# Patient Record
Sex: FEMALE | Race: WHITE | Employment: OTHER | URBAN - NONMETROPOLITAN AREA
[De-identification: names, ages, dates, MRNs, and addresses within clinical notes are randomized per-mention and may not be internally consistent; named-entity substitution may affect disease eponyms.]

---

## 2017-01-05 ENCOUNTER — OFFICE VISIT (OUTPATIENT)
Dept: GASTROENTEROLOGY | Age: 72
End: 2017-01-05
Payer: MEDICARE

## 2017-01-05 VITALS
SYSTOLIC BLOOD PRESSURE: 130 MMHG | DIASTOLIC BLOOD PRESSURE: 76 MMHG | HEART RATE: 77 BPM | RESPIRATION RATE: 16 BRPM | BODY MASS INDEX: 27.49 KG/M2 | HEIGHT: 64 IN | OXYGEN SATURATION: 96 % | WEIGHT: 161 LBS

## 2017-01-05 DIAGNOSIS — Z86.010 HX OF ADENOMATOUS COLONIC POLYPS: ICD-10-CM

## 2017-01-05 DIAGNOSIS — Z86.010 PERSONAL HISTORY OF COLONIC POLYPS: Primary | ICD-10-CM

## 2017-01-05 PROCEDURE — 99214 OFFICE O/P EST MOD 30 MIN: CPT | Performed by: NURSE PRACTITIONER

## 2017-01-05 ASSESSMENT — ENCOUNTER SYMPTOMS
ALLERGIC/IMMUNOLOGIC NEGATIVE: 1
ABDOMINAL PAIN: 0
CONSTIPATION: 0
SHORTNESS OF BREATH: 0
COUGH: 0
VOICE CHANGE: 0
DIARRHEA: 0
RECTAL PAIN: 0
VOMITING: 0
NAUSEA: 0
BACK PAIN: 0
SORE THROAT: 0
CHEST TIGHTNESS: 0
BLOOD IN STOOL: 0
EYES NEGATIVE: 1

## 2017-01-09 ENCOUNTER — ANESTHESIA EVENT (OUTPATIENT)
Dept: ENDOSCOPY | Age: 72
End: 2017-01-09
Payer: MEDICARE

## 2017-01-11 ENCOUNTER — ANESTHESIA (OUTPATIENT)
Dept: ENDOSCOPY | Age: 72
End: 2017-01-11
Payer: MEDICARE

## 2017-01-11 ENCOUNTER — APPOINTMENT (OUTPATIENT)
Dept: GENERAL RADIOLOGY | Age: 72
End: 2017-01-11
Attending: INTERNAL MEDICINE
Payer: MEDICARE

## 2017-01-11 ENCOUNTER — SURGERY (OUTPATIENT)
Age: 72
End: 2017-01-11

## 2017-01-11 VITALS
OXYGEN SATURATION: 96 % | SYSTOLIC BLOOD PRESSURE: 149 MMHG | DIASTOLIC BLOOD PRESSURE: 86 MMHG | RESPIRATION RATE: 5 BRPM

## 2017-01-11 PROCEDURE — 6360000002 HC RX W HCPCS: Performed by: NURSE ANESTHETIST, CERTIFIED REGISTERED

## 2017-01-11 PROCEDURE — 2500000003 HC RX 250 WO HCPCS: Performed by: NURSE ANESTHETIST, CERTIFIED REGISTERED

## 2017-01-11 PROCEDURE — 74328 X-RAY BILE DUCT ENDOSCOPY: CPT

## 2017-01-11 RX ORDER — SUCCINYLCHOLINE CHLORIDE 20 MG/ML
INJECTION INTRAMUSCULAR; INTRAVENOUS PRN
Status: DISCONTINUED | OUTPATIENT
Start: 2017-01-11 | End: 2017-01-11 | Stop reason: SDUPTHER

## 2017-01-11 RX ORDER — MIDAZOLAM HYDROCHLORIDE 1 MG/ML
INJECTION INTRAMUSCULAR; INTRAVENOUS PRN
Status: DISCONTINUED | OUTPATIENT
Start: 2017-01-11 | End: 2017-01-11 | Stop reason: SDUPTHER

## 2017-01-11 RX ORDER — ROCURONIUM BROMIDE 10 MG/ML
INJECTION, SOLUTION INTRAVENOUS PRN
Status: DISCONTINUED | OUTPATIENT
Start: 2017-01-11 | End: 2017-01-11 | Stop reason: SDUPTHER

## 2017-01-11 RX ORDER — PROPOFOL 10 MG/ML
INJECTION, EMULSION INTRAVENOUS PRN
Status: DISCONTINUED | OUTPATIENT
Start: 2017-01-11 | End: 2017-01-11 | Stop reason: SDUPTHER

## 2017-01-11 RX ORDER — LIDOCAINE HYDROCHLORIDE 10 MG/ML
INJECTION, SOLUTION INFILTRATION; PERINEURAL PRN
Status: DISCONTINUED | OUTPATIENT
Start: 2017-01-11 | End: 2017-01-11 | Stop reason: SDUPTHER

## 2017-01-11 RX ADMIN — SUGAMMADEX 145 MG: 100 INJECTION, SOLUTION INTRAVENOUS at 08:32

## 2017-01-11 RX ADMIN — SUCCINYLCHOLINE CHLORIDE 100 MG: 20 INJECTION, SOLUTION INTRAMUSCULAR; INTRAVENOUS; PARENTERAL at 08:07

## 2017-01-11 RX ADMIN — PROPOFOL 150 MG: 10 INJECTION, EMULSION INTRAVENOUS at 08:04

## 2017-01-11 RX ADMIN — LIDOCAINE HYDROCHLORIDE 40 MG: 10 INJECTION, SOLUTION INFILTRATION; PERINEURAL at 08:04

## 2017-01-11 RX ADMIN — PROPOFOL 50 MG: 10 INJECTION, EMULSION INTRAVENOUS at 08:07

## 2017-01-11 RX ADMIN — ROCURONIUM BROMIDE 15 MG: 10 INJECTION INTRAVENOUS at 08:10

## 2017-01-11 RX ADMIN — ROCURONIUM BROMIDE 5 MG: 10 INJECTION INTRAVENOUS at 08:04

## 2017-01-11 RX ADMIN — INDOMETHACIN 100 MG: 50 SUPPOSITORY RECTAL at 08:19

## 2017-01-11 RX ADMIN — MIDAZOLAM HYDROCHLORIDE 2 MG: 1 INJECTION, SOLUTION INTRAMUSCULAR; INTRAVENOUS at 08:03

## 2017-01-20 DIAGNOSIS — R97.8 ELEVATED CA 19-9 LEVEL: ICD-10-CM

## 2017-01-20 DIAGNOSIS — K83.1 AMPULLARY STENOSIS: Primary | ICD-10-CM

## 2017-01-20 DIAGNOSIS — K83.1 BILIARY STRICTURE: ICD-10-CM

## 2017-01-27 ENCOUNTER — HOSPITAL ENCOUNTER (OUTPATIENT)
Dept: CT IMAGING | Age: 72
Discharge: HOME | End: 2017-01-27
Payer: MEDICARE

## 2017-01-27 DIAGNOSIS — R97.8 ELEVATED CA 19-9 LEVEL: ICD-10-CM

## 2017-01-27 DIAGNOSIS — K83.1 BILIARY STRICTURE: ICD-10-CM

## 2017-01-27 DIAGNOSIS — K83.1 AMPULLARY STENOSIS: ICD-10-CM

## 2017-01-27 LAB
GFR NON-AFRICAN AMERICAN: 37
PERFORMED ON: ABNORMAL
POC CREATININE: 1.4 MG/DL (ref 0.3–1.3)
POC SAMPLE TYPE: ABNORMAL

## 2017-01-27 PROCEDURE — 82565 ASSAY OF CREATININE: CPT

## 2017-01-27 PROCEDURE — 74177 CT ABD & PELVIS W/CONTRAST: CPT

## 2017-01-27 PROCEDURE — 6360000004 HC RX CONTRAST MEDICATION

## 2017-02-01 ENCOUNTER — HOSPITAL ENCOUNTER (INPATIENT)
Age: 72
LOS: 2 days | Discharge: NURSING, REHAB OR LONG-TERM CARE | DRG: 552 | End: 2017-02-03
Attending: EMERGENCY MEDICINE | Admitting: FAMILY MEDICINE
Payer: MEDICARE

## 2017-02-01 ENCOUNTER — APPOINTMENT (OUTPATIENT)
Dept: CT IMAGING | Age: 72
DRG: 552 | End: 2017-02-01
Payer: MEDICARE

## 2017-02-01 DIAGNOSIS — R79.89 ELEVATED SERUM CREATININE: ICD-10-CM

## 2017-02-01 DIAGNOSIS — R73.9 HYPERGLYCEMIA: ICD-10-CM

## 2017-02-01 DIAGNOSIS — S32.029A CLOSED FRACTURE OF SECOND LUMBAR VERTEBRA, UNSPECIFIED FRACTURE MORPHOLOGY, INITIAL ENCOUNTER (HCC): ICD-10-CM

## 2017-02-01 DIAGNOSIS — R93.89 ABNORMAL CT SCAN: ICD-10-CM

## 2017-02-01 DIAGNOSIS — S32.019A CLOSED FRACTURE OF FIRST LUMBAR VERTEBRA, UNSPECIFIED FRACTURE MORPHOLOGY, INITIAL ENCOUNTER (HCC): ICD-10-CM

## 2017-02-01 DIAGNOSIS — R74.8 LIVER ENZYME ELEVATION: ICD-10-CM
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
## 2017-02-01 LAB
ALBUMIN SERPL-MCNC: 3.5 G/DL (ref 3.5–5.2)
ALP BLD-CCNC: 104 U/L (ref 35–104)
ALT SERPL-CCNC: 45 U/L (ref 5–33)
ANION GAP SERPL CALCULATED.3IONS-SCNC: 19 MMOL/L (ref 7–19)
AST SERPL-CCNC: 63 U/L (ref 5–32)
BILIRUB SERPL-MCNC: 0.6 MG/DL (ref 0.2–1.2)
BUN BLDV-MCNC: 20 MG/DL (ref 8–23)
CALCIUM SERPL-MCNC: 9.6 MG/DL (ref 8.8–10.2)
CHLORIDE BLD-SCNC: 97 MMOL/L (ref 98–111)
CO2: 25 MMOL/L (ref 22–29)
CREAT SERPL-MCNC: 1.1 MG/DL (ref 0.5–0.9)
GFR NON-AFRICAN AMERICAN: 49
GLOBULIN: 4.3 G/DL
GLUCOSE BLD-MCNC: 337 MG/DL (ref 74–109)
HCT VFR BLD CALC: 39.7 % (ref 37–47)
HEMOGLOBIN: 13.6 G/DL (ref 12–16)
LACTIC ACID: 1 MG/DL (ref 0.5–1.9)
LIPASE: 60 U/L (ref 13–60)
MCH RBC QN AUTO: 31.6 PG (ref 27–31)
MCHC RBC AUTO-ENTMCNC: 34.3 G/DL (ref 33–37)
MCV RBC AUTO: 92.3 FL (ref 81–99)
PDW BLD-RTO: 12.7 % (ref 11.5–14.5)
PERFORMED ON: NORMAL
PLATELET # BLD: 154 K/UL (ref 130–400)
PMV BLD AUTO: 11.4 FL (ref 7.4–10.4)
POC TROPONIN I: 0 NG/ML (ref 0–0.08)
POTASSIUM SERPL-SCNC: 3.9 MMOL/L (ref 3.5–5)
RBC # BLD: 4.3 M/UL (ref 4.2–5.4)
SODIUM BLD-SCNC: 141 MMOL/L (ref 136–145)
TOTAL PROTEIN: 7.8 G/DL (ref 6.6–8.7)
WBC # BLD: 10.1 K/UL (ref 4.8–10.8)

## 2017-02-01 PROCEDURE — 72131 CT LUMBAR SPINE W/O DYE: CPT

## 2017-02-01 PROCEDURE — 36415 COLL VENOUS BLD VENIPUNCTURE: CPT

## 2017-02-01 PROCEDURE — 96374 THER/PROPH/DIAG INJ IV PUSH: CPT

## 2017-02-01 PROCEDURE — 96375 TX/PRO/DX INJ NEW DRUG ADDON: CPT

## 2017-02-01 PROCEDURE — 74177 CT ABD & PELVIS W/CONTRAST: CPT

## 2017-02-01 PROCEDURE — 2580000003 HC RX 258: Performed by: EMERGENCY MEDICINE

## 2017-02-01 PROCEDURE — 80053 COMPREHEN METABOLIC PANEL: CPT

## 2017-02-01 PROCEDURE — 83605 ASSAY OF LACTIC ACID: CPT

## 2017-02-01 PROCEDURE — 85027 COMPLETE CBC AUTOMATED: CPT

## 2017-02-01 PROCEDURE — 6360000004 HC RX CONTRAST MEDICATION: Performed by: EMERGENCY MEDICINE

## 2017-02-01 PROCEDURE — 93005 ELECTROCARDIOGRAM TRACING: CPT

## 2017-02-01 PROCEDURE — 6370000000 HC RX 637 (ALT 250 FOR IP)

## 2017-02-01 PROCEDURE — 83690 ASSAY OF LIPASE: CPT

## 2017-02-01 PROCEDURE — 84484 ASSAY OF TROPONIN QUANT: CPT

## 2017-02-01 PROCEDURE — 1210000000 HC MED SURG R&B

## 2017-02-01 PROCEDURE — 99284 EMERGENCY DEPT VISIT MOD MDM: CPT | Performed by: EMERGENCY MEDICINE

## 2017-02-01 PROCEDURE — 6360000002 HC RX W HCPCS: Performed by: EMERGENCY MEDICINE

## 2017-02-01 PROCEDURE — 99285 EMERGENCY DEPT VISIT HI MDM: CPT

## 2017-02-01 PROCEDURE — 6370000000 HC RX 637 (ALT 250 FOR IP): Performed by: EMERGENCY MEDICINE

## 2017-02-01 RX ORDER — ONDANSETRON 2 MG/ML
4 INJECTION INTRAMUSCULAR; INTRAVENOUS ONCE
Status: COMPLETED | OUTPATIENT
Start: 2017-02-01 | End: 2017-02-01

## 2017-02-01 RX ORDER — MORPHINE SULFATE 4 MG/ML
4 INJECTION, SOLUTION INTRAMUSCULAR; INTRAVENOUS ONCE
Status: COMPLETED | OUTPATIENT
Start: 2017-02-01 | End: 2017-02-01

## 2017-02-01 RX ORDER — SODIUM CHLORIDE 0.9 % (FLUSH) 0.9 %
10 SYRINGE (ML) INJECTION PRN
Status: DISCONTINUED | OUTPATIENT
Start: 2017-02-01 | End: 2017-02-03 | Stop reason: HOSPADM

## 2017-02-01 RX ORDER — ONDANSETRON 2 MG/ML
4 INJECTION INTRAMUSCULAR; INTRAVENOUS EVERY 8 HOURS PRN
Status: DISCONTINUED | OUTPATIENT
Start: 2017-02-01 | End: 2017-02-03 | Stop reason: HOSPADM

## 2017-02-01 RX ORDER — CLONIDINE HYDROCHLORIDE 0.1 MG/1
TABLET ORAL
Status: COMPLETED
Start: 2017-02-01 | End: 2017-02-01

## 2017-02-01 RX ORDER — SODIUM CHLORIDE 0.9 % (FLUSH) 0.9 %
10 SYRINGE (ML) INJECTION EVERY 12 HOURS SCHEDULED
Status: DISCONTINUED | OUTPATIENT
Start: 2017-02-01 | End: 2017-02-03 | Stop reason: HOSPADM

## 2017-02-01 RX ORDER — GLIMEPIRIDE 4 MG/1
4 TABLET ORAL ONCE
Status: COMPLETED | OUTPATIENT
Start: 2017-02-01 | End: 2017-02-01

## 2017-02-01 RX ORDER — SODIUM CHLORIDE 9 MG/ML
INJECTION, SOLUTION INTRAVENOUS CONTINUOUS
Status: DISCONTINUED | OUTPATIENT
Start: 2017-02-01 | End: 2017-02-01

## 2017-02-01 RX ORDER — CLONIDINE HYDROCHLORIDE 0.1 MG/1
0.1 TABLET ORAL ONCE
Status: COMPLETED | OUTPATIENT
Start: 2017-02-01 | End: 2017-02-01

## 2017-02-01 RX ORDER — MORPHINE SULFATE 4 MG/ML
2 INJECTION, SOLUTION INTRAMUSCULAR; INTRAVENOUS
Status: DISCONTINUED | OUTPATIENT
Start: 2017-02-01 | End: 2017-02-03 | Stop reason: HOSPADM

## 2017-02-01 RX ORDER — SODIUM CHLORIDE 9 MG/ML
INJECTION, SOLUTION INTRAVENOUS CONTINUOUS
Status: DISCONTINUED | OUTPATIENT
Start: 2017-02-01 | End: 2017-02-03 | Stop reason: HOSPADM

## 2017-02-01 RX ADMIN — IOVERSOL 90 ML: 741 INJECTION INTRA-ARTERIAL; INTRAVENOUS at 20:56

## 2017-02-01 RX ADMIN — GLIMEPIRIDE 4 MG: 4 TABLET ORAL at 22:30

## 2017-02-01 RX ADMIN — CLONIDINE HYDROCHLORIDE 0.1 MG: 0.1 TABLET ORAL at 22:31

## 2017-02-01 RX ADMIN — ONDANSETRON 4 MG: 2 INJECTION INTRAMUSCULAR; INTRAVENOUS at 21:12

## 2017-02-01 RX ADMIN — MORPHINE SULFATE 4 MG: 4 INJECTION, SOLUTION INTRAMUSCULAR; INTRAVENOUS at 21:12

## 2017-02-01 RX ADMIN — SODIUM CHLORIDE: 9 INJECTION, SOLUTION INTRAVENOUS at 21:12

## 2017-02-01 ASSESSMENT — PAIN SCALES - GENERAL
PAINLEVEL_OUTOF10: 10
PAINLEVEL_OUTOF10: 10

## 2017-02-01 ASSESSMENT — ENCOUNTER SYMPTOMS
DIARRHEA: 0
EYE PAIN: 0
SHORTNESS OF BREATH: 0
ABDOMINAL PAIN: 0
NAUSEA: 1
BACK PAIN: 1
VOMITING: 1

## 2017-02-01 ASSESSMENT — PAIN DESCRIPTION - PAIN TYPE: TYPE: ACUTE PAIN

## 2017-02-01 ASSESSMENT — PAIN DESCRIPTION - LOCATION: LOCATION: BACK

## 2017-02-02 ENCOUNTER — APPOINTMENT (OUTPATIENT)
Dept: MRI IMAGING | Age: 72
DRG: 552 | End: 2017-02-02
Payer: MEDICARE

## 2017-02-02 PROBLEM — S32.010A CLOSED COMPRESSION FRACTURE OF FIRST LUMBAR VERTEBRA (HCC): Status: ACTIVE | Noted: 2017-02-02

## 2017-02-02 PROBLEM — S32.020A CLOSED COMPRESSION FRACTURE OF SECOND LUMBAR VERTEBRA (HCC): Status: ACTIVE | Noted: 2017-02-02

## 2017-02-02 LAB
BILIRUBIN URINE: NEGATIVE
BLOOD, URINE: ABNORMAL
CLARITY: CLEAR
COLOR: YELLOW
GLUCOSE BLD-MCNC: 432 MG/DL (ref 70–99)
GLUCOSE BLD-MCNC: 447 MG/DL (ref 70–99)
GLUCOSE BLD-MCNC: 516 MG/DL (ref 70–99)
GLUCOSE BLD-MCNC: 522 MG/DL (ref 70–99)
GLUCOSE BLD-MCNC: 532 MG/DL (ref 70–99)
GLUCOSE BLD-MCNC: 562 MG/DL (ref 74–109)
GLUCOSE URINE: >=1000 MG/DL
KETONES, URINE: 40 MG/DL
LEUKOCYTE ESTERASE, URINE: NEGATIVE
NITRITE, URINE: NEGATIVE
PERFORMED ON: ABNORMAL
PH UA: 6
PROTEIN UA: >=1000 MG/DL
SPECIFIC GRAVITY UA: 1.04
UROBILINOGEN, URINE: 0.2 E.U./DL

## 2017-02-02 PROCEDURE — 81003 URINALYSIS AUTO W/O SCOPE: CPT

## 2017-02-02 PROCEDURE — 82948 REAGENT STRIP/BLOOD GLUCOSE: CPT

## 2017-02-02 PROCEDURE — 1210000000 HC MED SURG R&B

## 2017-02-02 PROCEDURE — 6360000002 HC RX W HCPCS: Performed by: FAMILY MEDICINE

## 2017-02-02 PROCEDURE — 6370000000 HC RX 637 (ALT 250 FOR IP): Performed by: FAMILY MEDICINE

## 2017-02-02 PROCEDURE — 82947 ASSAY GLUCOSE BLOOD QUANT: CPT

## 2017-02-02 PROCEDURE — 72148 MRI LUMBAR SPINE W/O DYE: CPT

## 2017-02-02 PROCEDURE — 36415 COLL VENOUS BLD VENIPUNCTURE: CPT

## 2017-02-02 PROCEDURE — 2580000003 HC RX 258: Performed by: FAMILY MEDICINE

## 2017-02-02 RX ORDER — NEBIVOLOL 5 MG/1
10 TABLET ORAL DAILY
Status: DISCONTINUED | OUTPATIENT
Start: 2017-02-02 | End: 2017-02-03 | Stop reason: HOSPADM

## 2017-02-02 RX ORDER — LOSARTAN POTASSIUM 100 MG/1
100 TABLET ORAL DAILY
Status: DISCONTINUED | OUTPATIENT
Start: 2017-02-02 | End: 2017-02-03 | Stop reason: HOSPADM

## 2017-02-02 RX ORDER — OLMESARTAN MEDOXOMIL, AMLODIPINE AND HYDROCHLOROTHIAZIDE TABLET 40/10/25 MG 40; 10; 25 MG/1; MG/1; MG/1
1 TABLET ORAL DAILY
Status: DISCONTINUED | OUTPATIENT
Start: 2017-02-02 | End: 2017-02-02 | Stop reason: CLARIF

## 2017-02-02 RX ORDER — NICOTINE POLACRILEX 4 MG
15 LOZENGE BUCCAL PRN
Status: DISCONTINUED | OUTPATIENT
Start: 2017-02-02 | End: 2017-02-03 | Stop reason: HOSPADM

## 2017-02-02 RX ORDER — DEXTROSE MONOHYDRATE 50 MG/ML
100 INJECTION, SOLUTION INTRAVENOUS PRN
Status: DISCONTINUED | OUTPATIENT
Start: 2017-02-02 | End: 2017-02-03 | Stop reason: HOSPADM

## 2017-02-02 RX ORDER — DEXTROSE MONOHYDRATE 25 G/50ML
12.5 INJECTION, SOLUTION INTRAVENOUS PRN
Status: DISCONTINUED | OUTPATIENT
Start: 2017-02-02 | End: 2017-02-03 | Stop reason: HOSPADM

## 2017-02-02 RX ORDER — GLIMEPIRIDE 4 MG/1
4 TABLET ORAL 2 TIMES DAILY
Status: DISCONTINUED | OUTPATIENT
Start: 2017-02-02 | End: 2017-02-03

## 2017-02-02 RX ORDER — AMLODIPINE BESYLATE 10 MG/1
10 TABLET ORAL DAILY
Status: DISCONTINUED | OUTPATIENT
Start: 2017-02-02 | End: 2017-02-03 | Stop reason: HOSPADM

## 2017-02-02 RX ORDER — INSULIN GLARGINE 100 [IU]/ML
10 INJECTION, SOLUTION SUBCUTANEOUS ONCE
Status: COMPLETED | OUTPATIENT
Start: 2017-02-02 | End: 2017-02-02

## 2017-02-02 RX ORDER — HYDROCHLOROTHIAZIDE 25 MG/1
25 TABLET ORAL DAILY
Status: DISCONTINUED | OUTPATIENT
Start: 2017-02-02 | End: 2017-02-03 | Stop reason: HOSPADM

## 2017-02-02 RX ORDER — POTASSIUM CHLORIDE 20 MEQ/1
20 TABLET, EXTENDED RELEASE ORAL 2 TIMES DAILY
Status: DISCONTINUED | OUTPATIENT
Start: 2017-02-02 | End: 2017-02-03 | Stop reason: HOSPADM

## 2017-02-02 RX ADMIN — LOSARTAN POTASSIUM 100 MG: 100 TABLET ORAL at 09:17

## 2017-02-02 RX ADMIN — INSULIN GLARGINE 10 UNITS: 100 INJECTION, SOLUTION SUBCUTANEOUS at 16:01

## 2017-02-02 RX ADMIN — ONDANSETRON 4 MG: 2 INJECTION INTRAMUSCULAR; INTRAVENOUS at 09:28

## 2017-02-02 RX ADMIN — MORPHINE SULFATE 2 MG: 4 INJECTION, SOLUTION INTRAMUSCULAR; INTRAVENOUS at 04:47

## 2017-02-02 RX ADMIN — INSULIN LISPRO 10 UNITS: 100 INJECTION, SOLUTION INTRAVENOUS; SUBCUTANEOUS at 21:32

## 2017-02-02 RX ADMIN — MORPHINE SULFATE 2 MG: 4 INJECTION, SOLUTION INTRAMUSCULAR; INTRAVENOUS at 15:58

## 2017-02-02 RX ADMIN — MORPHINE SULFATE 2 MG: 4 INJECTION, SOLUTION INTRAMUSCULAR; INTRAVENOUS at 21:38

## 2017-02-02 RX ADMIN — ENOXAPARIN SODIUM 40 MG: 40 INJECTION SUBCUTANEOUS at 09:17

## 2017-02-02 RX ADMIN — POTASSIUM CHLORIDE 20 MEQ: 20 TABLET, EXTENDED RELEASE ORAL at 09:17

## 2017-02-02 RX ADMIN — INSULIN LISPRO 18 UNITS: 100 INJECTION, SOLUTION INTRAVENOUS; SUBCUTANEOUS at 12:27

## 2017-02-02 RX ADMIN — MORPHINE SULFATE 2 MG: 4 INJECTION, SOLUTION INTRAMUSCULAR; INTRAVENOUS at 01:19

## 2017-02-02 RX ADMIN — AMLODIPINE BESYLATE 10 MG: 10 TABLET ORAL at 09:17

## 2017-02-02 RX ADMIN — MORPHINE SULFATE 2 MG: 4 INJECTION, SOLUTION INTRAMUSCULAR; INTRAVENOUS at 09:08

## 2017-02-02 RX ADMIN — POTASSIUM CHLORIDE 20 MEQ: 20 TABLET, EXTENDED RELEASE ORAL at 20:37

## 2017-02-02 RX ADMIN — LINAGLIPTIN 5 MG: 5 TABLET, FILM COATED ORAL at 09:17

## 2017-02-02 RX ADMIN — HYDROCHLOROTHIAZIDE 25 MG: 25 TABLET ORAL at 09:17

## 2017-02-02 RX ADMIN — MORPHINE SULFATE 2 MG: 4 INJECTION, SOLUTION INTRAMUSCULAR; INTRAVENOUS at 23:40

## 2017-02-02 RX ADMIN — GLIMEPIRIDE 4 MG: 4 TABLET ORAL at 20:37

## 2017-02-02 RX ADMIN — INSULIN LISPRO 10 UNITS: 100 INJECTION, SOLUTION INTRAVENOUS; SUBCUTANEOUS at 16:01

## 2017-02-02 RX ADMIN — MORPHINE SULFATE 2 MG: 4 INJECTION, SOLUTION INTRAMUSCULAR; INTRAVENOUS at 19:33

## 2017-02-02 RX ADMIN — NEBIVOLOL HYDROCHLORIDE 10 MG: 5 TABLET ORAL at 09:17

## 2017-02-02 RX ADMIN — ONDANSETRON 4 MG: 2 INJECTION INTRAMUSCULAR; INTRAVENOUS at 01:19

## 2017-02-02 RX ADMIN — INSULIN LISPRO 9 UNITS: 100 INJECTION, SOLUTION INTRAVENOUS; SUBCUTANEOUS at 19:38

## 2017-02-02 RX ADMIN — GLIMEPIRIDE 4 MG: 4 TABLET ORAL at 09:17

## 2017-02-02 RX ADMIN — SODIUM CHLORIDE: 9 INJECTION, SOLUTION INTRAVENOUS at 09:28

## 2017-02-02 ASSESSMENT — PAIN SCALES - GENERAL
PAINLEVEL_OUTOF10: 6
PAINLEVEL_OUTOF10: 10
PAINLEVEL_OUTOF10: 4
PAINLEVEL_OUTOF10: 10
PAINLEVEL_OUTOF10: 5
PAINLEVEL_OUTOF10: 10
PAINLEVEL_OUTOF10: 10

## 2017-02-03 ENCOUNTER — HOSPITAL ENCOUNTER (INPATIENT)
Age: 72
LOS: 11 days | Discharge: HOME HEALTH | DRG: 948 | End: 2017-02-14
Attending: PSYCHIATRY & NEUROLOGY | Admitting: PSYCHIATRY & NEUROLOGY
Payer: MEDICARE

## 2017-02-03 VITALS
WEIGHT: 150 LBS | RESPIRATION RATE: 19 BRPM | DIASTOLIC BLOOD PRESSURE: 81 MMHG | OXYGEN SATURATION: 99 % | HEART RATE: 64 BPM | BODY MASS INDEX: 26.58 KG/M2 | HEIGHT: 63 IN | SYSTOLIC BLOOD PRESSURE: 152 MMHG | TEMPERATURE: 98.8 F

## 2017-02-03 DIAGNOSIS — I10 ESSENTIAL HYPERTENSION: Primary | ICD-10-CM

## 2017-02-03 DIAGNOSIS — E11.9 TYPE 2 DIABETES MELLITUS WITHOUT COMPLICATION, WITHOUT LONG-TERM CURRENT USE OF INSULIN (HCC): ICD-10-CM

## 2017-02-03 DIAGNOSIS — S32.020A CLOSED COMPRESSION FRACTURE OF SECOND LUMBAR VERTEBRA (HCC): ICD-10-CM

## 2017-02-03 LAB
ALBUMIN SERPL-MCNC: 2.8 G/DL (ref 3.5–5.2)
ALP BLD-CCNC: 86 U/L (ref 35–104)
ALT SERPL-CCNC: 22 U/L (ref 5–33)
ANION GAP SERPL CALCULATED.3IONS-SCNC: 12 MMOL/L (ref 7–19)
ANION GAP SERPL CALCULATED.3IONS-SCNC: 16 MMOL/L (ref 7–19)
AST SERPL-CCNC: 17 U/L (ref 5–32)
BASOPHILS ABSOLUTE: 0 K/UL (ref 0–0.2)
BASOPHILS ABSOLUTE: 0 K/UL (ref 0–0.2)
BASOPHILS RELATIVE PERCENT: 0.1 % (ref 0–1)
BASOPHILS RELATIVE PERCENT: 0.1 % (ref 0–1)
BILIRUB SERPL-MCNC: 0.4 MG/DL (ref 0.2–1.2)
BUN BLDV-MCNC: 36 MG/DL (ref 8–23)
BUN BLDV-MCNC: 38 MG/DL (ref 8–23)
CALCIUM SERPL-MCNC: 8.8 MG/DL (ref 8.8–10.2)
CALCIUM SERPL-MCNC: 9.1 MG/DL (ref 8.8–10.2)
CHLORIDE BLD-SCNC: 101 MMOL/L (ref 98–111)
CHLORIDE BLD-SCNC: 105 MMOL/L (ref 98–111)
CO2: 24 MMOL/L (ref 22–29)
CO2: 24 MMOL/L (ref 22–29)
CREAT SERPL-MCNC: 1.5 MG/DL (ref 0.5–0.9)
CREAT SERPL-MCNC: 1.6 MG/DL (ref 0.5–0.9)
EOSINOPHILS ABSOLUTE: 0 K/UL (ref 0–0.6)
EOSINOPHILS ABSOLUTE: 0 K/UL (ref 0–0.6)
EOSINOPHILS RELATIVE PERCENT: 0 % (ref 0–5)
EOSINOPHILS RELATIVE PERCENT: 0.1 % (ref 0–5)
GFR NON-AFRICAN AMERICAN: 32
GFR NON-AFRICAN AMERICAN: 34
GLOBULIN: 3.5 G/DL
GLUCOSE BLD-MCNC: 152 MG/DL (ref 70–99)
GLUCOSE BLD-MCNC: 181 MG/DL (ref 74–109)
GLUCOSE BLD-MCNC: 187 MG/DL (ref 70–99)
GLUCOSE BLD-MCNC: 199 MG/DL (ref 70–99)
GLUCOSE BLD-MCNC: 246 MG/DL (ref 70–99)
GLUCOSE BLD-MCNC: 247 MG/DL (ref 74–109)
HCT VFR BLD CALC: 36.4 % (ref 37–47)
HCT VFR BLD CALC: 37.8 % (ref 37–47)
HEMOGLOBIN: 11.8 G/DL (ref 12–16)
HEMOGLOBIN: 12.7 G/DL (ref 12–16)
LYMPHOCYTES ABSOLUTE: 0.9 K/UL (ref 1.1–4.5)
LYMPHOCYTES ABSOLUTE: 1 K/UL (ref 1.1–4.5)
LYMPHOCYTES RELATIVE PERCENT: 6.5 % (ref 20–40)
LYMPHOCYTES RELATIVE PERCENT: 7.2 % (ref 20–40)
MCH RBC QN AUTO: 30.4 PG (ref 27–31)
MCH RBC QN AUTO: 31.5 PG (ref 27–31)
MCHC RBC AUTO-ENTMCNC: 32.4 G/DL (ref 33–37)
MCHC RBC AUTO-ENTMCNC: 33.6 G/DL (ref 33–37)
MCV RBC AUTO: 93.8 FL (ref 81–99)
MCV RBC AUTO: 93.8 FL (ref 81–99)
MONOCYTES ABSOLUTE: 0.5 K/UL (ref 0–0.9)
MONOCYTES ABSOLUTE: 0.7 K/UL (ref 0–0.9)
MONOCYTES RELATIVE PERCENT: 4 % (ref 0–10)
MONOCYTES RELATIVE PERCENT: 5.2 % (ref 0–10)
NEUTROPHILS ABSOLUTE: 12.1 K/UL (ref 1.5–7.5)
NEUTROPHILS ABSOLUTE: 12.3 K/UL (ref 1.5–7.5)
NEUTROPHILS RELATIVE PERCENT: 87.1 % (ref 50–65)
NEUTROPHILS RELATIVE PERCENT: 89.3 % (ref 50–65)
PDW BLD-RTO: 13.2 % (ref 11.5–14.5)
PDW BLD-RTO: 13.5 % (ref 11.5–14.5)
PERFORMED ON: ABNORMAL
PLATELET # BLD: 154 K/UL (ref 130–400)
PLATELET # BLD: 187 K/UL (ref 130–400)
PMV BLD AUTO: 11.1 FL (ref 7.4–10.4)
PMV BLD AUTO: 11.5 FL (ref 7.4–10.4)
POTASSIUM SERPL-SCNC: 3.7 MMOL/L (ref 3.5–5)
POTASSIUM SERPL-SCNC: 3.8 MMOL/L (ref 3.5–5)
PREALBUMIN: 20 MG/DL (ref 20–40)
RBC # BLD: 3.88 M/UL (ref 4.2–5.4)
RBC # BLD: 4.03 M/UL (ref 4.2–5.4)
SODIUM BLD-SCNC: 141 MMOL/L (ref 136–145)
SODIUM BLD-SCNC: 141 MMOL/L (ref 136–145)
TOTAL PROTEIN: 6.3 G/DL (ref 6.6–8.7)
WBC # BLD: 13.5 K/UL (ref 4.8–10.8)
WBC # BLD: 14.1 K/UL (ref 4.8–10.8)

## 2017-02-03 PROCEDURE — 6370000000 HC RX 637 (ALT 250 FOR IP): Performed by: FAMILY MEDICINE

## 2017-02-03 PROCEDURE — 2580000003 HC RX 258: Performed by: FAMILY MEDICINE

## 2017-02-03 PROCEDURE — 85025 COMPLETE CBC W/AUTO DIFF WBC: CPT

## 2017-02-03 PROCEDURE — 97163 PT EVAL HIGH COMPLEX 45 MIN: CPT

## 2017-02-03 PROCEDURE — 84134 ASSAY OF PREALBUMIN: CPT

## 2017-02-03 PROCEDURE — 99222 1ST HOSP IP/OBS MODERATE 55: CPT | Performed by: INTERNAL MEDICINE

## 2017-02-03 PROCEDURE — 1180000000 HC REHAB R&B

## 2017-02-03 PROCEDURE — G8988 SELF CARE GOAL STATUS: HCPCS

## 2017-02-03 PROCEDURE — G8978 MOBILITY CURRENT STATUS: HCPCS

## 2017-02-03 PROCEDURE — 80048 BASIC METABOLIC PNL TOTAL CA: CPT

## 2017-02-03 PROCEDURE — 94664 DEMO&/EVAL PT USE INHALER: CPT

## 2017-02-03 PROCEDURE — 80053 COMPREHEN METABOLIC PANEL: CPT

## 2017-02-03 PROCEDURE — 6370000000 HC RX 637 (ALT 250 FOR IP): Performed by: PSYCHIATRY & NEUROLOGY

## 2017-02-03 PROCEDURE — G8979 MOBILITY GOAL STATUS: HCPCS

## 2017-02-03 PROCEDURE — G8987 SELF CARE CURRENT STATUS: HCPCS

## 2017-02-03 PROCEDURE — 82948 REAGENT STRIP/BLOOD GLUCOSE: CPT

## 2017-02-03 PROCEDURE — 2700000000 HC OXYGEN THERAPY PER DAY

## 2017-02-03 PROCEDURE — 36415 COLL VENOUS BLD VENIPUNCTURE: CPT

## 2017-02-03 PROCEDURE — 6360000002 HC RX W HCPCS: Performed by: FAMILY MEDICINE

## 2017-02-03 PROCEDURE — 97167 OT EVAL HIGH COMPLEX 60 MIN: CPT

## 2017-02-03 RX ORDER — 0.9 % SODIUM CHLORIDE 0.9 %
1000 INTRAVENOUS SOLUTION INTRAVENOUS ONCE
Status: COMPLETED | OUTPATIENT
Start: 2017-02-03 | End: 2017-02-03

## 2017-02-03 RX ORDER — DEXTROSE MONOHYDRATE 50 MG/ML
100 INJECTION, SOLUTION INTRAVENOUS PRN
Status: CANCELLED | OUTPATIENT
Start: 2017-02-03

## 2017-02-03 RX ORDER — NICOTINE POLACRILEX 4 MG
15 LOZENGE BUCCAL PRN
Status: CANCELLED | OUTPATIENT
Start: 2017-02-03

## 2017-02-03 RX ORDER — SODIUM CHLORIDE 9 MG/ML
INJECTION, SOLUTION INTRAVENOUS CONTINUOUS
Status: DISCONTINUED | OUTPATIENT
Start: 2017-02-03 | End: 2017-02-07

## 2017-02-03 RX ORDER — ONDANSETRON 2 MG/ML
4 INJECTION INTRAMUSCULAR; INTRAVENOUS EVERY 8 HOURS PRN
Status: DISCONTINUED | OUTPATIENT
Start: 2017-02-03 | End: 2017-02-14 | Stop reason: HOSPADM

## 2017-02-03 RX ORDER — SODIUM PHOSPHATE, DIBASIC AND SODIUM PHOSPHATE, MONOBASIC 7; 19 G/133ML; G/133ML
1 ENEMA RECTAL
Status: ACTIVE | OUTPATIENT
Start: 2017-02-03 | End: 2017-02-03

## 2017-02-03 RX ORDER — MORPHINE SULFATE 4 MG/ML
2 INJECTION, SOLUTION INTRAMUSCULAR; INTRAVENOUS
Status: CANCELLED | OUTPATIENT
Start: 2017-02-03

## 2017-02-03 RX ORDER — DEXTROSE MONOHYDRATE 50 MG/ML
100 INJECTION, SOLUTION INTRAVENOUS PRN
Status: DISCONTINUED | OUTPATIENT
Start: 2017-02-03 | End: 2017-02-14 | Stop reason: HOSPADM

## 2017-02-03 RX ORDER — HYDROCODONE BITARTRATE AND ACETAMINOPHEN 5; 325 MG/1; MG/1
1 TABLET ORAL EVERY 6 HOURS PRN
Status: DISCONTINUED | OUTPATIENT
Start: 2017-02-03 | End: 2017-02-03 | Stop reason: HOSPADM

## 2017-02-03 RX ORDER — POTASSIUM CHLORIDE 20 MEQ/1
20 TABLET, EXTENDED RELEASE ORAL 2 TIMES DAILY
Status: CANCELLED | OUTPATIENT
Start: 2017-02-03

## 2017-02-03 RX ORDER — NEBIVOLOL 5 MG/1
10 TABLET ORAL DAILY
Status: CANCELLED | OUTPATIENT
Start: 2017-02-04

## 2017-02-03 RX ORDER — POLYETHYLENE GLYCOL 3350 17 G/17G
17 POWDER, FOR SOLUTION ORAL DAILY
Status: DISCONTINUED | OUTPATIENT
Start: 2017-02-03 | End: 2017-02-14 | Stop reason: HOSPADM

## 2017-02-03 RX ORDER — ONDANSETRON 2 MG/ML
4 INJECTION INTRAMUSCULAR; INTRAVENOUS EVERY 8 HOURS PRN
Status: CANCELLED | OUTPATIENT
Start: 2017-02-03

## 2017-02-03 RX ORDER — HYDROCODONE BITARTRATE AND ACETAMINOPHEN 5; 325 MG/1; MG/1
2 TABLET ORAL EVERY 6 HOURS PRN
Status: DISCONTINUED | OUTPATIENT
Start: 2017-02-03 | End: 2017-02-03 | Stop reason: HOSPADM

## 2017-02-03 RX ORDER — SODIUM CHLORIDE 9 MG/ML
INJECTION, SOLUTION INTRAVENOUS CONTINUOUS
Status: CANCELLED | OUTPATIENT
Start: 2017-02-03

## 2017-02-03 RX ORDER — INSULIN GLARGINE 100 [IU]/ML
20 INJECTION, SOLUTION SUBCUTANEOUS DAILY
Status: DISCONTINUED | OUTPATIENT
Start: 2017-02-03 | End: 2017-02-03 | Stop reason: HOSPADM

## 2017-02-03 RX ORDER — POTASSIUM CHLORIDE 20 MEQ/1
20 TABLET, EXTENDED RELEASE ORAL 2 TIMES DAILY
Status: DISCONTINUED | OUTPATIENT
Start: 2017-02-03 | End: 2017-02-14 | Stop reason: HOSPADM

## 2017-02-03 RX ORDER — HYDROCODONE BITARTRATE AND ACETAMINOPHEN 5; 325 MG/1; MG/1
1 TABLET ORAL EVERY 6 HOURS PRN
Status: DISCONTINUED | OUTPATIENT
Start: 2017-02-03 | End: 2017-02-04 | Stop reason: ALTCHOICE

## 2017-02-03 RX ORDER — SODIUM CHLORIDE 0.9 % (FLUSH) 0.9 %
10 SYRINGE (ML) INJECTION PRN
Status: CANCELLED | OUTPATIENT
Start: 2017-02-03

## 2017-02-03 RX ORDER — HYDROCHLOROTHIAZIDE 25 MG/1
25 TABLET ORAL DAILY
Status: CANCELLED | OUTPATIENT
Start: 2017-02-04

## 2017-02-03 RX ORDER — HYDROCHLOROTHIAZIDE 25 MG/1
25 TABLET ORAL DAILY
Status: DISCONTINUED | OUTPATIENT
Start: 2017-02-04 | End: 2017-02-14 | Stop reason: HOSPADM

## 2017-02-03 RX ORDER — HYDROCODONE BITARTRATE AND ACETAMINOPHEN 5; 325 MG/1; MG/1
2 TABLET ORAL EVERY 6 HOURS PRN
Status: DISCONTINUED | OUTPATIENT
Start: 2017-02-03 | End: 2017-02-04 | Stop reason: ALTCHOICE

## 2017-02-03 RX ORDER — INSULIN GLARGINE 100 [IU]/ML
20 INJECTION, SOLUTION SUBCUTANEOUS DAILY
Status: DISCONTINUED | OUTPATIENT
Start: 2017-02-04 | End: 2017-02-11

## 2017-02-03 RX ORDER — NICOTINE POLACRILEX 4 MG
15 LOZENGE BUCCAL PRN
Status: DISCONTINUED | OUTPATIENT
Start: 2017-02-03 | End: 2017-02-14 | Stop reason: HOSPADM

## 2017-02-03 RX ORDER — HYDROCODONE BITARTRATE AND ACETAMINOPHEN 5; 325 MG/1; MG/1
1 TABLET ORAL EVERY 6 HOURS PRN
Status: CANCELLED | OUTPATIENT
Start: 2017-02-03

## 2017-02-03 RX ORDER — DEXTROSE MONOHYDRATE 25 G/50ML
12.5 INJECTION, SOLUTION INTRAVENOUS PRN
Status: DISCONTINUED | OUTPATIENT
Start: 2017-02-03 | End: 2017-02-14 | Stop reason: HOSPADM

## 2017-02-03 RX ORDER — AMLODIPINE BESYLATE 10 MG/1
10 TABLET ORAL DAILY
Status: CANCELLED | OUTPATIENT
Start: 2017-02-04

## 2017-02-03 RX ORDER — AMLODIPINE BESYLATE 10 MG/1
10 TABLET ORAL DAILY
Status: DISCONTINUED | OUTPATIENT
Start: 2017-02-04 | End: 2017-02-14 | Stop reason: HOSPADM

## 2017-02-03 RX ORDER — ONDANSETRON 4 MG/1
4 TABLET, FILM COATED ORAL EVERY 6 HOURS PRN
Status: DISCONTINUED | OUTPATIENT
Start: 2017-02-03 | End: 2017-02-14 | Stop reason: HOSPADM

## 2017-02-03 RX ORDER — MORPHINE SULFATE 4 MG/ML
2 INJECTION, SOLUTION INTRAMUSCULAR; INTRAVENOUS
Status: DISCONTINUED | OUTPATIENT
Start: 2017-02-03 | End: 2017-02-14 | Stop reason: HOSPADM

## 2017-02-03 RX ORDER — SODIUM CHLORIDE 0.9 % (FLUSH) 0.9 %
10 SYRINGE (ML) INJECTION EVERY 12 HOURS SCHEDULED
Status: CANCELLED | OUTPATIENT
Start: 2017-02-03

## 2017-02-03 RX ORDER — HYDROCODONE BITARTRATE AND ACETAMINOPHEN 5; 325 MG/1; MG/1
2 TABLET ORAL EVERY 6 HOURS PRN
Status: CANCELLED | OUTPATIENT
Start: 2017-02-03

## 2017-02-03 RX ORDER — NEBIVOLOL 5 MG/1
10 TABLET ORAL DAILY
Status: DISCONTINUED | OUTPATIENT
Start: 2017-02-04 | End: 2017-02-14 | Stop reason: HOSPADM

## 2017-02-03 RX ORDER — LOSARTAN POTASSIUM 100 MG/1
100 TABLET ORAL DAILY
Status: CANCELLED | OUTPATIENT
Start: 2017-02-04

## 2017-02-03 RX ORDER — DOCUSATE SODIUM 100 MG/1
100 CAPSULE, LIQUID FILLED ORAL 2 TIMES DAILY
Status: DISCONTINUED | OUTPATIENT
Start: 2017-02-03 | End: 2017-02-14 | Stop reason: HOSPADM

## 2017-02-03 RX ORDER — LOSARTAN POTASSIUM 50 MG/1
100 TABLET ORAL DAILY
Status: DISCONTINUED | OUTPATIENT
Start: 2017-02-04 | End: 2017-02-14 | Stop reason: HOSPADM

## 2017-02-03 RX ORDER — DEXTROSE MONOHYDRATE 25 G/50ML
12.5 INJECTION, SOLUTION INTRAVENOUS PRN
Status: CANCELLED | OUTPATIENT
Start: 2017-02-03

## 2017-02-03 RX ORDER — SODIUM CHLORIDE 0.9 % (FLUSH) 0.9 %
10 SYRINGE (ML) INJECTION PRN
Status: DISCONTINUED | OUTPATIENT
Start: 2017-02-03 | End: 2017-02-14 | Stop reason: HOSPADM

## 2017-02-03 RX ORDER — BISACODYL 10 MG
10 SUPPOSITORY, RECTAL RECTAL DAILY PRN
Status: DISCONTINUED | OUTPATIENT
Start: 2017-02-03 | End: 2017-02-14 | Stop reason: HOSPADM

## 2017-02-03 RX ORDER — SODIUM CHLORIDE 0.9 % (FLUSH) 0.9 %
10 SYRINGE (ML) INJECTION EVERY 12 HOURS SCHEDULED
Status: DISCONTINUED | OUTPATIENT
Start: 2017-02-03 | End: 2017-02-09

## 2017-02-03 RX ORDER — INSULIN GLARGINE 100 [IU]/ML
20 INJECTION, SOLUTION SUBCUTANEOUS DAILY
Status: CANCELLED | OUTPATIENT
Start: 2017-02-04

## 2017-02-03 RX ORDER — ACETAMINOPHEN 325 MG/1
650 TABLET ORAL EVERY 4 HOURS PRN
Status: DISCONTINUED | OUTPATIENT
Start: 2017-02-03 | End: 2017-02-14 | Stop reason: HOSPADM

## 2017-02-03 RX ADMIN — DOCUSATE SODIUM 100 MG: 100 CAPSULE, LIQUID FILLED ORAL at 22:30

## 2017-02-03 RX ADMIN — AMLODIPINE BESYLATE 10 MG: 10 TABLET ORAL at 08:33

## 2017-02-03 RX ADMIN — SODIUM CHLORIDE 1000 ML: 9 INJECTION, SOLUTION INTRAVENOUS at 08:32

## 2017-02-03 RX ADMIN — POTASSIUM CHLORIDE 20 MEQ: 20 TABLET, EXTENDED RELEASE ORAL at 22:30

## 2017-02-03 RX ADMIN — MORPHINE SULFATE 2 MG: 4 INJECTION, SOLUTION INTRAMUSCULAR; INTRAVENOUS at 05:49

## 2017-02-03 RX ADMIN — HYDROCODONE BITARTRATE AND ACETAMINOPHEN 2 TABLET: 5; 325 TABLET ORAL at 16:50

## 2017-02-03 RX ADMIN — SODIUM CHLORIDE: 9 INJECTION, SOLUTION INTRAVENOUS at 16:51

## 2017-02-03 RX ADMIN — HYDROCHLOROTHIAZIDE 25 MG: 25 TABLET ORAL at 08:33

## 2017-02-03 RX ADMIN — MORPHINE SULFATE 2 MG: 4 INJECTION, SOLUTION INTRAMUSCULAR; INTRAVENOUS at 01:48

## 2017-02-03 RX ADMIN — LINAGLIPTIN 5 MG: 5 TABLET, FILM COATED ORAL at 08:33

## 2017-02-03 RX ADMIN — MORPHINE SULFATE 2 MG: 4 INJECTION, SOLUTION INTRAMUSCULAR; INTRAVENOUS at 07:07

## 2017-02-03 RX ADMIN — LOSARTAN POTASSIUM 100 MG: 100 TABLET ORAL at 08:41

## 2017-02-03 RX ADMIN — HYDROCODONE BITARTRATE AND ACETAMINOPHEN 2 TABLET: 5; 325 TABLET ORAL at 08:54

## 2017-02-03 RX ADMIN — ONDANSETRON 4 MG: 2 INJECTION INTRAMUSCULAR; INTRAVENOUS at 01:10

## 2017-02-03 RX ADMIN — INSULIN LISPRO 6 UNITS: 100 INJECTION, SOLUTION INTRAVENOUS; SUBCUTANEOUS at 08:34

## 2017-02-03 RX ADMIN — NEBIVOLOL HYDROCHLORIDE 10 MG: 5 TABLET ORAL at 08:32

## 2017-02-03 RX ADMIN — ONDANSETRON 4 MG: 2 INJECTION INTRAMUSCULAR; INTRAVENOUS at 12:34

## 2017-02-03 RX ADMIN — ENOXAPARIN SODIUM 40 MG: 40 INJECTION SUBCUTANEOUS at 08:32

## 2017-02-03 RX ADMIN — INSULIN GLARGINE 20 UNITS: 100 INJECTION, SOLUTION SUBCUTANEOUS at 08:35

## 2017-02-03 RX ADMIN — POTASSIUM CHLORIDE 20 MEQ: 20 TABLET, EXTENDED RELEASE ORAL at 08:33

## 2017-02-03 ASSESSMENT — PAIN DESCRIPTION - DESCRIPTORS
DESCRIPTORS: ACHING
DESCRIPTORS: ACHING

## 2017-02-03 ASSESSMENT — PAIN SCALES - GENERAL
PAINLEVEL_OUTOF10: 10
PAINLEVEL_OUTOF10: 8
PAINLEVEL_OUTOF10: 9
PAINLEVEL_OUTOF10: 9
PAINLEVEL_OUTOF10: 2
PAINLEVEL_OUTOF10: 4

## 2017-02-03 ASSESSMENT — PAIN DESCRIPTION - ORIENTATION
ORIENTATION: LEFT
ORIENTATION: LOWER
ORIENTATION: LOWER

## 2017-02-03 ASSESSMENT — PAIN DESCRIPTION - LOCATION
LOCATION: HIP
LOCATION: BACK
LOCATION: BACK

## 2017-02-03 ASSESSMENT — PAIN DESCRIPTION - PAIN TYPE: TYPE: ACUTE PAIN

## 2017-02-04 LAB
ANION GAP SERPL CALCULATED.3IONS-SCNC: 11 MMOL/L (ref 7–19)
BASOPHILS ABSOLUTE: 0 K/UL (ref 0–0.2)
BASOPHILS RELATIVE PERCENT: 0.1 % (ref 0–1)
BUN BLDV-MCNC: 30 MG/DL (ref 8–23)
CALCIUM SERPL-MCNC: 8.7 MG/DL (ref 8.8–10.2)
CHLORIDE BLD-SCNC: 109 MMOL/L (ref 98–111)
CO2: 24 MMOL/L (ref 22–29)
CREAT SERPL-MCNC: 1.3 MG/DL (ref 0.5–0.9)
EOSINOPHILS ABSOLUTE: 0 K/UL (ref 0–0.6)
EOSINOPHILS RELATIVE PERCENT: 0.3 % (ref 0–5)
GFR NON-AFRICAN AMERICAN: 41
GLUCOSE BLD-MCNC: 100 MG/DL (ref 70–99)
GLUCOSE BLD-MCNC: 112 MG/DL (ref 70–99)
GLUCOSE BLD-MCNC: 119 MG/DL (ref 74–109)
GLUCOSE BLD-MCNC: 148 MG/DL (ref 70–99)
GLUCOSE BLD-MCNC: 66 MG/DL (ref 70–99)
GLUCOSE BLD-MCNC: 71 MG/DL (ref 70–99)
HCT VFR BLD CALC: 36.4 % (ref 37–47)
HEMOGLOBIN: 12.3 G/DL (ref 12–16)
LYMPHOCYTES ABSOLUTE: 1 K/UL (ref 1.1–4.5)
LYMPHOCYTES RELATIVE PERCENT: 9 % (ref 20–40)
MCH RBC QN AUTO: 31.6 PG (ref 27–31)
MCHC RBC AUTO-ENTMCNC: 33.8 G/DL (ref 33–37)
MCV RBC AUTO: 93.6 FL (ref 81–99)
MONOCYTES ABSOLUTE: 0.6 K/UL (ref 0–0.9)
MONOCYTES RELATIVE PERCENT: 5.4 % (ref 0–10)
NEUTROPHILS ABSOLUTE: 9.1 K/UL (ref 1.5–7.5)
NEUTROPHILS RELATIVE PERCENT: 85.2 % (ref 50–65)
PDW BLD-RTO: 13 % (ref 11.5–14.5)
PERFORMED ON: ABNORMAL
PERFORMED ON: NORMAL
PLATELET # BLD: 146 K/UL (ref 130–400)
PMV BLD AUTO: 10.8 FL (ref 7.4–10.4)
POTASSIUM SERPL-SCNC: 3.4 MMOL/L (ref 3.5–5)
RBC # BLD: 3.89 M/UL (ref 4.2–5.4)
SODIUM BLD-SCNC: 144 MMOL/L (ref 136–145)
WBC # BLD: 10.7 K/UL (ref 4.8–10.8)

## 2017-02-04 PROCEDURE — 97163 PT EVAL HIGH COMPLEX 45 MIN: CPT

## 2017-02-04 PROCEDURE — 85025 COMPLETE CBC W/AUTO DIFF WBC: CPT

## 2017-02-04 PROCEDURE — 97535 SELF CARE MNGMENT TRAINING: CPT

## 2017-02-04 PROCEDURE — 2700000000 HC OXYGEN THERAPY PER DAY

## 2017-02-04 PROCEDURE — 97110 THERAPEUTIC EXERCISES: CPT

## 2017-02-04 PROCEDURE — 36415 COLL VENOUS BLD VENIPUNCTURE: CPT

## 2017-02-04 PROCEDURE — 97167 OT EVAL HIGH COMPLEX 60 MIN: CPT

## 2017-02-04 PROCEDURE — 82948 REAGENT STRIP/BLOOD GLUCOSE: CPT

## 2017-02-04 PROCEDURE — 6360000002 HC RX W HCPCS: Performed by: PSYCHIATRY & NEUROLOGY

## 2017-02-04 PROCEDURE — 97116 GAIT TRAINING THERAPY: CPT

## 2017-02-04 PROCEDURE — G8987 SELF CARE CURRENT STATUS: HCPCS

## 2017-02-04 PROCEDURE — 2580000003 HC RX 258: Performed by: FAMILY MEDICINE

## 2017-02-04 PROCEDURE — 1180000000 HC REHAB R&B

## 2017-02-04 PROCEDURE — 80048 BASIC METABOLIC PNL TOTAL CA: CPT

## 2017-02-04 PROCEDURE — 6370000000 HC RX 637 (ALT 250 FOR IP): Performed by: FAMILY MEDICINE

## 2017-02-04 PROCEDURE — 6370000000 HC RX 637 (ALT 250 FOR IP): Performed by: PSYCHIATRY & NEUROLOGY

## 2017-02-04 PROCEDURE — G8988 SELF CARE GOAL STATUS: HCPCS

## 2017-02-04 PROCEDURE — 99223 1ST HOSP IP/OBS HIGH 75: CPT | Performed by: PSYCHIATRY & NEUROLOGY

## 2017-02-04 PROCEDURE — 6360000002 HC RX W HCPCS: Performed by: FAMILY MEDICINE

## 2017-02-04 RX ORDER — OXYCODONE AND ACETAMINOPHEN 10; 325 MG/1; MG/1
1 TABLET ORAL EVERY 6 HOURS PRN
Status: DISCONTINUED | OUTPATIENT
Start: 2017-02-04 | End: 2017-02-06

## 2017-02-04 RX ORDER — ONDANSETRON 4 MG/1
TABLET, ORALLY DISINTEGRATING ORAL
Status: DISPENSED
Start: 2017-02-04 | End: 2017-02-04

## 2017-02-04 RX ORDER — ONDANSETRON 4 MG/1
TABLET, ORALLY DISINTEGRATING ORAL
Status: COMPLETED
Start: 2017-02-04 | End: 2017-02-05

## 2017-02-04 RX ADMIN — HYDROCODONE BITARTRATE AND ACETAMINOPHEN 1 TABLET: 5; 325 TABLET ORAL at 00:27

## 2017-02-04 RX ADMIN — ONDANSETRON 4 MG: 2 INJECTION INTRAMUSCULAR; INTRAVENOUS at 10:49

## 2017-02-04 RX ADMIN — POTASSIUM CHLORIDE 20 MEQ: 20 TABLET, EXTENDED RELEASE ORAL at 08:21

## 2017-02-04 RX ADMIN — ONDANSETRON HYDROCHLORIDE 4 MG: 4 TABLET, FILM COATED ORAL at 04:55

## 2017-02-04 RX ADMIN — HYDROCODONE BITARTRATE AND ACETAMINOPHEN 2 TABLET: 5; 325 TABLET ORAL at 09:00

## 2017-02-04 RX ADMIN — SODIUM CHLORIDE: 9 INJECTION, SOLUTION INTRAVENOUS at 02:58

## 2017-02-04 RX ADMIN — LINAGLIPTIN 5 MG: 5 TABLET, FILM COATED ORAL at 08:21

## 2017-02-04 RX ADMIN — ONDANSETRON HYDROCHLORIDE 4 MG: 4 TABLET, FILM COATED ORAL at 22:25

## 2017-02-04 RX ADMIN — HYDROCODONE BITARTRATE AND ACETAMINOPHEN 2 TABLET: 5; 325 TABLET ORAL at 16:02

## 2017-02-04 RX ADMIN — NEBIVOLOL HYDROCHLORIDE 10 MG: 5 TABLET ORAL at 08:21

## 2017-02-04 RX ADMIN — DOCUSATE SODIUM 100 MG: 100 CAPSULE, LIQUID FILLED ORAL at 22:18

## 2017-02-04 RX ADMIN — ONDANSETRON 4 MG: 2 INJECTION INTRAMUSCULAR; INTRAVENOUS at 22:27

## 2017-02-04 RX ADMIN — SODIUM CHLORIDE: 9 INJECTION, SOLUTION INTRAVENOUS at 22:25

## 2017-02-04 RX ADMIN — SODIUM CHLORIDE: 9 INJECTION, SOLUTION INTRAVENOUS at 12:26

## 2017-02-04 RX ADMIN — ENOXAPARIN SODIUM 40 MG: 40 INJECTION SUBCUTANEOUS at 08:21

## 2017-02-04 RX ADMIN — DOCUSATE SODIUM 100 MG: 100 CAPSULE, LIQUID FILLED ORAL at 08:21

## 2017-02-04 RX ADMIN — HYDROCHLOROTHIAZIDE 25 MG: 25 TABLET ORAL at 08:21

## 2017-02-04 RX ADMIN — AMLODIPINE BESYLATE 10 MG: 10 TABLET ORAL at 08:21

## 2017-02-04 RX ADMIN — POTASSIUM CHLORIDE 20 MEQ: 20 TABLET, EXTENDED RELEASE ORAL at 22:17

## 2017-02-04 RX ADMIN — OXYCODONE HYDROCHLORIDE AND ACETAMINOPHEN 1 TABLET: 10; 325 TABLET ORAL at 22:19

## 2017-02-04 RX ADMIN — INSULIN LISPRO 3 UNITS: 100 INJECTION, SOLUTION INTRAVENOUS; SUBCUTANEOUS at 12:25

## 2017-02-04 RX ADMIN — INSULIN GLARGINE 20 UNITS: 100 INJECTION, SOLUTION SUBCUTANEOUS at 08:21

## 2017-02-04 RX ADMIN — LOSARTAN POTASSIUM 100 MG: 50 TABLET, FILM COATED ORAL at 08:59

## 2017-02-04 ASSESSMENT — PAIN SCALES - GENERAL
PAINLEVEL_OUTOF10: 8
PAINLEVEL_OUTOF10: 8
PAINLEVEL_OUTOF10: 0
PAINLEVEL_OUTOF10: 8
PAINLEVEL_OUTOF10: 10
PAINLEVEL_OUTOF10: 0
PAINLEVEL_OUTOF10: 8
PAINLEVEL_OUTOF10: 10
PAINLEVEL_OUTOF10: 0
PAINLEVEL_OUTOF10: 7
PAINLEVEL_OUTOF10: 7

## 2017-02-04 ASSESSMENT — PAIN DESCRIPTION - ORIENTATION: ORIENTATION: LOWER

## 2017-02-04 ASSESSMENT — PAIN DESCRIPTION - DESCRIPTORS: DESCRIPTORS: ACHING

## 2017-02-04 ASSESSMENT — PAIN DESCRIPTION - LOCATION: LOCATION: BACK

## 2017-02-04 ASSESSMENT — PAIN DESCRIPTION - PAIN TYPE: TYPE: ACUTE PAIN

## 2017-02-04 ASSESSMENT — PAIN DESCRIPTION - FREQUENCY: FREQUENCY: CONTINUOUS

## 2017-02-05 LAB
GLUCOSE BLD-MCNC: 82 MG/DL (ref 70–99)
GLUCOSE BLD-MCNC: 83 MG/DL (ref 70–99)
GLUCOSE BLD-MCNC: 87 MG/DL (ref 70–99)
GLUCOSE BLD-MCNC: 87 MG/DL (ref 70–99)
PERFORMED ON: NORMAL

## 2017-02-05 PROCEDURE — 2580000003 HC RX 258: Performed by: FAMILY MEDICINE

## 2017-02-05 PROCEDURE — 6370000000 HC RX 637 (ALT 250 FOR IP): Performed by: FAMILY MEDICINE

## 2017-02-05 PROCEDURE — 2700000000 HC OXYGEN THERAPY PER DAY

## 2017-02-05 PROCEDURE — 82948 REAGENT STRIP/BLOOD GLUCOSE: CPT

## 2017-02-05 PROCEDURE — 6370000000 HC RX 637 (ALT 250 FOR IP): Performed by: PSYCHIATRY & NEUROLOGY

## 2017-02-05 PROCEDURE — 6360000002 HC RX W HCPCS: Performed by: FAMILY MEDICINE

## 2017-02-05 PROCEDURE — 1180000000 HC REHAB R&B

## 2017-02-05 PROCEDURE — 6360000002 HC RX W HCPCS

## 2017-02-05 PROCEDURE — 6360000002 HC RX W HCPCS: Performed by: PSYCHIATRY & NEUROLOGY

## 2017-02-05 RX ADMIN — DOCUSATE SODIUM 100 MG: 100 CAPSULE, LIQUID FILLED ORAL at 07:44

## 2017-02-05 RX ADMIN — LOSARTAN POTASSIUM 100 MG: 50 TABLET, FILM COATED ORAL at 07:44

## 2017-02-05 RX ADMIN — NEBIVOLOL HYDROCHLORIDE 10 MG: 5 TABLET ORAL at 07:44

## 2017-02-05 RX ADMIN — LINAGLIPTIN 5 MG: 5 TABLET, FILM COATED ORAL at 07:44

## 2017-02-05 RX ADMIN — ENOXAPARIN SODIUM 40 MG: 40 INJECTION SUBCUTANEOUS at 07:44

## 2017-02-05 RX ADMIN — HYDROCHLOROTHIAZIDE 25 MG: 25 TABLET ORAL at 07:45

## 2017-02-05 RX ADMIN — AMLODIPINE BESYLATE 10 MG: 10 TABLET ORAL at 07:44

## 2017-02-05 RX ADMIN — OXYCODONE HYDROCHLORIDE AND ACETAMINOPHEN 1 TABLET: 10; 325 TABLET ORAL at 05:34

## 2017-02-05 RX ADMIN — ONDANSETRON: 4 TABLET, ORALLY DISINTEGRATING ORAL at 02:36

## 2017-02-05 RX ADMIN — Medication 10 ML: at 20:40

## 2017-02-05 RX ADMIN — SODIUM CHLORIDE: 9 INJECTION, SOLUTION INTRAVENOUS at 09:40

## 2017-02-05 RX ADMIN — DOCUSATE SODIUM 100 MG: 100 CAPSULE, LIQUID FILLED ORAL at 20:35

## 2017-02-05 RX ADMIN — ACETAMINOPHEN 650 MG: 325 TABLET, FILM COATED ORAL at 14:39

## 2017-02-05 RX ADMIN — POTASSIUM CHLORIDE 20 MEQ: 20 TABLET, EXTENDED RELEASE ORAL at 07:45

## 2017-02-05 RX ADMIN — MAGNESIUM HYDROXIDE 30 ML: 400 SUSPENSION ORAL at 02:35

## 2017-02-05 RX ADMIN — INSULIN GLARGINE 20 UNITS: 100 INJECTION, SOLUTION SUBCUTANEOUS at 07:47

## 2017-02-05 RX ADMIN — OXYCODONE HYDROCHLORIDE AND ACETAMINOPHEN 1 TABLET: 10; 325 TABLET ORAL at 12:18

## 2017-02-05 RX ADMIN — ONDANSETRON HYDROCHLORIDE 4 MG: 4 TABLET, FILM COATED ORAL at 05:41

## 2017-02-05 RX ADMIN — OXYCODONE HYDROCHLORIDE AND ACETAMINOPHEN 1 TABLET: 10; 325 TABLET ORAL at 20:35

## 2017-02-05 RX ADMIN — POTASSIUM CHLORIDE 20 MEQ: 20 TABLET, EXTENDED RELEASE ORAL at 20:35

## 2017-02-05 RX ADMIN — ACETAMINOPHEN 650 MG: 325 TABLET, FILM COATED ORAL at 22:37

## 2017-02-05 ASSESSMENT — PAIN SCALES - GENERAL
PAINLEVEL_OUTOF10: 4
PAINLEVEL_OUTOF10: 9
PAINLEVEL_OUTOF10: 6
PAINLEVEL_OUTOF10: 8
PAINLEVEL_OUTOF10: 4
PAINLEVEL_OUTOF10: 8
PAINLEVEL_OUTOF10: 0
PAINLEVEL_OUTOF10: 7

## 2017-02-05 ASSESSMENT — PAIN DESCRIPTION - LOCATION
LOCATION: BACK
LOCATION: HEAD

## 2017-02-05 ASSESSMENT — PAIN DESCRIPTION - FREQUENCY: FREQUENCY: CONTINUOUS

## 2017-02-05 ASSESSMENT — PAIN DESCRIPTION - DESCRIPTORS: DESCRIPTORS: ACHING

## 2017-02-05 ASSESSMENT — PAIN DESCRIPTION - PAIN TYPE
TYPE: ACUTE PAIN
TYPE: CHRONIC PAIN

## 2017-02-06 LAB
ANION GAP SERPL CALCULATED.3IONS-SCNC: 13 MMOL/L (ref 7–19)
BASOPHILS ABSOLUTE: 0 K/UL (ref 0–0.2)
BASOPHILS RELATIVE PERCENT: 0.2 % (ref 0–1)
BUN BLDV-MCNC: 14 MG/DL (ref 8–23)
CALCIUM SERPL-MCNC: 8.3 MG/DL (ref 8.8–10.2)
CHLORIDE BLD-SCNC: 108 MMOL/L (ref 98–111)
CO2: 24 MMOL/L (ref 22–29)
CREAT SERPL-MCNC: 1.2 MG/DL (ref 0.5–0.9)
EOSINOPHILS ABSOLUTE: 0.1 K/UL (ref 0–0.6)
EOSINOPHILS RELATIVE PERCENT: 1.9 % (ref 0–5)
GFR NON-AFRICAN AMERICAN: 44
GLUCOSE BLD-MCNC: 124 MG/DL (ref 70–99)
GLUCOSE BLD-MCNC: 61 MG/DL (ref 70–99)
GLUCOSE BLD-MCNC: 65 MG/DL (ref 70–99)
GLUCOSE BLD-MCNC: 67 MG/DL (ref 70–99)
GLUCOSE BLD-MCNC: 71 MG/DL (ref 74–109)
GLUCOSE BLD-MCNC: 83 MG/DL (ref 70–99)
GLUCOSE BLD-MCNC: 92 MG/DL (ref 70–99)
HCT VFR BLD CALC: 38.2 % (ref 37–47)
HEMOGLOBIN: 12.8 G/DL (ref 12–16)
LYMPHOCYTES ABSOLUTE: 1 K/UL (ref 1.1–4.5)
LYMPHOCYTES RELATIVE PERCENT: 15.8 % (ref 20–40)
MCH RBC QN AUTO: 31 PG (ref 27–31)
MCHC RBC AUTO-ENTMCNC: 33.5 G/DL (ref 33–37)
MCV RBC AUTO: 92.5 FL (ref 81–99)
MONOCYTES ABSOLUTE: 0.5 K/UL (ref 0–0.9)
MONOCYTES RELATIVE PERCENT: 8.2 % (ref 0–10)
NEUTROPHILS ABSOLUTE: 4.8 K/UL (ref 1.5–7.5)
NEUTROPHILS RELATIVE PERCENT: 73.9 % (ref 50–65)
PDW BLD-RTO: 12.7 % (ref 11.5–14.5)
PERFORMED ON: ABNORMAL
PERFORMED ON: NORMAL
PERFORMED ON: NORMAL
PLATELET # BLD: 134 K/UL (ref 130–400)
PMV BLD AUTO: 10.9 FL (ref 7.4–10.4)
POTASSIUM SERPL-SCNC: 3.5 MMOL/L (ref 3.5–5)
RBC # BLD: 4.13 M/UL (ref 4.2–5.4)
SODIUM BLD-SCNC: 145 MMOL/L (ref 136–145)
WBC # BLD: 6.5 K/UL (ref 4.8–10.8)

## 2017-02-06 PROCEDURE — 80048 BASIC METABOLIC PNL TOTAL CA: CPT

## 2017-02-06 PROCEDURE — 6360000002 HC RX W HCPCS: Performed by: FAMILY MEDICINE

## 2017-02-06 PROCEDURE — 6370000000 HC RX 637 (ALT 250 FOR IP): Performed by: FAMILY MEDICINE

## 2017-02-06 PROCEDURE — 1180000000 HC REHAB R&B

## 2017-02-06 PROCEDURE — 2700000000 HC OXYGEN THERAPY PER DAY

## 2017-02-06 PROCEDURE — 97530 THERAPEUTIC ACTIVITIES: CPT

## 2017-02-06 PROCEDURE — 6370000000 HC RX 637 (ALT 250 FOR IP): Performed by: PSYCHIATRY & NEUROLOGY

## 2017-02-06 PROCEDURE — 36415 COLL VENOUS BLD VENIPUNCTURE: CPT

## 2017-02-06 PROCEDURE — 97110 THERAPEUTIC EXERCISES: CPT

## 2017-02-06 PROCEDURE — 99232 SBSQ HOSP IP/OBS MODERATE 35: CPT | Performed by: PSYCHIATRY & NEUROLOGY

## 2017-02-06 PROCEDURE — 82948 REAGENT STRIP/BLOOD GLUCOSE: CPT

## 2017-02-06 PROCEDURE — 2580000003 HC RX 258: Performed by: FAMILY MEDICINE

## 2017-02-06 PROCEDURE — 85025 COMPLETE CBC W/AUTO DIFF WBC: CPT

## 2017-02-06 PROCEDURE — 97116 GAIT TRAINING THERAPY: CPT

## 2017-02-06 RX ORDER — DOXAZOSIN 2 MG/1
2 TABLET ORAL EVERY 12 HOURS SCHEDULED
Status: DISCONTINUED | OUTPATIENT
Start: 2017-02-06 | End: 2017-02-10

## 2017-02-06 RX ORDER — BACITRACIN, NEOMYCIN, POLYMYXIN B 400; 3.5; 5 [USP'U]/G; MG/G; [USP'U]/G
OINTMENT TOPICAL 2 TIMES DAILY
Status: DISCONTINUED | OUTPATIENT
Start: 2017-02-06 | End: 2017-02-14 | Stop reason: HOSPADM

## 2017-02-06 RX ORDER — BUTALBITAL, ACETAMINOPHEN AND CAFFEINE 50; 325; 40 MG/1; MG/1; MG/1
1 TABLET ORAL EVERY 4 HOURS PRN
Status: DISCONTINUED | OUTPATIENT
Start: 2017-02-06 | End: 2017-02-14 | Stop reason: HOSPADM

## 2017-02-06 RX ORDER — HYDROCODONE BITARTRATE AND ACETAMINOPHEN 10; 325 MG/1; MG/1
1 TABLET ORAL EVERY 4 HOURS PRN
Status: DISCONTINUED | OUTPATIENT
Start: 2017-02-06 | End: 2017-02-14 | Stop reason: HOSPADM

## 2017-02-06 RX ADMIN — DOXAZOSIN 2 MG: 2 TABLET ORAL at 08:22

## 2017-02-06 RX ADMIN — DOXAZOSIN 2 MG: 2 TABLET ORAL at 20:27

## 2017-02-06 RX ADMIN — LOSARTAN POTASSIUM 100 MG: 50 TABLET, FILM COATED ORAL at 08:21

## 2017-02-06 RX ADMIN — Medication 10 ML: at 20:30

## 2017-02-06 RX ADMIN — POLYETHYLENE GLYCOL 3350 17 G: 17 POWDER, FOR SOLUTION ORAL at 08:20

## 2017-02-06 RX ADMIN — LINAGLIPTIN 5 MG: 5 TABLET, FILM COATED ORAL at 08:20

## 2017-02-06 RX ADMIN — ENOXAPARIN SODIUM 40 MG: 40 INJECTION SUBCUTANEOUS at 08:20

## 2017-02-06 RX ADMIN — HYDROCODONE BITARTRATE AND ACETAMINOPHEN 1 TABLET: 10; 325 TABLET ORAL at 15:42

## 2017-02-06 RX ADMIN — SODIUM CHLORIDE: 9 INJECTION, SOLUTION INTRAVENOUS at 08:13

## 2017-02-06 RX ADMIN — ONDANSETRON 4 MG: 2 INJECTION INTRAMUSCULAR; INTRAVENOUS at 06:00

## 2017-02-06 RX ADMIN — AMLODIPINE BESYLATE 10 MG: 10 TABLET ORAL at 08:21

## 2017-02-06 RX ADMIN — POLYMYXIN B SULFATE, BACITRACIN ZINC, NEOMYCIN SULFATE: 5000; 3.5; 4 OINTMENT TOPICAL at 22:34

## 2017-02-06 RX ADMIN — HYDROCHLOROTHIAZIDE 25 MG: 25 TABLET ORAL at 08:21

## 2017-02-06 RX ADMIN — DOCUSATE SODIUM 100 MG: 100 CAPSULE, LIQUID FILLED ORAL at 08:22

## 2017-02-06 RX ADMIN — BUTALBITAL, ACETAMINOPHEN, AND CAFFEINE 1 TABLET: 50; 325; 40 TABLET ORAL at 20:26

## 2017-02-06 RX ADMIN — POTASSIUM CHLORIDE 20 MEQ: 20 TABLET, EXTENDED RELEASE ORAL at 08:21

## 2017-02-06 RX ADMIN — INSULIN GLARGINE 20 UNITS: 100 INJECTION, SOLUTION SUBCUTANEOUS at 08:26

## 2017-02-06 RX ADMIN — POTASSIUM CHLORIDE 20 MEQ: 20 TABLET, EXTENDED RELEASE ORAL at 20:27

## 2017-02-06 RX ADMIN — HYDROCODONE BITARTRATE AND ACETAMINOPHEN 1 TABLET: 10; 325 TABLET ORAL at 08:21

## 2017-02-06 RX ADMIN — ACETAMINOPHEN 650 MG: 325 TABLET, FILM COATED ORAL at 05:55

## 2017-02-06 RX ADMIN — DOCUSATE SODIUM 100 MG: 100 CAPSULE, LIQUID FILLED ORAL at 20:27

## 2017-02-06 RX ADMIN — NEBIVOLOL HYDROCHLORIDE 10 MG: 5 TABLET ORAL at 08:21

## 2017-02-06 RX ADMIN — OXYCODONE HYDROCHLORIDE AND ACETAMINOPHEN 1 TABLET: 10; 325 TABLET ORAL at 02:40

## 2017-02-06 ASSESSMENT — PAIN DESCRIPTION - PAIN TYPE
TYPE: ACUTE PAIN

## 2017-02-06 ASSESSMENT — PAIN DESCRIPTION - FREQUENCY
FREQUENCY: CONTINUOUS

## 2017-02-06 ASSESSMENT — PAIN DESCRIPTION - LOCATION
LOCATION: BACK
LOCATION: HEAD
LOCATION: BACK
LOCATION: ABDOMEN;BACK

## 2017-02-06 ASSESSMENT — PAIN DESCRIPTION - DESCRIPTORS
DESCRIPTORS: ACHING
DESCRIPTORS: CRAMPING;ACHING
DESCRIPTORS: ACHING
DESCRIPTORS: ACHING

## 2017-02-06 ASSESSMENT — PAIN DESCRIPTION - ORIENTATION
ORIENTATION: LOWER

## 2017-02-06 ASSESSMENT — PAIN DESCRIPTION - ONSET
ONSET: ON-GOING
ONSET: ON-GOING

## 2017-02-06 ASSESSMENT — PAIN SCALES - GENERAL
PAINLEVEL_OUTOF10: 0
PAINLEVEL_OUTOF10: 0
PAINLEVEL_OUTOF10: 8
PAINLEVEL_OUTOF10: 6
PAINLEVEL_OUTOF10: 5
PAINLEVEL_OUTOF10: 5
PAINLEVEL_OUTOF10: 10
PAINLEVEL_OUTOF10: 0
PAINLEVEL_OUTOF10: 0
PAINLEVEL_OUTOF10: 10
PAINLEVEL_OUTOF10: 9
PAINLEVEL_OUTOF10: 0

## 2017-02-06 ASSESSMENT — PAIN DESCRIPTION - PROGRESSION
CLINICAL_PROGRESSION: NOT CHANGED
CLINICAL_PROGRESSION: NOT CHANGED

## 2017-02-07 LAB
GLUCOSE BLD-MCNC: 140 MG/DL (ref 70–99)
GLUCOSE BLD-MCNC: 166 MG/DL (ref 70–99)
GLUCOSE BLD-MCNC: 88 MG/DL (ref 70–99)
GLUCOSE BLD-MCNC: 96 MG/DL (ref 70–99)
PERFORMED ON: ABNORMAL
PERFORMED ON: ABNORMAL
PERFORMED ON: NORMAL
PERFORMED ON: NORMAL

## 2017-02-07 PROCEDURE — 2580000003 HC RX 258: Performed by: FAMILY MEDICINE

## 2017-02-07 PROCEDURE — 6370000000 HC RX 637 (ALT 250 FOR IP): Performed by: PSYCHIATRY & NEUROLOGY

## 2017-02-07 PROCEDURE — 6360000002 HC RX W HCPCS: Performed by: FAMILY MEDICINE

## 2017-02-07 PROCEDURE — 2700000000 HC OXYGEN THERAPY PER DAY

## 2017-02-07 PROCEDURE — 99232 SBSQ HOSP IP/OBS MODERATE 35: CPT | Performed by: PSYCHIATRY & NEUROLOGY

## 2017-02-07 PROCEDURE — 1180000000 HC REHAB R&B

## 2017-02-07 PROCEDURE — 6370000000 HC RX 637 (ALT 250 FOR IP): Performed by: FAMILY MEDICINE

## 2017-02-07 PROCEDURE — 82948 REAGENT STRIP/BLOOD GLUCOSE: CPT

## 2017-02-07 RX ORDER — PANTOPRAZOLE SODIUM 40 MG/1
40 TABLET, DELAYED RELEASE ORAL
Status: DISCONTINUED | OUTPATIENT
Start: 2017-02-07 | End: 2017-02-14 | Stop reason: HOSPADM

## 2017-02-07 RX ORDER — DICYCLOMINE HYDROCHLORIDE 10 MG/1
10 CAPSULE ORAL
Status: DISCONTINUED | OUTPATIENT
Start: 2017-02-07 | End: 2017-02-14 | Stop reason: HOSPADM

## 2017-02-07 RX ORDER — SUCRALFATE ORAL 1 G/10ML
1 SUSPENSION ORAL EVERY 6 HOURS SCHEDULED
Status: DISCONTINUED | OUTPATIENT
Start: 2017-02-07 | End: 2017-02-10

## 2017-02-07 RX ADMIN — SODIUM CHLORIDE: 9 INJECTION, SOLUTION INTRAVENOUS at 06:37

## 2017-02-07 RX ADMIN — ONDANSETRON 4 MG: 2 INJECTION INTRAMUSCULAR; INTRAVENOUS at 05:13

## 2017-02-07 RX ADMIN — HYDROCODONE BITARTRATE AND ACETAMINOPHEN 1 TABLET: 10; 325 TABLET ORAL at 20:39

## 2017-02-07 RX ADMIN — SUCRALFATE 1 G: 1 SUSPENSION ORAL at 23:41

## 2017-02-07 RX ADMIN — NEBIVOLOL HYDROCHLORIDE 10 MG: 5 TABLET ORAL at 07:43

## 2017-02-07 RX ADMIN — INSULIN GLARGINE 20 UNITS: 100 INJECTION, SOLUTION SUBCUTANEOUS at 07:53

## 2017-02-07 RX ADMIN — ENOXAPARIN SODIUM 40 MG: 40 INJECTION SUBCUTANEOUS at 07:43

## 2017-02-07 RX ADMIN — SUCRALFATE 1 G: 1 SUSPENSION ORAL at 11:58

## 2017-02-07 RX ADMIN — POTASSIUM CHLORIDE 20 MEQ: 20 TABLET, EXTENDED RELEASE ORAL at 07:43

## 2017-02-07 RX ADMIN — Medication 10 ML: at 07:53

## 2017-02-07 RX ADMIN — DOCUSATE SODIUM 100 MG: 100 CAPSULE, LIQUID FILLED ORAL at 07:44

## 2017-02-07 RX ADMIN — HYDROCHLOROTHIAZIDE 25 MG: 25 TABLET ORAL at 07:47

## 2017-02-07 RX ADMIN — SUCRALFATE 1 G: 1 SUSPENSION ORAL at 09:43

## 2017-02-07 RX ADMIN — Medication 10 ML: at 20:19

## 2017-02-07 RX ADMIN — AMLODIPINE BESYLATE 10 MG: 10 TABLET ORAL at 07:44

## 2017-02-07 RX ADMIN — POLYMYXIN B SULFATE, BACITRACIN ZINC, NEOMYCIN SULFATE: 5000; 3.5; 4 OINTMENT TOPICAL at 07:50

## 2017-02-07 RX ADMIN — POTASSIUM CHLORIDE 20 MEQ: 20 TABLET, EXTENDED RELEASE ORAL at 20:19

## 2017-02-07 RX ADMIN — POLYMYXIN B SULFATE, BACITRACIN ZINC, NEOMYCIN SULFATE: 5000; 3.5; 4 OINTMENT TOPICAL at 20:18

## 2017-02-07 RX ADMIN — HYDROCODONE BITARTRATE AND ACETAMINOPHEN 1 TABLET: 10; 325 TABLET ORAL at 07:51

## 2017-02-07 RX ADMIN — LOSARTAN POTASSIUM 100 MG: 50 TABLET, FILM COATED ORAL at 07:43

## 2017-02-07 RX ADMIN — DICYCLOMINE HYDROCHLORIDE 10 MG: 10 CAPSULE ORAL at 09:43

## 2017-02-07 RX ADMIN — DOXAZOSIN 2 MG: 2 TABLET ORAL at 20:19

## 2017-02-07 RX ADMIN — HYDROCODONE BITARTRATE AND ACETAMINOPHEN 1 TABLET: 10; 325 TABLET ORAL at 03:46

## 2017-02-07 RX ADMIN — PANTOPRAZOLE SODIUM 40 MG: 40 TABLET, DELAYED RELEASE ORAL at 16:38

## 2017-02-07 RX ADMIN — DOXAZOSIN 2 MG: 2 TABLET ORAL at 07:44

## 2017-02-07 RX ADMIN — DOCUSATE SODIUM 100 MG: 100 CAPSULE, LIQUID FILLED ORAL at 20:19

## 2017-02-07 RX ADMIN — DICYCLOMINE HYDROCHLORIDE 10 MG: 10 CAPSULE ORAL at 16:38

## 2017-02-07 RX ADMIN — PANTOPRAZOLE SODIUM 40 MG: 40 TABLET, DELAYED RELEASE ORAL at 09:43

## 2017-02-07 RX ADMIN — LINAGLIPTIN 5 MG: 5 TABLET, FILM COATED ORAL at 07:44

## 2017-02-07 RX ADMIN — POLYETHYLENE GLYCOL 3350 17 G: 17 POWDER, FOR SOLUTION ORAL at 07:43

## 2017-02-07 ASSESSMENT — PAIN SCALES - GENERAL
PAINLEVEL_OUTOF10: 0
PAINLEVEL_OUTOF10: 0
PAINLEVEL_OUTOF10: 6
PAINLEVEL_OUTOF10: 10
PAINLEVEL_OUTOF10: 0
PAINLEVEL_OUTOF10: 8

## 2017-02-07 ASSESSMENT — PAIN DESCRIPTION - LOCATION
LOCATION: BACK
LOCATION: BACK

## 2017-02-07 ASSESSMENT — PAIN DESCRIPTION - ONSET
ONSET: ON-GOING
ONSET: ON-GOING

## 2017-02-07 ASSESSMENT — PAIN DESCRIPTION - PAIN TYPE
TYPE: ACUTE PAIN
TYPE: ACUTE PAIN

## 2017-02-07 ASSESSMENT — PAIN DESCRIPTION - PROGRESSION: CLINICAL_PROGRESSION: NOT CHANGED

## 2017-02-07 ASSESSMENT — PAIN DESCRIPTION - ORIENTATION
ORIENTATION: LOWER
ORIENTATION: LOWER

## 2017-02-07 ASSESSMENT — PAIN DESCRIPTION - FREQUENCY
FREQUENCY: CONTINUOUS
FREQUENCY: CONTINUOUS

## 2017-02-07 ASSESSMENT — PAIN DESCRIPTION - DESCRIPTORS
DESCRIPTORS: ACHING
DESCRIPTORS: ACHING

## 2017-02-08 LAB
GLUCOSE BLD-MCNC: 133 MG/DL (ref 70–99)
GLUCOSE BLD-MCNC: 149 MG/DL (ref 70–99)
GLUCOSE BLD-MCNC: 194 MG/DL (ref 70–99)
GLUCOSE BLD-MCNC: 270 MG/DL (ref 70–99)
GLUCOSE BLD-MCNC: 78 MG/DL (ref 70–99)
PERFORMED ON: ABNORMAL
PERFORMED ON: NORMAL

## 2017-02-08 PROCEDURE — 82948 REAGENT STRIP/BLOOD GLUCOSE: CPT

## 2017-02-08 PROCEDURE — 97530 THERAPEUTIC ACTIVITIES: CPT

## 2017-02-08 PROCEDURE — 97150 GROUP THERAPEUTIC PROCEDURES: CPT

## 2017-02-08 PROCEDURE — 97116 GAIT TRAINING THERAPY: CPT

## 2017-02-08 PROCEDURE — 6370000000 HC RX 637 (ALT 250 FOR IP): Performed by: PSYCHIATRY & NEUROLOGY

## 2017-02-08 PROCEDURE — 99233 SBSQ HOSP IP/OBS HIGH 50: CPT | Performed by: PSYCHIATRY & NEUROLOGY

## 2017-02-08 PROCEDURE — 97110 THERAPEUTIC EXERCISES: CPT

## 2017-02-08 PROCEDURE — 2700000000 HC OXYGEN THERAPY PER DAY

## 2017-02-08 PROCEDURE — 6360000002 HC RX W HCPCS: Performed by: FAMILY MEDICINE

## 2017-02-08 PROCEDURE — 1180000000 HC REHAB R&B

## 2017-02-08 PROCEDURE — 6370000000 HC RX 637 (ALT 250 FOR IP): Performed by: FAMILY MEDICINE

## 2017-02-08 RX ADMIN — SUCRALFATE 1 G: 1 SUSPENSION ORAL at 12:20

## 2017-02-08 RX ADMIN — INSULIN LISPRO 9 UNITS: 100 INJECTION, SOLUTION INTRAVENOUS; SUBCUTANEOUS at 12:47

## 2017-02-08 RX ADMIN — DOCUSATE SODIUM 100 MG: 100 CAPSULE, LIQUID FILLED ORAL at 07:43

## 2017-02-08 RX ADMIN — LOSARTAN POTASSIUM 100 MG: 50 TABLET, FILM COATED ORAL at 07:43

## 2017-02-08 RX ADMIN — DICYCLOMINE HYDROCHLORIDE 10 MG: 10 CAPSULE ORAL at 05:58

## 2017-02-08 RX ADMIN — HYDROCODONE BITARTRATE AND ACETAMINOPHEN 1 TABLET: 10; 325 TABLET ORAL at 15:07

## 2017-02-08 RX ADMIN — POTASSIUM CHLORIDE 20 MEQ: 20 TABLET, EXTENDED RELEASE ORAL at 19:30

## 2017-02-08 RX ADMIN — LINAGLIPTIN 5 MG: 5 TABLET, FILM COATED ORAL at 07:43

## 2017-02-08 RX ADMIN — DICYCLOMINE HYDROCHLORIDE 10 MG: 10 CAPSULE ORAL at 15:06

## 2017-02-08 RX ADMIN — DOXAZOSIN 2 MG: 2 TABLET ORAL at 19:30

## 2017-02-08 RX ADMIN — PANTOPRAZOLE SODIUM 40 MG: 40 TABLET, DELAYED RELEASE ORAL at 15:07

## 2017-02-08 RX ADMIN — AMLODIPINE BESYLATE 10 MG: 10 TABLET ORAL at 07:43

## 2017-02-08 RX ADMIN — PANTOPRAZOLE SODIUM 40 MG: 40 TABLET, DELAYED RELEASE ORAL at 05:58

## 2017-02-08 RX ADMIN — HYDROCODONE BITARTRATE AND ACETAMINOPHEN 1 TABLET: 10; 325 TABLET ORAL at 19:30

## 2017-02-08 RX ADMIN — POTASSIUM CHLORIDE 20 MEQ: 20 TABLET, EXTENDED RELEASE ORAL at 07:43

## 2017-02-08 RX ADMIN — HYDROCODONE BITARTRATE AND ACETAMINOPHEN 1 TABLET: 10; 325 TABLET ORAL at 03:00

## 2017-02-08 RX ADMIN — NEBIVOLOL HYDROCHLORIDE 10 MG: 5 TABLET ORAL at 07:43

## 2017-02-08 RX ADMIN — HYDROCODONE BITARTRATE AND ACETAMINOPHEN 1 TABLET: 10; 325 TABLET ORAL at 07:50

## 2017-02-08 RX ADMIN — DOXAZOSIN 2 MG: 2 TABLET ORAL at 07:43

## 2017-02-08 RX ADMIN — ENOXAPARIN SODIUM 40 MG: 40 INJECTION SUBCUTANEOUS at 07:42

## 2017-02-08 RX ADMIN — DOCUSATE SODIUM 100 MG: 100 CAPSULE, LIQUID FILLED ORAL at 19:31

## 2017-02-08 RX ADMIN — DICYCLOMINE HYDROCHLORIDE 10 MG: 10 CAPSULE ORAL at 12:20

## 2017-02-08 RX ADMIN — SUCRALFATE 1 G: 1 SUSPENSION ORAL at 05:58

## 2017-02-08 RX ADMIN — HYDROCHLOROTHIAZIDE 25 MG: 25 TABLET ORAL at 07:43

## 2017-02-08 RX ADMIN — SUCRALFATE 1 G: 1 SUSPENSION ORAL at 17:40

## 2017-02-08 ASSESSMENT — PAIN SCALES - GENERAL
PAINLEVEL_OUTOF10: 0
PAINLEVEL_OUTOF10: 7
PAINLEVEL_OUTOF10: 6
PAINLEVEL_OUTOF10: 4
PAINLEVEL_OUTOF10: 4
PAINLEVEL_OUTOF10: 8
PAINLEVEL_OUTOF10: 8
PAINLEVEL_OUTOF10: 7
PAINLEVEL_OUTOF10: 5
PAINLEVEL_OUTOF10: 7

## 2017-02-08 ASSESSMENT — PAIN DESCRIPTION - PAIN TYPE
TYPE: CHRONIC PAIN
TYPE: ACUTE PAIN
TYPE: CHRONIC PAIN
TYPE: ACUTE PAIN
TYPE: CHRONIC PAIN

## 2017-02-08 ASSESSMENT — PAIN DESCRIPTION - ORIENTATION
ORIENTATION: LOWER

## 2017-02-08 ASSESSMENT — PAIN DESCRIPTION - LOCATION
LOCATION: BACK

## 2017-02-08 ASSESSMENT — PAIN DESCRIPTION - FREQUENCY
FREQUENCY: CONTINUOUS

## 2017-02-08 ASSESSMENT — PAIN DESCRIPTION - DESCRIPTORS
DESCRIPTORS: ACHING

## 2017-02-08 ASSESSMENT — PAIN DESCRIPTION - PROGRESSION
CLINICAL_PROGRESSION: NOT CHANGED
CLINICAL_PROGRESSION: NOT CHANGED

## 2017-02-08 ASSESSMENT — PAIN DESCRIPTION - ONSET: ONSET: ON-GOING

## 2017-02-09 LAB
ANION GAP SERPL CALCULATED.3IONS-SCNC: 11 MMOL/L (ref 7–19)
BASOPHILS ABSOLUTE: 0 K/UL (ref 0–0.2)
BASOPHILS RELATIVE PERCENT: 0 % (ref 0–1)
BUN BLDV-MCNC: 19 MG/DL (ref 8–23)
CALCIUM SERPL-MCNC: 8.2 MG/DL (ref 8.8–10.2)
CHLORIDE BLD-SCNC: 102 MMOL/L (ref 98–111)
CO2: 26 MMOL/L (ref 22–29)
CREAT SERPL-MCNC: 1.6 MG/DL (ref 0.5–0.9)
EOSINOPHILS ABSOLUTE: 0.1 K/UL (ref 0–0.6)
EOSINOPHILS RELATIVE PERCENT: 2.6 % (ref 0–5)
GFR NON-AFRICAN AMERICAN: 32
GLUCOSE BLD-MCNC: 189 MG/DL (ref 70–99)
GLUCOSE BLD-MCNC: 204 MG/DL (ref 70–99)
GLUCOSE BLD-MCNC: 215 MG/DL (ref 70–99)
GLUCOSE BLD-MCNC: 235 MG/DL (ref 74–109)
GLUCOSE BLD-MCNC: 288 MG/DL (ref 70–99)
HCT VFR BLD CALC: 32.3 % (ref 37–47)
HEMOGLOBIN: 10.2 G/DL (ref 12–16)
LYMPHOCYTES ABSOLUTE: 1.2 K/UL (ref 1.1–4.5)
LYMPHOCYTES RELATIVE PERCENT: 32 % (ref 20–40)
MCH RBC QN AUTO: 30.4 PG (ref 27–31)
MCHC RBC AUTO-ENTMCNC: 31.6 G/DL (ref 33–37)
MCV RBC AUTO: 96.4 FL (ref 81–99)
MONOCYTES ABSOLUTE: 0.3 K/UL (ref 0–0.9)
MONOCYTES RELATIVE PERCENT: 9 % (ref 0–10)
NEUTROPHILS ABSOLUTE: 2.1 K/UL (ref 1.5–7.5)
NEUTROPHILS RELATIVE PERCENT: 56.1 % (ref 50–65)
PDW BLD-RTO: 13.4 % (ref 11.5–14.5)
PERFORMED ON: ABNORMAL
PLATELET # BLD: 103 K/UL (ref 130–400)
PMV BLD AUTO: 11.3 FL (ref 7.4–10.4)
POTASSIUM SERPL-SCNC: 4.3 MMOL/L (ref 3.5–5)
RBC # BLD: 3.35 M/UL (ref 4.2–5.4)
SODIUM BLD-SCNC: 139 MMOL/L (ref 136–145)
WBC # BLD: 3.8 K/UL (ref 4.8–10.8)

## 2017-02-09 PROCEDURE — 6370000000 HC RX 637 (ALT 250 FOR IP): Performed by: FAMILY MEDICINE

## 2017-02-09 PROCEDURE — 2700000000 HC OXYGEN THERAPY PER DAY

## 2017-02-09 PROCEDURE — 6370000000 HC RX 637 (ALT 250 FOR IP): Performed by: PSYCHIATRY & NEUROLOGY

## 2017-02-09 PROCEDURE — 97530 THERAPEUTIC ACTIVITIES: CPT

## 2017-02-09 PROCEDURE — 97535 SELF CARE MNGMENT TRAINING: CPT

## 2017-02-09 PROCEDURE — 82948 REAGENT STRIP/BLOOD GLUCOSE: CPT

## 2017-02-09 PROCEDURE — 6360000002 HC RX W HCPCS: Performed by: FAMILY MEDICINE

## 2017-02-09 PROCEDURE — 1180000000 HC REHAB R&B

## 2017-02-09 PROCEDURE — 97110 THERAPEUTIC EXERCISES: CPT

## 2017-02-09 PROCEDURE — 36415 COLL VENOUS BLD VENIPUNCTURE: CPT

## 2017-02-09 PROCEDURE — 85025 COMPLETE CBC W/AUTO DIFF WBC: CPT

## 2017-02-09 PROCEDURE — 99232 SBSQ HOSP IP/OBS MODERATE 35: CPT | Performed by: PSYCHIATRY & NEUROLOGY

## 2017-02-09 PROCEDURE — 97116 GAIT TRAINING THERAPY: CPT

## 2017-02-09 PROCEDURE — 80048 BASIC METABOLIC PNL TOTAL CA: CPT

## 2017-02-09 RX ADMIN — AMLODIPINE BESYLATE 10 MG: 10 TABLET ORAL at 08:03

## 2017-02-09 RX ADMIN — PANTOPRAZOLE SODIUM 40 MG: 40 TABLET, DELAYED RELEASE ORAL at 17:16

## 2017-02-09 RX ADMIN — SUCRALFATE 1 G: 1 SUSPENSION ORAL at 21:15

## 2017-02-09 RX ADMIN — HYDROCODONE BITARTRATE AND ACETAMINOPHEN 1 TABLET: 10; 325 TABLET ORAL at 20:23

## 2017-02-09 RX ADMIN — INSULIN LISPRO 6 UNITS: 100 INJECTION, SOLUTION INTRAVENOUS; SUBCUTANEOUS at 08:09

## 2017-02-09 RX ADMIN — PANTOPRAZOLE SODIUM 40 MG: 40 TABLET, DELAYED RELEASE ORAL at 05:16

## 2017-02-09 RX ADMIN — POLYMYXIN B SULFATE, BACITRACIN ZINC, NEOMYCIN SULFATE: 5000; 3.5; 4 OINTMENT TOPICAL at 08:08

## 2017-02-09 RX ADMIN — SUCRALFATE 1 G: 1 SUSPENSION ORAL at 17:16

## 2017-02-09 RX ADMIN — DOXAZOSIN 2 MG: 2 TABLET ORAL at 08:02

## 2017-02-09 RX ADMIN — LINAGLIPTIN 5 MG: 5 TABLET, FILM COATED ORAL at 08:02

## 2017-02-09 RX ADMIN — POTASSIUM CHLORIDE 20 MEQ: 20 TABLET, EXTENDED RELEASE ORAL at 08:02

## 2017-02-09 RX ADMIN — POLYMYXIN B SULFATE, BACITRACIN ZINC, NEOMYCIN SULFATE: 5000; 3.5; 4 OINTMENT TOPICAL at 20:23

## 2017-02-09 RX ADMIN — DOCUSATE SODIUM 100 MG: 100 CAPSULE, LIQUID FILLED ORAL at 08:02

## 2017-02-09 RX ADMIN — DICYCLOMINE HYDROCHLORIDE 10 MG: 10 CAPSULE ORAL at 17:16

## 2017-02-09 RX ADMIN — POTASSIUM CHLORIDE 20 MEQ: 20 TABLET, EXTENDED RELEASE ORAL at 20:23

## 2017-02-09 RX ADMIN — INSULIN LISPRO 3 UNITS: 100 INJECTION, SOLUTION INTRAVENOUS; SUBCUTANEOUS at 20:24

## 2017-02-09 RX ADMIN — SUCRALFATE 1 G: 1 SUSPENSION ORAL at 05:16

## 2017-02-09 RX ADMIN — ENOXAPARIN SODIUM 40 MG: 40 INJECTION SUBCUTANEOUS at 08:01

## 2017-02-09 RX ADMIN — INSULIN LISPRO 9 UNITS: 100 INJECTION, SOLUTION INTRAVENOUS; SUBCUTANEOUS at 17:32

## 2017-02-09 RX ADMIN — SUCRALFATE 1 G: 1 SUSPENSION ORAL at 11:04

## 2017-02-09 RX ADMIN — LOSARTAN POTASSIUM 100 MG: 50 TABLET, FILM COATED ORAL at 08:02

## 2017-02-09 RX ADMIN — DOXAZOSIN 2 MG: 2 TABLET ORAL at 20:23

## 2017-02-09 RX ADMIN — INSULIN GLARGINE 20 UNITS: 100 INJECTION, SOLUTION SUBCUTANEOUS at 08:02

## 2017-02-09 RX ADMIN — DICYCLOMINE HYDROCHLORIDE 10 MG: 10 CAPSULE ORAL at 05:15

## 2017-02-09 RX ADMIN — NEBIVOLOL HYDROCHLORIDE 10 MG: 5 TABLET ORAL at 08:02

## 2017-02-09 RX ADMIN — HYDROCHLOROTHIAZIDE 25 MG: 25 TABLET ORAL at 08:03

## 2017-02-09 RX ADMIN — DOCUSATE SODIUM 100 MG: 100 CAPSULE, LIQUID FILLED ORAL at 20:23

## 2017-02-09 RX ADMIN — HYDROCODONE BITARTRATE AND ACETAMINOPHEN 1 TABLET: 10; 325 TABLET ORAL at 13:22

## 2017-02-09 RX ADMIN — HYDROCODONE BITARTRATE AND ACETAMINOPHEN 1 TABLET: 10; 325 TABLET ORAL at 05:56

## 2017-02-09 RX ADMIN — DICYCLOMINE HYDROCHLORIDE 10 MG: 10 CAPSULE ORAL at 11:04

## 2017-02-09 ASSESSMENT — PAIN SCALES - GENERAL
PAINLEVEL_OUTOF10: 8
PAINLEVEL_OUTOF10: 8
PAINLEVEL_OUTOF10: 10
PAINLEVEL_OUTOF10: 7
PAINLEVEL_OUTOF10: 3
PAINLEVEL_OUTOF10: 7
PAINLEVEL_OUTOF10: 9
PAINLEVEL_OUTOF10: 10

## 2017-02-09 ASSESSMENT — PAIN DESCRIPTION - PAIN TYPE
TYPE: ACUTE PAIN

## 2017-02-09 ASSESSMENT — PAIN DESCRIPTION - ORIENTATION
ORIENTATION: LOWER

## 2017-02-09 ASSESSMENT — PAIN DESCRIPTION - DESCRIPTORS
DESCRIPTORS: ACHING

## 2017-02-09 ASSESSMENT — PAIN DESCRIPTION - ONSET
ONSET: ON-GOING
ONSET: ON-GOING

## 2017-02-09 ASSESSMENT — PAIN DESCRIPTION - FREQUENCY
FREQUENCY: CONTINUOUS
FREQUENCY: CONTINUOUS

## 2017-02-09 ASSESSMENT — PAIN DESCRIPTION - LOCATION
LOCATION: BACK

## 2017-02-09 ASSESSMENT — PAIN DESCRIPTION - PROGRESSION
CLINICAL_PROGRESSION: GRADUALLY IMPROVING
CLINICAL_PROGRESSION: NOT CHANGED

## 2017-02-10 LAB
GLUCOSE BLD-MCNC: 177 MG/DL (ref 70–99)
GLUCOSE BLD-MCNC: 197 MG/DL (ref 70–99)
GLUCOSE BLD-MCNC: 239 MG/DL (ref 70–99)
GLUCOSE BLD-MCNC: 254 MG/DL (ref 70–99)
PERFORMED ON: ABNORMAL

## 2017-02-10 PROCEDURE — 1180000000 HC REHAB R&B

## 2017-02-10 PROCEDURE — 99232 SBSQ HOSP IP/OBS MODERATE 35: CPT | Performed by: PSYCHIATRY & NEUROLOGY

## 2017-02-10 PROCEDURE — 6360000002 HC RX W HCPCS: Performed by: FAMILY MEDICINE

## 2017-02-10 PROCEDURE — 97535 SELF CARE MNGMENT TRAINING: CPT

## 2017-02-10 PROCEDURE — 99232 SBSQ HOSP IP/OBS MODERATE 35: CPT | Performed by: INTERNAL MEDICINE

## 2017-02-10 PROCEDURE — 97116 GAIT TRAINING THERAPY: CPT

## 2017-02-10 PROCEDURE — 6370000000 HC RX 637 (ALT 250 FOR IP): Performed by: PSYCHIATRY & NEUROLOGY

## 2017-02-10 PROCEDURE — 97110 THERAPEUTIC EXERCISES: CPT

## 2017-02-10 PROCEDURE — 6370000000 HC RX 637 (ALT 250 FOR IP): Performed by: FAMILY MEDICINE

## 2017-02-10 PROCEDURE — 2700000000 HC OXYGEN THERAPY PER DAY

## 2017-02-10 PROCEDURE — 97530 THERAPEUTIC ACTIVITIES: CPT

## 2017-02-10 PROCEDURE — 82948 REAGENT STRIP/BLOOD GLUCOSE: CPT

## 2017-02-10 RX ORDER — DOXAZOSIN 2 MG/1
4 TABLET ORAL EVERY 12 HOURS SCHEDULED
Status: DISCONTINUED | OUTPATIENT
Start: 2017-02-10 | End: 2017-02-13

## 2017-02-10 RX ADMIN — HYDROCHLOROTHIAZIDE 25 MG: 25 TABLET ORAL at 07:14

## 2017-02-10 RX ADMIN — DICYCLOMINE HYDROCHLORIDE 10 MG: 10 CAPSULE ORAL at 11:02

## 2017-02-10 RX ADMIN — DOCUSATE SODIUM 100 MG: 100 CAPSULE, LIQUID FILLED ORAL at 21:53

## 2017-02-10 RX ADMIN — LINAGLIPTIN 5 MG: 5 TABLET, FILM COATED ORAL at 07:14

## 2017-02-10 RX ADMIN — INSULIN LISPRO 9 UNITS: 100 INJECTION, SOLUTION INTRAVENOUS; SUBCUTANEOUS at 17:42

## 2017-02-10 RX ADMIN — SUCRALFATE 1 G: 1 SUSPENSION ORAL at 05:40

## 2017-02-10 RX ADMIN — LOSARTAN POTASSIUM 100 MG: 50 TABLET, FILM COATED ORAL at 07:14

## 2017-02-10 RX ADMIN — NEBIVOLOL HYDROCHLORIDE 10 MG: 5 TABLET ORAL at 07:14

## 2017-02-10 RX ADMIN — INSULIN LISPRO 6 UNITS: 100 INJECTION, SOLUTION INTRAVENOUS; SUBCUTANEOUS at 07:15

## 2017-02-10 RX ADMIN — DOCUSATE SODIUM 100 MG: 100 CAPSULE, LIQUID FILLED ORAL at 07:13

## 2017-02-10 RX ADMIN — PANTOPRAZOLE SODIUM 40 MG: 40 TABLET, DELAYED RELEASE ORAL at 05:40

## 2017-02-10 RX ADMIN — DOXAZOSIN 4 MG: 2 TABLET ORAL at 21:53

## 2017-02-10 RX ADMIN — POTASSIUM CHLORIDE 20 MEQ: 20 TABLET, EXTENDED RELEASE ORAL at 21:53

## 2017-02-10 RX ADMIN — POLYMYXIN B SULFATE, BACITRACIN ZINC, NEOMYCIN SULFATE: 5000; 3.5; 4 OINTMENT TOPICAL at 21:53

## 2017-02-10 RX ADMIN — AMLODIPINE BESYLATE 10 MG: 10 TABLET ORAL at 07:14

## 2017-02-10 RX ADMIN — INSULIN LISPRO 2 UNITS: 100 INJECTION, SOLUTION INTRAVENOUS; SUBCUTANEOUS at 21:54

## 2017-02-10 RX ADMIN — DOXAZOSIN 2 MG: 2 TABLET ORAL at 07:14

## 2017-02-10 RX ADMIN — INSULIN LISPRO 3 UNITS: 100 INJECTION, SOLUTION INTRAVENOUS; SUBCUTANEOUS at 12:07

## 2017-02-10 RX ADMIN — HYDROCODONE BITARTRATE AND ACETAMINOPHEN 1 TABLET: 10; 325 TABLET ORAL at 21:53

## 2017-02-10 RX ADMIN — POTASSIUM CHLORIDE 20 MEQ: 20 TABLET, EXTENDED RELEASE ORAL at 07:14

## 2017-02-10 RX ADMIN — INSULIN GLARGINE 20 UNITS: 100 INJECTION, SOLUTION SUBCUTANEOUS at 09:08

## 2017-02-10 RX ADMIN — DICYCLOMINE HYDROCHLORIDE 10 MG: 10 CAPSULE ORAL at 16:30

## 2017-02-10 RX ADMIN — DICYCLOMINE HYDROCHLORIDE 10 MG: 10 CAPSULE ORAL at 05:40

## 2017-02-10 RX ADMIN — PANTOPRAZOLE SODIUM 40 MG: 40 TABLET, DELAYED RELEASE ORAL at 16:30

## 2017-02-10 RX ADMIN — ENOXAPARIN SODIUM 40 MG: 40 INJECTION SUBCUTANEOUS at 07:13

## 2017-02-10 RX ADMIN — HYDROCODONE BITARTRATE AND ACETAMINOPHEN 1 TABLET: 10; 325 TABLET ORAL at 18:06

## 2017-02-10 ASSESSMENT — PAIN SCALES - GENERAL
PAINLEVEL_OUTOF10: 10
PAINLEVEL_OUTOF10: 4
PAINLEVEL_OUTOF10: 0
PAINLEVEL_OUTOF10: 9
PAINLEVEL_OUTOF10: 8
PAINLEVEL_OUTOF10: 10

## 2017-02-10 ASSESSMENT — PAIN DESCRIPTION - ONSET: ONSET: ON-GOING

## 2017-02-10 ASSESSMENT — PAIN DESCRIPTION - FREQUENCY: FREQUENCY: CONTINUOUS

## 2017-02-10 ASSESSMENT — PAIN DESCRIPTION - PAIN TYPE
TYPE: ACUTE PAIN
TYPE: ACUTE PAIN

## 2017-02-10 ASSESSMENT — PAIN DESCRIPTION - LOCATION
LOCATION: BACK
LOCATION: BACK

## 2017-02-10 ASSESSMENT — PAIN DESCRIPTION - DESCRIPTORS: DESCRIPTORS: ACHING;NAGGING

## 2017-02-10 ASSESSMENT — PAIN DESCRIPTION - ORIENTATION: ORIENTATION: LOWER

## 2017-02-11 LAB
ANION GAP SERPL CALCULATED.3IONS-SCNC: 13 MMOL/L (ref 7–19)
BASOPHILS ABSOLUTE: 0 K/UL (ref 0–0.2)
BASOPHILS RELATIVE PERCENT: 0 % (ref 0–1)
BUN BLDV-MCNC: 21 MG/DL (ref 8–23)
CALCIUM SERPL-MCNC: 8.3 MG/DL (ref 8.8–10.2)
CHLORIDE BLD-SCNC: 101 MMOL/L (ref 98–111)
CO2: 25 MMOL/L (ref 22–29)
CREAT SERPL-MCNC: 1.3 MG/DL (ref 0.5–0.9)
EOSINOPHILS ABSOLUTE: 0.1 K/UL (ref 0–0.6)
EOSINOPHILS RELATIVE PERCENT: 2 % (ref 0–5)
GFR NON-AFRICAN AMERICAN: 41
GLUCOSE BLD-MCNC: 211 MG/DL (ref 70–99)
GLUCOSE BLD-MCNC: 224 MG/DL (ref 70–99)
GLUCOSE BLD-MCNC: 225 MG/DL (ref 70–99)
GLUCOSE BLD-MCNC: 253 MG/DL (ref 70–99)
GLUCOSE BLD-MCNC: 275 MG/DL (ref 74–109)
HCT VFR BLD CALC: 32.5 % (ref 37–47)
HEMOGLOBIN: 10.2 G/DL (ref 12–16)
LYMPHOCYTES ABSOLUTE: 1.1 K/UL (ref 1.1–4.5)
LYMPHOCYTES RELATIVE PERCENT: 29.8 % (ref 20–40)
MCH RBC QN AUTO: 30.1 PG (ref 27–31)
MCHC RBC AUTO-ENTMCNC: 31.4 G/DL (ref 33–37)
MCV RBC AUTO: 95.9 FL (ref 81–99)
MONOCYTES ABSOLUTE: 0.3 K/UL (ref 0–0.9)
MONOCYTES RELATIVE PERCENT: 7 % (ref 0–10)
NEUTROPHILS ABSOLUTE: 2.2 K/UL (ref 1.5–7.5)
NEUTROPHILS RELATIVE PERCENT: 60.6 % (ref 50–65)
PDW BLD-RTO: 13.6 % (ref 11.5–14.5)
PERFORMED ON: ABNORMAL
PLATELET # BLD: 117 K/UL (ref 130–400)
PMV BLD AUTO: 11.5 FL (ref 7.4–10.4)
POTASSIUM SERPL-SCNC: 4.1 MMOL/L (ref 3.5–5)
RBC # BLD: 3.39 M/UL (ref 4.2–5.4)
SODIUM BLD-SCNC: 139 MMOL/L (ref 136–145)
WBC # BLD: 3.6 K/UL (ref 4.8–10.8)

## 2017-02-11 PROCEDURE — 1180000000 HC REHAB R&B

## 2017-02-11 PROCEDURE — 82948 REAGENT STRIP/BLOOD GLUCOSE: CPT

## 2017-02-11 PROCEDURE — 6370000000 HC RX 637 (ALT 250 FOR IP): Performed by: PSYCHIATRY & NEUROLOGY

## 2017-02-11 PROCEDURE — 80048 BASIC METABOLIC PNL TOTAL CA: CPT

## 2017-02-11 PROCEDURE — 6370000000 HC RX 637 (ALT 250 FOR IP): Performed by: FAMILY MEDICINE

## 2017-02-11 PROCEDURE — 99232 SBSQ HOSP IP/OBS MODERATE 35: CPT | Performed by: PSYCHIATRY & NEUROLOGY

## 2017-02-11 PROCEDURE — 6360000002 HC RX W HCPCS: Performed by: FAMILY MEDICINE

## 2017-02-11 PROCEDURE — 36415 COLL VENOUS BLD VENIPUNCTURE: CPT

## 2017-02-11 PROCEDURE — 2700000000 HC OXYGEN THERAPY PER DAY

## 2017-02-11 PROCEDURE — 85025 COMPLETE CBC W/AUTO DIFF WBC: CPT

## 2017-02-11 RX ORDER — INSULIN GLARGINE 100 [IU]/ML
30 INJECTION, SOLUTION SUBCUTANEOUS DAILY
Status: DISCONTINUED | OUTPATIENT
Start: 2017-02-12 | End: 2017-02-14 | Stop reason: HOSPADM

## 2017-02-11 RX ADMIN — INSULIN GLARGINE 20 UNITS: 100 INJECTION, SOLUTION SUBCUTANEOUS at 08:08

## 2017-02-11 RX ADMIN — INSULIN LISPRO 3 UNITS: 100 INJECTION, SOLUTION INTRAVENOUS; SUBCUTANEOUS at 21:39

## 2017-02-11 RX ADMIN — DOCUSATE SODIUM 100 MG: 100 CAPSULE, LIQUID FILLED ORAL at 08:07

## 2017-02-11 RX ADMIN — PANTOPRAZOLE SODIUM 40 MG: 40 TABLET, DELAYED RELEASE ORAL at 15:54

## 2017-02-11 RX ADMIN — DICYCLOMINE HYDROCHLORIDE 10 MG: 10 CAPSULE ORAL at 15:54

## 2017-02-11 RX ADMIN — HYDROCHLOROTHIAZIDE 25 MG: 25 TABLET ORAL at 08:07

## 2017-02-11 RX ADMIN — ENOXAPARIN SODIUM 40 MG: 40 INJECTION SUBCUTANEOUS at 08:06

## 2017-02-11 RX ADMIN — INSULIN LISPRO 6 UNITS: 100 INJECTION, SOLUTION INTRAVENOUS; SUBCUTANEOUS at 12:36

## 2017-02-11 RX ADMIN — NEBIVOLOL HYDROCHLORIDE 10 MG: 5 TABLET ORAL at 08:07

## 2017-02-11 RX ADMIN — INSULIN LISPRO 6 UNITS: 100 INJECTION, SOLUTION INTRAVENOUS; SUBCUTANEOUS at 16:27

## 2017-02-11 RX ADMIN — AMLODIPINE BESYLATE 10 MG: 10 TABLET ORAL at 08:07

## 2017-02-11 RX ADMIN — INSULIN LISPRO 9 UNITS: 100 INJECTION, SOLUTION INTRAVENOUS; SUBCUTANEOUS at 08:07

## 2017-02-11 RX ADMIN — HYDROCODONE BITARTRATE AND ACETAMINOPHEN 1 TABLET: 10; 325 TABLET ORAL at 13:00

## 2017-02-11 RX ADMIN — DOCUSATE SODIUM 100 MG: 100 CAPSULE, LIQUID FILLED ORAL at 21:40

## 2017-02-11 RX ADMIN — DICYCLOMINE HYDROCHLORIDE 10 MG: 10 CAPSULE ORAL at 06:07

## 2017-02-11 RX ADMIN — LINAGLIPTIN 5 MG: 5 TABLET, FILM COATED ORAL at 08:07

## 2017-02-11 RX ADMIN — HYDROCODONE BITARTRATE AND ACETAMINOPHEN 1 TABLET: 10; 325 TABLET ORAL at 19:32

## 2017-02-11 RX ADMIN — DOXAZOSIN 4 MG: 2 TABLET ORAL at 21:40

## 2017-02-11 RX ADMIN — DOXAZOSIN 4 MG: 2 TABLET ORAL at 08:07

## 2017-02-11 RX ADMIN — POTASSIUM CHLORIDE 20 MEQ: 20 TABLET, EXTENDED RELEASE ORAL at 08:07

## 2017-02-11 RX ADMIN — POLYMYXIN B SULFATE, BACITRACIN ZINC, NEOMYCIN SULFATE: 5000; 3.5; 4 OINTMENT TOPICAL at 08:08

## 2017-02-11 RX ADMIN — POTASSIUM CHLORIDE 20 MEQ: 20 TABLET, EXTENDED RELEASE ORAL at 21:40

## 2017-02-11 RX ADMIN — PANTOPRAZOLE SODIUM 40 MG: 40 TABLET, DELAYED RELEASE ORAL at 06:07

## 2017-02-11 RX ADMIN — POLYMYXIN B SULFATE, BACITRACIN ZINC, NEOMYCIN SULFATE: 5000; 3.5; 4 OINTMENT TOPICAL at 21:39

## 2017-02-11 RX ADMIN — LOSARTAN POTASSIUM 100 MG: 50 TABLET, FILM COATED ORAL at 08:08

## 2017-02-11 ASSESSMENT — PAIN SCALES - GENERAL
PAINLEVEL_OUTOF10: 2
PAINLEVEL_OUTOF10: 4
PAINLEVEL_OUTOF10: 6
PAINLEVEL_OUTOF10: 8

## 2017-02-11 ASSESSMENT — PAIN DESCRIPTION - PAIN TYPE: TYPE: ACUTE PAIN

## 2017-02-11 ASSESSMENT — PAIN DESCRIPTION - LOCATION: LOCATION: BACK

## 2017-02-12 LAB
GLUCOSE BLD-MCNC: 203 MG/DL (ref 70–99)
GLUCOSE BLD-MCNC: 204 MG/DL (ref 70–99)
GLUCOSE BLD-MCNC: 216 MG/DL (ref 70–99)
GLUCOSE BLD-MCNC: 224 MG/DL (ref 70–99)
PERFORMED ON: ABNORMAL

## 2017-02-12 PROCEDURE — 6360000002 HC RX W HCPCS: Performed by: FAMILY MEDICINE

## 2017-02-12 PROCEDURE — 6370000000 HC RX 637 (ALT 250 FOR IP): Performed by: PSYCHIATRY & NEUROLOGY

## 2017-02-12 PROCEDURE — 6370000000 HC RX 637 (ALT 250 FOR IP): Performed by: FAMILY MEDICINE

## 2017-02-12 PROCEDURE — 82948 REAGENT STRIP/BLOOD GLUCOSE: CPT

## 2017-02-12 PROCEDURE — 1180000000 HC REHAB R&B

## 2017-02-12 PROCEDURE — 2700000000 HC OXYGEN THERAPY PER DAY

## 2017-02-12 RX ADMIN — PANTOPRAZOLE SODIUM 40 MG: 40 TABLET, DELAYED RELEASE ORAL at 16:07

## 2017-02-12 RX ADMIN — INSULIN LISPRO 6 UNITS: 100 INJECTION, SOLUTION INTRAVENOUS; SUBCUTANEOUS at 17:39

## 2017-02-12 RX ADMIN — DOXAZOSIN 4 MG: 2 TABLET ORAL at 08:01

## 2017-02-12 RX ADMIN — HYDROCODONE BITARTRATE AND ACETAMINOPHEN 1 TABLET: 10; 325 TABLET ORAL at 13:01

## 2017-02-12 RX ADMIN — NEBIVOLOL HYDROCHLORIDE 10 MG: 5 TABLET ORAL at 08:01

## 2017-02-12 RX ADMIN — HYDROCODONE BITARTRATE AND ACETAMINOPHEN 1 TABLET: 10; 325 TABLET ORAL at 19:13

## 2017-02-12 RX ADMIN — HYDROCODONE BITARTRATE AND ACETAMINOPHEN 1 TABLET: 10; 325 TABLET ORAL at 23:49

## 2017-02-12 RX ADMIN — INSULIN LISPRO 6 UNITS: 100 INJECTION, SOLUTION INTRAVENOUS; SUBCUTANEOUS at 10:42

## 2017-02-12 RX ADMIN — INSULIN GLARGINE 30 UNITS: 100 INJECTION, SOLUTION SUBCUTANEOUS at 08:02

## 2017-02-12 RX ADMIN — POTASSIUM CHLORIDE 20 MEQ: 20 TABLET, EXTENDED RELEASE ORAL at 08:01

## 2017-02-12 RX ADMIN — POLYMYXIN B SULFATE, BACITRACIN ZINC, NEOMYCIN SULFATE: 5000; 3.5; 4 OINTMENT TOPICAL at 19:14

## 2017-02-12 RX ADMIN — LOSARTAN POTASSIUM 100 MG: 50 TABLET, FILM COATED ORAL at 08:01

## 2017-02-12 RX ADMIN — DOCUSATE SODIUM 100 MG: 100 CAPSULE, LIQUID FILLED ORAL at 19:14

## 2017-02-12 RX ADMIN — DOCUSATE SODIUM 100 MG: 100 CAPSULE, LIQUID FILLED ORAL at 08:01

## 2017-02-12 RX ADMIN — INSULIN LISPRO 3 UNITS: 100 INJECTION, SOLUTION INTRAVENOUS; SUBCUTANEOUS at 21:14

## 2017-02-12 RX ADMIN — LINAGLIPTIN 5 MG: 5 TABLET, FILM COATED ORAL at 08:01

## 2017-02-12 RX ADMIN — POTASSIUM CHLORIDE 20 MEQ: 20 TABLET, EXTENDED RELEASE ORAL at 19:13

## 2017-02-12 RX ADMIN — DOXAZOSIN 4 MG: 2 TABLET ORAL at 19:13

## 2017-02-12 RX ADMIN — DICYCLOMINE HYDROCHLORIDE 10 MG: 10 CAPSULE ORAL at 10:43

## 2017-02-12 RX ADMIN — PANTOPRAZOLE SODIUM 40 MG: 40 TABLET, DELAYED RELEASE ORAL at 06:17

## 2017-02-12 RX ADMIN — POLYETHYLENE GLYCOL 3350 17 G: 17 POWDER, FOR SOLUTION ORAL at 08:02

## 2017-02-12 RX ADMIN — HYDROCHLOROTHIAZIDE 25 MG: 25 TABLET ORAL at 08:01

## 2017-02-12 RX ADMIN — DICYCLOMINE HYDROCHLORIDE 10 MG: 10 CAPSULE ORAL at 06:17

## 2017-02-12 RX ADMIN — DICYCLOMINE HYDROCHLORIDE 10 MG: 10 CAPSULE ORAL at 16:07

## 2017-02-12 RX ADMIN — POLYMYXIN B SULFATE, BACITRACIN ZINC, NEOMYCIN SULFATE: 5000; 3.5; 4 OINTMENT TOPICAL at 08:03

## 2017-02-12 RX ADMIN — AMLODIPINE BESYLATE 10 MG: 10 TABLET ORAL at 08:02

## 2017-02-12 RX ADMIN — ENOXAPARIN SODIUM 40 MG: 40 INJECTION SUBCUTANEOUS at 08:01

## 2017-02-12 RX ADMIN — HYDROCODONE BITARTRATE AND ACETAMINOPHEN 1 TABLET: 10; 325 TABLET ORAL at 01:50

## 2017-02-12 RX ADMIN — INSULIN LISPRO 6 UNITS: 100 INJECTION, SOLUTION INTRAVENOUS; SUBCUTANEOUS at 08:02

## 2017-02-12 ASSESSMENT — PAIN SCALES - GENERAL
PAINLEVEL_OUTOF10: 8
PAINLEVEL_OUTOF10: 3
PAINLEVEL_OUTOF10: 8
PAINLEVEL_OUTOF10: 2
PAINLEVEL_OUTOF10: 8
PAINLEVEL_OUTOF10: 3
PAINLEVEL_OUTOF10: 8

## 2017-02-13 LAB
ANION GAP SERPL CALCULATED.3IONS-SCNC: 11 MMOL/L (ref 7–19)
BASOPHILS ABSOLUTE: 0 K/UL (ref 0–0.2)
BASOPHILS RELATIVE PERCENT: 0.2 % (ref 0–1)
BUN BLDV-MCNC: 19 MG/DL (ref 8–23)
CALCIUM SERPL-MCNC: 8.6 MG/DL (ref 8.8–10.2)
CHLORIDE BLD-SCNC: 105 MMOL/L (ref 98–111)
CO2: 28 MMOL/L (ref 22–29)
CREAT SERPL-MCNC: 1.3 MG/DL (ref 0.5–0.9)
EOSINOPHILS ABSOLUTE: 0.1 K/UL (ref 0–0.6)
EOSINOPHILS RELATIVE PERCENT: 2.7 % (ref 0–5)
GFR NON-AFRICAN AMERICAN: 41
GLUCOSE BLD-MCNC: 121 MG/DL (ref 70–99)
GLUCOSE BLD-MCNC: 141 MG/DL (ref 74–109)
GLUCOSE BLD-MCNC: 205 MG/DL (ref 70–99)
GLUCOSE BLD-MCNC: 239 MG/DL (ref 70–99)
GLUCOSE BLD-MCNC: 297 MG/DL (ref 70–99)
HCT VFR BLD CALC: 31.7 % (ref 37–47)
HEMOGLOBIN: 9.9 G/DL (ref 12–16)
LYMPHOCYTES ABSOLUTE: 1.3 K/UL (ref 1.1–4.5)
LYMPHOCYTES RELATIVE PERCENT: 28.9 % (ref 20–40)
MCH RBC QN AUTO: 30.2 PG (ref 27–31)
MCHC RBC AUTO-ENTMCNC: 31.2 G/DL (ref 33–37)
MCV RBC AUTO: 96.6 FL (ref 81–99)
MONOCYTES ABSOLUTE: 0.4 K/UL (ref 0–0.9)
MONOCYTES RELATIVE PERCENT: 8.9 % (ref 0–10)
NEUTROPHILS ABSOLUTE: 2.6 K/UL (ref 1.5–7.5)
NEUTROPHILS RELATIVE PERCENT: 58.8 % (ref 50–65)
PDW BLD-RTO: 13.5 % (ref 11.5–14.5)
PERFORMED ON: ABNORMAL
PLATELET # BLD: 124 K/UL (ref 130–400)
PMV BLD AUTO: 11.4 FL (ref 7.4–10.4)
POTASSIUM SERPL-SCNC: 4.2 MMOL/L (ref 3.5–5)
RBC # BLD: 3.28 M/UL (ref 4.2–5.4)
SODIUM BLD-SCNC: 144 MMOL/L (ref 136–145)
WBC # BLD: 4.4 K/UL (ref 4.8–10.8)

## 2017-02-13 PROCEDURE — 80048 BASIC METABOLIC PNL TOTAL CA: CPT

## 2017-02-13 PROCEDURE — 99232 SBSQ HOSP IP/OBS MODERATE 35: CPT | Performed by: PSYCHIATRY & NEUROLOGY

## 2017-02-13 PROCEDURE — 1180000000 HC REHAB R&B

## 2017-02-13 PROCEDURE — 97530 THERAPEUTIC ACTIVITIES: CPT

## 2017-02-13 PROCEDURE — 6370000000 HC RX 637 (ALT 250 FOR IP): Performed by: PSYCHIATRY & NEUROLOGY

## 2017-02-13 PROCEDURE — 97535 SELF CARE MNGMENT TRAINING: CPT

## 2017-02-13 PROCEDURE — 6370000000 HC RX 637 (ALT 250 FOR IP): Performed by: FAMILY MEDICINE

## 2017-02-13 PROCEDURE — 36415 COLL VENOUS BLD VENIPUNCTURE: CPT

## 2017-02-13 PROCEDURE — 2700000000 HC OXYGEN THERAPY PER DAY

## 2017-02-13 PROCEDURE — 85025 COMPLETE CBC W/AUTO DIFF WBC: CPT

## 2017-02-13 PROCEDURE — 6360000002 HC RX W HCPCS: Performed by: FAMILY MEDICINE

## 2017-02-13 PROCEDURE — 97110 THERAPEUTIC EXERCISES: CPT

## 2017-02-13 PROCEDURE — 97116 GAIT TRAINING THERAPY: CPT

## 2017-02-13 PROCEDURE — 82948 REAGENT STRIP/BLOOD GLUCOSE: CPT

## 2017-02-13 RX ORDER — DOXAZOSIN 2 MG/1
6 TABLET ORAL EVERY 12 HOURS SCHEDULED
Status: DISCONTINUED | OUTPATIENT
Start: 2017-02-13 | End: 2017-02-14 | Stop reason: HOSPADM

## 2017-02-13 RX ADMIN — DICYCLOMINE HYDROCHLORIDE 10 MG: 10 CAPSULE ORAL at 16:14

## 2017-02-13 RX ADMIN — LOSARTAN POTASSIUM 100 MG: 50 TABLET, FILM COATED ORAL at 07:42

## 2017-02-13 RX ADMIN — PANTOPRAZOLE SODIUM 40 MG: 40 TABLET, DELAYED RELEASE ORAL at 16:14

## 2017-02-13 RX ADMIN — HYDROCODONE BITARTRATE AND ACETAMINOPHEN 1 TABLET: 10; 325 TABLET ORAL at 21:33

## 2017-02-13 RX ADMIN — POTASSIUM CHLORIDE 20 MEQ: 20 TABLET, EXTENDED RELEASE ORAL at 07:43

## 2017-02-13 RX ADMIN — INSULIN LISPRO 9 UNITS: 100 INJECTION, SOLUTION INTRAVENOUS; SUBCUTANEOUS at 17:30

## 2017-02-13 RX ADMIN — DOCUSATE SODIUM 100 MG: 100 CAPSULE, LIQUID FILLED ORAL at 19:48

## 2017-02-13 RX ADMIN — DICYCLOMINE HYDROCHLORIDE 10 MG: 10 CAPSULE ORAL at 05:35

## 2017-02-13 RX ADMIN — HYDROCODONE BITARTRATE AND ACETAMINOPHEN 1 TABLET: 10; 325 TABLET ORAL at 11:03

## 2017-02-13 RX ADMIN — NEBIVOLOL HYDROCHLORIDE 10 MG: 5 TABLET ORAL at 07:43

## 2017-02-13 RX ADMIN — POTASSIUM CHLORIDE 20 MEQ: 20 TABLET, EXTENDED RELEASE ORAL at 19:47

## 2017-02-13 RX ADMIN — DOXAZOSIN 6 MG: 2 TABLET ORAL at 19:47

## 2017-02-13 RX ADMIN — HYDROCODONE BITARTRATE AND ACETAMINOPHEN 1 TABLET: 10; 325 TABLET ORAL at 18:15

## 2017-02-13 RX ADMIN — HYDROCHLOROTHIAZIDE 25 MG: 25 TABLET ORAL at 07:43

## 2017-02-13 RX ADMIN — AMLODIPINE BESYLATE 10 MG: 10 TABLET ORAL at 07:43

## 2017-02-13 RX ADMIN — INSULIN LISPRO 3 UNITS: 100 INJECTION, SOLUTION INTRAVENOUS; SUBCUTANEOUS at 21:34

## 2017-02-13 RX ADMIN — INSULIN GLARGINE 30 UNITS: 100 INJECTION, SOLUTION SUBCUTANEOUS at 07:42

## 2017-02-13 RX ADMIN — INSULIN LISPRO 6 UNITS: 100 INJECTION, SOLUTION INTRAVENOUS; SUBCUTANEOUS at 12:20

## 2017-02-13 RX ADMIN — PANTOPRAZOLE SODIUM 40 MG: 40 TABLET, DELAYED RELEASE ORAL at 05:35

## 2017-02-13 RX ADMIN — DOXAZOSIN 6 MG: 2 TABLET ORAL at 07:42

## 2017-02-13 RX ADMIN — LINAGLIPTIN 5 MG: 5 TABLET, FILM COATED ORAL at 07:42

## 2017-02-13 RX ADMIN — ENOXAPARIN SODIUM 40 MG: 40 INJECTION SUBCUTANEOUS at 07:42

## 2017-02-13 RX ADMIN — HYDROCODONE BITARTRATE AND ACETAMINOPHEN 1 TABLET: 10; 325 TABLET ORAL at 05:35

## 2017-02-13 RX ADMIN — DICYCLOMINE HYDROCHLORIDE 10 MG: 10 CAPSULE ORAL at 11:03

## 2017-02-13 RX ADMIN — DOCUSATE SODIUM 100 MG: 100 CAPSULE, LIQUID FILLED ORAL at 07:42

## 2017-02-13 RX ADMIN — POLYMYXIN B SULFATE, BACITRACIN ZINC, NEOMYCIN SULFATE: 5000; 3.5; 4 OINTMENT TOPICAL at 19:47

## 2017-02-13 ASSESSMENT — PAIN SCALES - GENERAL
PAINLEVEL_OUTOF10: 8
PAINLEVEL_OUTOF10: 2
PAINLEVEL_OUTOF10: 8
PAINLEVEL_OUTOF10: 2
PAINLEVEL_OUTOF10: 0
PAINLEVEL_OUTOF10: 8
PAINLEVEL_OUTOF10: 7
PAINLEVEL_OUTOF10: 7
PAINLEVEL_OUTOF10: 2

## 2017-02-13 ASSESSMENT — PAIN DESCRIPTION - LOCATION: LOCATION: BACK

## 2017-02-14 VITALS
DIASTOLIC BLOOD PRESSURE: 84 MMHG | RESPIRATION RATE: 18 BRPM | HEIGHT: 63 IN | OXYGEN SATURATION: 94 % | WEIGHT: 162.8 LBS | BODY MASS INDEX: 28.84 KG/M2 | SYSTOLIC BLOOD PRESSURE: 162 MMHG | TEMPERATURE: 97.9 F | HEART RATE: 79 BPM

## 2017-02-14 LAB
GLUCOSE BLD-MCNC: 166 MG/DL (ref 70–99)
GLUCOSE BLD-MCNC: 221 MG/DL (ref 70–99)
PERFORMED ON: ABNORMAL
PERFORMED ON: ABNORMAL

## 2017-02-14 PROCEDURE — 6360000002 HC RX W HCPCS: Performed by: FAMILY MEDICINE

## 2017-02-14 PROCEDURE — 6370000000 HC RX 637 (ALT 250 FOR IP): Performed by: FAMILY MEDICINE

## 2017-02-14 PROCEDURE — 6370000000 HC RX 637 (ALT 250 FOR IP): Performed by: PSYCHIATRY & NEUROLOGY

## 2017-02-14 PROCEDURE — 99239 HOSP IP/OBS DSCHRG MGMT >30: CPT | Performed by: PSYCHIATRY & NEUROLOGY

## 2017-02-14 PROCEDURE — 2700000000 HC OXYGEN THERAPY PER DAY

## 2017-02-14 PROCEDURE — 82948 REAGENT STRIP/BLOOD GLUCOSE: CPT

## 2017-02-14 RX ORDER — AMLODIPINE BESYLATE 10 MG/1
10 TABLET ORAL DAILY
Qty: 30 TABLET | Refills: 3 | Status: ON HOLD | OUTPATIENT
Start: 2017-02-14 | End: 2018-08-10 | Stop reason: CLARIF

## 2017-02-14 RX ORDER — POTASSIUM CHLORIDE 20 MEQ/1
20 TABLET, EXTENDED RELEASE ORAL 2 TIMES DAILY
Qty: 60 TABLET | Refills: 3 | Status: ON HOLD | OUTPATIENT
Start: 2017-02-14 | End: 2018-08-10

## 2017-02-14 RX ORDER — LOSARTAN POTASSIUM 100 MG/1
100 TABLET ORAL DAILY
Qty: 30 TABLET | Refills: 3 | Status: SHIPPED | OUTPATIENT
Start: 2017-02-14 | End: 2017-03-25

## 2017-02-14 RX ORDER — BACITRACIN, NEOMYCIN, POLYMYXIN B 400; 3.5; 5 [USP'U]/G; MG/G; [USP'U]/G
OINTMENT TOPICAL
Qty: 1 TUBE | Refills: 0 | Status: SHIPPED | OUTPATIENT
Start: 2017-02-14 | End: 2017-02-24

## 2017-02-14 RX ORDER — INSULIN GLARGINE 100 [IU]/ML
30 INJECTION, SOLUTION SUBCUTANEOUS DAILY
Qty: 1 VIAL | Refills: 3 | Status: ON HOLD | OUTPATIENT
Start: 2017-02-14 | End: 2018-08-10 | Stop reason: CLARIF

## 2017-02-14 RX ORDER — NICOTINE POLACRILEX 4 MG
15 LOZENGE BUCCAL PRN
Qty: 45 G | Refills: 1 | Status: ON HOLD | OUTPATIENT
Start: 2017-02-14 | End: 2017-03-30 | Stop reason: HOSPADM

## 2017-02-14 RX ORDER — PANTOPRAZOLE SODIUM 40 MG/1
40 TABLET, DELAYED RELEASE ORAL
Qty: 30 TABLET | Refills: 3 | Status: ON HOLD | OUTPATIENT
Start: 2017-02-14 | End: 2017-03-24 | Stop reason: ALTCHOICE

## 2017-02-14 RX ORDER — HYDROCHLOROTHIAZIDE 25 MG/1
25 TABLET ORAL DAILY
Qty: 30 TABLET | Refills: 3 | Status: ON HOLD | OUTPATIENT
Start: 2017-02-14 | End: 2018-08-10

## 2017-02-14 RX ORDER — PSEUDOEPHEDRINE HCL 30 MG
100 TABLET ORAL 2 TIMES DAILY
Qty: 60 CAPSULE | Refills: 0 | Status: ON HOLD | OUTPATIENT
Start: 2017-02-14 | End: 2018-08-10

## 2017-02-14 RX ORDER — NEBIVOLOL 10 MG/1
10 TABLET ORAL DAILY
Qty: 30 TABLET | Refills: 3 | Status: ON HOLD | OUTPATIENT
Start: 2017-02-14 | End: 2018-08-15

## 2017-02-14 RX ORDER — DOXAZOSIN 2 MG/1
6 TABLET ORAL EVERY 12 HOURS SCHEDULED
Qty: 30 TABLET | Refills: 3 | Status: ON HOLD | OUTPATIENT
Start: 2017-02-14 | End: 2018-08-10 | Stop reason: CLARIF

## 2017-02-14 RX ORDER — HYDROCODONE BITARTRATE AND ACETAMINOPHEN 10; 325 MG/1; MG/1
1 TABLET ORAL EVERY 4 HOURS PRN
Qty: 120 TABLET | Refills: 0 | Status: SHIPPED | OUTPATIENT
Start: 2017-02-14 | End: 2017-02-21

## 2017-02-14 RX ADMIN — DOXAZOSIN 6 MG: 2 TABLET ORAL at 08:28

## 2017-02-14 RX ADMIN — PANTOPRAZOLE SODIUM 40 MG: 40 TABLET, DELAYED RELEASE ORAL at 05:14

## 2017-02-14 RX ADMIN — NEBIVOLOL HYDROCHLORIDE 10 MG: 5 TABLET ORAL at 08:29

## 2017-02-14 RX ADMIN — AMLODIPINE BESYLATE 10 MG: 10 TABLET ORAL at 08:28

## 2017-02-14 RX ADMIN — LINAGLIPTIN 5 MG: 5 TABLET, FILM COATED ORAL at 08:28

## 2017-02-14 RX ADMIN — POTASSIUM CHLORIDE 20 MEQ: 20 TABLET, EXTENDED RELEASE ORAL at 08:28

## 2017-02-14 RX ADMIN — INSULIN LISPRO 3 UNITS: 100 INJECTION, SOLUTION INTRAVENOUS; SUBCUTANEOUS at 12:21

## 2017-02-14 RX ADMIN — HYDROCODONE BITARTRATE AND ACETAMINOPHEN 1 TABLET: 10; 325 TABLET ORAL at 01:57

## 2017-02-14 RX ADMIN — ENOXAPARIN SODIUM 40 MG: 40 INJECTION SUBCUTANEOUS at 08:27

## 2017-02-14 RX ADMIN — DOCUSATE SODIUM 100 MG: 100 CAPSULE, LIQUID FILLED ORAL at 08:28

## 2017-02-14 RX ADMIN — HYDROCODONE BITARTRATE AND ACETAMINOPHEN 1 TABLET: 10; 325 TABLET ORAL at 05:13

## 2017-02-14 RX ADMIN — DICYCLOMINE HYDROCHLORIDE 10 MG: 10 CAPSULE ORAL at 11:34

## 2017-02-14 RX ADMIN — INSULIN GLARGINE 30 UNITS: 100 INJECTION, SOLUTION SUBCUTANEOUS at 08:31

## 2017-02-14 RX ADMIN — LOSARTAN POTASSIUM 100 MG: 50 TABLET, FILM COATED ORAL at 08:28

## 2017-02-14 RX ADMIN — DICYCLOMINE HYDROCHLORIDE 10 MG: 10 CAPSULE ORAL at 05:13

## 2017-02-14 RX ADMIN — HYDROCHLOROTHIAZIDE 25 MG: 25 TABLET ORAL at 08:28

## 2017-02-14 RX ADMIN — INSULIN LISPRO 6 UNITS: 100 INJECTION, SOLUTION INTRAVENOUS; SUBCUTANEOUS at 08:50

## 2017-02-14 ASSESSMENT — PAIN SCALES - GENERAL
PAINLEVEL_OUTOF10: 1
PAINLEVEL_OUTOF10: 8
PAINLEVEL_OUTOF10: 2
PAINLEVEL_OUTOF10: 8

## 2017-02-15 ENCOUNTER — TELEPHONE (OUTPATIENT)
Dept: NEUROLOGY | Age: 72
End: 2017-02-15

## 2017-02-15 LAB
EKG P AXIS: 38 DEGREES
EKG P-R INTERVAL: 164 MS
EKG Q-T INTERVAL: 444 MS
EKG QRS DURATION: 102 MS
EKG QTC CALCULATION (BAZETT): 465 MS
EKG T AXIS: -32 DEGREES

## 2017-03-24 ENCOUNTER — ANESTHESIA (OUTPATIENT)
Dept: ENDOSCOPY | Age: 72
End: 2017-03-24
Payer: MEDICARE

## 2017-03-24 ENCOUNTER — HOSPITAL ENCOUNTER (OUTPATIENT)
Age: 72
Setting detail: OUTPATIENT SURGERY
Discharge: HOME | End: 2017-03-24
Attending: INTERNAL MEDICINE | Admitting: INTERNAL MEDICINE
Payer: MEDICARE

## 2017-03-24 ENCOUNTER — ANESTHESIA EVENT (OUTPATIENT)
Dept: ENDOSCOPY | Age: 72
End: 2017-03-24
Payer: MEDICARE

## 2017-03-24 VITALS
HEIGHT: 64 IN | DIASTOLIC BLOOD PRESSURE: 75 MMHG | RESPIRATION RATE: 14 BRPM | TEMPERATURE: 97.1 F | WEIGHT: 150 LBS | HEART RATE: 67 BPM | OXYGEN SATURATION: 98 % | SYSTOLIC BLOOD PRESSURE: 153 MMHG | BODY MASS INDEX: 25.61 KG/M2

## 2017-03-24 VITALS
RESPIRATION RATE: 15 BRPM | DIASTOLIC BLOOD PRESSURE: 69 MMHG | SYSTOLIC BLOOD PRESSURE: 137 MMHG | OXYGEN SATURATION: 97 %

## 2017-03-24 LAB
GLUCOSE BLD-MCNC: 70 MG/DL (ref 70–99)
PERFORMED ON: NORMAL

## 2017-03-24 PROCEDURE — 7100000011 HC PHASE II RECOVERY - ADDTL 15 MIN: Performed by: INTERNAL MEDICINE

## 2017-03-24 PROCEDURE — 45385 COLONOSCOPY W/LESION REMOVAL: CPT | Performed by: INTERNAL MEDICINE

## 2017-03-24 PROCEDURE — 2500000003 HC RX 250 WO HCPCS: Performed by: INTERNAL MEDICINE

## 2017-03-24 PROCEDURE — 3700000001 HC ADD 15 MINUTES (ANESTHESIA): Performed by: INTERNAL MEDICINE

## 2017-03-24 PROCEDURE — 2500000003 HC RX 250 WO HCPCS: Performed by: NURSE ANESTHETIST, CERTIFIED REGISTERED

## 2017-03-24 PROCEDURE — 6360000002 HC RX W HCPCS: Performed by: NURSE ANESTHETIST, CERTIFIED REGISTERED

## 2017-03-24 PROCEDURE — 3609010300 HC COLONOSCOPY W/BIOPSY SINGLE/MULTIPLE: Performed by: INTERNAL MEDICINE

## 2017-03-24 PROCEDURE — 7100000010 HC PHASE II RECOVERY - FIRST 15 MIN: Performed by: INTERNAL MEDICINE

## 2017-03-24 PROCEDURE — 45380 COLONOSCOPY AND BIOPSY: CPT | Performed by: INTERNAL MEDICINE

## 2017-03-24 PROCEDURE — 3700000000 HC ANESTHESIA ATTENDED CARE: Performed by: INTERNAL MEDICINE

## 2017-03-24 PROCEDURE — 2720000010 HC SURG SUPPLY STERILE: Performed by: INTERNAL MEDICINE

## 2017-03-24 PROCEDURE — 2580000003 HC RX 258: Performed by: INTERNAL MEDICINE

## 2017-03-24 PROCEDURE — 82948 REAGENT STRIP/BLOOD GLUCOSE: CPT

## 2017-03-24 RX ORDER — DIPHENHYDRAMINE HYDROCHLORIDE 50 MG/ML
12.5 INJECTION INTRAMUSCULAR; INTRAVENOUS
Status: DISCONTINUED | OUTPATIENT
Start: 2017-03-24 | End: 2017-03-24 | Stop reason: HOSPADM

## 2017-03-24 RX ORDER — PROMETHAZINE HYDROCHLORIDE 25 MG/ML
6.25 INJECTION, SOLUTION INTRAMUSCULAR; INTRAVENOUS
Status: DISCONTINUED | OUTPATIENT
Start: 2017-03-24 | End: 2017-03-24 | Stop reason: HOSPADM

## 2017-03-24 RX ORDER — LIDOCAINE HYDROCHLORIDE 20 MG/ML
INJECTION, SOLUTION INFILTRATION; PERINEURAL PRN
Status: DISCONTINUED | OUTPATIENT
Start: 2017-03-24 | End: 2017-03-24 | Stop reason: SDUPTHER

## 2017-03-24 RX ORDER — ONDANSETRON 2 MG/ML
4 INJECTION INTRAMUSCULAR; INTRAVENOUS
Status: DISCONTINUED | OUTPATIENT
Start: 2017-03-24 | End: 2017-03-24 | Stop reason: HOSPADM

## 2017-03-24 RX ORDER — LIDOCAINE HYDROCHLORIDE 10 MG/ML
1 INJECTION, SOLUTION EPIDURAL; INFILTRATION; INTRACAUDAL; PERINEURAL ONCE
Status: COMPLETED | OUTPATIENT
Start: 2017-03-24 | End: 2017-03-24

## 2017-03-24 RX ORDER — SODIUM CHLORIDE, SODIUM LACTATE, POTASSIUM CHLORIDE, CALCIUM CHLORIDE 600; 310; 30; 20 MG/100ML; MG/100ML; MG/100ML; MG/100ML
INJECTION, SOLUTION INTRAVENOUS CONTINUOUS
Status: DISCONTINUED | OUTPATIENT
Start: 2017-03-24 | End: 2017-03-24 | Stop reason: HOSPADM

## 2017-03-24 RX ORDER — PROPOFOL 10 MG/ML
INJECTION, EMULSION INTRAVENOUS PRN
Status: DISCONTINUED | OUTPATIENT
Start: 2017-03-24 | End: 2017-03-24 | Stop reason: SDUPTHER

## 2017-03-24 RX ADMIN — LIDOCAINE HYDROCHLORIDE 40 MG: 20 INJECTION, SOLUTION INFILTRATION; PERINEURAL at 13:57

## 2017-03-24 RX ADMIN — LIDOCAINE HYDROCHLORIDE 1 ML: 10 INJECTION, SOLUTION EPIDURAL; INFILTRATION; INTRACAUDAL; PERINEURAL at 12:09

## 2017-03-24 RX ADMIN — PROPOFOL 450 MG: 10 INJECTION, EMULSION INTRAVENOUS at 13:57

## 2017-03-24 RX ADMIN — SODIUM CHLORIDE, POTASSIUM CHLORIDE, SODIUM LACTATE AND CALCIUM CHLORIDE: 600; 310; 30; 20 INJECTION, SOLUTION INTRAVENOUS at 12:09

## 2017-03-24 ASSESSMENT — PAIN - FUNCTIONAL ASSESSMENT: PAIN_FUNCTIONAL_ASSESSMENT: 0-10

## 2017-03-25 ENCOUNTER — HOSPITAL ENCOUNTER (INPATIENT)
Age: 72
LOS: 2 days | Discharge: HOME | DRG: 392 | End: 2017-03-30
Attending: EMERGENCY MEDICINE | Admitting: FAMILY MEDICINE
Payer: MEDICARE

## 2017-03-25 ENCOUNTER — APPOINTMENT (OUTPATIENT)
Dept: CT IMAGING | Age: 72
DRG: 392 | End: 2017-03-25
Payer: MEDICARE

## 2017-03-25 DIAGNOSIS — R11.11 NON-INTRACTABLE VOMITING WITHOUT NAUSEA, UNSPECIFIED VOMITING TYPE: ICD-10-CM

## 2017-03-25 DIAGNOSIS — R10.9 ABDOMINAL PAIN, UNSPECIFIED LOCATION: Primary | ICD-10-CM

## 2017-03-25 LAB
ALBUMIN SERPL-MCNC: 4.2 G/DL (ref 3.5–5.2)
ALP BLD-CCNC: 70 U/L (ref 35–104)
ALT SERPL-CCNC: 14 U/L (ref 5–33)
ANION GAP SERPL CALCULATED.3IONS-SCNC: 15 MMOL/L (ref 7–19)
AST SERPL-CCNC: 21 U/L (ref 5–32)
BASOPHILS ABSOLUTE: 0 K/UL (ref 0–0.2)
BASOPHILS RELATIVE PERCENT: 0 % (ref 0–1)
BILIRUB SERPL-MCNC: 0.4 MG/DL (ref 0.2–1.2)
BUN BLDV-MCNC: 22 MG/DL (ref 8–23)
CALCIUM SERPL-MCNC: 9.8 MG/DL (ref 8.8–10.2)
CHLORIDE BLD-SCNC: 101 MMOL/L (ref 98–111)
CO2: 28 MMOL/L (ref 22–29)
CREAT SERPL-MCNC: 1.1 MG/DL (ref 0.5–0.9)
EOSINOPHILS ABSOLUTE: 0 K/UL (ref 0–0.6)
EOSINOPHILS RELATIVE PERCENT: 0 % (ref 0–5)
GFR NON-AFRICAN AMERICAN: 49
GLOBULIN: 3.5 G/DL
GLUCOSE BLD-MCNC: 189 MG/DL (ref 74–109)
GLUCOSE BLD-MCNC: 256 MG/DL (ref 70–99)
GLUCOSE BLD-MCNC: 290 MG/DL (ref 70–99)
GLUCOSE BLD-MCNC: 291 MG/DL (ref 70–99)
HCT VFR BLD CALC: 33.9 % (ref 37–47)
HEMOGLOBIN: 11 G/DL (ref 12–16)
LIPASE: 78 U/L (ref 13–60)
LYMPHOCYTES ABSOLUTE: 0.7 K/UL (ref 1.1–4.5)
LYMPHOCYTES RELATIVE PERCENT: 10.2 % (ref 20–40)
MCH RBC QN AUTO: 30.5 PG (ref 27–31)
MCHC RBC AUTO-ENTMCNC: 32.4 G/DL (ref 33–37)
MCV RBC AUTO: 93.9 FL (ref 81–99)
MONOCYTES ABSOLUTE: 0.2 K/UL (ref 0–0.9)
MONOCYTES RELATIVE PERCENT: 3.5 % (ref 0–10)
NEUTROPHILS ABSOLUTE: 6 K/UL (ref 1.5–7.5)
NEUTROPHILS RELATIVE PERCENT: 86.2 % (ref 50–65)
PDW BLD-RTO: 13.5 % (ref 11.5–14.5)
PERFORMED ON: ABNORMAL
PLATELET # BLD: 129 K/UL (ref 130–400)
PMV BLD AUTO: 10.6 FL (ref 7.4–10.4)
POTASSIUM SERPL-SCNC: 3.3 MMOL/L (ref 3.5–5)
RBC # BLD: 3.61 M/UL (ref 4.2–5.4)
SODIUM BLD-SCNC: 144 MMOL/L (ref 136–145)
TOTAL PROTEIN: 7.7 G/DL (ref 6.6–8.7)
WBC # BLD: 6.9 K/UL (ref 4.8–10.8)

## 2017-03-25 PROCEDURE — 6360000002 HC RX W HCPCS: Performed by: EMERGENCY MEDICINE

## 2017-03-25 PROCEDURE — 82948 REAGENT STRIP/BLOOD GLUCOSE: CPT

## 2017-03-25 PROCEDURE — 6360000004 HC RX CONTRAST MEDICATION: Performed by: EMERGENCY MEDICINE

## 2017-03-25 PROCEDURE — 6370000000 HC RX 637 (ALT 250 FOR IP): Performed by: FAMILY MEDICINE

## 2017-03-25 PROCEDURE — 6360000002 HC RX W HCPCS: Performed by: INTERNAL MEDICINE

## 2017-03-25 PROCEDURE — 96372 THER/PROPH/DIAG INJ SC/IM: CPT

## 2017-03-25 PROCEDURE — 96361 HYDRATE IV INFUSION ADD-ON: CPT

## 2017-03-25 PROCEDURE — 96376 TX/PRO/DX INJ SAME DRUG ADON: CPT

## 2017-03-25 PROCEDURE — 36415 COLL VENOUS BLD VENIPUNCTURE: CPT

## 2017-03-25 PROCEDURE — 85025 COMPLETE CBC W/AUTO DIFF WBC: CPT

## 2017-03-25 PROCEDURE — 96375 TX/PRO/DX INJ NEW DRUG ADDON: CPT

## 2017-03-25 PROCEDURE — 83690 ASSAY OF LIPASE: CPT

## 2017-03-25 PROCEDURE — 99285 EMERGENCY DEPT VISIT HI MDM: CPT

## 2017-03-25 PROCEDURE — 99284 EMERGENCY DEPT VISIT MOD MDM: CPT | Performed by: EMERGENCY MEDICINE

## 2017-03-25 PROCEDURE — G0378 HOSPITAL OBSERVATION PER HR: HCPCS

## 2017-03-25 PROCEDURE — 6360000002 HC RX W HCPCS: Performed by: FAMILY MEDICINE

## 2017-03-25 PROCEDURE — 80053 COMPREHEN METABOLIC PANEL: CPT

## 2017-03-25 PROCEDURE — 2580000003 HC RX 258: Performed by: EMERGENCY MEDICINE

## 2017-03-25 PROCEDURE — 6370000000 HC RX 637 (ALT 250 FOR IP): Performed by: EMERGENCY MEDICINE

## 2017-03-25 PROCEDURE — 99215 OFFICE O/P EST HI 40 MIN: CPT | Performed by: INTERNAL MEDICINE

## 2017-03-25 PROCEDURE — 2580000003 HC RX 258: Performed by: FAMILY MEDICINE

## 2017-03-25 PROCEDURE — 88305 TISSUE EXAM BY PATHOLOGIST: CPT

## 2017-03-25 PROCEDURE — 74177 CT ABD & PELVIS W/CONTRAST: CPT

## 2017-03-25 PROCEDURE — 96365 THER/PROPH/DIAG IV INF INIT: CPT

## 2017-03-25 RX ORDER — ACETAMINOPHEN 325 MG/1
650 TABLET ORAL EVERY 4 HOURS PRN
Status: DISCONTINUED | OUTPATIENT
Start: 2017-03-25 | End: 2017-03-30 | Stop reason: HOSPADM

## 2017-03-25 RX ORDER — NEBIVOLOL 5 MG/1
10 TABLET ORAL DAILY
Status: DISCONTINUED | OUTPATIENT
Start: 2017-03-25 | End: 2017-03-26

## 2017-03-25 RX ORDER — AMLODIPINE BESYLATE 10 MG/1
10 TABLET ORAL DAILY
Status: DISCONTINUED | OUTPATIENT
Start: 2017-03-25 | End: 2017-03-30 | Stop reason: HOSPADM

## 2017-03-25 RX ORDER — MORPHINE SULFATE 4 MG/ML
4 INJECTION, SOLUTION INTRAMUSCULAR; INTRAVENOUS ONCE
Status: COMPLETED | OUTPATIENT
Start: 2017-03-25 | End: 2017-03-25

## 2017-03-25 RX ORDER — INSULIN GLARGINE 100 [IU]/ML
30 INJECTION, SOLUTION SUBCUTANEOUS DAILY
Status: DISCONTINUED | OUTPATIENT
Start: 2017-03-25 | End: 2017-03-30 | Stop reason: HOSPADM

## 2017-03-25 RX ORDER — NICOTINE POLACRILEX 4 MG
15 LOZENGE BUCCAL PRN
Status: DISCONTINUED | OUTPATIENT
Start: 2017-03-25 | End: 2017-03-30 | Stop reason: HOSPADM

## 2017-03-25 RX ORDER — ONDANSETRON 2 MG/ML
4 INJECTION INTRAMUSCULAR; INTRAVENOUS ONCE
Status: COMPLETED | OUTPATIENT
Start: 2017-03-25 | End: 2017-03-25

## 2017-03-25 RX ORDER — DOCUSATE SODIUM 100 MG/1
100 CAPSULE, LIQUID FILLED ORAL 2 TIMES DAILY
Status: DISCONTINUED | OUTPATIENT
Start: 2017-03-25 | End: 2017-03-30 | Stop reason: HOSPADM

## 2017-03-25 RX ORDER — SODIUM CHLORIDE 0.9 % (FLUSH) 0.9 %
10 SYRINGE (ML) INJECTION PRN
Status: DISCONTINUED | OUTPATIENT
Start: 2017-03-25 | End: 2017-03-30 | Stop reason: HOSPADM

## 2017-03-25 RX ORDER — DEXTROSE MONOHYDRATE 25 G/50ML
12.5 INJECTION, SOLUTION INTRAVENOUS PRN
Status: DISCONTINUED | OUTPATIENT
Start: 2017-03-25 | End: 2017-03-30 | Stop reason: HOSPADM

## 2017-03-25 RX ORDER — MORPHINE SULFATE 4 MG/ML
4 INJECTION, SOLUTION INTRAMUSCULAR; INTRAVENOUS EVERY 4 HOURS PRN
Status: DISCONTINUED | OUTPATIENT
Start: 2017-03-25 | End: 2017-03-27

## 2017-03-25 RX ORDER — ONDANSETRON 2 MG/ML
8 INJECTION INTRAMUSCULAR; INTRAVENOUS EVERY 6 HOURS PRN
Status: DISCONTINUED | OUTPATIENT
Start: 2017-03-25 | End: 2017-03-30 | Stop reason: HOSPADM

## 2017-03-25 RX ORDER — DOXAZOSIN MESYLATE 4 MG/1
6 TABLET ORAL EVERY 12 HOURS SCHEDULED
Status: DISCONTINUED | OUTPATIENT
Start: 2017-03-25 | End: 2017-03-30 | Stop reason: HOSPADM

## 2017-03-25 RX ORDER — NICOTINE POLACRILEX 4 MG
15 LOZENGE BUCCAL PRN
Status: DISCONTINUED | OUTPATIENT
Start: 2017-03-25 | End: 2017-03-25 | Stop reason: SDUPTHER

## 2017-03-25 RX ORDER — DEXTROSE MONOHYDRATE 50 MG/ML
100 INJECTION, SOLUTION INTRAVENOUS PRN
Status: DISCONTINUED | OUTPATIENT
Start: 2017-03-25 | End: 2017-03-30 | Stop reason: HOSPADM

## 2017-03-25 RX ORDER — POTASSIUM CHLORIDE 20 MEQ/1
40 TABLET, EXTENDED RELEASE ORAL ONCE
Status: DISCONTINUED | OUTPATIENT
Start: 2017-03-25 | End: 2017-03-28

## 2017-03-25 RX ORDER — 0.9 % SODIUM CHLORIDE 0.9 %
500 INTRAVENOUS SOLUTION INTRAVENOUS ONCE
Status: COMPLETED | OUTPATIENT
Start: 2017-03-25 | End: 2017-03-25

## 2017-03-25 RX ORDER — ONDANSETRON 4 MG/1
4 TABLET, ORALLY DISINTEGRATING ORAL EVERY 8 HOURS PRN
Qty: 30 TABLET | Refills: 0 | Status: ON HOLD | OUTPATIENT
Start: 2017-03-25 | End: 2018-08-10

## 2017-03-25 RX ORDER — PROMETHAZINE HYDROCHLORIDE 25 MG/1
25 TABLET ORAL EVERY 6 HOURS PRN
Qty: 20 TABLET | Refills: 0 | Status: ON HOLD | OUTPATIENT
Start: 2017-03-25 | End: 2018-08-10

## 2017-03-25 RX ORDER — PROMETHAZINE HYDROCHLORIDE 25 MG/ML
12.5 INJECTION, SOLUTION INTRAMUSCULAR; INTRAVENOUS ONCE
Status: COMPLETED | OUTPATIENT
Start: 2017-03-25 | End: 2017-03-25

## 2017-03-25 RX ORDER — POTASSIUM CHLORIDE 20 MEQ/1
20 TABLET, EXTENDED RELEASE ORAL 2 TIMES DAILY
Status: DISCONTINUED | OUTPATIENT
Start: 2017-03-25 | End: 2017-03-30 | Stop reason: HOSPADM

## 2017-03-25 RX ORDER — PROMETHAZINE HYDROCHLORIDE 25 MG/ML
12.5 INJECTION, SOLUTION INTRAMUSCULAR; INTRAVENOUS EVERY 6 HOURS PRN
Status: DISCONTINUED | OUTPATIENT
Start: 2017-03-25 | End: 2017-03-26

## 2017-03-25 RX ORDER — CIPROFLOXACIN 2 MG/ML
400 INJECTION, SOLUTION INTRAVENOUS EVERY 12 HOURS
Status: DISCONTINUED | OUTPATIENT
Start: 2017-03-25 | End: 2017-03-30 | Stop reason: HOSPADM

## 2017-03-25 RX ORDER — SODIUM CHLORIDE 0.9 % (FLUSH) 0.9 %
10 SYRINGE (ML) INJECTION EVERY 12 HOURS SCHEDULED
Status: DISCONTINUED | OUTPATIENT
Start: 2017-03-25 | End: 2017-03-30 | Stop reason: HOSPADM

## 2017-03-25 RX ORDER — ONDANSETRON 2 MG/ML
4 INJECTION INTRAMUSCULAR; INTRAVENOUS EVERY 6 HOURS PRN
Status: DISCONTINUED | OUTPATIENT
Start: 2017-03-25 | End: 2017-03-25

## 2017-03-25 RX ADMIN — POTASSIUM CHLORIDE: 2 INJECTION, SOLUTION, CONCENTRATE INTRAVENOUS at 13:15

## 2017-03-25 RX ADMIN — Medication 10 ML: at 22:15

## 2017-03-25 RX ADMIN — IOPAMIDOL 90 ML: 755 INJECTION, SOLUTION INTRAVENOUS at 04:27

## 2017-03-25 RX ADMIN — PROMETHAZINE HYDROCHLORIDE 12.5 MG: 25 INJECTION, SOLUTION INTRAMUSCULAR; INTRAVENOUS at 07:01

## 2017-03-25 RX ADMIN — MORPHINE SULFATE 4 MG: 4 INJECTION, SOLUTION INTRAMUSCULAR; INTRAVENOUS at 18:24

## 2017-03-25 RX ADMIN — MORPHINE SULFATE 4 MG: 4 INJECTION, SOLUTION INTRAMUSCULAR; INTRAVENOUS at 03:52

## 2017-03-25 RX ADMIN — AMLODIPINE BESYLATE 10 MG: 10 TABLET ORAL at 13:02

## 2017-03-25 RX ADMIN — CIPROFLOXACIN 400 MG: 2 INJECTION, SOLUTION INTRAVENOUS at 22:17

## 2017-03-25 RX ADMIN — PROMETHAZINE HYDROCHLORIDE 12.5 MG: 25 INJECTION, SOLUTION INTRAMUSCULAR; INTRAVENOUS at 04:52

## 2017-03-25 RX ADMIN — DOCUSATE SODIUM 100 MG: 100 CAPSULE, LIQUID FILLED ORAL at 13:02

## 2017-03-25 RX ADMIN — ENOXAPARIN SODIUM 40 MG: 40 INJECTION SUBCUTANEOUS at 13:04

## 2017-03-25 RX ADMIN — INSULIN LISPRO 6 UNITS: 100 INJECTION, SOLUTION INTRAVENOUS; SUBCUTANEOUS at 18:06

## 2017-03-25 RX ADMIN — ONDANSETRON 4 MG: 2 INJECTION INTRAMUSCULAR; INTRAVENOUS at 03:53

## 2017-03-25 RX ADMIN — NEBIVOLOL HYDROCHLORIDE 10 MG: 5 TABLET ORAL at 13:01

## 2017-03-25 RX ADMIN — MORPHINE SULFATE 4 MG: 4 INJECTION, SOLUTION INTRAMUSCULAR; INTRAVENOUS at 07:01

## 2017-03-25 RX ADMIN — DOXAZOSIN 6 MG: 4 TABLET ORAL at 22:15

## 2017-03-25 RX ADMIN — DOXAZOSIN 6 MG: 4 TABLET ORAL at 13:02

## 2017-03-25 RX ADMIN — ONDANSETRON 8 MG: 2 INJECTION INTRAMUSCULAR; INTRAVENOUS at 18:23

## 2017-03-25 RX ADMIN — POTASSIUM CHLORIDE 20 MEQ: 20 TABLET, EXTENDED RELEASE ORAL at 22:16

## 2017-03-25 RX ADMIN — POTASSIUM CHLORIDE: 2 INJECTION, SOLUTION, CONCENTRATE INTRAVENOUS at 23:33

## 2017-03-25 RX ADMIN — INSULIN LISPRO 3 UNITS: 100 INJECTION, SOLUTION INTRAVENOUS; SUBCUTANEOUS at 22:16

## 2017-03-25 RX ADMIN — MORPHINE SULFATE 4 MG: 4 INJECTION, SOLUTION INTRAMUSCULAR; INTRAVENOUS at 22:15

## 2017-03-25 RX ADMIN — PROMETHAZINE HYDROCHLORIDE 12.5 MG: 25 INJECTION, SOLUTION INTRAMUSCULAR; INTRAVENOUS at 13:03

## 2017-03-25 RX ADMIN — SODIUM CHLORIDE 500 ML: 9 INJECTION, SOLUTION INTRAVENOUS at 03:49

## 2017-03-25 RX ADMIN — DOCUSATE SODIUM 100 MG: 100 CAPSULE, LIQUID FILLED ORAL at 22:15

## 2017-03-25 RX ADMIN — POTASSIUM CHLORIDE 20 MEQ: 20 TABLET, EXTENDED RELEASE ORAL at 13:01

## 2017-03-25 RX ADMIN — MORPHINE SULFATE 4 MG: 4 INJECTION, SOLUTION INTRAMUSCULAR; INTRAVENOUS at 13:02

## 2017-03-25 ASSESSMENT — PAIN SCALES - GENERAL
PAINLEVEL_OUTOF10: 7
PAINLEVEL_OUTOF10: 10
PAINLEVEL_OUTOF10: 10
PAINLEVEL_OUTOF10: 8
PAINLEVEL_OUTOF10: 10
PAINLEVEL_OUTOF10: 9
PAINLEVEL_OUTOF10: 8
PAINLEVEL_OUTOF10: 8

## 2017-03-25 ASSESSMENT — PAIN DESCRIPTION - PAIN TYPE: TYPE: ACUTE PAIN

## 2017-03-25 ASSESSMENT — PAIN DESCRIPTION - DESCRIPTORS: DESCRIPTORS: CONSTANT;ACHING

## 2017-03-25 ASSESSMENT — PAIN DESCRIPTION - ORIENTATION: ORIENTATION: OTHER (COMMENT)

## 2017-03-25 ASSESSMENT — PAIN DESCRIPTION - FREQUENCY: FREQUENCY: CONTINUOUS

## 2017-03-25 ASSESSMENT — PAIN DESCRIPTION - LOCATION
LOCATION: ABDOMEN
LOCATION: GENERALIZED
LOCATION: SHOULDER

## 2017-03-25 ASSESSMENT — ENCOUNTER SYMPTOMS
ABDOMINAL PAIN: 1
COUGH: 0
VOMITING: 1
SHORTNESS OF BREATH: 0

## 2017-03-26 ENCOUNTER — APPOINTMENT (OUTPATIENT)
Dept: GENERAL RADIOLOGY | Age: 72
DRG: 392 | End: 2017-03-26
Payer: MEDICARE

## 2017-03-26 LAB
AMYLASE: 88 U/L (ref 28–100)
ANION GAP SERPL CALCULATED.3IONS-SCNC: 14 MMOL/L (ref 7–19)
BASOPHILS ABSOLUTE: 0 K/UL (ref 0–0.2)
BASOPHILS RELATIVE PERCENT: 0.2 % (ref 0–1)
BUN BLDV-MCNC: 24 MG/DL (ref 8–23)
CALCIUM SERPL-MCNC: 8.8 MG/DL (ref 8.8–10.2)
CHLORIDE BLD-SCNC: 104 MMOL/L (ref 98–111)
CO2: 26 MMOL/L (ref 22–29)
CREAT SERPL-MCNC: 1.5 MG/DL (ref 0.5–0.9)
EOSINOPHILS ABSOLUTE: 0 K/UL (ref 0–0.6)
EOSINOPHILS RELATIVE PERCENT: 0 % (ref 0–5)
GFR NON-AFRICAN AMERICAN: 34
GLUCOSE BLD-MCNC: 269 MG/DL (ref 74–109)
GLUCOSE BLD-MCNC: 275 MG/DL (ref 70–99)
GLUCOSE BLD-MCNC: 306 MG/DL (ref 70–99)
GLUCOSE BLD-MCNC: 54 MG/DL (ref 70–99)
GLUCOSE BLD-MCNC: 70 MG/DL (ref 70–99)
GLUCOSE BLD-MCNC: 85 MG/DL (ref 70–99)
HCT VFR BLD CALC: 31.8 % (ref 37–47)
HEMOGLOBIN: 10 G/DL (ref 12–16)
LIPASE: 41 U/L (ref 13–60)
LYMPHOCYTES ABSOLUTE: 0.6 K/UL (ref 1.1–4.5)
LYMPHOCYTES RELATIVE PERCENT: 10.2 % (ref 20–40)
MCH RBC QN AUTO: 30.6 PG (ref 27–31)
MCHC RBC AUTO-ENTMCNC: 31.4 G/DL (ref 33–37)
MCV RBC AUTO: 97.2 FL (ref 81–99)
MONOCYTES ABSOLUTE: 0.4 K/UL (ref 0–0.9)
MONOCYTES RELATIVE PERCENT: 6.8 % (ref 0–10)
NEUTROPHILS ABSOLUTE: 4.6 K/UL (ref 1.5–7.5)
NEUTROPHILS RELATIVE PERCENT: 82.6 % (ref 50–65)
PDW BLD-RTO: 13.9 % (ref 11.5–14.5)
PERFORMED ON: ABNORMAL
PERFORMED ON: NORMAL
PERFORMED ON: NORMAL
PLATELET # BLD: 121 K/UL (ref 130–400)
PMV BLD AUTO: 11.1 FL (ref 7.4–10.4)
POTASSIUM SERPL-SCNC: 3.8 MMOL/L (ref 3.5–5)
RBC # BLD: 3.27 M/UL (ref 4.2–5.4)
SEDIMENTATION RATE, ERYTHROCYTE: 65 MM/HR (ref 0–25)
SODIUM BLD-SCNC: 144 MMOL/L (ref 136–145)
WBC # BLD: 5.6 K/UL (ref 4.8–10.8)

## 2017-03-26 PROCEDURE — 6370000000 HC RX 637 (ALT 250 FOR IP): Performed by: FAMILY MEDICINE

## 2017-03-26 PROCEDURE — 82150 ASSAY OF AMYLASE: CPT

## 2017-03-26 PROCEDURE — 82948 REAGENT STRIP/BLOOD GLUCOSE: CPT

## 2017-03-26 PROCEDURE — 85652 RBC SED RATE AUTOMATED: CPT

## 2017-03-26 PROCEDURE — 80048 BASIC METABOLIC PNL TOTAL CA: CPT

## 2017-03-26 PROCEDURE — 74000 XR ABDOMEN LIMITED (KUB): CPT

## 2017-03-26 PROCEDURE — 96375 TX/PRO/DX INJ NEW DRUG ADDON: CPT

## 2017-03-26 PROCEDURE — 36415 COLL VENOUS BLD VENIPUNCTURE: CPT

## 2017-03-26 PROCEDURE — 96376 TX/PRO/DX INJ SAME DRUG ADON: CPT

## 2017-03-26 PROCEDURE — 99213 OFFICE O/P EST LOW 20 MIN: CPT | Performed by: INTERNAL MEDICINE

## 2017-03-26 PROCEDURE — 96366 THER/PROPH/DIAG IV INF ADDON: CPT

## 2017-03-26 PROCEDURE — 2580000003 HC RX 258: Performed by: FAMILY MEDICINE

## 2017-03-26 PROCEDURE — 83690 ASSAY OF LIPASE: CPT

## 2017-03-26 PROCEDURE — 6360000002 HC RX W HCPCS: Performed by: INTERNAL MEDICINE

## 2017-03-26 PROCEDURE — 6360000002 HC RX W HCPCS: Performed by: FAMILY MEDICINE

## 2017-03-26 PROCEDURE — G0378 HOSPITAL OBSERVATION PER HR: HCPCS

## 2017-03-26 PROCEDURE — 96361 HYDRATE IV INFUSION ADD-ON: CPT

## 2017-03-26 PROCEDURE — 85025 COMPLETE CBC W/AUTO DIFF WBC: CPT

## 2017-03-26 RX ORDER — NEBIVOLOL 5 MG/1
10 TABLET ORAL 2 TIMES DAILY
Status: DISCONTINUED | OUTPATIENT
Start: 2017-03-26 | End: 2017-03-30 | Stop reason: HOSPADM

## 2017-03-26 RX ORDER — METOCLOPRAMIDE HYDROCHLORIDE 5 MG/ML
10 INJECTION INTRAMUSCULAR; INTRAVENOUS EVERY 6 HOURS
Status: DISCONTINUED | OUTPATIENT
Start: 2017-03-26 | End: 2017-03-27

## 2017-03-26 RX ORDER — 0.9 % SODIUM CHLORIDE 0.9 %
500 INTRAVENOUS SOLUTION INTRAVENOUS ONCE
Status: COMPLETED | OUTPATIENT
Start: 2017-03-26 | End: 2017-03-26

## 2017-03-26 RX ADMIN — AMLODIPINE BESYLATE 10 MG: 10 TABLET ORAL at 09:53

## 2017-03-26 RX ADMIN — Medication 10 ML: at 09:54

## 2017-03-26 RX ADMIN — CIPROFLOXACIN 400 MG: 2 INJECTION, SOLUTION INTRAVENOUS at 09:53

## 2017-03-26 RX ADMIN — NEBIVOLOL HYDROCHLORIDE 10 MG: 5 TABLET ORAL at 09:54

## 2017-03-26 RX ADMIN — MORPHINE SULFATE 4 MG: 4 INJECTION, SOLUTION INTRAMUSCULAR; INTRAVENOUS at 08:47

## 2017-03-26 RX ADMIN — INSULIN LISPRO 6 UNITS: 100 INJECTION, SOLUTION INTRAVENOUS; SUBCUTANEOUS at 09:56

## 2017-03-26 RX ADMIN — POTASSIUM CHLORIDE: 2 INJECTION, SOLUTION, CONCENTRATE INTRAVENOUS at 15:51

## 2017-03-26 RX ADMIN — METOCLOPRAMIDE 10 MG: 5 INJECTION, SOLUTION INTRAMUSCULAR; INTRAVENOUS at 22:29

## 2017-03-26 RX ADMIN — MORPHINE SULFATE 4 MG: 4 INJECTION, SOLUTION INTRAMUSCULAR; INTRAVENOUS at 04:21

## 2017-03-26 RX ADMIN — INSULIN GLARGINE 30 UNITS: 100 INJECTION, SOLUTION SUBCUTANEOUS at 09:56

## 2017-03-26 RX ADMIN — DOCUSATE SODIUM 100 MG: 100 CAPSULE, LIQUID FILLED ORAL at 09:54

## 2017-03-26 RX ADMIN — ONDANSETRON 8 MG: 2 INJECTION INTRAMUSCULAR; INTRAVENOUS at 12:10

## 2017-03-26 RX ADMIN — CIPROFLOXACIN 400 MG: 2 INJECTION, SOLUTION INTRAVENOUS at 22:22

## 2017-03-26 RX ADMIN — MORPHINE SULFATE 4 MG: 4 INJECTION, SOLUTION INTRAMUSCULAR; INTRAVENOUS at 15:51

## 2017-03-26 RX ADMIN — MORPHINE SULFATE 4 MG: 4 INJECTION, SOLUTION INTRAMUSCULAR; INTRAVENOUS at 19:23

## 2017-03-26 RX ADMIN — INSULIN LISPRO 8 UNITS: 100 INJECTION, SOLUTION INTRAVENOUS; SUBCUTANEOUS at 12:55

## 2017-03-26 RX ADMIN — PROMETHAZINE HYDROCHLORIDE 12.5 MG: 25 INJECTION, SOLUTION INTRAMUSCULAR; INTRAVENOUS at 19:23

## 2017-03-26 RX ADMIN — MORPHINE SULFATE 4 MG: 4 INJECTION, SOLUTION INTRAMUSCULAR; INTRAVENOUS at 23:33

## 2017-03-26 RX ADMIN — LINAGLIPTIN 5 MG: 5 TABLET, FILM COATED ORAL at 09:53

## 2017-03-26 RX ADMIN — DOXAZOSIN 6 MG: 4 TABLET ORAL at 09:54

## 2017-03-26 RX ADMIN — POTASSIUM CHLORIDE 20 MEQ: 20 TABLET, EXTENDED RELEASE ORAL at 09:53

## 2017-03-26 RX ADMIN — ONDANSETRON 8 MG: 2 INJECTION INTRAMUSCULAR; INTRAVENOUS at 23:33

## 2017-03-26 RX ADMIN — SODIUM CHLORIDE 500 ML: 9 INJECTION, SOLUTION INTRAVENOUS at 09:55

## 2017-03-26 ASSESSMENT — PAIN SCALES - GENERAL
PAINLEVEL_OUTOF10: 10
PAINLEVEL_OUTOF10: 9
PAINLEVEL_OUTOF10: 10

## 2017-03-27 LAB
ANION GAP SERPL CALCULATED.3IONS-SCNC: 12 MMOL/L (ref 7–19)
BASOPHILS ABSOLUTE: 0 K/UL (ref 0–0.2)
BASOPHILS RELATIVE PERCENT: 0.2 % (ref 0–1)
BUN BLDV-MCNC: 24 MG/DL (ref 8–23)
CALCIUM SERPL-MCNC: 8.5 MG/DL (ref 8.8–10.2)
CHLORIDE BLD-SCNC: 110 MMOL/L (ref 98–111)
CO2: 24 MMOL/L (ref 22–29)
CREAT SERPL-MCNC: 1.5 MG/DL (ref 0.5–0.9)
EOSINOPHILS ABSOLUTE: 0 K/UL (ref 0–0.6)
EOSINOPHILS RELATIVE PERCENT: 0.2 % (ref 0–5)
GFR NON-AFRICAN AMERICAN: 34
GLUCOSE BLD-MCNC: 103 MG/DL (ref 74–109)
GLUCOSE BLD-MCNC: 107 MG/DL (ref 70–99)
GLUCOSE BLD-MCNC: 114 MG/DL (ref 70–99)
GLUCOSE BLD-MCNC: 143 MG/DL (ref 70–99)
GLUCOSE BLD-MCNC: 171 MG/DL (ref 70–99)
HCT VFR BLD CALC: 29.3 % (ref 37–47)
HEMOGLOBIN: 9.2 G/DL (ref 12–16)
LYMPHOCYTES ABSOLUTE: 0.9 K/UL (ref 1.1–4.5)
LYMPHOCYTES RELATIVE PERCENT: 15.4 % (ref 20–40)
MCH RBC QN AUTO: 30.8 PG (ref 27–31)
MCHC RBC AUTO-ENTMCNC: 31.4 G/DL (ref 33–37)
MCV RBC AUTO: 98 FL (ref 81–99)
MONOCYTES ABSOLUTE: 0.3 K/UL (ref 0–0.9)
MONOCYTES RELATIVE PERCENT: 5.7 % (ref 0–10)
NEUTROPHILS ABSOLUTE: 4.4 K/UL (ref 1.5–7.5)
NEUTROPHILS RELATIVE PERCENT: 78.1 % (ref 50–65)
PDW BLD-RTO: 13.7 % (ref 11.5–14.5)
PERFORMED ON: ABNORMAL
PLATELET # BLD: 115 K/UL (ref 130–400)
PMV BLD AUTO: 10.6 FL (ref 7.4–10.4)
POTASSIUM SERPL-SCNC: 3.7 MMOL/L (ref 3.5–5)
RBC # BLD: 2.99 M/UL (ref 4.2–5.4)
SODIUM BLD-SCNC: 146 MMOL/L (ref 136–145)
WBC # BLD: 5.6 K/UL (ref 4.8–10.8)

## 2017-03-27 PROCEDURE — 96376 TX/PRO/DX INJ SAME DRUG ADON: CPT

## 2017-03-27 PROCEDURE — 99213 OFFICE O/P EST LOW 20 MIN: CPT | Performed by: INTERNAL MEDICINE

## 2017-03-27 PROCEDURE — 36415 COLL VENOUS BLD VENIPUNCTURE: CPT

## 2017-03-27 PROCEDURE — 6360000002 HC RX W HCPCS: Performed by: INTERNAL MEDICINE

## 2017-03-27 PROCEDURE — 96372 THER/PROPH/DIAG INJ SC/IM: CPT

## 2017-03-27 PROCEDURE — 82948 REAGENT STRIP/BLOOD GLUCOSE: CPT

## 2017-03-27 PROCEDURE — 96366 THER/PROPH/DIAG IV INF ADDON: CPT

## 2017-03-27 PROCEDURE — 2700000000 HC OXYGEN THERAPY PER DAY

## 2017-03-27 PROCEDURE — 85025 COMPLETE CBC W/AUTO DIFF WBC: CPT

## 2017-03-27 PROCEDURE — 80048 BASIC METABOLIC PNL TOTAL CA: CPT

## 2017-03-27 PROCEDURE — 6360000002 HC RX W HCPCS: Performed by: FAMILY MEDICINE

## 2017-03-27 PROCEDURE — 2580000003 HC RX 258: Performed by: FAMILY MEDICINE

## 2017-03-27 PROCEDURE — G0378 HOSPITAL OBSERVATION PER HR: HCPCS

## 2017-03-27 PROCEDURE — 6370000000 HC RX 637 (ALT 250 FOR IP): Performed by: FAMILY MEDICINE

## 2017-03-27 RX ORDER — METOCLOPRAMIDE HYDROCHLORIDE 5 MG/ML
5 INJECTION INTRAMUSCULAR; INTRAVENOUS EVERY 6 HOURS
Status: DISCONTINUED | OUTPATIENT
Start: 2017-03-27 | End: 2017-03-29

## 2017-03-27 RX ORDER — MORPHINE SULFATE 4 MG/ML
4 INJECTION, SOLUTION INTRAMUSCULAR; INTRAVENOUS
Status: DISCONTINUED | OUTPATIENT
Start: 2017-03-27 | End: 2017-03-28

## 2017-03-27 RX ORDER — PROMETHAZINE HYDROCHLORIDE 25 MG/ML
12.5 INJECTION, SOLUTION INTRAMUSCULAR; INTRAVENOUS EVERY 6 HOURS PRN
Status: DISCONTINUED | OUTPATIENT
Start: 2017-03-27 | End: 2017-03-30 | Stop reason: HOSPADM

## 2017-03-27 RX ADMIN — MORPHINE SULFATE 4 MG: 4 INJECTION, SOLUTION INTRAMUSCULAR; INTRAVENOUS at 11:52

## 2017-03-27 RX ADMIN — POTASSIUM CHLORIDE 20 MEQ: 20 TABLET, EXTENDED RELEASE ORAL at 20:51

## 2017-03-27 RX ADMIN — Medication 10 ML: at 08:46

## 2017-03-27 RX ADMIN — ONDANSETRON 8 MG: 2 INJECTION INTRAMUSCULAR; INTRAVENOUS at 16:02

## 2017-03-27 RX ADMIN — POTASSIUM CHLORIDE: 2 INJECTION, SOLUTION, CONCENTRATE INTRAVENOUS at 10:57

## 2017-03-27 RX ADMIN — POTASSIUM CHLORIDE: 2 INJECTION, SOLUTION, CONCENTRATE INTRAVENOUS at 22:27

## 2017-03-27 RX ADMIN — NEBIVOLOL HYDROCHLORIDE 10 MG: 5 TABLET ORAL at 08:31

## 2017-03-27 RX ADMIN — DOXAZOSIN 6 MG: 4 TABLET ORAL at 08:31

## 2017-03-27 RX ADMIN — MORPHINE SULFATE 4 MG: 4 INJECTION, SOLUTION INTRAMUSCULAR; INTRAVENOUS at 16:02

## 2017-03-27 RX ADMIN — MORPHINE SULFATE 4 MG: 4 INJECTION, SOLUTION INTRAMUSCULAR; INTRAVENOUS at 08:30

## 2017-03-27 RX ADMIN — MORPHINE SULFATE 4 MG: 4 INJECTION, SOLUTION INTRAMUSCULAR; INTRAVENOUS at 20:33

## 2017-03-27 RX ADMIN — METOCLOPRAMIDE 5 MG: 5 INJECTION, SOLUTION INTRAMUSCULAR; INTRAVENOUS at 20:34

## 2017-03-27 RX ADMIN — DOCUSATE SODIUM 100 MG: 100 CAPSULE, LIQUID FILLED ORAL at 20:50

## 2017-03-27 RX ADMIN — METOCLOPRAMIDE 10 MG: 5 INJECTION, SOLUTION INTRAMUSCULAR; INTRAVENOUS at 03:48

## 2017-03-27 RX ADMIN — Medication 10 ML: at 20:51

## 2017-03-27 RX ADMIN — MORPHINE SULFATE 4 MG: 4 INJECTION, SOLUTION INTRAMUSCULAR; INTRAVENOUS at 23:37

## 2017-03-27 RX ADMIN — CIPROFLOXACIN 400 MG: 2 INJECTION, SOLUTION INTRAVENOUS at 20:30

## 2017-03-27 RX ADMIN — MORPHINE SULFATE 4 MG: 4 INJECTION, SOLUTION INTRAMUSCULAR; INTRAVENOUS at 03:48

## 2017-03-27 RX ADMIN — LINAGLIPTIN 5 MG: 5 TABLET, FILM COATED ORAL at 08:31

## 2017-03-27 RX ADMIN — DOXAZOSIN 6 MG: 4 TABLET ORAL at 20:50

## 2017-03-27 RX ADMIN — AMLODIPINE BESYLATE 10 MG: 10 TABLET ORAL at 08:31

## 2017-03-27 RX ADMIN — PROMETHAZINE HYDROCHLORIDE 12.5 MG: 25 INJECTION, SOLUTION INTRAMUSCULAR; INTRAVENOUS at 23:37

## 2017-03-27 RX ADMIN — POTASSIUM CHLORIDE 20 MEQ: 20 TABLET, EXTENDED RELEASE ORAL at 08:31

## 2017-03-27 RX ADMIN — METOCLOPRAMIDE 10 MG: 5 INJECTION, SOLUTION INTRAMUSCULAR; INTRAVENOUS at 08:31

## 2017-03-27 RX ADMIN — INSULIN LISPRO 2 UNITS: 100 INJECTION, SOLUTION INTRAVENOUS; SUBCUTANEOUS at 11:55

## 2017-03-27 RX ADMIN — CIPROFLOXACIN 400 MG: 2 INJECTION, SOLUTION INTRAVENOUS at 08:39

## 2017-03-27 RX ADMIN — INSULIN GLARGINE 30 UNITS: 100 INJECTION, SOLUTION SUBCUTANEOUS at 08:30

## 2017-03-27 RX ADMIN — NEBIVOLOL HYDROCHLORIDE 10 MG: 5 TABLET ORAL at 20:51

## 2017-03-27 RX ADMIN — PROMETHAZINE HYDROCHLORIDE 12.5 MG: 25 INJECTION, SOLUTION INTRAMUSCULAR; INTRAVENOUS at 11:52

## 2017-03-27 RX ADMIN — DOCUSATE SODIUM 100 MG: 100 CAPSULE, LIQUID FILLED ORAL at 08:32

## 2017-03-27 RX ADMIN — ONDANSETRON 8 MG: 2 INJECTION INTRAMUSCULAR; INTRAVENOUS at 08:29

## 2017-03-27 RX ADMIN — METOCLOPRAMIDE 5 MG: 5 INJECTION, SOLUTION INTRAMUSCULAR; INTRAVENOUS at 16:02

## 2017-03-27 ASSESSMENT — PAIN SCALES - GENERAL
PAINLEVEL_OUTOF10: 5
PAINLEVEL_OUTOF10: 10
PAINLEVEL_OUTOF10: 6
PAINLEVEL_OUTOF10: 10
PAINLEVEL_OUTOF10: 9
PAINLEVEL_OUTOF10: 5
PAINLEVEL_OUTOF10: 9
PAINLEVEL_OUTOF10: 10
PAINLEVEL_OUTOF10: 6
PAINLEVEL_OUTOF10: 7

## 2017-03-28 LAB
GLUCOSE BLD-MCNC: 105 MG/DL (ref 70–99)
GLUCOSE BLD-MCNC: 118 MG/DL (ref 70–99)
GLUCOSE BLD-MCNC: 76 MG/DL (ref 70–99)
GLUCOSE BLD-MCNC: 77 MG/DL (ref 70–99)
GLUCOSE BLD-MCNC: 96 MG/DL (ref 70–99)
PERFORMED ON: ABNORMAL
PERFORMED ON: ABNORMAL
PERFORMED ON: NORMAL

## 2017-03-28 PROCEDURE — 99232 SBSQ HOSP IP/OBS MODERATE 35: CPT | Performed by: INTERNAL MEDICINE

## 2017-03-28 PROCEDURE — 6370000000 HC RX 637 (ALT 250 FOR IP): Performed by: FAMILY MEDICINE

## 2017-03-28 PROCEDURE — 6360000002 HC RX W HCPCS: Performed by: INTERNAL MEDICINE

## 2017-03-28 PROCEDURE — 96376 TX/PRO/DX INJ SAME DRUG ADON: CPT

## 2017-03-28 PROCEDURE — 6360000002 HC RX W HCPCS: Performed by: FAMILY MEDICINE

## 2017-03-28 PROCEDURE — 1210000000 HC MED SURG R&B

## 2017-03-28 PROCEDURE — 82948 REAGENT STRIP/BLOOD GLUCOSE: CPT

## 2017-03-28 PROCEDURE — 2700000000 HC OXYGEN THERAPY PER DAY

## 2017-03-28 PROCEDURE — 2580000003 HC RX 258: Performed by: FAMILY MEDICINE

## 2017-03-28 RX ORDER — OXYCODONE HYDROCHLORIDE AND ACETAMINOPHEN 5; 325 MG/1; MG/1
1 TABLET ORAL EVERY 4 HOURS PRN
Status: DISCONTINUED | OUTPATIENT
Start: 2017-03-28 | End: 2017-03-30 | Stop reason: HOSPADM

## 2017-03-28 RX ORDER — MORPHINE SULFATE 4 MG/ML
2 INJECTION, SOLUTION INTRAMUSCULAR; INTRAVENOUS
Status: DISCONTINUED | OUTPATIENT
Start: 2017-03-28 | End: 2017-03-30 | Stop reason: HOSPADM

## 2017-03-28 RX ADMIN — DOCUSATE SODIUM 100 MG: 100 CAPSULE, LIQUID FILLED ORAL at 07:52

## 2017-03-28 RX ADMIN — MORPHINE SULFATE 2 MG: 4 INJECTION, SOLUTION INTRAMUSCULAR; INTRAVENOUS at 14:28

## 2017-03-28 RX ADMIN — PROMETHAZINE HYDROCHLORIDE 12.5 MG: 25 INJECTION, SOLUTION INTRAMUSCULAR; INTRAVENOUS at 18:13

## 2017-03-28 RX ADMIN — NEBIVOLOL HYDROCHLORIDE 10 MG: 5 TABLET ORAL at 20:50

## 2017-03-28 RX ADMIN — NEBIVOLOL HYDROCHLORIDE 10 MG: 5 TABLET ORAL at 07:51

## 2017-03-28 RX ADMIN — ONDANSETRON 8 MG: 2 INJECTION INTRAMUSCULAR; INTRAVENOUS at 04:07

## 2017-03-28 RX ADMIN — MORPHINE SULFATE 4 MG: 4 INJECTION, SOLUTION INTRAMUSCULAR; INTRAVENOUS at 04:06

## 2017-03-28 RX ADMIN — MORPHINE SULFATE 2 MG: 4 INJECTION, SOLUTION INTRAMUSCULAR; INTRAVENOUS at 21:13

## 2017-03-28 RX ADMIN — LINAGLIPTIN 5 MG: 5 TABLET, FILM COATED ORAL at 07:52

## 2017-03-28 RX ADMIN — POTASSIUM CHLORIDE: 2 INJECTION, SOLUTION, CONCENTRATE INTRAVENOUS at 10:47

## 2017-03-28 RX ADMIN — CIPROFLOXACIN 400 MG: 2 INJECTION, SOLUTION INTRAVENOUS at 20:54

## 2017-03-28 RX ADMIN — CIPROFLOXACIN 400 MG: 2 INJECTION, SOLUTION INTRAVENOUS at 10:07

## 2017-03-28 RX ADMIN — MORPHINE SULFATE 4 MG: 4 INJECTION, SOLUTION INTRAMUSCULAR; INTRAVENOUS at 07:49

## 2017-03-28 RX ADMIN — AMLODIPINE BESYLATE 10 MG: 10 TABLET ORAL at 07:52

## 2017-03-28 RX ADMIN — ONDANSETRON 4 MG: 2 INJECTION INTRAMUSCULAR; INTRAVENOUS at 14:28

## 2017-03-28 RX ADMIN — DOXAZOSIN 6 MG: 4 TABLET ORAL at 07:52

## 2017-03-28 RX ADMIN — POTASSIUM CHLORIDE 20 MEQ: 20 TABLET, EXTENDED RELEASE ORAL at 07:51

## 2017-03-28 RX ADMIN — DOXAZOSIN 6 MG: 4 TABLET ORAL at 20:50

## 2017-03-28 RX ADMIN — INSULIN GLARGINE 30 UNITS: 100 INJECTION, SOLUTION SUBCUTANEOUS at 07:49

## 2017-03-28 RX ADMIN — Medication 10 ML: at 20:51

## 2017-03-28 RX ADMIN — DOCUSATE SODIUM 100 MG: 100 CAPSULE, LIQUID FILLED ORAL at 20:50

## 2017-03-28 RX ADMIN — METOCLOPRAMIDE 5 MG: 5 INJECTION, SOLUTION INTRAMUSCULAR; INTRAVENOUS at 03:20

## 2017-03-28 RX ADMIN — MORPHINE SULFATE 2 MG: 4 INJECTION, SOLUTION INTRAMUSCULAR; INTRAVENOUS at 18:12

## 2017-03-28 RX ADMIN — METOCLOPRAMIDE 5 MG: 5 INJECTION, SOLUTION INTRAMUSCULAR; INTRAVENOUS at 07:51

## 2017-03-28 RX ADMIN — METOCLOPRAMIDE 5 MG: 5 INJECTION, SOLUTION INTRAMUSCULAR; INTRAVENOUS at 14:27

## 2017-03-28 RX ADMIN — OXYCODONE HYDROCHLORIDE AND ACETAMINOPHEN 1 TABLET: 5; 325 TABLET ORAL at 19:24

## 2017-03-28 RX ADMIN — POTASSIUM CHLORIDE 20 MEQ: 20 TABLET, EXTENDED RELEASE ORAL at 20:50

## 2017-03-28 RX ADMIN — METOCLOPRAMIDE 5 MG: 5 INJECTION, SOLUTION INTRAMUSCULAR; INTRAVENOUS at 20:50

## 2017-03-28 ASSESSMENT — PAIN SCALES - GENERAL
PAINLEVEL_OUTOF10: 7
PAINLEVEL_OUTOF10: 0
PAINLEVEL_OUTOF10: 9
PAINLEVEL_OUTOF10: 0
PAINLEVEL_OUTOF10: 8
PAINLEVEL_OUTOF10: 10
PAINLEVEL_OUTOF10: 9
PAINLEVEL_OUTOF10: 3

## 2017-03-29 LAB
ALBUMIN SERPL-MCNC: 2.8 G/DL (ref 3.5–5.2)
ALP BLD-CCNC: 51 U/L (ref 35–104)
ALT SERPL-CCNC: 9 U/L (ref 5–33)
ANION GAP SERPL CALCULATED.3IONS-SCNC: 13 MMOL/L (ref 7–19)
AST SERPL-CCNC: 15 U/L (ref 5–32)
BASOPHILS ABSOLUTE: 0 K/UL (ref 0–0.2)
BASOPHILS RELATIVE PERCENT: 0.2 % (ref 0–1)
BILIRUB SERPL-MCNC: 0.4 MG/DL (ref 0.2–1.2)
BUN BLDV-MCNC: 17 MG/DL (ref 8–23)
CALCIUM SERPL-MCNC: 8.4 MG/DL (ref 8.8–10.2)
CHLORIDE BLD-SCNC: 108 MMOL/L (ref 98–111)
CO2: 24 MMOL/L (ref 22–29)
CREAT SERPL-MCNC: 1.2 MG/DL (ref 0.5–0.9)
EOSINOPHILS ABSOLUTE: 0.1 K/UL (ref 0–0.6)
EOSINOPHILS RELATIVE PERCENT: 2.2 % (ref 0–5)
GFR NON-AFRICAN AMERICAN: 44
GLOBULIN: 2.5 G/DL
GLUCOSE BLD-MCNC: 104 MG/DL (ref 70–99)
GLUCOSE BLD-MCNC: 109 MG/DL (ref 70–99)
GLUCOSE BLD-MCNC: 137 MG/DL (ref 70–99)
GLUCOSE BLD-MCNC: 53 MG/DL (ref 74–109)
GLUCOSE BLD-MCNC: 56 MG/DL (ref 70–99)
GLUCOSE BLD-MCNC: 73 MG/DL (ref 70–99)
GLUCOSE BLD-MCNC: 77 MG/DL (ref 70–99)
HCT VFR BLD CALC: 27.2 % (ref 37–47)
HEMOGLOBIN: 8.9 G/DL (ref 12–16)
LYMPHOCYTES ABSOLUTE: 1.1 K/UL (ref 1.1–4.5)
LYMPHOCYTES RELATIVE PERCENT: 22.9 % (ref 20–40)
MCH RBC QN AUTO: 31 PG (ref 27–31)
MCHC RBC AUTO-ENTMCNC: 32.7 G/DL (ref 33–37)
MCV RBC AUTO: 94.8 FL (ref 81–99)
MONOCYTES ABSOLUTE: 0.4 K/UL (ref 0–0.9)
MONOCYTES RELATIVE PERCENT: 7.7 % (ref 0–10)
NEUTROPHILS ABSOLUTE: 3.3 K/UL (ref 1.5–7.5)
NEUTROPHILS RELATIVE PERCENT: 66.8 % (ref 50–65)
PDW BLD-RTO: 13.3 % (ref 11.5–14.5)
PERFORMED ON: ABNORMAL
PERFORMED ON: NORMAL
PERFORMED ON: NORMAL
PLATELET # BLD: 115 K/UL (ref 130–400)
PMV BLD AUTO: 10.9 FL (ref 7.4–10.4)
POTASSIUM SERPL-SCNC: 3.6 MMOL/L (ref 3.5–5)
RBC # BLD: 2.87 M/UL (ref 4.2–5.4)
SEDIMENTATION RATE, ERYTHROCYTE: 48 MM/HR (ref 0–25)
SODIUM BLD-SCNC: 145 MMOL/L (ref 136–145)
TOTAL PROTEIN: 5.3 G/DL (ref 6.6–8.7)
WBC # BLD: 4.9 K/UL (ref 4.8–10.8)

## 2017-03-29 PROCEDURE — 85025 COMPLETE CBC W/AUTO DIFF WBC: CPT

## 2017-03-29 PROCEDURE — 1210000000 HC MED SURG R&B

## 2017-03-29 PROCEDURE — 6360000002 HC RX W HCPCS: Performed by: INTERNAL MEDICINE

## 2017-03-29 PROCEDURE — 6370000000 HC RX 637 (ALT 250 FOR IP): Performed by: FAMILY MEDICINE

## 2017-03-29 PROCEDURE — 2700000000 HC OXYGEN THERAPY PER DAY

## 2017-03-29 PROCEDURE — 2580000003 HC RX 258: Performed by: FAMILY MEDICINE

## 2017-03-29 PROCEDURE — 82948 REAGENT STRIP/BLOOD GLUCOSE: CPT

## 2017-03-29 PROCEDURE — 80053 COMPREHEN METABOLIC PANEL: CPT

## 2017-03-29 PROCEDURE — 6360000002 HC RX W HCPCS: Performed by: FAMILY MEDICINE

## 2017-03-29 PROCEDURE — 85652 RBC SED RATE AUTOMATED: CPT

## 2017-03-29 PROCEDURE — 36415 COLL VENOUS BLD VENIPUNCTURE: CPT

## 2017-03-29 PROCEDURE — 99232 SBSQ HOSP IP/OBS MODERATE 35: CPT | Performed by: INTERNAL MEDICINE

## 2017-03-29 RX ORDER — METOCLOPRAMIDE HYDROCHLORIDE 5 MG/ML
10 INJECTION INTRAMUSCULAR; INTRAVENOUS EVERY 6 HOURS
Status: DISCONTINUED | OUTPATIENT
Start: 2017-03-29 | End: 2017-03-30 | Stop reason: HOSPADM

## 2017-03-29 RX ADMIN — METOCLOPRAMIDE 5 MG: 5 INJECTION, SOLUTION INTRAMUSCULAR; INTRAVENOUS at 09:46

## 2017-03-29 RX ADMIN — AMLODIPINE BESYLATE 10 MG: 10 TABLET ORAL at 09:46

## 2017-03-29 RX ADMIN — PROMETHAZINE HYDROCHLORIDE 12.5 MG: 25 INJECTION, SOLUTION INTRAMUSCULAR; INTRAVENOUS at 20:20

## 2017-03-29 RX ADMIN — DOXAZOSIN 6 MG: 4 TABLET ORAL at 09:43

## 2017-03-29 RX ADMIN — ONDANSETRON 8 MG: 2 INJECTION INTRAMUSCULAR; INTRAVENOUS at 04:40

## 2017-03-29 RX ADMIN — LINAGLIPTIN 5 MG: 5 TABLET, FILM COATED ORAL at 09:43

## 2017-03-29 RX ADMIN — MORPHINE SULFATE 2 MG: 4 INJECTION, SOLUTION INTRAMUSCULAR; INTRAVENOUS at 01:06

## 2017-03-29 RX ADMIN — CIPROFLOXACIN 400 MG: 2 INJECTION, SOLUTION INTRAVENOUS at 20:06

## 2017-03-29 RX ADMIN — POTASSIUM CHLORIDE 20 MEQ: 20 TABLET, EXTENDED RELEASE ORAL at 09:45

## 2017-03-29 RX ADMIN — Medication 10 ML: at 10:07

## 2017-03-29 RX ADMIN — DOCUSATE SODIUM 100 MG: 100 CAPSULE, LIQUID FILLED ORAL at 20:06

## 2017-03-29 RX ADMIN — MORPHINE SULFATE 2 MG: 4 INJECTION, SOLUTION INTRAMUSCULAR; INTRAVENOUS at 10:40

## 2017-03-29 RX ADMIN — PROMETHAZINE HYDROCHLORIDE 12.5 MG: 25 INJECTION, SOLUTION INTRAMUSCULAR; INTRAVENOUS at 01:06

## 2017-03-29 RX ADMIN — ONDANSETRON 8 MG: 2 INJECTION INTRAMUSCULAR; INTRAVENOUS at 19:23

## 2017-03-29 RX ADMIN — METOCLOPRAMIDE 10 MG: 5 INJECTION, SOLUTION INTRAMUSCULAR; INTRAVENOUS at 20:47

## 2017-03-29 RX ADMIN — NEBIVOLOL HYDROCHLORIDE 10 MG: 5 TABLET ORAL at 09:43

## 2017-03-29 RX ADMIN — PROMETHAZINE HYDROCHLORIDE 12.5 MG: 25 INJECTION, SOLUTION INTRAMUSCULAR; INTRAVENOUS at 10:00

## 2017-03-29 RX ADMIN — POTASSIUM CHLORIDE: 2 INJECTION, SOLUTION, CONCENTRATE INTRAVENOUS at 02:21

## 2017-03-29 RX ADMIN — POTASSIUM CHLORIDE 20 MEQ: 20 TABLET, EXTENDED RELEASE ORAL at 20:06

## 2017-03-29 RX ADMIN — POTASSIUM CHLORIDE: 2 INJECTION, SOLUTION, CONCENTRATE INTRAVENOUS at 20:08

## 2017-03-29 RX ADMIN — NEBIVOLOL HYDROCHLORIDE 10 MG: 5 TABLET ORAL at 20:06

## 2017-03-29 RX ADMIN — CIPROFLOXACIN 400 MG: 2 INJECTION, SOLUTION INTRAVENOUS at 09:47

## 2017-03-29 RX ADMIN — ONDANSETRON 8 MG: 2 INJECTION INTRAMUSCULAR; INTRAVENOUS at 13:24

## 2017-03-29 RX ADMIN — DOXAZOSIN 6 MG: 4 TABLET ORAL at 20:06

## 2017-03-29 RX ADMIN — MORPHINE SULFATE 2 MG: 4 INJECTION, SOLUTION INTRAMUSCULAR; INTRAVENOUS at 22:30

## 2017-03-29 RX ADMIN — DOCUSATE SODIUM 100 MG: 100 CAPSULE, LIQUID FILLED ORAL at 09:45

## 2017-03-29 RX ADMIN — METOCLOPRAMIDE 10 MG: 5 INJECTION, SOLUTION INTRAMUSCULAR; INTRAVENOUS at 16:00

## 2017-03-29 RX ADMIN — METOCLOPRAMIDE 5 MG: 5 INJECTION, SOLUTION INTRAMUSCULAR; INTRAVENOUS at 04:01

## 2017-03-29 RX ADMIN — PROMETHAZINE HYDROCHLORIDE 12.5 MG: 25 INJECTION, SOLUTION INTRAMUSCULAR; INTRAVENOUS at 12:05

## 2017-03-29 RX ADMIN — MORPHINE SULFATE 2 MG: 4 INJECTION, SOLUTION INTRAMUSCULAR; INTRAVENOUS at 16:00

## 2017-03-29 RX ADMIN — OXYCODONE HYDROCHLORIDE AND ACETAMINOPHEN 1 TABLET: 5; 325 TABLET ORAL at 20:05

## 2017-03-29 RX ADMIN — MORPHINE SULFATE 2 MG: 4 INJECTION, SOLUTION INTRAMUSCULAR; INTRAVENOUS at 18:57

## 2017-03-29 RX ADMIN — MORPHINE SULFATE 2 MG: 4 INJECTION, SOLUTION INTRAMUSCULAR; INTRAVENOUS at 04:40

## 2017-03-29 ASSESSMENT — PAIN SCALES - GENERAL
PAINLEVEL_OUTOF10: 10
PAINLEVEL_OUTOF10: 10
PAINLEVEL_OUTOF10: 8
PAINLEVEL_OUTOF10: 10
PAINLEVEL_OUTOF10: 10
PAINLEVEL_OUTOF10: 4
PAINLEVEL_OUTOF10: 9
PAINLEVEL_OUTOF10: 8
PAINLEVEL_OUTOF10: 10
PAINLEVEL_OUTOF10: 10

## 2017-03-30 VITALS
HEIGHT: 64 IN | WEIGHT: 152.25 LBS | DIASTOLIC BLOOD PRESSURE: 79 MMHG | BODY MASS INDEX: 25.99 KG/M2 | OXYGEN SATURATION: 93 % | TEMPERATURE: 98.2 F | HEART RATE: 68 BPM | RESPIRATION RATE: 20 BRPM | SYSTOLIC BLOOD PRESSURE: 173 MMHG

## 2017-03-30 LAB
ANION GAP SERPL CALCULATED.3IONS-SCNC: 13 MMOL/L (ref 7–19)
BASOPHILS ABSOLUTE: 0 K/UL (ref 0–0.2)
BASOPHILS RELATIVE PERCENT: 0.2 % (ref 0–1)
BUN BLDV-MCNC: 15 MG/DL (ref 8–23)
CALCIUM SERPL-MCNC: 8.3 MG/DL (ref 8.8–10.2)
CHLORIDE BLD-SCNC: 106 MMOL/L (ref 98–111)
CO2: 25 MMOL/L (ref 22–29)
CREAT SERPL-MCNC: 1.3 MG/DL (ref 0.5–0.9)
EOSINOPHILS ABSOLUTE: 0.1 K/UL (ref 0–0.6)
EOSINOPHILS RELATIVE PERCENT: 2.2 % (ref 0–5)
GFR NON-AFRICAN AMERICAN: 41
GLUCOSE BLD-MCNC: 127 MG/DL (ref 70–99)
GLUCOSE BLD-MCNC: 87 MG/DL (ref 70–99)
GLUCOSE BLD-MCNC: 88 MG/DL (ref 74–109)
HCT VFR BLD CALC: 28.2 % (ref 37–47)
HEMOGLOBIN: 9.3 G/DL (ref 12–16)
LYMPHOCYTES ABSOLUTE: 1 K/UL (ref 1.1–4.5)
LYMPHOCYTES RELATIVE PERCENT: 21 % (ref 20–40)
MCH RBC QN AUTO: 30.9 PG (ref 27–31)
MCHC RBC AUTO-ENTMCNC: 33 G/DL (ref 33–37)
MCV RBC AUTO: 93.7 FL (ref 81–99)
MONOCYTES ABSOLUTE: 0.3 K/UL (ref 0–0.9)
MONOCYTES RELATIVE PERCENT: 7.2 % (ref 0–10)
NEUTROPHILS ABSOLUTE: 3.2 K/UL (ref 1.5–7.5)
NEUTROPHILS RELATIVE PERCENT: 69.2 % (ref 50–65)
PDW BLD-RTO: 13.4 % (ref 11.5–14.5)
PERFORMED ON: ABNORMAL
PERFORMED ON: NORMAL
PLATELET # BLD: 113 K/UL (ref 130–400)
PMV BLD AUTO: 10.3 FL (ref 7.4–10.4)
POTASSIUM SERPL-SCNC: 3.8 MMOL/L (ref 3.5–5)
RBC # BLD: 3.01 M/UL (ref 4.2–5.4)
SODIUM BLD-SCNC: 144 MMOL/L (ref 136–145)
WBC # BLD: 4.6 K/UL (ref 4.8–10.8)

## 2017-03-30 PROCEDURE — 85025 COMPLETE CBC W/AUTO DIFF WBC: CPT

## 2017-03-30 PROCEDURE — 82948 REAGENT STRIP/BLOOD GLUCOSE: CPT

## 2017-03-30 PROCEDURE — 6360000002 HC RX W HCPCS: Performed by: FAMILY MEDICINE

## 2017-03-30 PROCEDURE — 36415 COLL VENOUS BLD VENIPUNCTURE: CPT

## 2017-03-30 PROCEDURE — 6360000002 HC RX W HCPCS: Performed by: INTERNAL MEDICINE

## 2017-03-30 PROCEDURE — 6370000000 HC RX 637 (ALT 250 FOR IP): Performed by: FAMILY MEDICINE

## 2017-03-30 PROCEDURE — 80048 BASIC METABOLIC PNL TOTAL CA: CPT

## 2017-03-30 RX ORDER — OXYCODONE HYDROCHLORIDE AND ACETAMINOPHEN 5; 325 MG/1; MG/1
1 TABLET ORAL EVERY 6 HOURS PRN
Qty: 30 TABLET | Refills: 0 | Status: SHIPPED | OUTPATIENT
Start: 2017-03-30 | End: 2017-04-06

## 2017-03-30 RX ORDER — CIPROFLOXACIN 500 MG/1
500 TABLET, FILM COATED ORAL 2 TIMES DAILY
Qty: 10 TABLET | Refills: 0 | Status: SHIPPED | OUTPATIENT
Start: 2017-03-30 | End: 2017-04-04

## 2017-03-30 RX ADMIN — MORPHINE SULFATE 2 MG: 4 INJECTION, SOLUTION INTRAMUSCULAR; INTRAVENOUS at 04:56

## 2017-03-30 RX ADMIN — METOCLOPRAMIDE 10 MG: 5 INJECTION, SOLUTION INTRAMUSCULAR; INTRAVENOUS at 09:49

## 2017-03-30 RX ADMIN — DOCUSATE SODIUM 100 MG: 100 CAPSULE, LIQUID FILLED ORAL at 09:50

## 2017-03-30 RX ADMIN — MORPHINE SULFATE 2 MG: 4 INJECTION, SOLUTION INTRAMUSCULAR; INTRAVENOUS at 01:31

## 2017-03-30 RX ADMIN — DOXAZOSIN 6 MG: 4 TABLET ORAL at 09:49

## 2017-03-30 RX ADMIN — ONDANSETRON 8 MG: 2 INJECTION INTRAMUSCULAR; INTRAVENOUS at 10:01

## 2017-03-30 RX ADMIN — AMLODIPINE BESYLATE 10 MG: 10 TABLET ORAL at 09:49

## 2017-03-30 RX ADMIN — LINAGLIPTIN 5 MG: 5 TABLET, FILM COATED ORAL at 10:41

## 2017-03-30 RX ADMIN — NEBIVOLOL HYDROCHLORIDE 10 MG: 5 TABLET ORAL at 10:41

## 2017-03-30 RX ADMIN — POTASSIUM CHLORIDE 20 MEQ: 20 TABLET, EXTENDED RELEASE ORAL at 09:49

## 2017-03-30 RX ADMIN — CIPROFLOXACIN 400 MG: 2 INJECTION, SOLUTION INTRAVENOUS at 09:47

## 2017-03-30 RX ADMIN — METOCLOPRAMIDE 10 MG: 5 INJECTION, SOLUTION INTRAMUSCULAR; INTRAVENOUS at 03:10

## 2017-03-30 RX ADMIN — ONDANSETRON 8 MG: 2 INJECTION INTRAMUSCULAR; INTRAVENOUS at 01:31

## 2017-03-30 ASSESSMENT — PAIN SCALES - GENERAL
PAINLEVEL_OUTOF10: 6
PAINLEVEL_OUTOF10: 7

## 2017-04-11 ENCOUNTER — OFFICE VISIT (OUTPATIENT)
Dept: GASTROENTEROLOGY | Age: 72
End: 2017-04-11
Payer: MEDICARE

## 2017-04-11 VITALS
OXYGEN SATURATION: 98 % | WEIGHT: 151 LBS | HEART RATE: 76 BPM | SYSTOLIC BLOOD PRESSURE: 130 MMHG | BODY MASS INDEX: 25.78 KG/M2 | DIASTOLIC BLOOD PRESSURE: 80 MMHG | HEIGHT: 64 IN

## 2017-04-11 DIAGNOSIS — Z98.890 S/P COLONOSCOPIC POLYPECTOMY: Primary | ICD-10-CM

## 2017-04-11 DIAGNOSIS — A08.39 OTHER VIRAL ENTERITIS: ICD-10-CM

## 2017-04-11 PROCEDURE — 1111F DSCHRG MED/CURRENT MED MERGE: CPT | Performed by: NURSE PRACTITIONER

## 2017-04-11 PROCEDURE — G8420 CALC BMI NORM PARAMETERS: HCPCS | Performed by: NURSE PRACTITIONER

## 2017-04-11 PROCEDURE — 1090F PRES/ABSN URINE INCON ASSESS: CPT | Performed by: NURSE PRACTITIONER

## 2017-04-11 PROCEDURE — 3017F COLORECTAL CA SCREEN DOC REV: CPT | Performed by: NURSE PRACTITIONER

## 2017-04-11 PROCEDURE — G8400 PT W/DXA NO RESULTS DOC: HCPCS | Performed by: NURSE PRACTITIONER

## 2017-04-11 PROCEDURE — 3014F SCREEN MAMMO DOC REV: CPT | Performed by: NURSE PRACTITIONER

## 2017-04-11 PROCEDURE — 1123F ACP DISCUSS/DSCN MKR DOCD: CPT | Performed by: NURSE PRACTITIONER

## 2017-04-11 PROCEDURE — 99213 OFFICE O/P EST LOW 20 MIN: CPT | Performed by: NURSE PRACTITIONER

## 2017-04-11 PROCEDURE — G8427 DOCREV CUR MEDS BY ELIG CLIN: HCPCS | Performed by: NURSE PRACTITIONER

## 2017-04-11 PROCEDURE — 4040F PNEUMOC VAC/ADMIN/RCVD: CPT | Performed by: NURSE PRACTITIONER

## 2017-04-11 PROCEDURE — 1036F TOBACCO NON-USER: CPT | Performed by: NURSE PRACTITIONER

## 2017-04-12 ASSESSMENT — ENCOUNTER SYMPTOMS
CHOKING: 0
ABDOMINAL PAIN: 0
BLOOD IN STOOL: 0
COUGH: 0
ABDOMINAL DISTENTION: 0
DIARRHEA: 0
RECTAL PAIN: 0
VOMITING: 0
VOICE CHANGE: 0
NAUSEA: 0
SHORTNESS OF BREATH: 0
TROUBLE SWALLOWING: 0
CONSTIPATION: 0

## 2017-04-17 ENCOUNTER — TELEPHONE (OUTPATIENT)
Dept: GASTROENTEROLOGY | Age: 72
End: 2017-04-17

## 2018-01-01 ENCOUNTER — OFFICE VISIT (OUTPATIENT)
Dept: CARDIOLOGY | Age: 73
End: 2018-01-01
Payer: MEDICARE

## 2018-01-01 ENCOUNTER — OFFICE VISIT (OUTPATIENT)
Dept: URGENT CARE | Age: 73
End: 2018-01-01
Payer: MEDICARE

## 2018-01-01 VITALS
HEIGHT: 64 IN | SYSTOLIC BLOOD PRESSURE: 158 MMHG | BODY MASS INDEX: 30.39 KG/M2 | HEART RATE: 75 BPM | WEIGHT: 178 LBS | DIASTOLIC BLOOD PRESSURE: 80 MMHG

## 2018-01-01 VITALS
RESPIRATION RATE: 18 BRPM | OXYGEN SATURATION: 96 % | DIASTOLIC BLOOD PRESSURE: 70 MMHG | TEMPERATURE: 97.8 F | HEART RATE: 85 BPM | WEIGHT: 178 LBS | SYSTOLIC BLOOD PRESSURE: 120 MMHG | BODY MASS INDEX: 30.55 KG/M2

## 2018-01-01 DIAGNOSIS — I50.32 CHRONIC DIASTOLIC HEART FAILURE (HCC): ICD-10-CM

## 2018-01-01 DIAGNOSIS — I10 ESSENTIAL HYPERTENSION: Primary | ICD-10-CM

## 2018-01-01 DIAGNOSIS — R94.31 ABNORMAL ELECTROCARDIOGRAM: ICD-10-CM

## 2018-01-01 DIAGNOSIS — H66.92 LEFT OTITIS MEDIA, UNSPECIFIED OTITIS MEDIA TYPE: Primary | ICD-10-CM

## 2018-01-01 PROCEDURE — 4040F PNEUMOC VAC/ADMIN/RCVD: CPT | Performed by: SPECIALIST

## 2018-01-01 PROCEDURE — G8484 FLU IMMUNIZE NO ADMIN: HCPCS | Performed by: INTERNAL MEDICINE

## 2018-01-01 PROCEDURE — 93000 ELECTROCARDIOGRAM COMPLETE: CPT | Performed by: INTERNAL MEDICINE

## 2018-01-01 PROCEDURE — G8484 FLU IMMUNIZE NO ADMIN: HCPCS | Performed by: SPECIALIST

## 2018-01-01 PROCEDURE — G8417 CALC BMI ABV UP PARAM F/U: HCPCS | Performed by: INTERNAL MEDICINE

## 2018-01-01 PROCEDURE — 1036F TOBACCO NON-USER: CPT | Performed by: INTERNAL MEDICINE

## 2018-01-01 PROCEDURE — 99212 OFFICE O/P EST SF 10 MIN: CPT | Performed by: SPECIALIST

## 2018-01-01 PROCEDURE — 1101F PT FALLS ASSESS-DOCD LE1/YR: CPT | Performed by: INTERNAL MEDICINE

## 2018-01-01 PROCEDURE — 1090F PRES/ABSN URINE INCON ASSESS: CPT | Performed by: INTERNAL MEDICINE

## 2018-01-01 PROCEDURE — G8427 DOCREV CUR MEDS BY ELIG CLIN: HCPCS | Performed by: SPECIALIST

## 2018-01-01 PROCEDURE — 1090F PRES/ABSN URINE INCON ASSESS: CPT | Performed by: SPECIALIST

## 2018-01-01 PROCEDURE — 1036F TOBACCO NON-USER: CPT | Performed by: SPECIALIST

## 2018-01-01 PROCEDURE — 4040F PNEUMOC VAC/ADMIN/RCVD: CPT | Performed by: INTERNAL MEDICINE

## 2018-01-01 PROCEDURE — G8417 CALC BMI ABV UP PARAM F/U: HCPCS | Performed by: SPECIALIST

## 2018-01-01 PROCEDURE — G8427 DOCREV CUR MEDS BY ELIG CLIN: HCPCS | Performed by: INTERNAL MEDICINE

## 2018-01-01 PROCEDURE — 3017F COLORECTAL CA SCREEN DOC REV: CPT | Performed by: SPECIALIST

## 2018-01-01 PROCEDURE — G8400 PT W/DXA NO RESULTS DOC: HCPCS | Performed by: SPECIALIST

## 2018-01-01 PROCEDURE — 3017F COLORECTAL CA SCREEN DOC REV: CPT | Performed by: INTERNAL MEDICINE

## 2018-01-01 PROCEDURE — 1101F PT FALLS ASSESS-DOCD LE1/YR: CPT | Performed by: SPECIALIST

## 2018-01-01 PROCEDURE — 1123F ACP DISCUSS/DSCN MKR DOCD: CPT | Performed by: INTERNAL MEDICINE

## 2018-01-01 PROCEDURE — 99214 OFFICE O/P EST MOD 30 MIN: CPT | Performed by: INTERNAL MEDICINE

## 2018-01-01 PROCEDURE — 1123F ACP DISCUSS/DSCN MKR DOCD: CPT | Performed by: SPECIALIST

## 2018-01-01 PROCEDURE — G8400 PT W/DXA NO RESULTS DOC: HCPCS | Performed by: INTERNAL MEDICINE

## 2018-01-01 RX ORDER — CEFUROXIME AXETIL 500 MG/1
500 TABLET ORAL 2 TIMES DAILY
Qty: 20 TABLET | Refills: 0 | Status: SHIPPED | OUTPATIENT
Start: 2018-01-01 | End: 2019-01-01

## 2018-01-01 ASSESSMENT — ENCOUNTER SYMPTOMS
GASTROINTESTINAL NEGATIVE: 1
DIARRHEA: 0
SHORTNESS OF BREATH: 0
NAUSEA: 0
EYES NEGATIVE: 1
RESPIRATORY NEGATIVE: 1
VOMITING: 0

## 2018-08-10 ENCOUNTER — APPOINTMENT (OUTPATIENT)
Dept: GENERAL RADIOLOGY | Age: 73
DRG: 291 | End: 2018-08-10
Payer: MEDICARE

## 2018-08-10 ENCOUNTER — HOSPITAL ENCOUNTER (INPATIENT)
Age: 73
LOS: 5 days | Discharge: HOME | DRG: 291 | End: 2018-08-15
Attending: EMERGENCY MEDICINE | Admitting: FAMILY MEDICINE
Payer: MEDICARE

## 2018-08-10 DIAGNOSIS — N17.9 ACUTE KIDNEY INJURY (HCC): ICD-10-CM

## 2018-08-10 DIAGNOSIS — R06.00 ACUTE DYSPNEA: ICD-10-CM

## 2018-08-10 DIAGNOSIS — I50.9 CONGESTIVE HEART FAILURE, UNSPECIFIED HF CHRONICITY, UNSPECIFIED HEART FAILURE TYPE (HCC): Primary | ICD-10-CM

## 2018-08-10 LAB
ALBUMIN SERPL-MCNC: 3.2 G/DL (ref 3.5–5.2)
ALP BLD-CCNC: 53 U/L (ref 35–104)
ALT SERPL-CCNC: 13 U/L (ref 5–33)
ANION GAP SERPL CALCULATED.3IONS-SCNC: 14 MMOL/L (ref 7–19)
AST SERPL-CCNC: 15 U/L (ref 5–32)
BASE EXCESS ARTERIAL: -6.5 MMOL/L (ref -2–2)
BASOPHILS ABSOLUTE: 0 K/UL (ref 0–0.2)
BASOPHILS RELATIVE PERCENT: 0.4 % (ref 0–1)
BILIRUB SERPL-MCNC: <0.2 MG/DL (ref 0.2–1.2)
BUN BLDV-MCNC: 42 MG/DL (ref 8–23)
CALCIUM SERPL-MCNC: 8.7 MG/DL (ref 8.8–10.2)
CARBOXYHEMOGLOBIN ARTERIAL: 2.4 % (ref 0–5)
CHLORIDE BLD-SCNC: 111 MMOL/L (ref 98–111)
CO2: 17 MMOL/L (ref 22–29)
CREAT SERPL-MCNC: 2.7 MG/DL (ref 0.5–0.9)
EOSINOPHILS ABSOLUTE: 0.1 K/UL (ref 0–0.6)
EOSINOPHILS RELATIVE PERCENT: 1.7 % (ref 0–5)
GFR NON-AFRICAN AMERICAN: 17
GLUCOSE BLD-MCNC: 110 MG/DL (ref 70–99)
GLUCOSE BLD-MCNC: 128 MG/DL (ref 70–99)
GLUCOSE BLD-MCNC: 79 MG/DL (ref 74–109)
HCO3 ARTERIAL: 17.7 MMOL/L (ref 22–26)
HCT VFR BLD CALC: 28.7 % (ref 37–47)
HEMOGLOBIN, ART, EXTENDED: 9.7 G/DL (ref 12–16)
HEMOGLOBIN: 8.7 G/DL (ref 12–16)
LYMPHOCYTES ABSOLUTE: 0.6 K/UL (ref 1.1–4.5)
LYMPHOCYTES RELATIVE PERCENT: 11.9 % (ref 20–40)
MCH RBC QN AUTO: 29.7 PG (ref 27–31)
MCHC RBC AUTO-ENTMCNC: 30.3 G/DL (ref 33–37)
MCV RBC AUTO: 98 FL (ref 81–99)
METHEMOGLOBIN ARTERIAL: 1 %
MONOCYTES ABSOLUTE: 0.4 K/UL (ref 0–0.9)
MONOCYTES RELATIVE PERCENT: 6.7 % (ref 0–10)
NEUTROPHILS ABSOLUTE: 4.2 K/UL (ref 1.5–7.5)
NEUTROPHILS RELATIVE PERCENT: 78.9 % (ref 50–65)
O2 CONTENT ARTERIAL: 12.8 ML/DL
O2 SAT, ARTERIAL: 93.2 %
O2 THERAPY: ABNORMAL
PCO2 ARTERIAL: 30 MMHG (ref 35–45)
PDW BLD-RTO: 13.9 % (ref 11.5–14.5)
PERFORMED ON: ABNORMAL
PERFORMED ON: ABNORMAL
PH ARTERIAL: 7.38 (ref 7.35–7.45)
PLATELET # BLD: 102 K/UL (ref 130–400)
PMV BLD AUTO: 10.7 FL (ref 9.4–12.3)
PO2 ARTERIAL: 65 MMHG (ref 80–100)
POTASSIUM SERPL-SCNC: 4.3 MMOL/L (ref 3.5–5)
POTASSIUM, WHOLE BLOOD: 4.1
PRO-BNP: 7088 PG/ML (ref 0–900)
RBC # BLD: 2.93 M/UL (ref 4.2–5.4)
SODIUM BLD-SCNC: 142 MMOL/L (ref 136–145)
TOTAL PROTEIN: 6.3 G/DL (ref 6.6–8.7)
TROPONIN: <0.01 NG/ML (ref 0–0.03)
WBC # BLD: 5.4 K/UL (ref 4.8–10.8)

## 2018-08-10 PROCEDURE — 82803 BLOOD GASES ANY COMBINATION: CPT

## 2018-08-10 PROCEDURE — 99285 EMERGENCY DEPT VISIT HI MDM: CPT

## 2018-08-10 PROCEDURE — 6360000002 HC RX W HCPCS: Performed by: EMERGENCY MEDICINE

## 2018-08-10 PROCEDURE — 96374 THER/PROPH/DIAG INJ IV PUSH: CPT

## 2018-08-10 PROCEDURE — 2580000003 HC RX 258: Performed by: FAMILY MEDICINE

## 2018-08-10 PROCEDURE — 99285 EMERGENCY DEPT VISIT HI MDM: CPT | Performed by: EMERGENCY MEDICINE

## 2018-08-10 PROCEDURE — 85025 COMPLETE CBC W/AUTO DIFF WBC: CPT

## 2018-08-10 PROCEDURE — 82948 REAGENT STRIP/BLOOD GLUCOSE: CPT

## 2018-08-10 PROCEDURE — 84484 ASSAY OF TROPONIN QUANT: CPT

## 2018-08-10 PROCEDURE — 36415 COLL VENOUS BLD VENIPUNCTURE: CPT

## 2018-08-10 PROCEDURE — 1210000000 HC MED SURG R&B

## 2018-08-10 PROCEDURE — 83880 ASSAY OF NATRIURETIC PEPTIDE: CPT

## 2018-08-10 PROCEDURE — 93005 ELECTROCARDIOGRAM TRACING: CPT

## 2018-08-10 PROCEDURE — 71045 X-RAY EXAM CHEST 1 VIEW: CPT

## 2018-08-10 PROCEDURE — 84132 ASSAY OF SERUM POTASSIUM: CPT

## 2018-08-10 PROCEDURE — 6360000002 HC RX W HCPCS: Performed by: FAMILY MEDICINE

## 2018-08-10 PROCEDURE — 36600 WITHDRAWAL OF ARTERIAL BLOOD: CPT

## 2018-08-10 PROCEDURE — 80053 COMPREHEN METABOLIC PANEL: CPT

## 2018-08-10 PROCEDURE — 6370000000 HC RX 637 (ALT 250 FOR IP): Performed by: FAMILY MEDICINE

## 2018-08-10 RX ORDER — SODIUM CHLORIDE 0.9 % (FLUSH) 0.9 %
10 SYRINGE (ML) INJECTION PRN
Status: DISCONTINUED | OUTPATIENT
Start: 2018-08-10 | End: 2018-08-15 | Stop reason: HOSPADM

## 2018-08-10 RX ORDER — DOXAZOSIN MESYLATE 4 MG/1
4 TABLET ORAL 2 TIMES DAILY
Status: DISCONTINUED | OUTPATIENT
Start: 2018-08-10 | End: 2018-08-15 | Stop reason: HOSPADM

## 2018-08-10 RX ORDER — FUROSEMIDE 10 MG/ML
40 INJECTION INTRAMUSCULAR; INTRAVENOUS ONCE
Status: COMPLETED | OUTPATIENT
Start: 2018-08-10 | End: 2018-08-10

## 2018-08-10 RX ORDER — DEXTROSE MONOHYDRATE 50 MG/ML
100 INJECTION, SOLUTION INTRAVENOUS PRN
Status: DISCONTINUED | OUTPATIENT
Start: 2018-08-10 | End: 2018-08-15 | Stop reason: HOSPADM

## 2018-08-10 RX ORDER — ACETAMINOPHEN 325 MG/1
650 TABLET ORAL EVERY 4 HOURS PRN
Status: DISCONTINUED | OUTPATIENT
Start: 2018-08-10 | End: 2018-08-15 | Stop reason: HOSPADM

## 2018-08-10 RX ORDER — NEBIVOLOL 5 MG/1
10 TABLET ORAL DAILY
Status: DISCONTINUED | OUTPATIENT
Start: 2018-08-10 | End: 2018-08-14

## 2018-08-10 RX ORDER — DOXAZOSIN 8 MG/1
4 TABLET ORAL 2 TIMES DAILY
COMMUNITY
End: 2018-09-27 | Stop reason: DRUGHIGH

## 2018-08-10 RX ORDER — INSULIN GLARGINE 100 [IU]/ML
30 INJECTION, SOLUTION SUBCUTANEOUS DAILY
Status: DISCONTINUED | OUTPATIENT
Start: 2018-08-10 | End: 2018-08-15 | Stop reason: HOSPADM

## 2018-08-10 RX ORDER — SODIUM CHLORIDE 0.9 % (FLUSH) 0.9 %
10 SYRINGE (ML) INJECTION EVERY 12 HOURS SCHEDULED
Status: DISCONTINUED | OUTPATIENT
Start: 2018-08-10 | End: 2018-08-15 | Stop reason: HOSPADM

## 2018-08-10 RX ORDER — NICOTINE POLACRILEX 4 MG
15 LOZENGE BUCCAL PRN
Status: DISCONTINUED | OUTPATIENT
Start: 2018-08-10 | End: 2018-08-15 | Stop reason: HOSPADM

## 2018-08-10 RX ORDER — DEXTROSE MONOHYDRATE 25 G/50ML
12.5 INJECTION, SOLUTION INTRAVENOUS PRN
Status: DISCONTINUED | OUTPATIENT
Start: 2018-08-10 | End: 2018-08-15 | Stop reason: HOSPADM

## 2018-08-10 RX ORDER — AMLODIPINE BESYLATE AND BENAZEPRIL HYDROCHLORIDE 10; 20 MG/1; MG/1
1 CAPSULE ORAL DAILY
Status: ON HOLD | COMMUNITY
End: 2018-08-15 | Stop reason: HOSPADM

## 2018-08-10 RX ORDER — HYDROCODONE BITARTRATE AND ACETAMINOPHEN 7.5; 325 MG/1; MG/1
1 TABLET ORAL EVERY 12 HOURS PRN
COMMUNITY
End: 2019-01-01 | Stop reason: ALTCHOICE

## 2018-08-10 RX ORDER — HYDROCODONE BITARTRATE AND ACETAMINOPHEN 7.5; 325 MG/1; MG/1
1 TABLET ORAL EVERY 12 HOURS PRN
Status: DISCONTINUED | OUTPATIENT
Start: 2018-08-10 | End: 2018-08-15 | Stop reason: HOSPADM

## 2018-08-10 RX ADMIN — INSULIN GLARGINE 30 UNITS: 100 INJECTION, SOLUTION SUBCUTANEOUS at 16:58

## 2018-08-10 RX ADMIN — DOXAZOSIN 4 MG: 4 TABLET ORAL at 21:18

## 2018-08-10 RX ADMIN — FUROSEMIDE 40 MG: 10 INJECTION, SOLUTION INTRAMUSCULAR; INTRAVENOUS at 11:10

## 2018-08-10 RX ADMIN — Medication 10 ML: at 21:18

## 2018-08-10 RX ADMIN — ENOXAPARIN SODIUM 30 MG: 40 INJECTION SUBCUTANEOUS at 14:58

## 2018-08-10 RX ADMIN — NEBIVOLOL HYDROCHLORIDE 10 MG: 5 TABLET ORAL at 16:59

## 2018-08-10 ASSESSMENT — ENCOUNTER SYMPTOMS
SORE THROAT: 0
COUGH: 1
ABDOMINAL PAIN: 0
VOMITING: 0
DIARRHEA: 0
BACK PAIN: 0
SHORTNESS OF BREATH: 1
NAUSEA: 0
RHINORRHEA: 0

## 2018-08-10 NOTE — PROGRESS NOTES
pH, Arterial 7.380  7.350 - 7.450 Final 08/10/2018  9:45 AM [facility]   pCO2, Arterial 30.0   35.0 - 45.0 mmHg Final 08/10/2018  9:45 AM [facility]   pO2, Arterial 65.0   80.0 - 100.0 mmHg Final 08/10/2018  9:45 AM [facility]   HCO3, Arterial 17.7   22.0 - 26.0 mmol/L Final 08/10/2018  9:45 AM [facility] Excess, Arterial -6.5   -2.0 - 2.0 mmol/L Final 08/10/2018  9:45 AM [facility]   Hemoglobin, Art, Extended 9.7   12.0 - 16.0 g/dL Final 08/10/2018  9:45 AM [facility]   O2 Sat, Arterial 93.2  >92 % Final 08/10/2018  9:45 AM [facility]   Carboxyhgb, Arterial 2.4  0.0 - 5.0 % Final 08/10/2018  9:45 AM [facility]        0.0-1.5   (Smokers 1.5-5.0)    Methemoglobin, Arterial 1.0  <1.5 % Final 08/10/2018  9:45 AM [facility]   O2 Content, Arterial 12.8  Not Established mL/dL Final 08/10/2018  9:45 AM [facility]     Patient on room air. ABG's drawn from LR, Hugo's test +.

## 2018-08-10 NOTE — ED NOTES
ASSESSMENT: PATIENT ARRIVES TODAY AFTER EXPERIENCING SHORTNESS OF BREATH X 1 WEEK. PATIENT IS IRRITABLE WHEN NURSING STAFF TRIES TO FIND AN IV    PT ALERT/ORIENTED X4. PUPILS EQUAL/REACTIVE    SKIN:  WARM/DRY PINK CAPILLARY REFILL < 2SECS    CARDIAC:  S1 S2 NOTED     LUNGS: WHEEZES UPPER AND DIMINISHED LOWER LOBES, RESPIRATIONS EVEN/UNLABORED     ABDOMEN: BOWEL SOUNDS NOTED UPPER AND LOWER QUADRANTS                     SOFT AND NONTENDER. EXTREMITIES:  BILATERAL DP AND PT AND EDEMA NOTED. NO DISTRESS NOTED. SIDE RAILS UP AND CALL LIGHT IN REACH.        Marty Hart RN  08/10/18 0104

## 2018-08-10 NOTE — CONSULTS
 Rectal Cancer Neg Hx        Social History  Social History     Social History    Marital status:      Spouse name: Caren Allen Number of children: 2    Years of education: 15     Occupational History    Not on file. Social History Main Topics    Smoking status: Never Smoker    Smokeless tobacco: Never Used    Alcohol use No    Drug use: No    Sexual activity: Not on file     Other Topics Concern    Not on file     Social History Narrative    No narrative on file         Review of Systems:  History obtained from chart review and the patient  General ROS: No fever or chills  Respiratory ROS: +cough, shortness of breath, wheezing  Cardiovascular ROS: No chest pain or palpitations  Gastrointestinal ROS: No abdominal pain or melena  Genito-Urinary ROS: No dysuria or hematuria  Musculoskeletal ROS: No joint pain or swelling   14 point ROS reviewed with the patient and negative except as noted above and in the HPI unless unable to obtain. Objective:  Blood pressure (!) 158/84, pulse 87, temperature 97.8 °F (36.6 °C), temperature source Temporal, resp. rate 20, height 5' 4\" (1.626 m), weight 202 lb (91.6 kg), SpO2 97 %, not currently breastfeeding.     Intake/Output Summary (Last 24 hours) at 08/10/18 1244  Last data filed at 08/10/18 1206   Gross per 24 hour   Intake                0 ml   Output              350 ml   Net             -350 ml     General: awake/alert   Chest:  Basilar crackles  CVS: regular rate and rhythm  Abdominal: soft, nontender, normal bowel sounds  Extremities: no cyanosis, ble edema  Skin: warm and dry without rash      Labs:  BMP: Recent Labs      08/10/18   0935  08/10/18   0945   NA  142   --    K  4.3  4.1   CL  111   --    CO2  17*   --    BUN  42*   --    CREATININE  2.7*   --    CALCIUM  8.7*   --      CBC: Recent Labs      08/10/18   0935   WBC  5.4   HGB  8.7*   HCT  28.7*   MCV  98.0   PLT  102*     LIVER PROFILE:   Recent Labs      08/10/18   0935   AST  15   ALT

## 2018-08-10 NOTE — ED PROVIDER NOTES
[facility] 4 ONCOLOGY UNIT  eMERGENCY dEPARTMENT eNCOUnter      Pt Name: Baltazar Servin  MRN: [id number]  [place] 1945  Date of evaluation: 8/10/2018  Provider: Megan Castelan MD    [address] [place] [address]       Chief Complaint   Patient presents with    Shortness of Breath         HISTORY OF PRESENT ILLNESS   (Location/Symptom, Timing/Onset, Context/Setting, Quality, Duration, Modifying Factors, Severity)  Note limiting factors. Baltazar Servin is a 72 y.o. female who presents to the emergency department For progressive shortness of breath over the last 1 week. Patient states that she has had a dry hacking cough but it has been nonproductive. She denies infectious symptoms of fever or chills. She does admit to leg swelling that seems to have gotten worse and bilateral legs. She denies a history of heart failure or COPD. She does admit that she has had to sleep at an incline recently. She does not wear home oxygen. She has no complaint of chest pain. HPI    Nursing Notes were reviewed. REVIEW OF SYSTEMS    (2-9 systems for level 4, 10 or more for level 5)     Review of Systems   Constitutional: Positive for fatigue. Negative for chills and fever. HENT: Negative for rhinorrhea and sore throat. Respiratory: Positive for cough and shortness of breath. Cardiovascular: Positive for leg swelling. Negative for chest pain. Gastrointestinal: Negative for abdominal pain, diarrhea, nausea and vomiting. Genitourinary: Negative for dysuria, frequency and urgency. Musculoskeletal: Negative for back pain and neck pain. Neurological: Negative for dizziness and headaches. All other systems reviewed and are negative.            PAST MEDICAL HISTORY     Past Medical History:   Diagnosis Date    Anemia     Blood circulation, collateral     Broken hip (HCC)     L hip    [facility]estive [facility]     Closed compression fracture of first lumbar vertebra (HCC) 2/2/2017    Closed compression fracture of first lumbar vertebra ([facility] [address] 2017    Colon polyps     History of blood transfusion     Hypertension     Osteoarthritis     Type II or unspecified type diabetes mellitus without mention of complication, not stated as uncontrolled     UTI (urinary tract infection)     Gangrenous UTI with gas in urinary tract         SURGICAL HISTORY       Past Surgical History:   Procedure Laterality Date    APPENDECTOMY       SECTION      x 2    CHOLECYSTECTOMY      COLONOSCOPY  09    Dr Angy Del Cido (LOWER) N/A 2016    ERCP/EUS-Dr BEKA Sutton-w/placement of a 10 Sami x7 cm temporary plastic biliary stent-Ampullary stenosis, mild dilation of the cbd w/questionable calcification vs stone, small periampullary diverticulum, removal of biliary sludge    ENDOSCOPY, COLON, DIAGNOSTIC      ERCP  2016    ERCP/EUS-Dr BEKA Sutton-w/placement of a 10 Sami x7 cm temporary plastic biliary stent-Ampullary stenosis, mild dilation of the cbd w/questionable calcification vs stone, small periampullary diverticulum, removal of biliary sludge    ERCP N/A 2017    Dr BEKA Sutton-Successful removal of indwelling biliary stent, no evidence of residual choledocholithiasis or common biliary stricture     EYE SURGERY      cataract    FEMUR FRACTURE SURGERY Left 2016    SHORT TFN INTERTROCHAN FRACTURE performed by Brigette Lockett MD at [address] [place] [address]      femur fracture surgery    HIP FRACTURE SURGERY Left     pinning    [place] COLONOSCOPY W/BIOPSY SINGLE/MULTIPLE N/A 3/24/2017    Dr BEKA Sutton-Diverticular disease-Tubular AP (-) dysplasia x 5--3 yr recall    UPPER GASTROINTESTINAL ENDOSCOPY N/A 10/14/2016    Dr Simons-Hemorrhagic gastritis         CURRENT MEDICATIONS       Current Discharge Medication List      CONTINUE these medications which have NOT CHANGED    Details   amLODIPine-benazepril (LOTREL) 10-20 MG per capsule Take 1 capsule by mouth daily      doxazosin (CARDURA) 8 MG tablet Take 4 mg by mouth 2 times daily Takes 1/2 of 8 mg tablet to equal 4 mg      insulin glargine (BASAGLAR KWIKPEN) 100 UNIT/ML injection pen Inject 30 Units into the skin Daily      insulin aspart (NOVOLOG FLEXPEN) 100 UNIT/ML injection pen Inject 0-35 Units into the skin 3 times daily as needed for High Blood Sugar Per home sliding scale      SITagliptin (JANUVIA) 50 MG tablet Take 50 mg by mouth daily      HYDROcodone-acetaminophen (NORCO) 7.5-325 MG per tablet Take 1 tablet by mouth every 12 hours as needed for Pain. .      nebivolol (BYSTOLIC) 10 MG tablet Take 1 tablet by mouth daily  Qty: 30 tablet, Refills: 3             ALLERGIES     Codeine    FAMILY HISTORY       Family History   Problem Relation Age of Onset    Arthritis Mother     Cancer Mother     High Cholesterol Mother     Arthritis Father     Diabetes Father     High Cholesterol Father     Liver Cancer Father     Arthritis Sister     Diabetes Sister     High Blood Pressure Sister     High Cholesterol Sister     Arthritis Brother     Diabetes Brother     High Blood Pressure Brother     High Cholesterol Brother     Vision Loss Brother     Colon Cancer Neg Hx     Colon Polyps Neg Hx     Esophageal Cancer Neg Hx     Liver Disease Neg Hx     Stomach Cancer Neg Hx     Rectal Cancer Neg Hx           SOCIAL HISTORY       Social History     Social History    Marital status:      Spouse name: Darlyn Mercedes Number of children: 2    Years of education: 15     Social History Main Topics    Smoking status: Never Smoker    Smokeless tobacco: Never Used    Alcohol use No    Drug use: No    Sexual activity: Not Asked     Other Topics Concern    None     Social History Narrative    None       SCREENINGS    [place] Coma Scale  Eye Opening: Spontaneous  Best Verbal Response: Oriented  Best Motor Response: Obeys commands  [place] Coma Scale Score: 15        PHYSICAL EXAM    (up to 7 for level 4, 8 or more for level 5)     ED Triage Vitals [08/10/18 0911]   BP Temp Temp src Pulse Resp SpO2 Height Weight   131/72 97.5 °F (36.4 °C) -- 66 20 90 % 5' 4\" (1.626 m) 180 lb (81.6 kg)       Physical Exam   Constitutional: She is oriented to person, place, and time. She appears well-developed and well-nourished. No distress. HENT:   Head: Normocephalic and atraumatic. Right Ear: External ear normal.   Left Ear: External ear normal.   Eyes: Conjunctivae and EOM are normal.   Neck: Normal range of motion. No tracheal deviation present. Cardiovascular: Normal rate, regular rhythm, normal heart sounds and intact distal pulses. No murmur heard. Pulmonary/Chest: No tachypnea. No respiratory distress. She has no wheezes. She has no rhonchi. She has rales in the right lower field and the left lower field. Abdominal: Soft. She exhibits no mass. There is no tenderness. Musculoskeletal: Normal range of motion. She exhibits edema. Bilateral 2+ pitting edema   Neurological: She is alert and oriented to person, place, and time. GCS eye subscore is 4. GCS verbal subscore is 5. GCS motor subscore is 6. Skin: Skin is warm and dry. Vitals reviewed. DIAGNOSTIC RESULTS     EKG: All EKG's are interpreted by the Emergency Department Physician who either signs or Co-signs this chart in the absence of a cardiologist.    69, normal sinus rhythm, nondiagnostic EKG    RADIOLOGY:   Non-plain film images such as CT, Ultrasound and MRI are read by the radiologist. Plain radiographic images are visualized and preliminarily interpreted by the emergency physician with the below findings:          XR CHEST PORTABLE   Final Result   Moderate pulmonary hypoventilation with vascular   congestion centrally and in the lung bases with normal heart size. Mild volume overload cannot be excluded. There may be small bilateral   pleural effusions. No consolidating pneumonia is seen. Signed by Dr Homero Pedraza on 8/10/2018 10:19 AM              LABS:  Labs Reviewed   CBC

## 2018-08-11 LAB
ANION GAP SERPL CALCULATED.3IONS-SCNC: 16 MMOL/L (ref 7–19)
BACTERIA: NORMAL /HPF
BILIRUBIN URINE: NEGATIVE
BLOOD, URINE: ABNORMAL
BUN BLDV-MCNC: 41 MG/DL (ref 8–23)
CALCIUM SERPL-MCNC: 8.9 MG/DL (ref 8.8–10.2)
CHLORIDE BLD-SCNC: 110 MMOL/L (ref 98–111)
CLARITY: CLEAR
CO2: 18 MMOL/L (ref 22–29)
COLOR: YELLOW
CREAT SERPL-MCNC: 2.7 MG/DL (ref 0.5–0.9)
CREATININE URINE: 56.7 MG/DL (ref 4.2–622)
EOSINOPHIL,URINE: NORMAL
EPITHELIAL CELLS, UA: NORMAL /HPF
FERRITIN: 60.9 NG/ML (ref 13–150)
FOLATE: 15 NG/ML (ref 4.8–37.3)
GFR NON-AFRICAN AMERICAN: 17
GLUCOSE BLD-MCNC: 112 MG/DL (ref 70–99)
GLUCOSE BLD-MCNC: 159 MG/DL (ref 70–99)
GLUCOSE BLD-MCNC: 63 MG/DL (ref 70–99)
GLUCOSE BLD-MCNC: 88 MG/DL (ref 74–109)
GLUCOSE BLD-MCNC: 98 MG/DL (ref 70–99)
GLUCOSE URINE: NEGATIVE MG/DL
IRON SATURATION: 11 % (ref 14–50)
IRON: 35 UG/DL (ref 37–145)
KETONES, URINE: NEGATIVE MG/DL
LEUKOCYTE ESTERASE, URINE: NEGATIVE
LV EF: 58 %
LVEF MODALITY: NORMAL
MAGNESIUM: 2.1 MG/DL (ref 1.6–2.4)
NITRITE, URINE: NEGATIVE
PARATHYROID HORMONE INTACT: 205.4 PG/ML (ref 15–65)
PERFORMED ON: ABNORMAL
PERFORMED ON: NORMAL
PH UA: 5
PHOSPHORUS: 4.7 MG/DL (ref 2.5–4.5)
POTASSIUM SERPL-SCNC: 4 MMOL/L (ref 3.5–5)
PRO-BNP: 9512 PG/ML (ref 0–900)
PROTEIN PROTEIN: 238 MG/DL (ref 15–45)
PROTEIN UA: >=300 MG/DL
RBC UA: NORMAL /HPF (ref 0–2)
SODIUM BLD-SCNC: 144 MMOL/L (ref 136–145)
SODIUM URINE: 70 MMOL/L
SPECIFIC GRAVITY UA: 1.02
TOTAL IRON BINDING CAPACITY: 307 UG/DL (ref 250–400)
UREA NITROGEN, UR: 372 MG/DL
URIC ACID, SERUM: 7.8 MG/DL (ref 2.4–5.7)
UROBILINOGEN, URINE: 0.2 E.U./DL
VITAMIN B-12: 283 PG/ML (ref 211–946)
VITAMIN D 25-HYDROXY: 10.7 NG/ML

## 2018-08-11 PROCEDURE — 82728 ASSAY OF FERRITIN: CPT

## 2018-08-11 PROCEDURE — 84156 ASSAY OF PROTEIN URINE: CPT

## 2018-08-11 PROCEDURE — 6370000000 HC RX 637 (ALT 250 FOR IP): Performed by: INTERNAL MEDICINE

## 2018-08-11 PROCEDURE — 93306 TTE W/DOPPLER COMPLETE: CPT

## 2018-08-11 PROCEDURE — 80048 BASIC METABOLIC PNL TOTAL CA: CPT

## 2018-08-11 PROCEDURE — 82306 VITAMIN D 25 HYDROXY: CPT

## 2018-08-11 PROCEDURE — 82607 VITAMIN B-12: CPT

## 2018-08-11 PROCEDURE — 81001 URINALYSIS AUTO W/SCOPE: CPT

## 2018-08-11 PROCEDURE — 84100 ASSAY OF PHOSPHORUS: CPT

## 2018-08-11 PROCEDURE — 36415 COLL VENOUS BLD VENIPUNCTURE: CPT

## 2018-08-11 PROCEDURE — 82746 ASSAY OF FOLIC ACID SERUM: CPT

## 2018-08-11 PROCEDURE — 2580000003 HC RX 258: Performed by: FAMILY MEDICINE

## 2018-08-11 PROCEDURE — 83970 ASSAY OF PARATHORMONE: CPT

## 2018-08-11 PROCEDURE — 2700000000 HC OXYGEN THERAPY PER DAY

## 2018-08-11 PROCEDURE — 84540 ASSAY OF URINE/UREA-N: CPT

## 2018-08-11 PROCEDURE — 6360000002 HC RX W HCPCS: Performed by: NURSE PRACTITIONER

## 2018-08-11 PROCEDURE — 83735 ASSAY OF MAGNESIUM: CPT

## 2018-08-11 PROCEDURE — 84300 ASSAY OF URINE SODIUM: CPT

## 2018-08-11 PROCEDURE — 1210000000 HC MED SURG R&B

## 2018-08-11 PROCEDURE — 89050 BODY FLUID CELL COUNT: CPT

## 2018-08-11 PROCEDURE — 99223 1ST HOSP IP/OBS HIGH 75: CPT | Performed by: INTERNAL MEDICINE

## 2018-08-11 PROCEDURE — 83550 IRON BINDING TEST: CPT

## 2018-08-11 PROCEDURE — 83880 ASSAY OF NATRIURETIC PEPTIDE: CPT

## 2018-08-11 PROCEDURE — 84550 ASSAY OF BLOOD/URIC ACID: CPT

## 2018-08-11 PROCEDURE — 6370000000 HC RX 637 (ALT 250 FOR IP): Performed by: FAMILY MEDICINE

## 2018-08-11 PROCEDURE — 82948 REAGENT STRIP/BLOOD GLUCOSE: CPT

## 2018-08-11 PROCEDURE — 6360000002 HC RX W HCPCS: Performed by: FAMILY MEDICINE

## 2018-08-11 PROCEDURE — 82570 ASSAY OF URINE CREATININE: CPT

## 2018-08-11 PROCEDURE — 51702 INSERT TEMP BLADDER CATH: CPT

## 2018-08-11 PROCEDURE — 83540 ASSAY OF IRON: CPT

## 2018-08-11 RX ORDER — SODIUM BICARBONATE 650 MG/1
325 TABLET ORAL 2 TIMES DAILY
Status: DISCONTINUED | OUTPATIENT
Start: 2018-08-11 | End: 2018-08-15 | Stop reason: HOSPADM

## 2018-08-11 RX ORDER — IRON POLYSACCHARIDE COMPLEX 150 MG
150 CAPSULE ORAL DAILY
Status: DISCONTINUED | OUTPATIENT
Start: 2018-08-11 | End: 2018-08-15 | Stop reason: HOSPADM

## 2018-08-11 RX ORDER — FUROSEMIDE 10 MG/ML
40 INJECTION INTRAMUSCULAR; INTRAVENOUS 2 TIMES DAILY
Status: COMPLETED | OUTPATIENT
Start: 2018-08-11 | End: 2018-08-11

## 2018-08-11 RX ORDER — ERGOCALCIFEROL 1.25 MG/1
50000 CAPSULE ORAL WEEKLY
Status: DISCONTINUED | OUTPATIENT
Start: 2018-08-11 | End: 2018-08-15 | Stop reason: HOSPADM

## 2018-08-11 RX ORDER — FUROSEMIDE 10 MG/ML
40 INJECTION INTRAMUSCULAR; INTRAVENOUS 2 TIMES DAILY
Status: DISCONTINUED | OUTPATIENT
Start: 2018-08-12 | End: 2018-08-11

## 2018-08-11 RX ADMIN — Medication 10 ML: at 19:43

## 2018-08-11 RX ADMIN — FUROSEMIDE 40 MG: 10 INJECTION, SOLUTION INTRAMUSCULAR; INTRAVENOUS at 15:39

## 2018-08-11 RX ADMIN — INSULIN GLARGINE 30 UNITS: 100 INJECTION, SOLUTION SUBCUTANEOUS at 09:51

## 2018-08-11 RX ADMIN — SODIUM BICARBONATE 325 MG: 650 TABLET ORAL at 19:42

## 2018-08-11 RX ADMIN — DOXAZOSIN 4 MG: 4 TABLET ORAL at 09:51

## 2018-08-11 RX ADMIN — Medication 150 MG: at 18:22

## 2018-08-11 RX ADMIN — DOXAZOSIN 4 MG: 4 TABLET ORAL at 19:42

## 2018-08-11 RX ADMIN — ERGOCALCIFEROL 50000 UNITS: 1.25 CAPSULE ORAL at 18:22

## 2018-08-11 RX ADMIN — FUROSEMIDE 40 MG: 10 INJECTION, SOLUTION INTRAMUSCULAR; INTRAVENOUS at 19:42

## 2018-08-11 RX ADMIN — NEBIVOLOL HYDROCHLORIDE 10 MG: 5 TABLET ORAL at 09:51

## 2018-08-11 RX ADMIN — ENOXAPARIN SODIUM 30 MG: 40 INJECTION SUBCUTANEOUS at 09:51

## 2018-08-11 ASSESSMENT — ENCOUNTER SYMPTOMS
NAUSEA: 0
CONSTIPATION: 0
ORTHOPNEA: 1
SHORTNESS OF BREATH: 1
BLOOD IN STOOL: 0
DIARRHEA: 0
SPUTUM PRODUCTION: 0
COUGH: 1

## 2018-08-11 NOTE — PROGRESS NOTES
Nephrology ([facility]) Progress Note    Patient:  Enrique Matthews  YOB: 1945  Date of Service: 8/11/2018  MRN: [id number]   Acct: [de-identified]   Primary Care Physician: Porter Farmer MD  Advance Directive: Full Code  Admit Date: 8/10/2018       Hospital Day: 1  Referring Provider: Porter Farmer MD    Patient independently seen and examined, Chart, Consults, Notes, Operative notes, Labs, Cardiology, and Radiology studies reviewed as able. Subjective:  Enrique Matthews is a 72 y.o. female  whom we were consulted for ABBIE / CKD. Pt admitted with cough/soa. No f/c/n/v. Some edema. No NSAIDs that she is aware of. No hematuria/dysuria. Today, feels that she is breathing better. No f/c/n/v. Family present, questions about CKD.       Allergies:  Codeine    Medicines:  Current Facility-Administered Medications   Medication Dose Route Frequency Provider Last Rate Last Dose    [START ON 8/12/2018] furosemide (LASIX) injection 40 mg  40 mg Intravenous BID Pat Sandy APRN        furosemide (LASIX) injection 40 mg  40 mg Intravenous BID Pat Sandy APRN   40 mg at 08/11/18 1539    sodium chloride flush 0.9 % injection 10 mL  10 mL Intravenous 2 times per day Porter Farmer MD   10 mL at 08/10/18 2118    sodium chloride flush 0.9 % injection 10 mL  10 mL Intravenous PRN Porter Farmer MD        acetaminophen (TYLENOL) tablet 650 mg  650 mg Oral Q4H PRN Porter Farmer MD        enoxaparin (LOVENOX) injection 30 mg  30 mg Subcutaneous Daily Porter Farmer MD   30 mg at 08/11/18 0951    glucose (GLUTOSE) 40 % oral gel 15 g  15 g Oral PRN Porter Farmer MD        dextrose 50 % solution 12.5 g  12.5 g Intravenous PRN Porter Farmer MD        glucagon (rDNA) injection 1 mg  1 mg Intramuscular PRN Porter Farmer MD        dextrose 5 % solution  100 mL/hr Intravenous PRN Porter Farmer MD        insulin lispro (HUMALOG) injection vial 0-12 Units diverticulum, removal of biliary sludge    ERCP N/A 1/11/2017    Dr BEKA Sutton-Successful removal of indwelling biliary stent, no evidence of residual choledocholithiasis or common biliary stricture     EYE SURGERY      cataract    FEMUR FRACTURE SURGERY Left 9/9/2016    SHORT TFN INTERTROCHAN FRACTURE performed by Amy Lombardi MD at [address] [place] [address]      femur fracture surgery    HIP FRACTURE SURGERY Left     pinning    [place] COLONOSCOPY W/BIOPSY SINGLE/MULTIPLE N/A 3/24/2017    Dr BEKA Sutton-Diverticular disease-Tubular AP (-) dysplasia x 5--3 yr recall    UPPER GASTROINTESTINAL ENDOSCOPY N/A 10/14/2016    Dr Simons-Hemorrhagic gastritis       Family History  Family History   Problem Relation Age of Onset    Arthritis Mother     Cancer Mother     High Cholesterol Mother     Arthritis Father     Diabetes Father     High Cholesterol Father     Liver Cancer Father     Arthritis Sister     Diabetes Sister     High Blood Pressure Sister     High Cholesterol Sister     Arthritis Brother     Diabetes Brother     High Blood Pressure Brother     High Cholesterol Brother     Vision Loss Brother     Colon Cancer Neg Hx     Colon Polyps Neg Hx     Esophageal Cancer Neg Hx     Liver Disease Neg Hx     Stomach Cancer Neg Hx     Rectal Cancer Neg Hx        Social History  Social History     Social History    Marital status:      Spouse name: So Diallo Number of children: 2    Years of education: 15     Occupational History    Not on file.      Social History Main Topics    Smoking status: Never Smoker    Smokeless tobacco: Never Used    Alcohol use No    Drug use: No    Sexual activity: Not on file     Other Topics Concern    Not on file     Social History Narrative    No narrative on file         Review of Systems:  History obtained from chart review and the patient  General ROS: No fever or chills  Respiratory ROS: +cough, shortness of breath, wheezing  Cardiovascular ROS: No chest pain or palpitations  Gastrointestinal ROS: No abdominal pain or melena  Genito-Urinary ROS: No dysuria or hematuria  Musculoskeletal ROS: No joint pain or swelling         Objective:  Blood pressure (!) 168/83, pulse 73, temperature 97.4 °F (36.3 °C), temperature source Temporal, resp. rate 18, height 5' 4\" (1.626 m), weight 202 lb (91.6 kg), SpO2 96 %, not currently breastfeeding. Intake/Output Summary (Last 24 hours) at 08/11/18 1745  Last data filed at 08/11/18 1554   Gross per 24 hour   Intake             1190 ml   Output             1310 ml   Net             -120 ml     General: awake/alert   Chest: decreased AE bilat with few basilar crackles  CVS: regular rate and rhythm  Abdominal: soft, nontender, normal bowel sounds  Extremities: no cyanosis, ble edema  Skin: warm and dry without rash    Labs:  BMP: Recent Labs      08/10/18   0935  08/10/18   0945  08/11/18   0840   NA  142   --   144   K  4.3  4.1  4.0   CL  111   --   110   CO2  17*   --   18*   PHOS   --    --   4.7*   BUN  42*   --   41*   CREATININE  2.7*   --   2.7*   CALCIUM  8.7*   --   8.9     CBC: Recent Labs      08/10/18   0935   WBC  5.4   HGB  8.7*   HCT  28.7*   MCV  98.0   PLT  102*     LIVER PROFILE:   Recent Labs      08/10/18   0935   AST  15   ALT  13   BILITOT  <0.2   ALKPHOS  53     PT/INR: No results for input(s): PROTIME, INR in the last 72 hours. APTT: No results for input(s): APTT in the last 72 hours. BNP:  No results for input(s): BNP in the last 72 hours. Ionized Calcium:No results for input(s): IONCA in the last 72 hours. Magnesium:  Recent Labs      08/11/18   0840   MG  2.1     Phosphorus:  Recent Labs      08/11/18   0840   PHOS  4.7*     HgbA1C: No results for input(s): LABA1C in the last 72 hours. Hepatic: Recent Labs      08/10/18   0935   ALKPHOS  53   ALT  13   AST  15   PROT  6.3*   BILITOT  <0.2   LABALBU  3.2*     Lactic Acid: No results for input(s): LACTA in the last 72 hours.   Troponin: No results for

## 2018-08-11 NOTE — PLAN OF CARE
Problem: Falls - Risk of:  Goal: Will remain free from falls  Will remain free from falls   Outcome: Ongoing      Problem: Breathing Pattern - Ineffective:  Goal: Ability to achieve and maintain a regular respiratory rate will improve  Ability to achieve and maintain a regular respiratory rate will improve   Outcome: Ongoing

## 2018-08-11 NOTE — PROGRESS NOTES
Daughter told PCA this morning that she would give pt a bath when she got back from her house. Will continue to monitor.  Electronically signed by Sendy Al on 8/11/2018 at 3:06 PM

## 2018-08-11 NOTE — CONSULTS
History:   Procedure Laterality Date    APPENDECTOMY       SECTION      x 2    CHOLECYSTECTOMY      COLONOSCOPY  09    Dr Devon Willingham (LOWER) N/A 2016    ERCP/EUS-Dr BEKA Sutton-w/placement of a 10 Setswana x7 cm temporary plastic biliary stent-Ampullary stenosis, mild dilation of the cbd w/questionable calcification vs stone, small periampullary diverticulum, removal of biliary sludge    ENDOSCOPY, COLON, DIAGNOSTIC      ERCP  2016    ERCP/EUS-Dr BEKA Sutton-w/placement of a 10 Setswana x7 cm temporary plastic biliary stent-Ampullary stenosis, mild dilation of the cbd w/questionable calcification vs stone, small periampullary diverticulum, removal of biliary sludge    ERCP N/A 2017    Dr BEKA Sutton-Successful removal of indwelling biliary stent, no evidence of residual choledocholithiasis or common biliary stricture     EYE SURGERY      cataract    FEMUR FRACTURE SURGERY Left 2016    SHORT TFN INTERTROCHAN FRACTURE performed by Britney Costello MD at [address] [place] [address]      femur fracture surgery    HIP FRACTURE SURGERY Left     pinning    NM COLONOSCOPY W/BIOPSY SINGLE/MULTIPLE N/A 3/24/2017    Dr BEKA Sutton-Diverticular disease-Tubular AP (-) dysplasia x 5--3 yr recall    UPPER GASTROINTESTINAL ENDOSCOPY N/A 10/14/2016    Dr Simons-Hemorrhagic gastritis       Family History   Problem Relation Age of Onset    Arthritis Mother     Cancer Mother     High Cholesterol Mother     Arthritis Father     Diabetes Father     High Cholesterol Father     Liver Cancer Father     Arthritis Sister     Diabetes Sister     High Blood Pressure Sister     High Cholesterol Sister     Arthritis Brother     Diabetes Brother     High Blood Pressure Brother     High Cholesterol Brother     Vision Loss Brother     Colon Cancer Neg Hx     Colon Polyps Neg Hx     Esophageal Cancer Neg Hx     Liver Disease Neg Hx     Stomach Cancer Neg Hx     Rectal Cancer Neg Hx Social History     Social History    Marital status:      Spouse name: Aneta Landry Number of children: 2    Years of education: 15     Occupational History    Not on file. Social History Main Topics    Smoking status: Never Smoker    Smokeless tobacco: Never Used    Alcohol use No    Drug use: No    Sexual activity: Not on file     Other Topics Concern    Not on file     Social History Narrative    No narrative on file       Allergies   Allergen Reactions    Codeine Nausea And Vomiting       Current Meds   sodium chloride flush  10 mL Intravenous 2 times per day    enoxaparin  30 mg Subcutaneous Daily    insulin lispro  0-12 Units Subcutaneous TID WC    insulin lispro  0-6 Units Subcutaneous Nightly    doxazosin  4 mg Oral BID    insulin glargine  30 Units Subcutaneous Daily    nebivolol  10 mg Oral Daily       Current Infused Meds   dextrose         PE:  Vitals:    08/11/18 0729   BP: (!) 168/83   Pulse: 66   Resp: 18   Temp: 97.4 °F (36.3 °C)   SpO2: 96%       Intake/Output Summary (Last 24 hours) at 08/11/18 1413  Last data filed at 08/11/18 1306   Gross per 24 hour   Intake              940 ml   Output             1640 ml   Net             -700 ml     Estimated body mass index is 34.67 kg/m² as calculated from the following:    Height as of this encounter: 5' 4\" (1.626 m). Weight as of this encounter: 202 lb (91.6 kg).     General - No acute distress  Eyes - PERRL, anicteric sclerae; no lid-lag  ENMT - Atraumatic; Mucous membranes moist, oropharynx clear  Neck - trachea midline, thyroid non-tender  Cardio - No jugular venous distension                Clear s1 s2, no gallop, rub, murmur                 No edema, normal pulses  Resp - Tachypneic, decreased breath sounds bilaterally  GI - abdomen soft, non-tender, no hepatosplenomegaly  Skin - warm and dry; no rashes  Psych - A+O x 3, normal affect    Recent Labs      08/10/18   0935   WBC  5.4   HGB  8.7*   PLT  102*     Recent Labs      08/10/18   0935  08/10/18   0945  08/11/18   0840   NA  142   --   144   K  4.3  4.1  4.0   CL  111   --   110   CO2  17*   --   18*   BUN  42*   --   41*   CREATININE  2.7*   --   2.7*   LABGLOM  17*   --   17*   MG   --    --   2.1   CALCIUM  8.7*   --   8.9   PHOS   --    --   4.7*     Recent Labs      08/10/18   0935  08/11/18   0840   TROPONINI  <0.01   --    PROBNP  7,088*  9,512*       ECG 08/11/18  NSR with PVCs @ 70-80 bpm     TTE- pending    Assessment, Recommendations, & Plan:  72 y.o. female with HTN, diabetes, with CHF and ABBIE. CHF- BNP 9,512. CXR: Moderate pulmonary hypoventilation with vascular congestion and possibly mild volume overload. 1500 mL  output from Lasix given in ER. Will continue to diurese. Daily weights and I&O. Echocardiogram pending. ABBIE- Cr 2.7 Nephrology following. HTN- Blood pressures 153//83. Home meds of Cardura and Bystolic resumed. Continue to monitor and adjust medciations as needed. Christopher Barros [facility] [place]                                                                                                                                                  Addendum                                                                                                                              I have seen and evaluated patient and agree with the assessment. The patient is a 29-year-old woman with a past medical history of CKD, hypertension, who presented to the ER after becoming short of air. The patient has been monitored as an outpatient for worsening renal function by her primary care provider. She was supposed PCN nephrology but became short of air over the last 3 days. She was found to have a creatinine of 2.7 and pro-NT BNP of 7000. This has increased to 9500 today. She was given IV Lasix which helped her breathing as she began urinating. She is a net negative of 500 mL.   Chest x-ray showed on pulmonary vascular congestion. Currently she is resting comfortably with oxygen. BP (!) 146/66   Pulse 68   Temp 97.4 °F (36.3 °C) (Temporal)   Resp 18   Ht 5' 4\" (1.626 m)   Wt 202 lb (91.6 kg)   SpO2 93%   BMI 34.67 kg/m²     General - No acute distress  Eyes - PERRL, anicteric sclerae; no lid-lag  ENMT - Atraumatic; Mucous membranes moist, oropharynx clear  Neck - trachea midline, thyroid non-tender  Cardio - No jugular venous distension                Clear s1 s2, no gallop, rub, murmur                 1+ pitting edema, normal pulses  Resp - Normal effort, decreased breath sounds in the bases with bilateral crackles  GI - abdomen soft, non-tender, no hepatosplenomegaly  Skin - warm and dry; no rashes  Psych - A+O x 3, normal affect    Assessment and plan - 72year-old with acute on chronic diastolic heart failure, acute on chronic kidney injury, hypertension, respiratory failure    Heart failure - by the time this reading I reviewed the echocardiogram that showed normal systolic function with grade 2 diastolic dysfunction. We will continue IV diuresis. I have reviewed the MAR. the patient received the IV dose of Lasix 40 mg at 11:00 this morning and then 2 doses this afternoon and evening. I will hold Lasix until we can determine how this may affect her renal dysfunction. I will work with nephrology on dosing of diuretic medications    Hypertension - moderately elevated, monitor    As always, thank you for allowing me to participate in the care of your patient. Please do not hesitate to contact me with any questions or concerns. Azam Stover MD, MSc  Director of the [facility] [place]    8/11/2018 8:09 PM

## 2018-08-12 LAB
ANION GAP SERPL CALCULATED.3IONS-SCNC: 13 MMOL/L (ref 7–19)
BUN BLDV-MCNC: 43 MG/DL (ref 8–23)
CALCIUM SERPL-MCNC: 8.6 MG/DL (ref 8.8–10.2)
CHLORIDE BLD-SCNC: 111 MMOL/L (ref 98–111)
CO2: 20 MMOL/L (ref 22–29)
CREAT SERPL-MCNC: 2.6 MG/DL (ref 0.5–0.9)
GFR NON-AFRICAN AMERICAN: 18
GLUCOSE BLD-MCNC: 108 MG/DL (ref 70–99)
GLUCOSE BLD-MCNC: 225 MG/DL (ref 70–99)
GLUCOSE BLD-MCNC: 267 MG/DL (ref 70–99)
GLUCOSE BLD-MCNC: 51 MG/DL (ref 74–109)
GLUCOSE BLD-MCNC: 53 MG/DL (ref 70–99)
PERFORMED ON: ABNORMAL
POTASSIUM SERPL-SCNC: 3.6 MMOL/L (ref 3.5–5)
SODIUM BLD-SCNC: 144 MMOL/L (ref 136–145)

## 2018-08-12 PROCEDURE — 82948 REAGENT STRIP/BLOOD GLUCOSE: CPT

## 2018-08-12 PROCEDURE — 6360000002 HC RX W HCPCS: Performed by: INTERNAL MEDICINE

## 2018-08-12 PROCEDURE — 1210000000 HC MED SURG R&B

## 2018-08-12 PROCEDURE — 6370000000 HC RX 637 (ALT 250 FOR IP): Performed by: FAMILY MEDICINE

## 2018-08-12 PROCEDURE — 2580000003 HC RX 258: Performed by: FAMILY MEDICINE

## 2018-08-12 PROCEDURE — 6360000002 HC RX W HCPCS: Performed by: FAMILY MEDICINE

## 2018-08-12 PROCEDURE — 36415 COLL VENOUS BLD VENIPUNCTURE: CPT

## 2018-08-12 PROCEDURE — 6370000000 HC RX 637 (ALT 250 FOR IP): Performed by: INTERNAL MEDICINE

## 2018-08-12 PROCEDURE — 99231 SBSQ HOSP IP/OBS SF/LOW 25: CPT | Performed by: INTERNAL MEDICINE

## 2018-08-12 PROCEDURE — 2700000000 HC OXYGEN THERAPY PER DAY

## 2018-08-12 PROCEDURE — 80048 BASIC METABOLIC PNL TOTAL CA: CPT

## 2018-08-12 RX ORDER — FUROSEMIDE 10 MG/ML
40 INJECTION INTRAMUSCULAR; INTRAVENOUS 2 TIMES DAILY
Status: DISCONTINUED | OUTPATIENT
Start: 2018-08-12 | End: 2018-08-13

## 2018-08-12 RX ORDER — NIFEDIPINE 30 MG/1
30 TABLET, EXTENDED RELEASE ORAL 2 TIMES DAILY
Status: DISCONTINUED | OUTPATIENT
Start: 2018-08-12 | End: 2018-08-15 | Stop reason: HOSPADM

## 2018-08-12 RX ADMIN — NIFEDIPINE 30 MG: 30 TABLET, FILM COATED, EXTENDED RELEASE ORAL at 08:53

## 2018-08-12 RX ADMIN — NIFEDIPINE 30 MG: 30 TABLET, FILM COATED, EXTENDED RELEASE ORAL at 20:27

## 2018-08-12 RX ADMIN — Medication 10 ML: at 20:27

## 2018-08-12 RX ADMIN — ENOXAPARIN SODIUM 30 MG: 40 INJECTION SUBCUTANEOUS at 08:39

## 2018-08-12 RX ADMIN — INSULIN LISPRO 4 UNITS: 100 INJECTION, SOLUTION INTRAVENOUS; SUBCUTANEOUS at 17:52

## 2018-08-12 RX ADMIN — INSULIN LISPRO 3 UNITS: 100 INJECTION, SOLUTION INTRAVENOUS; SUBCUTANEOUS at 20:27

## 2018-08-12 RX ADMIN — DOXAZOSIN 4 MG: 4 TABLET ORAL at 08:38

## 2018-08-12 RX ADMIN — SODIUM BICARBONATE 325 MG: 650 TABLET ORAL at 20:27

## 2018-08-12 RX ADMIN — FUROSEMIDE 40 MG: 10 INJECTION, SOLUTION INTRAMUSCULAR; INTRAVENOUS at 08:53

## 2018-08-12 RX ADMIN — NEBIVOLOL HYDROCHLORIDE 10 MG: 5 TABLET ORAL at 08:38

## 2018-08-12 RX ADMIN — FUROSEMIDE 40 MG: 10 INJECTION, SOLUTION INTRAMUSCULAR; INTRAVENOUS at 17:58

## 2018-08-12 RX ADMIN — DOXAZOSIN 4 MG: 4 TABLET ORAL at 20:27

## 2018-08-12 RX ADMIN — Medication 10 ML: at 08:40

## 2018-08-12 RX ADMIN — SODIUM BICARBONATE 325 MG: 650 TABLET ORAL at 08:38

## 2018-08-12 RX ADMIN — Medication 150 MG: at 08:38

## 2018-08-12 NOTE — PROGRESS NOTES
cbd w/questionable calcification vs stone, small periampullary diverticulum, removal of biliary sludge    ENDOSCOPY, COLON, DIAGNOSTIC      ERCP  11/4/2016    ERCP/EUS-Dr BEKA Sutton-w/placement of a 10 German x7 cm temporary plastic biliary stent-Ampullary stenosis, mild dilation of the cbd w/questionable calcification vs stone, small periampullary diverticulum, removal of biliary sludge    ERCP N/A 1/11/2017    Dr BEKA Sutton-Successful removal of indwelling biliary stent, no evidence of residual choledocholithiasis or common biliary stricture     EYE SURGERY      cataract    FEMUR FRACTURE SURGERY Left 9/9/2016    SHORT TFN INTERTROCHAN FRACTURE performed by Maria Teresa Hagen MD at [address] [place] [address]      femur fracture surgery    HIP FRACTURE SURGERY Left     pinning    [place] COLONOSCOPY W/BIOPSY SINGLE/MULTIPLE N/A 3/24/2017    Dr BEKA Sutton-Diverticular disease-Tubular AP (-) dysplasia x 5--3 yr recall    UPPER GASTROINTESTINAL ENDOSCOPY N/A 10/14/2016    Dr Simons-Hemorrhagic gastritis       Family History  Family History   Problem Relation Age of Onset    Arthritis Mother     Cancer Mother     High Cholesterol Mother     Arthritis Father     Diabetes Father     High Cholesterol Father     Liver Cancer Father     Arthritis Sister     Diabetes Sister     High Blood Pressure Sister     High Cholesterol Sister     Arthritis Brother     Diabetes Brother     High Blood Pressure Brother     High Cholesterol Brother     Vision Loss Brother     Colon Cancer Neg Hx     Colon Polyps Neg Hx     Esophageal Cancer Neg Hx     Liver Disease Neg Hx     Stomach Cancer Neg Hx     Rectal Cancer Neg Hx        Social History  Social History     Social History    Marital status:      Spouse name: Ferny Awan Number of children: 2    Years of education: 15     Occupational History    Not on file.      Social History Main Topics    Smoking status: Never Smoker    Smokeless tobacco: Never Used    Alcohol 72 hours. Magnesium:  Recent Labs      08/11/18   0840   MG  2.1     Phosphorus:  Recent Labs      08/11/18   0840   PHOS  4.7*     HgbA1C: No results for input(s): LABA1C in the last 72 hours. Hepatic:   Recent Labs      08/10/18   0935   ALKPHOS  53   ALT  13   AST  15   PROT  6.3*   BILITOT  <0.2   LABALBU  3.2*     Lactic Acid: No results for input(s): LACTA in the last 72 hours. Troponin: No results for input(s): CKTOTAL, CKMB, TROPONINT in the last 72 hours. ABGs:   Lab Results   Component Value Date    PHART 7.380 08/10/2018    PO2ART 65.0 08/10/2018    JSG1XTD 30.0 08/10/2018     CRP:  No results for input(s): CRP in the last 72 hours. Sed Rate:  No results for input(s): SEDRATE in the last 72 hours. Culture Results:   Blood Culture Recent: No results for input(s): BC in the last 720 hours. Urine Culture Recent : No results for input(s): LABURIN in the last 720 hours. Radiology reports as per the Radiologist  Radiology: Xr Chest Portable    Result Date: 8/10/2018  EXAMINATION: XR CHEST PORTABLE 8/10/2018 10:17 AM HISTORY: Shortness of breath. Report: Comparison is made with the study from 11/2/2016. Previously, a nasogastric tube was present and has been removed. Lungs are grossly hypoaerated, there is central and basilar vascular congestion and probable small bilateral pleural effusions. No pneumothorax is identified. There is no obvious lung consolidation. The osseous structures are remarkable for 3 levels of vertebroplasty in the lower thoracic and upper lumbar spine which were not present before. Moderate pulmonary hypoventilation with vascular congestion centrally and in the lung bases with normal heart size. Mild volume overload cannot be excluded. There may be small bilateral pleural effusions. No consolidating pneumonia is seen. Signed by Dr Eliza Pedraza on 8/10/2018 10:19 AM       Assessment   ABBIE   CKD 3  Acute CHF exacerbation (still no echo available)  Acidosis  Anemia - iron deficiency  Low vitamin D  Secondary Hyperparathyroidism     Plan:  IV diuretics, Monitor response, d/w cardiology  Vitamin D  Iron   bicarb       Torrie Cam MD  08/12/18  12:17 PM

## 2018-08-13 ENCOUNTER — APPOINTMENT (OUTPATIENT)
Dept: ULTRASOUND IMAGING | Age: 73
DRG: 291 | End: 2018-08-13
Payer: MEDICARE

## 2018-08-13 LAB
ANION GAP SERPL CALCULATED.3IONS-SCNC: 15 MMOL/L (ref 7–19)
BUN BLDV-MCNC: 40 MG/DL (ref 8–23)
CALCIUM SERPL-MCNC: 8 MG/DL (ref 8.8–10.2)
CHLORIDE BLD-SCNC: 106 MMOL/L (ref 98–111)
CO2: 23 MMOL/L (ref 22–29)
CREAT SERPL-MCNC: 2.8 MG/DL (ref 0.5–0.9)
GFR NON-AFRICAN AMERICAN: 17
GLUCOSE BLD-MCNC: 109 MG/DL (ref 70–99)
GLUCOSE BLD-MCNC: 134 MG/DL (ref 70–99)
GLUCOSE BLD-MCNC: 146 MG/DL (ref 70–99)
GLUCOSE BLD-MCNC: 211 MG/DL (ref 74–109)
GLUCOSE BLD-MCNC: 212 MG/DL (ref 70–99)
PERFORMED ON: ABNORMAL
POTASSIUM SERPL-SCNC: 3.8 MMOL/L (ref 3.5–5)
SODIUM BLD-SCNC: 144 MMOL/L (ref 136–145)

## 2018-08-13 PROCEDURE — 99231 SBSQ HOSP IP/OBS SF/LOW 25: CPT | Performed by: INTERNAL MEDICINE

## 2018-08-13 PROCEDURE — 76770 US EXAM ABDO BACK WALL COMP: CPT

## 2018-08-13 PROCEDURE — 80048 BASIC METABOLIC PNL TOTAL CA: CPT

## 2018-08-13 PROCEDURE — 6360000002 HC RX W HCPCS: Performed by: FAMILY MEDICINE

## 2018-08-13 PROCEDURE — 36415 COLL VENOUS BLD VENIPUNCTURE: CPT

## 2018-08-13 PROCEDURE — 6370000000 HC RX 637 (ALT 250 FOR IP): Performed by: INTERNAL MEDICINE

## 2018-08-13 PROCEDURE — 1210000000 HC MED SURG R&B

## 2018-08-13 PROCEDURE — 6370000000 HC RX 637 (ALT 250 FOR IP): Performed by: FAMILY MEDICINE

## 2018-08-13 PROCEDURE — 2580000003 HC RX 258: Performed by: FAMILY MEDICINE

## 2018-08-13 PROCEDURE — 2700000000 HC OXYGEN THERAPY PER DAY

## 2018-08-13 PROCEDURE — 82948 REAGENT STRIP/BLOOD GLUCOSE: CPT

## 2018-08-13 RX ORDER — FUROSEMIDE 40 MG/1
40 TABLET ORAL DAILY
Status: DISCONTINUED | OUTPATIENT
Start: 2018-08-13 | End: 2018-08-15 | Stop reason: HOSPADM

## 2018-08-13 RX ADMIN — ENOXAPARIN SODIUM 30 MG: 40 INJECTION SUBCUTANEOUS at 09:11

## 2018-08-13 RX ADMIN — HYDROCODONE BITARTRATE AND ACETAMINOPHEN 1 TABLET: 7.5; 325 TABLET ORAL at 06:52

## 2018-08-13 RX ADMIN — FUROSEMIDE 40 MG: 40 TABLET ORAL at 10:16

## 2018-08-13 RX ADMIN — NIFEDIPINE 30 MG: 30 TABLET, FILM COATED, EXTENDED RELEASE ORAL at 20:08

## 2018-08-13 RX ADMIN — Medication 10 ML: at 09:12

## 2018-08-13 RX ADMIN — NIFEDIPINE 30 MG: 30 TABLET, FILM COATED, EXTENDED RELEASE ORAL at 09:11

## 2018-08-13 RX ADMIN — SODIUM BICARBONATE 325 MG: 650 TABLET ORAL at 20:08

## 2018-08-13 RX ADMIN — DOXAZOSIN 4 MG: 4 TABLET ORAL at 20:08

## 2018-08-13 RX ADMIN — Medication 10 ML: at 20:09

## 2018-08-13 RX ADMIN — DOXAZOSIN 4 MG: 4 TABLET ORAL at 09:11

## 2018-08-13 RX ADMIN — NEBIVOLOL HYDROCHLORIDE 10 MG: 5 TABLET ORAL at 09:11

## 2018-08-13 RX ADMIN — INSULIN LISPRO 4 UNITS: 100 INJECTION, SOLUTION INTRAVENOUS; SUBCUTANEOUS at 09:12

## 2018-08-13 RX ADMIN — INSULIN GLARGINE 30 UNITS: 100 INJECTION, SOLUTION SUBCUTANEOUS at 09:12

## 2018-08-13 RX ADMIN — SODIUM BICARBONATE 325 MG: 650 TABLET ORAL at 09:11

## 2018-08-13 RX ADMIN — Medication 150 MG: at 09:11

## 2018-08-13 ASSESSMENT — PAIN SCALES - GENERAL
PAINLEVEL_OUTOF10: 0
PAINLEVEL_OUTOF10: 6

## 2018-08-13 NOTE — PROGRESS NOTES
[facility]  Daily Progress Note      Chief Complaint: f/u HF    Interval Hx: Her breathing is better after being net -3.9 L, creatinine going Up    ROS:  The rest of the systems are negative except Interval Hx    Current Inpatient Medications:   furosemide  40 mg Oral Daily    NIFEdipine  30 mg Oral BID    sodium bicarbonate  325 mg Oral BID    iron polysaccharides  150 mg Oral Daily    vitamin D  50,000 Units Oral Weekly    sodium chloride flush  10 mL Intravenous 2 times per day    enoxaparin  30 mg Subcutaneous Daily    insulin lispro  0-12 Units Subcutaneous TID WC    insulin lispro  0-6 Units Subcutaneous Nightly    doxazosin  4 mg Oral BID    insulin glargine  30 Units Subcutaneous Daily    nebivolol  10 mg Oral Daily       IV Infusions (if any):   dextrose         Physical Exam:   /60   Pulse 72   Temp 97.9 °F (36.6 °C)   Resp 20   Ht 5' 4\" (1.626 m)   Wt 204 lb 1.6 oz (92.6 kg)   SpO2 95%   BMI 35.03 kg/m²       Intake/Output Summary (Last 24 hours) at 08/13/18 1302  Last data filed at 08/13/18 1255   Gross per 24 hour   Intake             1670 ml   Output             1750 ml   Net              -80 ml       Gen - NAD  Neck - Supple  ENMT - MMM, OP Clear  Cardio - No JVD     Clear s1 s2, no gallop, rub, murmur                No edema, 2+ radials  Resp - Normal effort, CTA Bilat  GI - soft, non-tender, no HSM  Psych - A+O x 3, normal affect     Diagnostics:      No results for input(s): WBC, HGB, PLT in the last 72 hours.    Recent Labs      08/11/18   0840  08/12/18   0245  08/13/18   0330   NA  144  144  144   K  4.0  3.6  3.8   CL  110  111  106   CO2  18*  20*  23   BUN  41*  43*  40*   CREATININE  2.7*  2.6*  2.8*   LABGLOM  17*  18*  17*   MG  2.1   --    --    CALCIUM  8.9  8.6*  8.0*   PHOS  4.7*   --    --       Recent Labs      08/11/18   0840   PROBNP  9,512*          Assessment:     72 y.o. female with Acute on chronic diastolic heart failure ABBIE,

## 2018-08-13 NOTE — PROGRESS NOTES
Progress Note  8/13/2018 7:14 AM  Subjective:   Admit Date: 8/10/2018  PCP: Benedicto Gutierrez MD    Interval History: She feels better. DIET LOW SODIUM 2 GM; Carb Control: 4 carb choices (60 gms)/meal    Intake/Output Summary (Last 24 hours) at 08/13/18 0714  Last data filed at 08/13/18 0328   Gross per 24 hour   Intake             1550 ml   Output             2950 ml   Net            -1400 ml     Medications:      dextrose        furosemide  40 mg Intravenous BID    NIFEdipine  30 mg Oral BID    sodium bicarbonate  325 mg Oral BID    iron polysaccharides  150 mg Oral Daily    vitamin D  50,000 Units Oral Weekly    sodium chloride flush  10 mL Intravenous 2 times per day    enoxaparin  30 mg Subcutaneous Daily    insulin lispro  0-12 Units Subcutaneous TID WC    insulin lispro  0-6 Units Subcutaneous Nightly    doxazosin  4 mg Oral BID    insulin glargine  30 Units Subcutaneous Daily    nebivolol  10 mg Oral Daily     Recent Labs      08/10/18   0935   WBC  5.4   HGB  8.7*   PLT  102*     Recent Labs      08/11/18   0840  08/12/18   0245  08/13/18   0330   NA  144  144  144   K  4.0  3.6  3.8   CL  110  111  106   CO2  18*  20*  23   BUN  41*  43*  40*   CREATININE  2.7*  2.6*  2.8*   GLUCOSE  88  51*  211*     Recent Labs      08/10/18   0935   AST  15   ALT  13   BILITOT  <0.2   ALKPHOS  53       Objective:   Vitals: /60   Pulse 72   Temp 97.9 °F (36.6 °C) (Temporal)   Resp 20   Ht 5' 4\" (1.626 m)   Wt 204 lb 1.6 oz (92.6 kg)   SpO2 95%   BMI 35.03 kg/m²   General appearance: alert and cooperative with exam  Lungs: Bibasilar rales  Heart: regular rate and rhythm, S1, S2 normal, no murmur, click, rub or gallop  Abdomen: soft, non-tender; bowel sounds normal; no masses,  no organomegaly  Extremities: Bilateral edema. Neurologic: No obvious focal neurologic deficits. Assessment and Plan:   1. 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 2. Acute chf, diastolic type: Per Dr Tevin Khalil.   3. HTN:

## 2018-08-13 NOTE — PLAN OF CARE
Problem: Falls - Risk of:  Goal: Will remain free from falls  Will remain free from falls   Outcome: Ongoing    Goal: Absence of physical injury  Absence of physical injury   Outcome: Ongoing      Problem: Breathing Pattern - Ineffective:  Goal: Ability to achieve and maintain a regular respiratory rate will improve  Ability to achieve and maintain a regular respiratory rate will improve   Outcome: Ongoing

## 2018-08-13 NOTE — PROGRESS NOTES
Nephrology ([address]) Progress Note    Patient:  Enrique Matthews  YOB: 1945  Date of Service: 8/13/2018  MRN: [id number]   Acct: [de-identified]   Primary Care Physician: Porter Farmer MD  Advance Directive: Full Code  Admit Date: 8/10/2018       Hospital Day: 3  Referring Provider: Porter Farmer MD    Patient Seen, Chart, Consults notes, Labs, Radiology studies reviewed. Subjective:  Enrique Matthews is a 72 y.o. female  whom we were consulted for ABBIE / CKD 3.  Pt admitted with cough/soa.  No f/c/n/v.  Some edema.  No NSAIDs that she is aware of.  No hematuria/dysuria.        Today, feels that she is breathing better. No f/c/n/v. Family was present.  She was anxious to go home.        Allergies:  Codeine    Medicines:  Current Facility-Administered Medications   Medication Dose Route Frequency Provider Last Rate Last Dose    NIFEdipine (PROCARDIA XL) extended release tablet 30 mg  30 mg Oral BID Uriel Phillips MD   30 mg at 08/13/18 0911    sodium bicarbonate tablet 325 mg  325 mg Oral BID Stalin Marie MD   325 mg at 08/13/18 0911    iron polysaccharides (NIFEREX) capsule 150 mg  150 mg Oral Daily Stalin Marie MD   150 mg at 08/13/18 5410    vitamin D (ERGOCALCIFEROL) capsule 50,000 Units  50,000 Units Oral Weekly Stalin Marie MD   50,000 Units at 08/11/18 1822    sodium chloride flush 0.9 % injection 10 mL  10 mL Intravenous 2 times per day Porter Farmer MD   10 mL at 08/13/18 0912    sodium chloride flush 0.9 % injection 10 mL  10 mL Intravenous PRN Porter Farmer MD        acetaminophen (TYLENOL) tablet 650 mg  650 mg Oral Q4H PRN Porter Farmer MD        enoxaparin (LOVENOX) injection 30 mg  30 mg Subcutaneous Daily Porter Farmer MD   30 mg at 08/13/18 0911    glucose (GLUTOSE) 40 % oral gel 15 g  15 g Oral PRN Porter Farmer MD        dextrose 50 % solution 12.5 g  12.5 g Intravenous PRN Porter Farmer MD        glucagon Concern    Not on file     Social History Narrative    No narrative on file         Review of Systems:  History obtained from chart review and the patient  General ROS: No fever or chills  Respiratory ROS: No cough, +shortness of breath, -wheezing  Cardiovascular ROS: no chest pain but dyspnea on exertion  Gastrointestinal ROS: No abdominal pain or melena  Genito-Urinary ROS: No dysuria or hematuria  Musculoskeletal ROS: No joint pain or swelling         Objective:  Blood pressure 137/60, pulse 72, temperature 97.9 °F (36.6 °C), temperature source Temporal, resp. rate 20, height 5' 4\" (1.626 m), weight 204 lb 1.6 oz (92.6 kg), SpO2 95 %, not currently breastfeeding. Intake/Output Summary (Last 24 hours) at 08/13/18 0926  Last data filed at 08/13/18 0853   Gross per 24 hour   Intake             1550 ml   Output             2950 ml   Net            -1400 ml     General: alert and oriented x3   Chest:  Decreased breath sounds at the bases  CVS: regular rate and rhythm  Abdominal: soft, nontender, normal bowel sounds  Extremities: no cyanosis or edema  Skin: warm and dry without rash    Labs:  BMP: Recent Labs      08/11/18   0840  08/12/18   0245  08/13/18   0330   NA  144  144  144   K  4.0  3.6  3.8   CL  110  111  106   CO2  18*  20*  23   PHOS  4.7*   --    --    BUN  41*  43*  40*   CREATININE  2.7*  2.6*  2.8*   CALCIUM  8.9  8.6*  8.0*     CBC: Recent Labs      08/10/18   0935   WBC  5.4   HGB  8.7*   HCT  28.7*   MCV  98.0   PLT  102*     LIVER PROFILE:   Recent Labs      08/10/18   0935   AST  15   ALT  13   BILITOT  <0.2   ALKPHOS  53     PT/INR: No results for input(s): PROTIME, INR in the last 72 hours. APTT: No results for input(s): APTT in the last 72 hours. BNP:  No results for input(s): BNP in the last 72 hours. Ionized Calcium:No results for input(s): IONCA in the last 72 hours.   Magnesium:  Recent Labs      08/11/18   0840   MG  2.1     Phosphorus:  Recent Labs      08/11/18   0840   PHOS

## 2018-08-14 LAB
ANION GAP SERPL CALCULATED.3IONS-SCNC: 14 MMOL/L (ref 7–19)
ANION GAP SERPL CALCULATED.3IONS-SCNC: 15 MMOL/L (ref 7–19)
BUN BLDV-MCNC: 41 MG/DL (ref 8–23)
BUN BLDV-MCNC: 41 MG/DL (ref 8–23)
CALCIUM SERPL-MCNC: 8.4 MG/DL (ref 8.8–10.2)
CALCIUM SERPL-MCNC: 8.7 MG/DL (ref 8.8–10.2)
CHLORIDE BLD-SCNC: 106 MMOL/L (ref 98–111)
CHLORIDE BLD-SCNC: 109 MMOL/L (ref 98–111)
CO2: 22 MMOL/L (ref 22–29)
CO2: 24 MMOL/L (ref 22–29)
CREAT SERPL-MCNC: 2.4 MG/DL (ref 0.5–0.9)
CREAT SERPL-MCNC: 2.5 MG/DL (ref 0.5–0.9)
GFR NON-AFRICAN AMERICAN: 19
GFR NON-AFRICAN AMERICAN: 20
GLUCOSE BLD-MCNC: 100 MG/DL (ref 74–109)
GLUCOSE BLD-MCNC: 152 MG/DL (ref 70–99)
GLUCOSE BLD-MCNC: 216 MG/DL (ref 70–99)
GLUCOSE BLD-MCNC: 68 MG/DL (ref 70–99)
GLUCOSE BLD-MCNC: 73 MG/DL (ref 74–109)
PERFORMED ON: ABNORMAL
POTASSIUM SERPL-SCNC: 3.8 MMOL/L (ref 3.5–5)
POTASSIUM SERPL-SCNC: 3.9 MMOL/L (ref 3.5–5)
SODIUM BLD-SCNC: 143 MMOL/L (ref 136–145)
SODIUM BLD-SCNC: 147 MMOL/L (ref 136–145)

## 2018-08-14 PROCEDURE — 6370000000 HC RX 637 (ALT 250 FOR IP): Performed by: INTERNAL MEDICINE

## 2018-08-14 PROCEDURE — 2700000000 HC OXYGEN THERAPY PER DAY

## 2018-08-14 PROCEDURE — 2580000003 HC RX 258: Performed by: FAMILY MEDICINE

## 2018-08-14 PROCEDURE — G8978 MOBILITY CURRENT STATUS: HCPCS

## 2018-08-14 PROCEDURE — 80048 BASIC METABOLIC PNL TOTAL CA: CPT

## 2018-08-14 PROCEDURE — 36415 COLL VENOUS BLD VENIPUNCTURE: CPT

## 2018-08-14 PROCEDURE — G8979 MOBILITY GOAL STATUS: HCPCS

## 2018-08-14 PROCEDURE — 6360000002 HC RX W HCPCS: Performed by: FAMILY MEDICINE

## 2018-08-14 PROCEDURE — 1210000000 HC MED SURG R&B

## 2018-08-14 PROCEDURE — 97161 PT EVAL LOW COMPLEX 20 MIN: CPT

## 2018-08-14 PROCEDURE — 6370000000 HC RX 637 (ALT 250 FOR IP): Performed by: FAMILY MEDICINE

## 2018-08-14 PROCEDURE — 82948 REAGENT STRIP/BLOOD GLUCOSE: CPT

## 2018-08-14 PROCEDURE — 99231 SBSQ HOSP IP/OBS SF/LOW 25: CPT | Performed by: INTERNAL MEDICINE

## 2018-08-14 RX ORDER — NEBIVOLOL 5 MG/1
10 TABLET ORAL 2 TIMES DAILY
Status: DISCONTINUED | OUTPATIENT
Start: 2018-08-14 | End: 2018-08-15 | Stop reason: HOSPADM

## 2018-08-14 RX ORDER — POTASSIUM CHLORIDE 750 MG/1
10 TABLET, EXTENDED RELEASE ORAL DAILY
Status: DISCONTINUED | OUTPATIENT
Start: 2018-08-14 | End: 2018-08-15 | Stop reason: HOSPADM

## 2018-08-14 RX ADMIN — POTASSIUM CHLORIDE 10 MEQ: 10 TABLET, EXTENDED RELEASE ORAL at 10:59

## 2018-08-14 RX ADMIN — NEBIVOLOL HYDROCHLORIDE 10 MG: 5 TABLET ORAL at 20:41

## 2018-08-14 RX ADMIN — Medication 10 ML: at 13:42

## 2018-08-14 RX ADMIN — INSULIN LISPRO 2 UNITS: 100 INJECTION, SOLUTION INTRAVENOUS; SUBCUTANEOUS at 13:33

## 2018-08-14 RX ADMIN — SODIUM BICARBONATE 325 MG: 650 TABLET ORAL at 10:59

## 2018-08-14 RX ADMIN — SODIUM BICARBONATE 325 MG: 650 TABLET ORAL at 20:41

## 2018-08-14 RX ADMIN — NIFEDIPINE 30 MG: 30 TABLET, FILM COATED, EXTENDED RELEASE ORAL at 20:41

## 2018-08-14 RX ADMIN — FUROSEMIDE 40 MG: 40 TABLET ORAL at 10:59

## 2018-08-14 RX ADMIN — ENOXAPARIN SODIUM 30 MG: 40 INJECTION SUBCUTANEOUS at 10:59

## 2018-08-14 RX ADMIN — INSULIN LISPRO 4 UNITS: 100 INJECTION, SOLUTION INTRAVENOUS; SUBCUTANEOUS at 17:47

## 2018-08-14 RX ADMIN — NIFEDIPINE 30 MG: 30 TABLET, FILM COATED, EXTENDED RELEASE ORAL at 10:59

## 2018-08-14 RX ADMIN — DOXAZOSIN 4 MG: 4 TABLET ORAL at 15:45

## 2018-08-14 RX ADMIN — Medication 150 MG: at 13:41

## 2018-08-14 RX ADMIN — DOXAZOSIN 4 MG: 4 TABLET ORAL at 20:41

## 2018-08-14 RX ADMIN — NEBIVOLOL HYDROCHLORIDE 10 MG: 5 TABLET ORAL at 13:42

## 2018-08-14 NOTE — PROGRESS NOTES
[facility]  Daily Progress Note      Chief Complaint: f/u HF    Interval Hx: Doing well, net negative 5.5 L     ROS:  The rest of the systems are negative except Interval Hx    Current Inpatient Medications:   nebivolol  10 mg Oral BID    potassium chloride  10 mEq Oral Daily    furosemide  40 mg Oral Daily    NIFEdipine  30 mg Oral BID    sodium bicarbonate  325 mg Oral BID    iron polysaccharides  150 mg Oral Daily    vitamin D  50,000 Units Oral Weekly    sodium chloride flush  10 mL Intravenous 2 times per day    enoxaparin  30 mg Subcutaneous Daily    insulin lispro  0-12 Units Subcutaneous TID WC    insulin lispro  0-6 Units Subcutaneous Nightly    doxazosin  4 mg Oral BID    insulin glargine  30 Units Subcutaneous Daily       IV Infusions (if any):   dextrose         Physical Exam:   BP (!) 165/78   Pulse 76   Temp 98.1 °F (36.7 °C) (Temporal)   Resp 20   Ht 5' 4\" (1.626 m)   Wt 204 lb 1.6 oz (92.6 kg)   SpO2 94%   BMI 35.03 kg/m²       Intake/Output Summary (Last 24 hours) at 08/14/18 1648  Last data filed at 08/14/18 1256   Gross per 24 hour   Intake             1030 ml   Output              900 ml   Net              130 ml       Gen - NAD  Neck - Supple  ENMT - MMM, OP Clear  Cardio - No JVD     Clear s1 s2, no gallop, rub, murmur                No edema, 2+ radials  Resp - Normal effort, CTA Bilat  GI - soft, non-tender, no HSM  Psych - A+O x 3, normal affect     Diagnostics:      No results for input(s): WBC, HGB, PLT in the last 72 hours. Recent Labs      08/13/18   0330  08/14/18   0323  08/14/18   0905   NA  144  147*  143   K  3.8  3.8  3.9   CL  106  109  106   CO2  23  24  22   BUN  40*  41*  41*   CREATININE  2.8*  2.5*  2.4*   LABGLOM  17*  19*  20*   CALCIUM  8.0*  8.4*  8.7*      No results for input(s): TROPONINI, PROBNP in the last 72 hours.        Assessment:     72 y.o. female with Acute on chronic diastolic heart failure ABBIE, hypertension respiratory

## 2018-08-14 NOTE — PROGRESS NOTES
Spoke with patient re: referral to CHF clinic. Discussed with patient diet, activity, medications, definition of heart failure and s/s, daily wt monitoring with reporting of wt gain of >2-3 lbs in 24 hours or > 5 lbs in one week, BP/HR and activity monitoring with use of daily log, f/u appointments with PCP, CHF clinic and cardiologist. Discussed vaccine use, testing, monitoring of BS. Total time spent with patient by CHF nurse 45 minutes. Additional time will be spent with patient and NP at the first HF clinic appointment. Patient stated she has never been on a low sodium diet. Ms. Zamzam Bess stated at this time her son is doing most of her grocery shopping. Patient uses a walker for ambulation. Patient stated her PCP is Dr Barbara Wolff. Patient has chosen to follow up with Dr Sona Aparicio as Dr. Kierra Garcia is leaving the cardiology practice.

## 2018-08-14 NOTE — PROGRESS NOTES
Progress Note  8/14/2018 7:08 AM  Subjective:   Admit Date: 8/10/2018  PCP: Derick Ferrer MD    Interval History: She feels better. DIET LOW SODIUM 2 GM; Carb Control: 4 carb choices (60 gms)/meal    Intake/Output Summary (Last 24 hours) at 08/14/18 0708  Last data filed at 08/14/18 0524   Gross per 24 hour   Intake             1030 ml   Output             2050 ml   Net            -1020 ml     Medications:      dextrose        furosemide  40 mg Oral Daily    NIFEdipine  30 mg Oral BID    sodium bicarbonate  325 mg Oral BID    iron polysaccharides  150 mg Oral Daily    vitamin D  50,000 Units Oral Weekly    sodium chloride flush  10 mL Intravenous 2 times per day    enoxaparin  30 mg Subcutaneous Daily    insulin lispro  0-12 Units Subcutaneous TID WC    insulin lispro  0-6 Units Subcutaneous Nightly    doxazosin  4 mg Oral BID    insulin glargine  30 Units Subcutaneous Daily    nebivolol  10 mg Oral Daily     No results for input(s): WBC, HGB, PLT in the last 72 hours. Recent Labs      08/12/18   0245  08/13/18   0330  08/14/18   0323   NA  144  144  147*   K  3.6  3.8  3.8   CL  111  106  109   CO2  20*  23  24   BUN  43*  40*  41*   CREATININE  2.6*  2.8*  2.5*   GLUCOSE  51*  211*  73*     No results for input(s): AST, ALT, ALB, BILITOT, ALKPHOS in the last 72 hours. Objective:   Vitals: BP (!) 161/86   Pulse 85   Temp 98.5 °F (36.9 °C)   Resp 18   Ht 5' 4\" (1.626 m)   Wt 204 lb 1.6 oz (92.6 kg)   SpO2 95%   BMI 35.03 kg/m²   General appearance: alert and cooperative with exam  Lungs: Bibasilar rales  Heart: regular rate and rhythm, S1, S2 normal, no murmur, click, rub or gallop  Abdomen: soft, non-tender; bowel sounds normal; no masses,  no organomegaly  Extremities: Bilateral edema. Neurologic: No obvious focal neurologic deficits. Assessment and Plan:   1. 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 Numbers are a little better. 2. Acute chf, diastolic type: Per Dr Marlon Corley.  She states she is breathing better. I will increase her activity. D/C plans in near future and when others feel she is ready. 3. HTN: Still high and I will increase her bystolic. 4. DM, type 2 insulin requiring: BS is okay. 5. Anemia of chronic dz: Continue as is. Advance Directive: Full Code  DVT prophylaxis with enoxaparin 30 mg sub-Q daily. Discharge planning: ? Active Problems:    Congestive heart failure ([address]  Resolved Problems:    * No resolved hospital problems.  Kit Briones MD

## 2018-08-14 NOTE — PROGRESS NOTES
Nutrition Education    Type and Reason for Visit: Patient Education    Patient stated follows Na restriction at home. Receptive to information given, but sleepy in bed. Will follow up for questions. · Verbally reviewed following information with Patient: 2 gm Na diet  · Written educational materials provided. · Contact name and number provided. · Refer to Patient Education activity for more details.     Electronically signed by Alonso Webster MS, RD, LD on 8/14/18 at 2:00 PM    Contact Number: [phone]

## 2018-08-14 NOTE — PROGRESS NOTES
Subcutaneous Daily Jaye Castro MD   30 mg at 18 0911    glucose (GLUTOSE) 40 % oral gel 15 g  15 g Oral PRN Jaye Castro MD        dextrose 50 % solution 12.5 g  12.5 g Intravenous PRN Jaye Castro MD        glucagon (rDNA) injection 1 mg  1 mg Intramuscular PRN Jaye Castro MD        dextrose 5 % solution  100 mL/hr Intravenous PRN Jaye Castro MD        insulin lispro (HUMALOG) injection vial 0-12 Units  0-12 Units Subcutaneous TID WC Jaye Castro MD   4 Units at 18    insulin lispro (HUMALOG) injection vial 0-6 Units  0-6 Units Subcutaneous Nightly Jaey Castro MD   3 Units at 18    doxazosin (CARDURA) tablet 4 mg  4 mg Oral BID Jaye Castro MD   4 mg at 18    HYDROcodone-acetaminophen ([facility]) 7.5-325 MG per tablet 1 tablet  1 tablet Oral Q12H PRN Jaye Castro MD   1 tablet at 18 9148    insulin glargine (LANTUS) injection vial 30 Units  30 Units Subcutaneous Daily Jaye Castro MD   30 Units at 18 09       Past Medical History:  Past Medical History:   Diagnosis Date    Anemia     Blood circulation, collateral     Broken hip ([facility] [address]     L hip    [facility]F [facility]congestive heart failure) ([facility] [address]     Closed compression fracture of first lumbar vertebra ([facility] [address] 2017    Closed compression fracture of first lumbar vertebra ([facility] [address] 2017    Colon polyps     History of blood transfusion     Hypertension     Osteoarthritis     Palliative care encounter 2018    Type II or unspecified type diabetes mellitus without mention of complication, not stated as uncontrolled     UTI (urinary tract infection)     Gangrenous UTI with gas in urinary tract       Past Surgical History:  Past Surgical History:   Procedure Laterality Date    APPENDECTOMY       SECTION      x 2    CHOLECYSTECTOMY      COLONOSCOPY  09    Dr Nathan Resendiz (LOWER) N/A 2016    ERCP/EUS-Dr IRELAND file.     Social History Main Topics    Smoking status: Never Smoker    Smokeless tobacco: Never Used    Alcohol use No    Drug use: No    Sexual activity: Not on file     Other Topics Concern    Not on file     Social History Narrative    No narrative on file         Review of Systems:  History obtained from chart review and the patient  General ROS: No fever or chills  Respiratory ROS: No cough, +shortness of breath, -wheezing  Cardiovascular ROS: no chest pain but dyspnea on exertion  Gastrointestinal ROS: No abdominal pain or melena  Genito-Urinary ROS: No dysuria or hematuria  Musculoskeletal ROS: No joint pain or swelling         Objective:  Blood pressure 135/68, pulse 66, temperature 98 °F (36.7 °C), temperature source Temporal, resp. rate 20, height 5' 4\" (1.626 m), weight 204 lb 1.6 oz (92.6 kg), SpO2 96 %, not currently breastfeeding. Intake/Output Summary (Last 24 hours) at 08/14/18 1026  Last data filed at 08/14/18 0524   Gross per 24 hour   Intake              790 ml   Output             2050 ml   Net            -1260 ml     General: alert and oriented x3   Chest:  Decreased breath sounds at the bases  CVS: regular rate and rhythm  Abdominal: soft, nontender, normal bowel sounds  Extremities: no cyanosis or edema  Skin: warm and dry without rash    Labs:  BMP:   Recent Labs      08/13/18   0330  08/14/18   0323  08/14/18   0905   NA  144  147*  143   K  3.8  3.8  3.9   CL  106  109  106   CO2  23  24  22   BUN  40*  41*  41*   CREATININE  2.8*  2.5*  2.4*   CALCIUM  8.0*  8.4*  8.7*     CBC: No results for input(s): WBC, HGB, HCT, MCV, PLT in the last 72 hours. LIVER PROFILE:   No results for input(s): AST, ALT, LIPASE, BILIDIR, BILITOT, ALKPHOS in the last 72 hours. Invalid input(s): AMYLASE,  ALB  PT/INR: No results for input(s): PROTIME, INR in the last 72 hours. APTT: No results for input(s): APTT in the last 72 hours.   BNP:  No results for input(s): BNP in the last 72 function is a bit better. Continue po diuretics. If patient is discharged, then she will need a close renal follow up (within 7-10 days of discharge).

## 2018-08-14 NOTE — PROGRESS NOTES
with dec step length as distance increases  Distance: ~50 ft     Balance  Sitting - Static: Good  Sitting - Dynamic: Good  Standing - Static: Good  Standing - Dynamic: Good;-        Assessment   Body structures, Functions, Activity limitations: Decreased functional mobility ; Decreased ADL status; Decreased endurance;Decreased balance;Decreased strength  Assessment: Pt presents to therapy with slight dec balance and dec endurance for mobility. Pt step length dec with longer ambulation distance and dec gait speed. Pt with SOB at times during mobility. Pt will benefit from skilled PT to improve endurance with mobility. Patient Education: Pt educated to try using rollator more at home since she is safe and able to ambulate. Discussed walking short distances around her house will be much better for her and keep her stronger, rather than using the w/c just for convenience. REQUIRES PT FOLLOW UP: Yes  Activity Tolerance  Activity Tolerance: Patient limited by fatigue         Plan   Plan  Times per week: 7xs/week  Current Treatment Recommendations: Strengthening, ROM, Balance Training, Functional Mobility Training, Transfer Training, Gait Training, Stair training, Positioning, Safety Education & Training, Patient/Caregiver Education & Training, Endurance Training, ADL/Self-care Training  Plan Comment: Progress transfers and ambulation, work on stairs when pt ready for safe home access, work to build endurance  Safety Devices  Type of devices: Left in chair, Gait belt, Call light within reach (no chair alarm found. Pt son in room and both agree not to get up without staff)    G-Code  PT G-Codes  Functional Assessment Tool Used: bed to chair  Functional Limitation: Mobility: Walking and moving around  Mobility: Walking and Moving Around Current Status ():  At least 20 percent but less than 40 percent impaired, limited or restricted  Mobility: Walking and Moving Around Goal Status (): 0 percent impaired, limited or restricted  OutComes Score                                           AM-PAC Score             Goals  Short term goals  Time Frame for Short term goals: 14 days  Short term goal 1: Pt will complete STS with AAD and mod I. Short term goal 2: Pt will complete bed to chair with AAD SBA. Short term goal 3: Pt will amblate 150 ft with AAD and CGA. Short term goal 4: Pt will navigate 3 steps with AAD and CGA for safe home access.        Therapy Time   Individual Concurrent Group Co-treatment   Time In           Time Out           Minutes                   Irena Ren        Electronically signed by Irena Ren on 8/14/2018 at 11:48 AM

## 2018-08-15 VITALS
TEMPERATURE: 98.6 F | HEIGHT: 64 IN | DIASTOLIC BLOOD PRESSURE: 72 MMHG | SYSTOLIC BLOOD PRESSURE: 148 MMHG | HEART RATE: 69 BPM | OXYGEN SATURATION: 96 % | RESPIRATION RATE: 18 BRPM | WEIGHT: 204.1 LBS | BODY MASS INDEX: 34.85 KG/M2

## 2018-08-15 LAB
ANION GAP SERPL CALCULATED.3IONS-SCNC: 12 MMOL/L (ref 7–19)
BUN BLDV-MCNC: 44 MG/DL (ref 8–23)
CALCIUM SERPL-MCNC: 8.4 MG/DL (ref 8.8–10.2)
CHLORIDE BLD-SCNC: 106 MMOL/L (ref 98–111)
CO2: 25 MMOL/L (ref 22–29)
CREAT SERPL-MCNC: 2.5 MG/DL (ref 0.5–0.9)
GFR NON-AFRICAN AMERICAN: 19
GLUCOSE BLD-MCNC: 152 MG/DL (ref 70–99)
GLUCOSE BLD-MCNC: 251 MG/DL (ref 74–109)
GLUCOSE BLD-MCNC: 303 MG/DL (ref 70–99)
PERFORMED ON: ABNORMAL
PERFORMED ON: ABNORMAL
POTASSIUM SERPL-SCNC: 4 MMOL/L (ref 3.5–5)
SODIUM BLD-SCNC: 143 MMOL/L (ref 136–145)

## 2018-08-15 PROCEDURE — 6370000000 HC RX 637 (ALT 250 FOR IP): Performed by: INTERNAL MEDICINE

## 2018-08-15 PROCEDURE — 6360000002 HC RX W HCPCS: Performed by: FAMILY MEDICINE

## 2018-08-15 PROCEDURE — 80048 BASIC METABOLIC PNL TOTAL CA: CPT

## 2018-08-15 PROCEDURE — 2580000003 HC RX 258: Performed by: FAMILY MEDICINE

## 2018-08-15 PROCEDURE — 97110 THERAPEUTIC EXERCISES: CPT

## 2018-08-15 PROCEDURE — 6370000000 HC RX 637 (ALT 250 FOR IP): Performed by: FAMILY MEDICINE

## 2018-08-15 PROCEDURE — 36415 COLL VENOUS BLD VENIPUNCTURE: CPT

## 2018-08-15 PROCEDURE — 94761 N-INVAS EAR/PLS OXIMETRY MLT: CPT

## 2018-08-15 PROCEDURE — 82948 REAGENT STRIP/BLOOD GLUCOSE: CPT

## 2018-08-15 PROCEDURE — 97116 GAIT TRAINING THERAPY: CPT

## 2018-08-15 PROCEDURE — 2700000000 HC OXYGEN THERAPY PER DAY

## 2018-08-15 RX ORDER — NEBIVOLOL 10 MG/1
10 TABLET ORAL 2 TIMES DAILY
Qty: 60 TABLET | Refills: 11 | Status: SHIPPED | OUTPATIENT
Start: 2018-08-15 | End: 2019-01-01

## 2018-08-15 RX ORDER — FUROSEMIDE 40 MG/1
40 TABLET ORAL DAILY
Qty: 30 TABLET | Refills: 11 | Status: SHIPPED | OUTPATIENT
Start: 2018-08-16 | End: 2018-09-27 | Stop reason: DRUGHIGH

## 2018-08-15 RX ORDER — SODIUM BICARBONATE 325 MG/1
325 TABLET ORAL 2 TIMES DAILY
Qty: 60 TABLET | Refills: 11 | Status: SHIPPED | OUTPATIENT
Start: 2018-08-15

## 2018-08-15 RX ORDER — IRON POLYSACCHARIDE COMPLEX 150 MG
150 CAPSULE ORAL DAILY
Qty: 30 CAPSULE | Refills: 3 | Status: SHIPPED | OUTPATIENT
Start: 2018-08-16

## 2018-08-15 RX ORDER — POTASSIUM CHLORIDE 750 MG/1
10 TABLET, EXTENDED RELEASE ORAL DAILY
Qty: 30 TABLET | Refills: 11 | Status: SHIPPED | OUTPATIENT
Start: 2018-08-16 | End: 2019-01-01

## 2018-08-15 RX ORDER — ERGOCALCIFEROL (VITAMIN D2) 1250 MCG
50000 CAPSULE ORAL WEEKLY
Qty: 4 CAPSULE | Refills: 11 | Status: SHIPPED | OUTPATIENT
Start: 2018-08-18

## 2018-08-15 RX ORDER — NIFEDIPINE 30 MG/1
30 TABLET, FILM COATED, EXTENDED RELEASE ORAL 2 TIMES DAILY
Qty: 60 TABLET | Refills: 11 | Status: SHIPPED | OUTPATIENT
Start: 2018-08-15

## 2018-08-15 RX ADMIN — POTASSIUM CHLORIDE 10 MEQ: 10 TABLET, EXTENDED RELEASE ORAL at 08:51

## 2018-08-15 RX ADMIN — Medication 150 MG: at 08:51

## 2018-08-15 RX ADMIN — INSULIN GLARGINE 30 UNITS: 100 INJECTION, SOLUTION SUBCUTANEOUS at 08:57

## 2018-08-15 RX ADMIN — ENOXAPARIN SODIUM 30 MG: 40 INJECTION SUBCUTANEOUS at 08:51

## 2018-08-15 RX ADMIN — DOXAZOSIN 4 MG: 4 TABLET ORAL at 08:52

## 2018-08-15 RX ADMIN — NEBIVOLOL HYDROCHLORIDE 10 MG: 5 TABLET ORAL at 08:52

## 2018-08-15 RX ADMIN — NIFEDIPINE 30 MG: 30 TABLET, FILM COATED, EXTENDED RELEASE ORAL at 08:52

## 2018-08-15 RX ADMIN — INSULIN LISPRO 8 UNITS: 100 INJECTION, SOLUTION INTRAVENOUS; SUBCUTANEOUS at 08:52

## 2018-08-15 RX ADMIN — Medication 10 ML: at 08:56

## 2018-08-15 RX ADMIN — SODIUM BICARBONATE 325 MG: 650 TABLET ORAL at 08:52

## 2018-08-15 RX ADMIN — INSULIN LISPRO 2 UNITS: 100 INJECTION, SOLUTION INTRAVENOUS; SUBCUTANEOUS at 12:29

## 2018-08-15 RX ADMIN — FUROSEMIDE 40 MG: 40 TABLET ORAL at 08:51

## 2018-08-15 NOTE — PROGRESS NOTES
Susanne Sue is a 72 y.o. female patient.     Current Facility-Administered Medications   Medication Dose Route Frequency Provider Last Rate Last Dose    nebivolol (BYSTOLIC) tablet 10 mg  10 mg Oral BID Vanessa Gamino MD   10 mg at 08/15/18 0622    potassium chloride (KLOR-CON M) extended release tablet 10 mEq  10 mEq Oral Daily Vanessa Gamino MD   10 mEq at 08/15/18 0851    furosemide (LASIX) tablet 40 mg  40 mg Oral Daily Willis Rob MD   40 mg at 08/15/18 0851    NIFEdipine (PROCARDIA XL) extended release tablet 30 mg  30 mg Oral BID Anna Mariano MD   30 mg at 08/15/18 7728    sodium bicarbonate tablet 325 mg  325 mg Oral BID Diann Francis MD   325 mg at 08/15/18 8519    iron polysaccharides (NIFEREX) capsule 150 mg  150 mg Oral Daily Diann Francis MD   150 mg at 08/15/18 9633    vitamin D (ERGOCALCIFEROL) capsule 50,000 Units  50,000 Units Oral Weekly Diann Francis MD   50,000 Units at 08/11/18 1822    sodium chloride flush 0.9 % injection 10 mL  10 mL Intravenous 2 times per day Vanessa Gamino MD   10 mL at 08/15/18 0856    sodium chloride flush 0.9 % injection 10 mL  10 mL Intravenous PRN Vanessa Gamino MD        acetaminophen (TYLENOL) tablet 650 mg  650 mg Oral Q4H PRN Vanessa Gamino MD        enoxaparin (LOVENOX) injection 30 mg  30 mg Subcutaneous Daily Vanessa Gamino MD   30 mg at 08/15/18 0851    glucose (GLUTOSE) 40 % oral gel 15 g  15 g Oral PRN Vanessa Gamino MD        dextrose 50 % solution 12.5 g  12.5 g Intravenous PRN Vanessa Gamino MD        glucagon (rDNA) injection 1 mg  1 mg Intramuscular PRN Vanessa Gamino MD        dextrose 5 % solution  100 mL/hr Intravenous PRN Vanessa Gamino MD        insulin lispro (HUMALOG) injection vial 0-12 Units  0-12 Units Subcutaneous TID WC Vanessa Gamino MD   2 Units at 08/15/18 1229    insulin lispro (HUMALOG) injection vial 0-6 Units  0-6 Units Subcutaneous Nightly Vanessa Gamino MD   3

## 2018-08-15 NOTE — PROGRESS NOTES
Nephrology ([address] [facility]) Progress Note    Patient:  Zachary Stringer  YOB: 1945  Date of Service: 8/15/2018  MRN: [id number]   Acct: [de-identified]   Primary Care Physician: Phoenix Ribera MD  Advance Directive: Full Code  Admit Date: 8/10/2018       Hospital Day: 5  Referring Provider: Phoenix Ribera MD    Patient Seen, Chart, Consults notes, Labs, Radiology studies reviewed.     Subjective:  Zachary Stringer is a 72 y.o. female  whom we were consulted for ABBIE / CKD 3.  Pt admitted with cough/soa.  No f/c/n/v.  Some edema.  No NSAIDs that she is aware of.  No hematuria/dysuria.  Today, she offered no new complaints.         Allergies:  Codeine    Medicines:  Current Facility-Administered Medications   Medication Dose Route Frequency Provider Last Rate Last Dose    nebivolol (BYSTOLIC) tablet 10 mg  10 mg Oral BID Phoenix Ribera MD   10 mg at 08/15/18 2278    potassium chloride (KLOR-CON M) extended release tablet 10 mEq  10 mEq Oral Daily Phoenix Ribera MD   10 mEq at 08/15/18 0851    furosemide (LASIX) tablet 40 mg  40 mg Oral Daily Pete Mcallister MD   40 mg at 08/15/18 0851    NIFEdipine (PROCARDIA XL) extended release tablet 30 mg  30 mg Oral BID Darshan Ricks MD   30 mg at 08/15/18 4870    sodium bicarbonate tablet 325 mg  325 mg Oral BID Hossein Khanna MD   325 mg at 08/15/18 2165    iron polysaccharides (NIFEREX) capsule 150 mg  150 mg Oral Daily Hossein Khanna MD   150 mg at 08/15/18 1903    vitamin D (ERGOCALCIFEROL) capsule 50,000 Units  50,000 Units Oral Weekly Hossein Khanna MD   50,000 Units at 08/11/18 1822    sodium chloride flush 0.9 % injection 10 mL  10 mL Intravenous 2 times per day Phoenix Ribera MD   10 mL at 08/15/18 0856    sodium chloride flush 0.9 % injection 10 mL  10 mL Intravenous PRN Phoenix Ribera MD        acetaminophen (TYLENOL) tablet 650 mg  650 mg Oral Q4H PRN Phoenix Ribera MD        enoxaparin (LOVENOX) injection 30 mg  30 mg Subcutaneous Daily Melvin Mcmahon MD   30 mg at 08/15/18 0851    glucose (GLUTOSE) 40 % oral gel 15 g  15 g Oral PRN Melvin Mcmahon MD        dextrose 50 % solution 12.5 g  12.5 g Intravenous PRN Melvin Mcmahon MD        glucagon (rDNA) injection 1 mg  1 mg Intramuscular PRN Melvin Mcmahon MD        dextrose 5 % solution  100 mL/hr Intravenous PRN Melvin Mcmahon MD        insulin lispro (HUMALOG) injection vial 0-12 Units  0-12 Units Subcutaneous TID WC Melvin Mcmahon MD   8 Units at 08/15/18 4967    insulin lispro (HUMALOG) injection vial 0-6 Units  0-6 Units Subcutaneous Nightly Melvin Mcmahon MD   3 Units at 187    doxazosin (CARDURA) tablet 4 mg  4 mg Oral BID Melvin Mcmahon MD   4 mg at 08/15/18 1587    HYDROcodone-acetaminophen ([facility]) 7.5-325 MG per tablet 1 tablet  1 tablet Oral Q12H PRN Melvin Mcmahon MD   1 tablet at 18 9571    insulin glargine (LANTUS) injection vial 30 Units  30 Units Subcutaneous Daily Melvin Mcmahon MD   30 Units at 08/15/18 0857       Past Medical History:  Past Medical History:   Diagnosis Date    Anemia     Blood circulation, collateral     Broken hip ([facility] [address]     L hip    CHF (congestive heart failure) ([facility] [address]     Closed compression fracture of first lumbar vertebra ([facility] [address] 2017    Closed compression fracture of first lumbar vertebra ([facility] [address] 2017    Colon polyps     History of blood transfusion     Hypertension     Osteoarthritis     Palliative care encounter 2018    Type II or unspecified type diabetes mellitus without mention of complication, not stated as uncontrolled     UTI (urinary tract infection)     Gangrenous UTI with gas in urinary tract       Past Surgical History:  Past Surgical History:   Procedure Laterality Date    APPENDECTOMY       SECTION      x 2    CHOLECYSTECTOMY      COLONOSCOPY  09    Dr Ryder Lopez (LOWER) N/A 2016 hours. Ionized Calcium:No results for input(s): IONCA in the last 72 hours. Magnesium:  No results for input(s): MG in the last 72 hours. Phosphorus:  No results for input(s): PHOS in the last 72 hours. HgbA1C: No results for input(s): LABA1C in the last 72 hours. Hepatic: No results for input(s): ALKPHOS, ALT, AST, PROT, BILITOT, BILIDIR, LABALBU in the last 72 hours. Lactic Acid: No results for input(s): LACTA in the last 72 hours. Troponin: No results for input(s): CKTOTAL, CKMB, TROPONINT in the last 72 hours. ABGs: No results for input(s): PH, PCO2, PO2, HCO3, O2SAT in the last 72 hours. CRP:  No results for input(s): CRP in the last 72 hours. Sed Rate:  No results for input(s): SEDRATE in the last 72 hours. Cultures:   No results for input(s): CULTURE in the last 72 hours. Radiology reports as per the Radiologist  Radiology: Xr Chest Portable    Result Date: 8/10/2018  EXAMINATION: XR CHEST PORTABLE 8/10/2018 10:17 AM HISTORY: Shortness of breath. Report: Comparison is made with the study from 11/2/2016. Previously, a nasogastric tube was present and has been removed. Lungs are grossly hypoaerated, there is central and basilar vascular congestion and probable small bilateral pleural effusions. No pneumothorax is identified. There is no obvious lung consolidation. The osseous structures are remarkable for 3 levels of vertebroplasty in the lower thoracic and upper lumbar spine which were not present before. Moderate pulmonary hypoventilation with vascular congestion centrally and in the lung bases with normal heart size. Mild volume overload cannot be excluded. There may be small bilateral pleural effusions. No consolidating pneumonia is seen. Signed by Dr Donna Weinstein.  Keila on 8/10/2018 10:19 AM       Assessment     -ABBIE (ATN)  -CKD 3  -Acute CHF exacerbation (still no echo available)  -Acidosis  -Anemia - iron deficiency  -Low vitamin D  -Secondary Hyperparathyroidism       Plan:  Renal

## 2018-08-15 NOTE — PROGRESS NOTES
await Dr Idania Hooks recommendation on whether he feels she is ready for discharge. 2. Acute chf, diastolic type: Dr Ilene Burns feels she is ready for D/C.  3. HTN: Better since I increased her bystolic. 4. DM, type 2 insulin requiring: BS is okay. 5. Anemia of chronic dz: Continue as is. Advance Directive: Full Code  DVT prophylaxis with enoxaparin 30 mg sub-Q daily. Discharge planning: ? Active Problems:    Congestive heart failure ([address]  Resolved Problems:    * No resolved hospital problems.  Prudence MD Theresa

## 2018-08-15 NOTE — PROGRESS NOTES
Pt is being dc'd. Transport here now to take pt downstairs. Son asked if pt O2 is ongoing or temp. This was not determined on dc paperwork as well. I did explain he should speak with MD when she sees him next week. If they have questions before they should call Dr. Sushma Castillo. Voiced understanding.   Electronically signed by Matias Dhaliwal RN on 8/15/2018 at 2:43 PM

## 2018-08-16 LAB
EKG P AXIS: 61 DEGREES
EKG P-R INTERVAL: 188 MS
EKG Q-T INTERVAL: 442 MS
EKG QRS DURATION: 94 MS
EKG QTC CALCULATION (BAZETT): 456 MS
EKG T AXIS: -118 DEGREES

## 2018-08-20 ENCOUNTER — TELEPHONE (OUTPATIENT)
Dept: CARDIOLOGY | Age: 73
End: 2018-08-20

## 2018-08-20 NOTE — TELEPHONE ENCOUNTER
Spoke with patient's daughter who stated Ms. Juan Francisco Candelaria is doing well. The daughter stated the patient has not been weighing daily or taking BP. Reminded daughter of the importance of patient weighing daily, BP daily, low salt diet and exercise. Explained that rise in wt of 2-3 lbs overnight or more than 5 lbs in a week are the early warning signs that the patient's HF may be worsening. The daughter stated she will work with patient to follow the HF protocol. Discussed HF clinic appointment of 8/24/18 at 0900 and our office location. She stated the patient's sister will bring Ms. Arzola to this appointment. The daughter/patient will call with any questions/problems.

## 2018-08-31 ENCOUNTER — TELEPHONE (OUTPATIENT)
Dept: CARDIOLOGY | Age: 73
End: 2018-08-31

## 2018-09-06 ENCOUNTER — OFFICE VISIT (OUTPATIENT)
Dept: CARDIOLOGY | Age: 73
End: 2018-09-06
Payer: MEDICARE

## 2018-09-06 VITALS
HEIGHT: 64 IN | SYSTOLIC BLOOD PRESSURE: 124 MMHG | HEART RATE: 80 BPM | BODY MASS INDEX: 31.24 KG/M2 | DIASTOLIC BLOOD PRESSURE: 78 MMHG | WEIGHT: 183 LBS

## 2018-09-06 DIAGNOSIS — I50.32 CHRONIC DIASTOLIC HEART FAILURE (HCC): Primary | ICD-10-CM

## 2018-09-06 DIAGNOSIS — I10 ESSENTIAL HYPERTENSION: ICD-10-CM

## 2018-09-06 DIAGNOSIS — E11.9 TYPE 2 DIABETES MELLITUS WITHOUT COMPLICATION, WITHOUT LONG-TERM CURRENT USE OF INSULIN (HCC): ICD-10-CM

## 2018-09-06 PROCEDURE — 1036F TOBACCO NON-USER: CPT | Performed by: NURSE PRACTITIONER

## 2018-09-06 PROCEDURE — 1123F ACP DISCUSS/DSCN MKR DOCD: CPT | Performed by: NURSE PRACTITIONER

## 2018-09-06 PROCEDURE — G8417 CALC BMI ABV UP PARAM F/U: HCPCS | Performed by: NURSE PRACTITIONER

## 2018-09-06 PROCEDURE — 1101F PT FALLS ASSESS-DOCD LE1/YR: CPT | Performed by: NURSE PRACTITIONER

## 2018-09-06 PROCEDURE — 1111F DSCHRG MED/CURRENT MED MERGE: CPT | Performed by: NURSE PRACTITIONER

## 2018-09-06 PROCEDURE — 99214 OFFICE O/P EST MOD 30 MIN: CPT | Performed by: NURSE PRACTITIONER

## 2018-09-06 PROCEDURE — 2022F DILAT RTA XM EVC RTNOPTHY: CPT | Performed by: NURSE PRACTITIONER

## 2018-09-06 PROCEDURE — 4040F PNEUMOC VAC/ADMIN/RCVD: CPT | Performed by: NURSE PRACTITIONER

## 2018-09-06 PROCEDURE — 1090F PRES/ABSN URINE INCON ASSESS: CPT | Performed by: NURSE PRACTITIONER

## 2018-09-06 PROCEDURE — G8427 DOCREV CUR MEDS BY ELIG CLIN: HCPCS | Performed by: NURSE PRACTITIONER

## 2018-09-06 PROCEDURE — 3046F HEMOGLOBIN A1C LEVEL >9.0%: CPT | Performed by: NURSE PRACTITIONER

## 2018-09-06 PROCEDURE — 3017F COLORECTAL CA SCREEN DOC REV: CPT | Performed by: NURSE PRACTITIONER

## 2018-09-06 PROCEDURE — G8400 PT W/DXA NO RESULTS DOC: HCPCS | Performed by: NURSE PRACTITIONER

## 2018-09-06 NOTE — PROGRESS NOTES
Hyperlipidemia no,     The patient denies numbness or weakness to suggest cerebrovascular accident or transient ischemic attack. Beryl Burciaga MD is PCP and follows labs including Lipids.   Suellen Otoole has the following history as recorded in [facility]:    Patient Active Problem List    Diagnosis Date Noted    Congestive heart failure ([facility] [address] 08/10/2018    Abdominal pain     Abnormal CT of the abdomen     Enteritis     Type 2 diabetes mellitus without complication, without long-term current use of insulin (HCC)     Closed compression fracture of second lumbar vertebra (HCC) 2017    Closed compression fracture of first lumbar vertebra ([address] 2017    History of biliary stent insertion     Ampullary stenosis     Biliary stricture     Elevated CA 19-9 level     Right upper quadrant abdominal pain     Gastritis with hemorrhage     Upper GI bleed     Hematemesis with nausea     Acute diarrhea     Nausea and vomiting     Closed intertrochanteric fracture of left femur ([address] 2016    History of adenomatous polyp of colon 2013    Anemia 2013    Essential hypertension 2013    DM (diabetes mellitus) ([address] 2013     Past Medical History:   Diagnosis Date    Anemia     Blood circulation, collateral     Broken hip (HCC)     L hip    [facility]     Closed compression fracture of first lumbar vertebra (HCC) 2017    Closed compression fracture of first lumbar vertebra (HCC) 2017    Colon polyps     History of blood transfusion     Hypertension     Osteoarthritis     Palliative care encounter 2018    Type II or unspecified type diabetes mellitus without mention of complication, not stated as uncontrolled     UTI (urinary tract infection)     Gangrenous UTI with gas in urinary tract     Past Surgical History:   Procedure Laterality Date    APPENDECTOMY       SECTION      x 2    CHOLECYSTECTOMY      COLONOSCOPY  11-02-09    Dr Magnus Davidson (LOWER) N/A 11/4/2016    ERCP/EUS-Dr BKEA Sutton-w/placement of a 10 [place] x7 cm temporary plastic biliary stent-Ampullary stenosis, mild dilation of the cbd w/questionable calcification vs stone, small periampullary diverticulum, removal of biliary sludge    ENDOSCOPY, COLON, DIAGNOSTIC      ERCP  11/4/2016    ERCP/EUS-Dr BEKA Sutton-w/placement of a 10 [place] x7 cm temporary plastic biliary stent-Ampullary stenosis, mild dilation of the cbd w/questionable calcification vs stone, small periampullary diverticulum, removal of biliary sludge    ERCP N/A 1/11/2017    Dr BEKA Sutton-Successful removal of indwelling biliary stent, no evidence of residual choledocholithiasis or common biliary stricture     EYE SURGERY      cataract    FEMUR FRACTURE SURGERY Left 9/9/2016    SHORT TFN INTERTROCHAN FRACTURE performed by Candace Pichardo MD at [facility]      femur fracture surgery    HIP FRACTURE SURGERY Left     pinning    RI COLONOSCOPY W/BIOPSY SINGLE/MULTIPLE N/A 3/24/2017    Dr BEKA Sutton-Diverticular disease-Tubular AP (-) dysplasia x 5--3 yr recall    UPPER GASTROINTESTINAL ENDOSCOPY N/A 10/14/2016    Dr Simons-Hemorrhagic gastritis     Family History   Problem Relation Age of Onset    Arthritis Mother     Cancer Mother     High Cholesterol Mother     Arthritis Father     Diabetes Father     High Cholesterol Father     Liver Cancer Father     Arthritis Sister     Diabetes Sister     High Blood Pressure Sister     High Cholesterol Sister     Arthritis Brother     Diabetes Brother     High Blood Pressure Brother     High Cholesterol Brother     Vision Loss Brother     Colon Cancer Neg Hx     Colon Polyps Neg Hx     Esophageal Cancer Neg Hx     Liver Disease Neg Hx     Stomach Cancer Neg Hx     Rectal Cancer Neg Hx      Social History   Substance Use Topics    Smoking status: Never Smoker    Smokeless tobacco: Never Used   [facility] 2. Type 2 diabetes mellitus without complication, without long-term current use of insulin ([facility] [address]     3. Essential hypertension     4. Anemia of chronic disease    Patient is  on GDMT including ACE/ARB, BB, Aldosterone antagonist.  Class III - Symptoms of HF on less-than-ordinary exertion, Newly Diagnosed? Yes, Heart Failure Type: Diastolic  .     no know adherence challenges  States taking medications as prescribed  Stable cardiovascular status. No evidence of overt heart failure, angina or dysrhythmia. Plan:    ~60 minutes spent with patient and son reviewing diet, exercise, sodium / fluid restrictions, log sheets, as well as review of diastolic heart failure. The son has asked the appropriate questions. Will see her back in 2 weeks. Repeat labs at that time. Guideline Directed Medical Therapy (GDMT):   You will be monitored and placed on GDMT. This will include medications such as Diuretics, Ace Inhibitors, Ace Receptor Blockers (ARB), Beta Blockers (BB), Aldosterone Antagonists, and newer combination medication Entresto. Weigh daily:  Learn what your \"dry\" or \"ideal\" weight is - Dry weight is your weight without extra water (fluid). Weigh yourself at the same time each day, preferably in the morning, in similar clothing, after urinating but before eating, and on the same scale. Record your weight in a diary or calendar. If you gain two pounds in a day or five pounds in a week, call and report as you may be directed to increase your diuretic. Diet:   Low sodium diet- < 2000mg daily   Fluid Restriction of 1500 mL per 24 hours unless notified otherwise. Exercise: Your goal is to progress to 30 minutes a day. Recommendations have been reviewed with you by the heart failure nurse and provided in your information folder. Sleep Apnea  If you have sleep apnea and use a CPAP/ BIPAP- please continue to be compliant in your use.  It has been shown that sleep apnea may coexist with patients with

## 2018-09-06 NOTE — PATIENT INSTRUCTIONS
Weigh daily:  Learn what your \"dry\" or \"ideal\" weight is - Dry weight is your weight without extra water (fluid). Weigh yourself at the same time each day, preferably in the morning, in similar clothing, after urinating but before eating, and on the same scale. Record your weight in a diary or calendar. If you gain two pounds in a day or five pounds in a week, call and report as you may be directed to increase your diuretic. Guideline Directed Medical Therapy (GDMT):   You will be monitored and placed on GDMT. This will include medications such as Diuretics, Ace Inhibitors, Ace Receptor Blockers (ARB), Beta Blockers (BB), Aldosterone Antagonists, and newer combination medication Entresto. Diet:   Low sodium diet- < 2000mg daily   Fluid Restriction of 1500 mL per 24 hours unless notified otherwise. Exercise: Your goal is to progress to 30 minutes a day. Recommendations have been reviewed with you by the heart failure nurse and provided in your information folder. Sleep Apnea  If you have sleep apnea and use a CPAP/ BIPAP- please continue to be compliant in your use. It has been shown that sleep apnea may coexist with patients with heart disease and has been indicated in the progression of heart failure and vascular disease. If you have never been tested for sleep apnea, a [place] sleepiness scale has been given to you. If you score above a 10 we will recommend you for overnight sleep study. Those results will be sent to your primary care physician for further management as directed. Labs: You will be screened for anemia, diabetes, thyroid disease, kidney disease, and hyperlipidemia. If you have not had recent labs you may be asked to have a CBC, CMP, BNP, HgA1C, and TSH drawn. Abnormal results will be referred to your primary care physician for further evaluation and management. Testing: An Ultrasound of your heart called an Echocardiogram may be repeated as directed.      Follow up: You will follow up in the heart failure clinic in 2 weeks. You will receive a call from the heart failure nurse in between your visits. You will follow up with your primary care physician once discharged from the clinic. You will follow up with your cardiologist once discharged from the clinic. Report any abnormal edema or increasing shortness of breath  Monitor BP at home- goal at rest < 130/80  Call with any questions or concerns  Report any new problems  Continue current medications as directed  Continue plan of treatment  It is always recommended that you bring your medications bottles with you to each visit - this is for your safety!

## 2018-09-19 ENCOUNTER — OFFICE VISIT (OUTPATIENT)
Dept: CARDIOLOGY | Age: 73
End: 2018-09-19
Payer: MEDICARE

## 2018-09-19 VITALS
DIASTOLIC BLOOD PRESSURE: 72 MMHG | BODY MASS INDEX: 30.05 KG/M2 | WEIGHT: 176 LBS | SYSTOLIC BLOOD PRESSURE: 132 MMHG | HEART RATE: 60 BPM | HEIGHT: 64 IN

## 2018-09-19 DIAGNOSIS — I50.32 CHRONIC DIASTOLIC HEART FAILURE (HCC): ICD-10-CM

## 2018-09-19 DIAGNOSIS — E11.9 TYPE 2 DIABETES MELLITUS WITHOUT COMPLICATION, WITHOUT LONG-TERM CURRENT USE OF INSULIN (HCC): ICD-10-CM

## 2018-09-19 DIAGNOSIS — D63.8 ANEMIA OF CHRONIC DISEASE: ICD-10-CM

## 2018-09-19 DIAGNOSIS — I10 ESSENTIAL HYPERTENSION: Primary | ICD-10-CM

## 2018-09-19 PROCEDURE — 99213 OFFICE O/P EST LOW 20 MIN: CPT | Performed by: NURSE PRACTITIONER

## 2018-09-19 PROCEDURE — 1101F PT FALLS ASSESS-DOCD LE1/YR: CPT | Performed by: NURSE PRACTITIONER

## 2018-09-19 PROCEDURE — 2022F DILAT RTA XM EVC RTNOPTHY: CPT | Performed by: NURSE PRACTITIONER

## 2018-09-19 PROCEDURE — 1036F TOBACCO NON-USER: CPT | Performed by: NURSE PRACTITIONER

## 2018-09-19 PROCEDURE — G8427 DOCREV CUR MEDS BY ELIG CLIN: HCPCS | Performed by: NURSE PRACTITIONER

## 2018-09-19 PROCEDURE — G8417 CALC BMI ABV UP PARAM F/U: HCPCS | Performed by: NURSE PRACTITIONER

## 2018-09-19 PROCEDURE — G8400 PT W/DXA NO RESULTS DOC: HCPCS | Performed by: NURSE PRACTITIONER

## 2018-09-19 PROCEDURE — 1090F PRES/ABSN URINE INCON ASSESS: CPT | Performed by: NURSE PRACTITIONER

## 2018-09-19 PROCEDURE — 4040F PNEUMOC VAC/ADMIN/RCVD: CPT | Performed by: NURSE PRACTITIONER

## 2018-09-19 PROCEDURE — 3017F COLORECTAL CA SCREEN DOC REV: CPT | Performed by: NURSE PRACTITIONER

## 2018-09-19 PROCEDURE — 3046F HEMOGLOBIN A1C LEVEL >9.0%: CPT | Performed by: NURSE PRACTITIONER

## 2018-09-19 PROCEDURE — 1123F ACP DISCUSS/DSCN MKR DOCD: CPT | Performed by: NURSE PRACTITIONER

## 2018-09-19 NOTE — PROGRESS NOTES
Social History   Substance Use Topics    Smoking status: Never Smoker    Smokeless tobacco: Never Used    Alcohol use No      Current Outpatient Prescriptions   Medication Sig Dispense Refill    sodium bicarbonate 325 MG tablet Take 1 tablet by mouth 2 times daily 60 tablet 11    nebivolol (BYSTOLIC) 10 MG tablet Take 1 tablet by mouth 2 times daily 60 tablet 11    NIFEdipine (ADALAT CC) 30 MG extended release tablet Take 1 tablet by mouth 2 times daily 60 tablet 11    furosemide (LASIX) 40 MG tablet Take 1 tablet by mouth daily 30 tablet 11    iron polysaccharides (NIFEREX) 150 MG capsule Take 1 capsule by mouth daily 30 capsule 3    potassium chloride (KLOR-CON M) 10 MEQ extended release tablet Take 1 tablet by mouth daily Take with the furosemide 30 tablet 11    vitamin D (ERGOCALCIFEROL) 29139 units capsule Take 1 capsule by mouth once a week 4 capsule 11    doxazosin (CARDURA) 8 MG tablet Take 4 mg by mouth 2 times daily Takes 1/2 of 8 mg tablet to equal 4 mg      insulin glargine (BASAGLAR KWIKPEN) 100 UNIT/ML injection pen Inject 30 Units into the skin Daily      insulin aspart (NOVOLOG FLEXPEN) 100 UNIT/ML injection pen Inject 0-35 Units into the skin 3 times daily as needed for High Blood Sugar Per home sliding scale      SITagliptin (JANUVIA) 50 MG tablet Take 50 mg by mouth daily      HYDROcodone-acetaminophen (NORCO) 7.5-325 MG per tablet Take 1 tablet by mouth every 12 hours as needed for Pain. .       No current facility-administered medications for this visit. Allergies: Codeine    Review of Systems  Constitutional  Activity tolerance has not improved. Appetite Good  No fever, chills or diaphoresis. has fatigue. HEENT  no significant rhinorrhea or epistaxis. No tinnitus or significant hearing loss. Eyes  no sudden vision change or amaurosis. Respiratory  no significant wheezing, stridor, apnea or cough.   has dyspnea on exertion  denies  Resting shortness of preferably in the morning, in similar clothing, after urinating but before eating, and on the same scale. Record your weight in a diary or calendar. If you gain two pounds in a day or five pounds in a week, call and report as you may be directed to increase your diuretic. Diet:   Low sodium diet- < 2000mg daily   Fluid Restriction of 1500 mL per 24 hours unless notified otherwise. Exercise: Your goal is to progress to 30 minutes a day. Recommendations have been reviewed with you by the heart failure nurse and provided in your information folder. Sleep Apnea  If you have sleep apnea and use a CPAP/ BIPAP- please continue to be compliant in your use. It has been shown that sleep apnea may coexist with patients with heart disease and has been indicated in the progression of heart failure and vascular disease. If you have never been tested for sleep apnea, a [place] sleepiness scale has been given to you. If you score above a 10 we will recommend you for overnight sleep study. Those results will be sent to your primary care physician for further management as directed. Labs: You will be screened for anemia, diabetes, thyroid disease, kidney disease, and hyperlipidemia. If you have not had recent labs you may be asked to have a CBC, CMP, BNP, HgA1C, and TSH drawn. Abnormal results will be referred to your primary care physician for further evaluation and management. Testing: An Ultrasound of your heart called an Echocardiogram may be repeated as directed. Follow up: You will follow up in the heart failure clinic in 2 weeks. You will receive a call from the heart failure nurse in between your visits. You will follow up with your primary care physician once discharged from the clinic. You will follow up with your cardiologist once discharged from the clinic.      Report any abnormal edema or increasing shortness of breath  Monitor BP at home- goal at rest < 130/80  Call with any questions or concerns  Report any new problems  Continue current medications as directed  Continue plan of treatment  It is always recommended that you bring your medications bottles with you to each visit - this is for your safety! Smoking:  Quit smoking. Call the [address] 0-112-QUIT-NOW     Quit Now [place] is a FREE online service available to [place] residents 13years of age and over. When you become a member, it offers a free telephone service, so you can speak to a  in person, if you would prefer. Call the [facility] or [phone]. Special tools, a support team of coaches, research-based information, and a community of others trying to become tobacco free. Expert coaches can talk to you about overcoming common barriers, such as dealing with stress, fighting cravings, coping with irritability, and controlling weight gain. https://www.Engage/    Https://www.PeopleJam.org/    Nicotine is an addictive drug found in tobacco that causes changes in the brain - making people crave it more and more. When prolonged tobacco use is stopped, it can cause unpleasant withdrawal symptoms, including irritability and anxiety. BENEFITS OF STOPPING SMOKIN minutes after quitting - Your heart rate and blood pressure drop. 12 hours after quitting - The carbon monoxide level in your blood drops to normal.  2 weeks to 3 months after quitting - Your circulation improves and your lung function increases. 1 to 9 months after quitting - Coughing and shortness of breath decrease; cilia (tiny hair-like structures that move mucus out of the lungs) start to regain normal function in the lungs, increasing the ability to handle mucus, clean the lungs, and reduce the risk of infection. 1 year after quitting - The excess risk of coronary heart disease is half that of a continuing smoker.   5 years after quitting - Risk of cancer of the mouth, throat, esophagus, and bladder are cut in half. Cervical cancer risk falls to that of a non-smoker. Stroke risk can fall to that of a non-smoker after 2-5 years. 10 years after quitting - The risk of dying from lung cancer is about half that of a person who is still smoking. The risk of cancer of the larynx (voice box) and pancreas decreases.     Alvenia Leyden, APRN

## 2018-09-21 ENCOUNTER — TELEPHONE (OUTPATIENT)
Dept: CARDIOLOGY | Age: 73
End: 2018-09-21

## 2018-09-26 ENCOUNTER — TELEPHONE (OUTPATIENT)
Dept: CARDIOLOGY | Age: 73
End: 2018-09-26

## 2018-09-26 NOTE — TELEPHONE ENCOUNTER
Patient son contacted office asking for lab orders to be sent to [facility]. Explained since Nya Garcia received labs from her PCP she did not need to have anymore drawn. Son voiced understanding.

## 2018-09-27 ENCOUNTER — OFFICE VISIT (OUTPATIENT)
Dept: CARDIOLOGY | Age: 73
End: 2018-09-27
Payer: MEDICARE

## 2018-09-27 VITALS
HEIGHT: 64 IN | DIASTOLIC BLOOD PRESSURE: 62 MMHG | SYSTOLIC BLOOD PRESSURE: 122 MMHG | WEIGHT: 178 LBS | BODY MASS INDEX: 30.39 KG/M2 | HEART RATE: 72 BPM

## 2018-09-27 DIAGNOSIS — N18.4 STAGE 4 CHRONIC KIDNEY DISEASE (HCC): ICD-10-CM

## 2018-09-27 DIAGNOSIS — I10 ESSENTIAL HYPERTENSION: ICD-10-CM

## 2018-09-27 DIAGNOSIS — R94.31 ABNORMAL ELECTROCARDIOGRAM: ICD-10-CM

## 2018-09-27 DIAGNOSIS — R06.09 DYSPNEA ON EFFORT: ICD-10-CM

## 2018-09-27 DIAGNOSIS — I50.32 CHRONIC DIASTOLIC HEART FAILURE (HCC): Primary | ICD-10-CM

## 2018-09-27 PROBLEM — N18.9 CHRONIC KIDNEY DISEASE: Status: ACTIVE | Noted: 2018-09-27

## 2018-09-27 PROCEDURE — 99214 OFFICE O/P EST MOD 30 MIN: CPT | Performed by: INTERNAL MEDICINE

## 2018-09-27 PROCEDURE — 93000 ELECTROCARDIOGRAM COMPLETE: CPT | Performed by: INTERNAL MEDICINE

## 2018-09-27 RX ORDER — DOXAZOSIN 8 MG/1
TABLET ORAL
Status: ON HOLD | COMMUNITY
End: 2019-01-01 | Stop reason: HOSPADM

## 2018-09-27 RX ORDER — FUROSEMIDE 40 MG/1
40 TABLET ORAL DAILY
COMMUNITY
End: 2019-01-01 | Stop reason: DRUGHIGH

## 2018-09-27 ASSESSMENT — ENCOUNTER SYMPTOMS
NAUSEA: 0
DIARRHEA: 0
RESPIRATORY NEGATIVE: 1
VOMITING: 0
GASTROINTESTINAL NEGATIVE: 1
EYES NEGATIVE: 1
SHORTNESS OF BREATH: 0

## 2018-09-27 NOTE — PROGRESS NOTES
mouth daily      HYDROcodone-acetaminophen (NORCO) 7.5-325 MG per tablet Take 1 tablet by mouth every 12 hours as needed for Pain. .       No current facility-administered medications for this visit. Social History     Social History    Marital status:      Spouse name: Steffen Dumont Number of children: 2    Years of education: 15     Occupational History    Not on file. Social History Main Topics    Smoking status: Never Smoker    Smokeless tobacco: Never Used    Alcohol use No    Drug use: No    Sexual activity: Not on file     Other Topics Concern    Not on file     Social History Narrative    Born in [place]     46 years only marriage    She has one son and one daughter    Worked as a  presently retired    Education high school    Yarsani venice none    Enjoys working with a [employer] and [employer] also [employer]    Denies history of tobacco usage alcohol consumption or substance usage    Physically sedentary       Physical Examination:  /62   Pulse 72   Ht 5' 4\" (1.626 m)   Wt 178 lb (80.7 kg)   BMI 30.55 kg/m²   Physical Exam   Constitutional: She appears well-developed and well-nourished. Neck: No JVD present. Carotid bruit is not present. Cardiovascular: Normal rate, regular rhythm and normal heart sounds. Exam reveals no gallop and no friction rub. No murmur heard. Pulmonary/Chest: Effort normal and breath sounds normal. No respiratory distress. She has no wheezes. She has no rales. Abdominal: She exhibits no distension. There is no tenderness. Musculoskeletal: She exhibits edema. 1+ mildly pitting edema   Lymphadenopathy:     She has no cervical adenopathy. Skin: Skin is warm and dry. ASSESSMENT:     Diagnosis Orders   1. Chronic diastolic heart failure (HCC)  EKG 12 lead   2. Essential hypertension  EKG 12 lead   3. Stage 4 chronic kidney disease ([address]     4. Abnormal electrocardiogram     5.  Dyspnea on effort PLAN:  Orders Placed This Encounter   Procedures    EKG 12 lead     No orders of the defined types were placed in this encounter. 1. Continue present medications  2. Recommend pharmacologic stress testing with myocardial perfusion imaging i.e. Lexiscan  3. Recommend follow-up assessment in 3 months  4. Regarding her blood pressure seems to be well controlled her home readings are typically 780-866 systolic but with her advanced kidney disease I think we should probably leave it as is for the time being unless the nephrologist feels otherwise    Return in about 3 months (around 12/27/2018). Cary Fajardo MD 9/27/2018 11:32 AM    [id number] [facility]      This dictation was generated by voice recognition computer software. Although all attempts are made to edit the dictation for accuracy, there may be errors in the transcription that are not intended.

## 2018-10-03 ENCOUNTER — OFFICE VISIT (OUTPATIENT)
Dept: CARDIOLOGY | Age: 73
End: 2018-10-03
Payer: MEDICARE

## 2018-10-03 VITALS
HEIGHT: 64 IN | DIASTOLIC BLOOD PRESSURE: 60 MMHG | BODY MASS INDEX: 30.39 KG/M2 | HEART RATE: 72 BPM | SYSTOLIC BLOOD PRESSURE: 130 MMHG | WEIGHT: 178 LBS

## 2018-10-03 DIAGNOSIS — I50.32 CHRONIC DIASTOLIC HEART FAILURE (HCC): Primary | ICD-10-CM

## 2018-10-03 DIAGNOSIS — E11.9 TYPE 2 DIABETES MELLITUS WITHOUT COMPLICATION, WITHOUT LONG-TERM CURRENT USE OF INSULIN (HCC): ICD-10-CM

## 2018-10-03 PROCEDURE — 1123F ACP DISCUSS/DSCN MKR DOCD: CPT | Performed by: NURSE PRACTITIONER

## 2018-10-03 PROCEDURE — G8427 DOCREV CUR MEDS BY ELIG CLIN: HCPCS | Performed by: NURSE PRACTITIONER

## 2018-10-03 PROCEDURE — 1036F TOBACCO NON-USER: CPT | Performed by: NURSE PRACTITIONER

## 2018-10-03 PROCEDURE — G8400 PT W/DXA NO RESULTS DOC: HCPCS | Performed by: NURSE PRACTITIONER

## 2018-10-03 PROCEDURE — G8417 CALC BMI ABV UP PARAM F/U: HCPCS | Performed by: NURSE PRACTITIONER

## 2018-10-03 PROCEDURE — 4040F PNEUMOC VAC/ADMIN/RCVD: CPT | Performed by: NURSE PRACTITIONER

## 2018-10-03 PROCEDURE — 99213 OFFICE O/P EST LOW 20 MIN: CPT | Performed by: NURSE PRACTITIONER

## 2018-10-03 PROCEDURE — 1090F PRES/ABSN URINE INCON ASSESS: CPT | Performed by: NURSE PRACTITIONER

## 2018-10-03 PROCEDURE — 3017F COLORECTAL CA SCREEN DOC REV: CPT | Performed by: NURSE PRACTITIONER

## 2018-10-03 PROCEDURE — 2022F DILAT RTA XM EVC RTNOPTHY: CPT | Performed by: NURSE PRACTITIONER

## 2018-10-03 PROCEDURE — 3046F HEMOGLOBIN A1C LEVEL >9.0%: CPT | Performed by: NURSE PRACTITIONER

## 2018-10-03 PROCEDURE — G8484 FLU IMMUNIZE NO ADMIN: HCPCS | Performed by: NURSE PRACTITIONER

## 2018-10-03 PROCEDURE — 1101F PT FALLS ASSESS-DOCD LE1/YR: CPT | Performed by: NURSE PRACTITIONER

## 2018-10-03 NOTE — PROGRESS NOTES
[facility], [address] [id number] [facility], [address] [place] [address], [address] [id number]  Phone: [phone]  Fax: [phone]      OFFICE VISIT:  10/03/2018 Clinic visit 3    Angélica Babin - : 1945    Reason For Visit:  Guille Bautista is a 72 y.o. female who is here for Follow-up (No cardiac symptoms); Congestive Heart Failure; and Hypertension    Recent hospitalization for Class III - Symptoms of HF on less-than-ordinary exertion, Newly Diagnosed? Yes, Heart Failure Type: Diastolic  Last ECHO was performed 2018  EF 35-92%, Grade 2 Diastolic Dysfunction, RVSP 42 mmHg  Last Heart Cath none  Weight on day of discharge 204 lbs. At home 173-176    Wt Readings from Last 3 Encounters:   10/03/18 178 lb (80.7 kg)   18 178 lb (80.7 kg)   18 176 lb (79.8 kg)       Saw neph yesterday. Will take Lasix BID for 4 days then back down to daily. BP ok, weight stable. Hosp from 8/10/18-8/15/18  Medications changes while hospitalized include being given IV hydration and IV diuresis. Admission BNP was 9,512. She had 1500 mL output from Lasix given in ER. Creatinine was 2.7. She is in a wheelchair within the last year. Uses a walker sometimes. She fell and broke hip then fell and broke her back. On Oxygen at night or when active-very sedentary    Subjective  Guille Bautista stated has improvement in overall symptoms since discharge from hospital.    She is not adhering to low sodium diet. She is getting much better. She is not adhering to fluid restrictions. Still improving   She is adhering to daily weights. does have a scale at home. She is not exercising. Sedentary related to back   Home health is not following the patient. The patient does not have a life vest or does not have an AICD. COWAN has improved. Patient denies resting shortness of breath, orthopnea, paroxysmal nocturnal dyspnea, syncope, presyncope, arrhythmia, edema and fatigue. Comorbidities:    The cyanosis. trace to 1+ bilateral pedal edema  Musculoskeletal -  No clubbing . No Osler's nodes. Gait is not assessed. No kyphosis or scoliosis. Skin -  no stasis ulcers or dermatitis. Neurological - No focal signs are identified. Oriented to person, place and time. Psychiatric -  Appropriate affect and mood. Echo 8/11/2018   Summary   1. Moderately dilated left atrium   2. Mildly dilated left ventricular internal dimensions with mild   concentric LVH   3. Normal LV systolic function and with an estimated LVEF of 55-60%   4. Grade 2 diastolic dysfunction (pseudo-normal pattern) with an elevated   left atrial pressure   5. Mild mitral regurgitation   6. Moderately severe tricuspid regurgitation with estimated RVSP of 42 mm   Hg      Signature      ----------------------------------------------------------------   Electronically signed by Rd Allan MD(Interpreting   [id number]) on 08/11/2018 08:20 PM    Assessment:     Diagnosis Orders   1. Chronic diastolic heart failure ([facility] [address]     2. Type 2 diabetes mellitus without complication, without long-term current use of insulin (HCC)         Patient is  on GDMT including ACE/ARB, BB, Aldosterone antagonist.  Class III - Symptoms of HF on less-than-ordinary exertion, Newly Diagnosed? Yes, Heart Failure Type: Diastolic    no know adherence challenges  States taking medications as prescribed  Stable cardiovascular status. No evidence of overt heart failure, angina or dysrhythmia. Plan:  She has seen Dr. Codey García and established care with him. She has done well with the CHF clinic and will be discharged from the clinic. Please keep follow up with Dr. Codey García as scheduled. BP improved. Guideline Directed Medical Therapy (GDMT):   You will be monitored and placed on GDMT.  This will include medications such as Diuretics, Ace Inhibitors, Ace Receptor Blockers (ARB), Beta Blockers (BB), Aldosterone Antagonists, and newer combination medication Entresto. Weigh daily:  Learn what your \"dry\" or \"ideal\" weight is - Dry weight is your weight without extra water (fluid). Weigh yourself at the same time each day, preferably in the morning, in similar clothing, after urinating but before eating, and on the same scale. Record your weight in a diary or calendar. If you gain two pounds in a day or five pounds in a week, call and report as you may be directed to increase your diuretic. Diet:   Low sodium diet- < 2000mg daily   Fluid Restriction of 1500 mL per 24 hours unless notified otherwise. Exercise: Your goal is to progress to 30 minutes a day. Recommendations have been reviewed with you by the heart failure nurse and provided in your information folder. Sleep Apnea  If you have sleep apnea and use a CPAP/ BIPAP- please continue to be compliant in your use. It has been shown that sleep apnea may coexist with patients with heart disease and has been indicated in the progression of heart failure and vascular disease. If you have never been tested for sleep apnea, a [place] sleepiness scale has been given to you. If you score above a 10 we will recommend you for overnight sleep study. Those results will be sent to your primary care physician for further management as directed. Labs: You will be screened for anemia, diabetes, thyroid disease, kidney disease, and hyperlipidemia. If you have not had recent labs you may be asked to have a CBC, CMP, BNP, HgA1C, and TSH drawn. Abnormal results will be referred to your primary care physician for further evaluation and management. Testing: An Ultrasound of your heart called an Echocardiogram may be repeated as directed. Follow up:   With Dr. Naomi Freeman  As scheduled     Report any abnormal edema or increasing shortness of breath  Monitor BP at home- goal at rest < 130/80  Call with any questions or concerns  Report any new problems  Continue current medications as

## 2018-12-31 NOTE — PROGRESS NOTES
BEKA Sutton-w/placement of a 10 Estonian x7 cm temporary plastic biliary stent-Ampullary stenosis, mild dilation of the cbd w/questionable calcification vs stone, small periampullary diverticulum, removal of biliary sludge    ERCP N/A 1/11/2017    Dr BEKA Sutton-Successful removal of indwelling biliary stent, no evidence of residual choledocholithiasis or common biliary stricture     EYE SURGERY      cataract    FEMUR FRACTURE SURGERY Left 9/9/2016    SHORT TFN INTERTROCHAN FRACTURE performed by Linda Mcginnis MD at [address] [place] [address]      femur fracture surgery    HIP FRACTURE SURGERY Left     pinning    [place] COLONOSCOPY W/BIOPSY SINGLE/MULTIPLE N/A 3/24/2017    Dr BEKA Sutton-Diverticular disease-Tubular AP (-) dysplasia x 5--3 yr recall    UPPER GASTROINTESTINAL ENDOSCOPY N/A 10/14/2016    Dr Simons-Hemorrhagic gastritis       Family History   Problem Relation Age of Onset    Arthritis Mother     Cancer Mother     High Cholesterol Mother     Arthritis Father     Diabetes Father     High Cholesterol Father     Liver Cancer Father     Arthritis Sister     Diabetes Sister     High Blood Pressure Sister     High Cholesterol Sister     Arthritis Brother     Diabetes Brother     High Blood Pressure Brother     High Cholesterol Brother     Vision Loss Brother     Colon Cancer Neg Hx     Colon Polyps Neg Hx     Esophageal Cancer Neg Hx     Liver Disease Neg Hx     Stomach Cancer Neg Hx     Rectal Cancer Neg Hx        Social History   Substance Use Topics    Smoking status: Never Smoker    Smokeless tobacco: Never Used    Alcohol use No      Current Outpatient Prescriptions   Medication Sig Dispense Refill    cefUROXime (CEFTIN) 500 MG tablet Take 1 tablet by mouth 2 times daily for 10 days 20 tablet 0    doxazosin (CARDURA) 8 MG tablet 1/2 in the morning 1 tablet at night      furosemide (LASIX) 40 MG tablet Take 40 mg by mouth daily       sodium bicarbonate 325 MG tablet Take 1 tablet by mouth 2 doctor if:    · You have new or worse symptoms.     · You are not getting better after taking an antibiotic for 2 days. Where can you learn more? Go to [email or web address] and sign in to your [employer] account. Enter M848 in the [facility] box to learn more about \"Ear Infection (Otitis Media): Care Instructions. \"     If you do not have an account, please click on the \"Sign Up Now\" link. Current as of: March 28, 2018  Content Version: 11.8  © 6005-8603 Healthwise, Incorporated. Care instructions adapted under license by [facility] ([facility]). If you have questions about a medical condition or this instruction, always ask your healthcare professional. [address] any warranty or liability for your use of this information.                Electronically signed by CATHY Schmitz NP on 12/31/2018 at 11:13 AM

## 2018-12-31 NOTE — PATIENT INSTRUCTIONS
Patient Education        Ear Infection (Otitis Media): Care Instructions  Your Care Instructions    An ear infection may start with a cold and affect the middle ear (otitis media). It can hurt a lot. Most ear infections clear up on their own in a couple of days. Most often you will not need antibiotics. This is because many ear infections are caused by a virus. Antibiotics don't work against a virus. Regular doses of pain medicines are the best way to reduce your fever and help you feel better. Follow-up care is a key part of your treatment and safety. Be sure to make and go to all appointments, and call your doctor if you are having problems. It's also a good idea to know your test results and keep a list of the medicines you take. How can you care for yourself at home? · Take pain medicines exactly as directed. ? If the doctor gave you a prescription medicine for pain, take it as prescribed. ? If you are not taking a prescription pain medicine, take an over-the-counter medicine, such as acetaminophen (Tylenol), ibuprofen (Advil, Motrin), or naproxen (Aleve). Read and follow all instructions on the label. ? Do not take two or more pain medicines at the same time unless the doctor told you to. Many pain medicines have acetaminophen, which is Tylenol. Too much acetaminophen (Tylenol) can be harmful. · Plan to take a full dose of pain reliever before bedtime. Getting enough sleep will help you get better. · Try a warm, moist washcloth on the ear. It may help relieve pain. · If your doctor prescribed antibiotics, take them as directed. Do not stop taking them just because you feel better. You need to take the full course of antibiotics. When should you call for help?   Call your doctor now or seek immediate medical care if:    · You have new or increasing ear pain.     · You have new or increasing pus or blood draining from your ear.     · You have a fever with a stiff neck or a severe headache.    Watch

## 2019-01-01 ENCOUNTER — HOSPITAL ENCOUNTER (INPATIENT)
Age: 74
LOS: 13 days | Discharge: HOME HEALTH | DRG: 435 | End: 2019-09-24
Attending: EMERGENCY MEDICINE | Admitting: FAMILY MEDICINE
Payer: MEDICARE

## 2019-01-01 ENCOUNTER — APPOINTMENT (OUTPATIENT)
Dept: GENERAL RADIOLOGY | Age: 74
DRG: 291 | End: 2019-01-01
Payer: MEDICARE

## 2019-01-01 ENCOUNTER — APPOINTMENT (OUTPATIENT)
Dept: CT IMAGING | Age: 74
DRG: 477 | End: 2019-01-01
Payer: MEDICARE

## 2019-01-01 ENCOUNTER — APPOINTMENT (OUTPATIENT)
Dept: CT IMAGING | Age: 74
DRG: 374 | End: 2019-01-01
Payer: MEDICARE

## 2019-01-01 ENCOUNTER — APPOINTMENT (OUTPATIENT)
Dept: MRI IMAGING | Age: 74
DRG: 435 | End: 2019-01-01
Payer: MEDICARE

## 2019-01-01 ENCOUNTER — HOSPITAL ENCOUNTER (INPATIENT)
Age: 74
LOS: 1 days | Discharge: HOME | DRG: 291 | End: 2019-07-06
Attending: EMERGENCY MEDICINE | Admitting: FAMILY MEDICINE
Payer: MEDICARE

## 2019-01-01 ENCOUNTER — HOSPITAL ENCOUNTER (OUTPATIENT)
Dept: INTERVENTIONAL RADIOLOGY/VASCULAR | Age: 74
Discharge: HOME | End: 2019-03-06
Payer: MEDICARE

## 2019-01-01 ENCOUNTER — ANESTHESIA (OUTPATIENT)
Dept: OPERATING ROOM | Age: 74
DRG: 477 | End: 2019-01-01
Payer: MEDICARE

## 2019-01-01 ENCOUNTER — APPOINTMENT (OUTPATIENT)
Dept: CT IMAGING | Age: 74
DRG: 435 | End: 2019-01-01
Payer: MEDICARE

## 2019-01-01 ENCOUNTER — TELEPHONE (OUTPATIENT)
Dept: CARDIOLOGY | Age: 74
End: 2019-01-01

## 2019-01-01 ENCOUNTER — OFFICE VISIT (OUTPATIENT)
Dept: SURGERY | Age: 74
End: 2019-01-01
Payer: MEDICARE

## 2019-01-01 ENCOUNTER — HOSPITAL ENCOUNTER (OUTPATIENT)
Dept: NUCLEAR MEDICINE | Age: 74
Discharge: HOME | End: 2019-01-13
Payer: MEDICARE

## 2019-01-01 ENCOUNTER — TELEPHONE (OUTPATIENT)
Dept: GASTROENTEROLOGY | Age: 74
End: 2019-01-01

## 2019-01-01 ENCOUNTER — ANESTHESIA EVENT (OUTPATIENT)
Dept: OPERATING ROOM | Age: 74
DRG: 477 | End: 2019-01-01
Payer: MEDICARE

## 2019-01-01 ENCOUNTER — APPOINTMENT (OUTPATIENT)
Dept: GENERAL RADIOLOGY | Age: 74
DRG: 286 | End: 2019-01-01
Attending: INTERNAL MEDICINE
Payer: MEDICARE

## 2019-01-01 ENCOUNTER — APPOINTMENT (OUTPATIENT)
Dept: MRI IMAGING | Age: 74
DRG: 477 | End: 2019-01-01
Payer: MEDICARE

## 2019-01-01 ENCOUNTER — TELEPHONE (OUTPATIENT)
Dept: INTERNAL MEDICINE CLINIC | Age: 74
End: 2019-01-01

## 2019-01-01 ENCOUNTER — ANESTHESIA (OUTPATIENT)
Dept: ENDOSCOPY | Age: 74
DRG: 435 | End: 2019-01-01
Payer: MEDICARE

## 2019-01-01 ENCOUNTER — HOSPITAL ENCOUNTER (EMERGENCY)
Age: 74
Discharge: HOME | DRG: 477 | End: 2019-08-23
Attending: EMERGENCY MEDICINE
Payer: MEDICARE

## 2019-01-01 ENCOUNTER — HOSPITAL ENCOUNTER (OUTPATIENT)
Dept: VASCULAR LAB | Age: 74
Discharge: HOME | End: 2019-07-09
Payer: MEDICARE

## 2019-01-01 ENCOUNTER — ANESTHESIA EVENT (OUTPATIENT)
Dept: ENDOSCOPY | Age: 74
DRG: 435 | End: 2019-01-01
Payer: MEDICARE

## 2019-01-01 ENCOUNTER — OFFICE VISIT (OUTPATIENT)
Dept: CARDIOLOGY | Age: 74
End: 2019-01-01
Payer: MEDICARE

## 2019-01-01 ENCOUNTER — APPOINTMENT (OUTPATIENT)
Dept: GENERAL RADIOLOGY | Age: 74
DRG: 477 | End: 2019-01-01
Payer: MEDICARE

## 2019-01-01 ENCOUNTER — TELEPHONE (OUTPATIENT)
Dept: HEMATOLOGY | Age: 74
End: 2019-01-01

## 2019-01-01 ENCOUNTER — HOSPITAL ENCOUNTER (INPATIENT)
Dept: CARDIAC CATH/INVASIVE PROCEDURES | Age: 74
LOS: 1 days | Discharge: ANOTHER HOSPITAL | DRG: 286 | End: 2019-04-25
Attending: INTERNAL MEDICINE | Admitting: INTERNAL MEDICINE
Payer: MEDICARE

## 2019-01-01 ENCOUNTER — HOSPITAL ENCOUNTER (INPATIENT)
Age: 74
LOS: 7 days | Discharge: HOME | DRG: 477 | End: 2019-08-31
Attending: FAMILY MEDICINE | Admitting: FAMILY MEDICINE
Payer: MEDICARE

## 2019-01-01 ENCOUNTER — APPOINTMENT (OUTPATIENT)
Dept: NUCLEAR MEDICINE | Age: 74
DRG: 435 | End: 2019-01-01
Payer: MEDICARE

## 2019-01-01 ENCOUNTER — OFFICE VISIT (OUTPATIENT)
Dept: VASCULAR SURGERY | Age: 74
End: 2019-01-01
Payer: MEDICARE

## 2019-01-01 ENCOUNTER — APPOINTMENT (OUTPATIENT)
Dept: GENERAL RADIOLOGY | Age: 74
DRG: 374 | End: 2019-01-01
Payer: MEDICARE

## 2019-01-01 ENCOUNTER — HOSPITAL ENCOUNTER (OUTPATIENT)
Dept: NON INVASIVE DIAGNOSTICS | Age: 74
Discharge: HOME | End: 2019-01-11
Payer: MEDICARE

## 2019-01-01 ENCOUNTER — TELEPHONE (OUTPATIENT)
Dept: VASCULAR SURGERY | Age: 74
End: 2019-01-01

## 2019-01-01 ENCOUNTER — HOSPITAL ENCOUNTER (INPATIENT)
Age: 74
LOS: 1 days | Discharge: HOSPICE | DRG: 374 | End: 2019-10-01
Attending: EMERGENCY MEDICINE | Admitting: FAMILY MEDICINE
Payer: MEDICARE

## 2019-01-01 ENCOUNTER — PREP FOR PROCEDURE (OUTPATIENT)
Dept: VASCULAR SURGERY | Age: 74
End: 2019-01-01

## 2019-01-01 ENCOUNTER — HOSPITAL ENCOUNTER (OUTPATIENT)
Dept: INFUSION THERAPY | Age: 74
Setting detail: INFUSION SERIES
Discharge: HOME | End: 2019-04-16
Payer: MEDICARE

## 2019-01-01 VITALS
SYSTOLIC BLOOD PRESSURE: 104 MMHG | WEIGHT: 155.13 LBS | BODY MASS INDEX: 26.49 KG/M2 | HEART RATE: 111 BPM | TEMPERATURE: 98.5 F | RESPIRATION RATE: 16 BRPM | OXYGEN SATURATION: 92 % | HEIGHT: 64 IN | DIASTOLIC BLOOD PRESSURE: 63 MMHG

## 2019-01-01 VITALS
TEMPERATURE: 97.2 F | RESPIRATION RATE: 16 BRPM | DIASTOLIC BLOOD PRESSURE: 61 MMHG | HEART RATE: 98 BPM | SYSTOLIC BLOOD PRESSURE: 122 MMHG | HEIGHT: 64 IN | BODY MASS INDEX: 28.68 KG/M2 | WEIGHT: 168 LBS | OXYGEN SATURATION: 90 %

## 2019-01-01 VITALS
TEMPERATURE: 98.5 F | OXYGEN SATURATION: 98 % | SYSTOLIC BLOOD PRESSURE: 159 MMHG | DIASTOLIC BLOOD PRESSURE: 87 MMHG | WEIGHT: 180.6 LBS | HEART RATE: 97 BPM | RESPIRATION RATE: 27 BRPM | HEIGHT: 64 IN | BODY MASS INDEX: 30.83 KG/M2

## 2019-01-01 VITALS
HEART RATE: 84 BPM | SYSTOLIC BLOOD PRESSURE: 118 MMHG | WEIGHT: 178 LBS | DIASTOLIC BLOOD PRESSURE: 76 MMHG | BODY MASS INDEX: 30.39 KG/M2 | HEIGHT: 64 IN

## 2019-01-01 VITALS
DIASTOLIC BLOOD PRESSURE: 79 MMHG | HEIGHT: 64 IN | SYSTOLIC BLOOD PRESSURE: 165 MMHG | HEART RATE: 71 BPM | TEMPERATURE: 98.3 F | BODY MASS INDEX: 30.39 KG/M2 | WEIGHT: 178 LBS | RESPIRATION RATE: 17 BRPM | OXYGEN SATURATION: 96 %

## 2019-01-01 VITALS
OXYGEN SATURATION: 95 % | HEIGHT: 64 IN | BODY MASS INDEX: 31 KG/M2 | WEIGHT: 181.6 LBS | RESPIRATION RATE: 16 BRPM | TEMPERATURE: 98.2 F | HEART RATE: 90 BPM | SYSTOLIC BLOOD PRESSURE: 161 MMHG | DIASTOLIC BLOOD PRESSURE: 86 MMHG

## 2019-01-01 VITALS — HEART RATE: 67 BPM | SYSTOLIC BLOOD PRESSURE: 134 MMHG | DIASTOLIC BLOOD PRESSURE: 68 MMHG | RESPIRATION RATE: 18 BRPM

## 2019-01-01 VITALS
DIASTOLIC BLOOD PRESSURE: 88 MMHG | HEART RATE: 91 BPM | WEIGHT: 179.9 LBS | SYSTOLIC BLOOD PRESSURE: 159 MMHG | HEIGHT: 64 IN | TEMPERATURE: 97.3 F | RESPIRATION RATE: 16 BRPM | OXYGEN SATURATION: 97 % | BODY MASS INDEX: 30.71 KG/M2

## 2019-01-01 VITALS
SYSTOLIC BLOOD PRESSURE: 105 MMHG | RESPIRATION RATE: 14 BRPM | OXYGEN SATURATION: 100 % | DIASTOLIC BLOOD PRESSURE: 61 MMHG

## 2019-01-01 VITALS
SYSTOLIC BLOOD PRESSURE: 128 MMHG | BODY MASS INDEX: 30.55 KG/M2 | HEART RATE: 88 BPM | WEIGHT: 178 LBS | DIASTOLIC BLOOD PRESSURE: 74 MMHG

## 2019-01-01 VITALS
SYSTOLIC BLOOD PRESSURE: 128 MMHG | TEMPERATURE: 97.8 F | RESPIRATION RATE: 17 BRPM | DIASTOLIC BLOOD PRESSURE: 77 MMHG | HEART RATE: 88 BPM

## 2019-01-01 VITALS
HEART RATE: 103 BPM | RESPIRATION RATE: 18 BRPM | TEMPERATURE: 98 F | SYSTOLIC BLOOD PRESSURE: 141 MMHG | WEIGHT: 178 LBS | OXYGEN SATURATION: 98 % | BODY MASS INDEX: 30.39 KG/M2 | HEIGHT: 64 IN | DIASTOLIC BLOOD PRESSURE: 82 MMHG

## 2019-01-01 VITALS
BODY MASS INDEX: 30.73 KG/M2 | HEIGHT: 64 IN | SYSTOLIC BLOOD PRESSURE: 120 MMHG | WEIGHT: 180 LBS | TEMPERATURE: 97.6 F | DIASTOLIC BLOOD PRESSURE: 70 MMHG

## 2019-01-01 VITALS
SYSTOLIC BLOOD PRESSURE: 102 MMHG | DIASTOLIC BLOOD PRESSURE: 58 MMHG | BODY MASS INDEX: 30.39 KG/M2 | HEART RATE: 72 BPM | HEIGHT: 64 IN | WEIGHT: 178 LBS

## 2019-01-01 VITALS
OXYGEN SATURATION: 97 % | DIASTOLIC BLOOD PRESSURE: 59 MMHG | SYSTOLIC BLOOD PRESSURE: 101 MMHG | RESPIRATION RATE: 37 BRPM

## 2019-01-01 DIAGNOSIS — I50.42 CHRONIC COMBINED SYSTOLIC AND DIASTOLIC HEART FAILURE (HCC): Chronic | ICD-10-CM

## 2019-01-01 DIAGNOSIS — R41.0 DISORIENTATION: Primary | ICD-10-CM

## 2019-01-01 DIAGNOSIS — C79.51 DISTANT METASTASIS STAGING CATEGORY M1B INVOLVING BONE (HCC): ICD-10-CM

## 2019-01-01 DIAGNOSIS — I10 ESSENTIAL HYPERTENSION: Chronic | ICD-10-CM

## 2019-01-01 DIAGNOSIS — I25.10 LEFT MAIN CORONARY ARTERY DISEASE: ICD-10-CM

## 2019-01-01 DIAGNOSIS — I50.32 CHRONIC DIASTOLIC HEART FAILURE (HCC): ICD-10-CM

## 2019-01-01 DIAGNOSIS — R94.39 ABNORMAL STRESS ECG: ICD-10-CM

## 2019-01-01 DIAGNOSIS — C80.1 OSTEOLYTIC LESION DUE TO METASTASIS WITH UNKNOWN PRIMARY SITE (HCC): ICD-10-CM

## 2019-01-01 DIAGNOSIS — I50.32 CHRONIC DIASTOLIC HEART FAILURE (HCC): Primary | ICD-10-CM

## 2019-01-01 DIAGNOSIS — Z99.2 STAGE 5 CHRONIC KIDNEY DISEASE ON CHRONIC DIALYSIS (HCC): Chronic | ICD-10-CM

## 2019-01-01 DIAGNOSIS — N18.9 CHRONIC KIDNEY DISEASE, UNSPECIFIED CKD STAGE: ICD-10-CM

## 2019-01-01 DIAGNOSIS — I10 ESSENTIAL HYPERTENSION: Primary | ICD-10-CM

## 2019-01-01 DIAGNOSIS — M54.6 PAIN IN THORACIC SPINE: ICD-10-CM

## 2019-01-01 DIAGNOSIS — I25.10 CORONARY ARTERY DISEASE INVOLVING NATIVE CORONARY ARTERY OF NATIVE HEART WITHOUT ANGINA PECTORIS: Primary | ICD-10-CM

## 2019-01-01 DIAGNOSIS — R11.11 NON-INTRACTABLE VOMITING WITHOUT NAUSEA, UNSPECIFIED VOMITING TYPE: ICD-10-CM

## 2019-01-01 DIAGNOSIS — N18.5 STAGE 5 CHRONIC KIDNEY DISEASE NOT ON CHRONIC DIALYSIS (HCC): ICD-10-CM

## 2019-01-01 DIAGNOSIS — R11.2 NON-INTRACTABLE VOMITING WITH NAUSEA, UNSPECIFIED VOMITING TYPE: ICD-10-CM

## 2019-01-01 DIAGNOSIS — R10.30 LOWER ABDOMINAL PAIN: Primary | ICD-10-CM

## 2019-01-01 DIAGNOSIS — K76.9 LIVER LESION: ICD-10-CM

## 2019-01-01 DIAGNOSIS — J02.9 ACUTE PHARYNGITIS, UNSPECIFIED ETIOLOGY: ICD-10-CM

## 2019-01-01 DIAGNOSIS — Z99.2 HEMODIALYSIS PATIENT (HCC): Primary | ICD-10-CM

## 2019-01-01 DIAGNOSIS — N18.6 ESRD (END STAGE RENAL DISEASE) (HCC): Primary | ICD-10-CM

## 2019-01-01 DIAGNOSIS — C79.51 OSTEOLYTIC LESION DUE TO METASTASIS WITH UNKNOWN PRIMARY SITE (HCC): ICD-10-CM

## 2019-01-01 DIAGNOSIS — R11.2 NAUSEA AND VOMITING, INTRACTABILITY OF VOMITING NOT SPECIFIED, UNSPECIFIED VOMITING TYPE: ICD-10-CM

## 2019-01-01 DIAGNOSIS — R52 INTRACTABLE PAIN: Primary | ICD-10-CM

## 2019-01-01 DIAGNOSIS — I10 ESSENTIAL HYPERTENSION: ICD-10-CM

## 2019-01-01 DIAGNOSIS — K57.32 DIVERTICULITIS OF COLON: ICD-10-CM

## 2019-01-01 DIAGNOSIS — N18.4 STAGE 4 CHRONIC KIDNEY DISEASE (HCC): Primary | ICD-10-CM

## 2019-01-01 DIAGNOSIS — M89.8X9 BONE MASS: Primary | ICD-10-CM

## 2019-01-01 DIAGNOSIS — E11.8 TYPE 2 DIABETES MELLITUS WITH COMPLICATION, WITH LONG-TERM CURRENT USE OF INSULIN (HCC): Chronic | ICD-10-CM

## 2019-01-01 DIAGNOSIS — D64.9 ANEMIA, UNSPECIFIED TYPE: ICD-10-CM

## 2019-01-01 DIAGNOSIS — M54.2 PAIN OF CERVICAL SPINE: ICD-10-CM

## 2019-01-01 DIAGNOSIS — Z01.818 PRE-OP EXAM: Primary | ICD-10-CM

## 2019-01-01 DIAGNOSIS — I16.0 HYPERTENSIVE URGENCY: Primary | ICD-10-CM

## 2019-01-01 DIAGNOSIS — M54.6 ACUTE BILATERAL THORACIC BACK PAIN: ICD-10-CM

## 2019-01-01 DIAGNOSIS — R07.9 CHEST PAIN, UNSPECIFIED TYPE: Primary | ICD-10-CM

## 2019-01-01 DIAGNOSIS — E78.2 MIXED HYPERLIPIDEMIA: ICD-10-CM

## 2019-01-01 DIAGNOSIS — Z79.4 TYPE 2 DIABETES MELLITUS WITH COMPLICATION, WITH LONG-TERM CURRENT USE OF INSULIN (HCC): Chronic | ICD-10-CM

## 2019-01-01 DIAGNOSIS — R06.09 DYSPNEA ON EFFORT: ICD-10-CM

## 2019-01-01 DIAGNOSIS — R94.31 ABNORMAL ELECTROCARDIOGRAM: ICD-10-CM

## 2019-01-01 DIAGNOSIS — E78.2 MIXED HYPERLIPIDEMIA: Primary | ICD-10-CM

## 2019-01-01 DIAGNOSIS — D64.9 SYMPTOMATIC ANEMIA: ICD-10-CM

## 2019-01-01 DIAGNOSIS — R11.2 INTRACTABLE VOMITING WITH NAUSEA, UNSPECIFIED VOMITING TYPE: ICD-10-CM

## 2019-01-01 DIAGNOSIS — N18.6 STAGE 5 CHRONIC KIDNEY DISEASE ON CHRONIC DIALYSIS (HCC): Chronic | ICD-10-CM

## 2019-01-01 DIAGNOSIS — M54.2 NECK PAIN: ICD-10-CM

## 2019-01-01 DIAGNOSIS — C79.51 DISTANT METASTASIS STAGING CATEGORY M1B INVOLVING BONE (HCC): Primary | ICD-10-CM

## 2019-01-01 DIAGNOSIS — M89.9 LYTIC BONE LESIONS ON XRAY: ICD-10-CM

## 2019-01-01 DIAGNOSIS — N18.6 ESRD (END STAGE RENAL DISEASE) (HCC): ICD-10-CM

## 2019-01-01 LAB
ABO/RH: NORMAL
ABO/RH: NORMAL
ALBUMIN SERPL-MCNC: 2.8 G/DL (ref 3.5–5.2)
ALBUMIN SERPL-MCNC: 2.9 G/DL (ref 3.5–5.2)
ALBUMIN SERPL-MCNC: 3.2 G/DL (ref 3.5–5.2)
ALBUMIN SERPL-MCNC: 3.2 G/DL (ref 3.5–5.2)
ALBUMIN SERPL-MCNC: 3.47 G/DL (ref 3.75–5.01)
ALBUMIN SERPL-MCNC: 3.5 G/DL (ref 3.5–5.2)
ALBUMIN SERPL-MCNC: 3.6 G/DL (ref 3.5–5.2)
ALBUMIN SERPL-MCNC: 3.8 G/DL (ref 3.5–5.2)
ALBUMIN SERPL-MCNC: 3.9 G/DL (ref 3.5–5.2)
ALBUMIN SERPL-MCNC: 4.1 G/DL (ref 3.5–5.2)
ALBUMIN SERPL-MCNC: 4.1 G/DL (ref 3.5–5.2)
ALBUMIN SERPL-MCNC: 4.2 G/DL (ref 3.5–5.2)
ALBUMIN SERPL-MCNC: 4.3 G/DL (ref 3.5–5.2)
ALP BLD-CCNC: 55 U/L (ref 35–104)
ALP BLD-CCNC: 62 U/L (ref 35–104)
ALP BLD-CCNC: 69 U/L (ref 35–104)
ALP BLD-CCNC: 74 U/L (ref 35–104)
ALP BLD-CCNC: 79 U/L (ref 35–104)
ALP BLD-CCNC: 79 U/L (ref 35–104)
ALP BLD-CCNC: 83 U/L (ref 35–104)
ALP BLD-CCNC: 83 U/L (ref 35–104)
ALP BLD-CCNC: 87 U/L (ref 35–104)
ALP BLD-CCNC: 95 U/L (ref 35–104)
ALP BLD-CCNC: 97 U/L (ref 35–104)
ALP BLD-CCNC: 97 U/L (ref 35–104)
ALPHA FETOPROTEIN: 1.7 NG/ML (ref 0–8.3)
ALPHA FETOPROTEIN: 1.9 NG/ML (ref 0–8.3)
ALPHA-1-GLOBULIN: 0.48 G/DL (ref 0.19–0.46)
ALPHA-2-GLOBULIN: 1.31 G/DL (ref 0.48–1.05)
ALT SERPL-CCNC: 10 U/L (ref 5–33)
ALT SERPL-CCNC: 13 U/L (ref 5–33)
ALT SERPL-CCNC: 13 U/L (ref 5–33)
ALT SERPL-CCNC: 14 U/L (ref 5–33)
ALT SERPL-CCNC: 16 U/L (ref 5–33)
ALT SERPL-CCNC: 20 U/L (ref 5–33)
ALT SERPL-CCNC: 6 U/L (ref 5–33)
ALT SERPL-CCNC: 7 U/L (ref 5–33)
ALT SERPL-CCNC: 8 U/L (ref 5–33)
ALT SERPL-CCNC: 8 U/L (ref 5–33)
ALT SERPL-CCNC: 9 U/L (ref 5–33)
AMMONIA: 14 UMOL/L (ref 11–51)
AMMONIA: 15 UMOL/L (ref 11–51)
AMMONIA: 17 UMOL/L (ref 11–51)
AMMONIA: 24 UMOL/L (ref 11–51)
AMMONIA: 29 UMOL/L (ref 11–51)
AMMONIA: 32 UMOL/L (ref 11–51)
AMMONIA: 57 UMOL/L (ref 11–51)
ANION GAP SERPL CALCULATED.3IONS-SCNC: 10 MMOL/L (ref 7–19)
ANION GAP SERPL CALCULATED.3IONS-SCNC: 10 MMOL/L (ref 7–19)
ANION GAP SERPL CALCULATED.3IONS-SCNC: 11 MMOL/L (ref 7–19)
ANION GAP SERPL CALCULATED.3IONS-SCNC: 12 MMOL/L (ref 7–19)
ANION GAP SERPL CALCULATED.3IONS-SCNC: 13 MMOL/L (ref 7–19)
ANION GAP SERPL CALCULATED.3IONS-SCNC: 14 MMOL/L (ref 7–19)
ANION GAP SERPL CALCULATED.3IONS-SCNC: 15 MMOL/L (ref 7–19)
ANION GAP SERPL CALCULATED.3IONS-SCNC: 15 MMOL/L (ref 7–19)
ANION GAP SERPL CALCULATED.3IONS-SCNC: 16 MMOL/L (ref 7–19)
ANION GAP SERPL CALCULATED.3IONS-SCNC: 16 MMOL/L (ref 7–19)
ANION GAP SERPL CALCULATED.3IONS-SCNC: 17 MMOL/L (ref 7–19)
ANION GAP SERPL CALCULATED.3IONS-SCNC: 19 MMOL/L (ref 7–19)
ANION GAP SERPL CALCULATED.3IONS-SCNC: 19 MMOL/L (ref 7–19)
ANION GAP SERPL CALCULATED.3IONS-SCNC: 20 MMOL/L (ref 7–19)
ANION GAP SERPL CALCULATED.3IONS-SCNC: 22 MMOL/L (ref 7–19)
ANION GAP SERPL CALCULATED.3IONS-SCNC: 24 MMOL/L (ref 7–19)
ANION GAP SERPL CALCULATED.3IONS-SCNC: 9 MMOL/L (ref 7–19)
ANTIBODY IDENTIFICATION: NORMAL
ANTIBODY SCREEN: NORMAL
ANTIBODY SCREEN: NORMAL
APTT: 24.8 SEC (ref 26–36.2)
APTT: 28.6 SEC (ref 26–36.2)
AST SERPL-CCNC: 18 U/L (ref 5–32)
AST SERPL-CCNC: 18 U/L (ref 5–32)
AST SERPL-CCNC: 19 U/L (ref 5–32)
AST SERPL-CCNC: 22 U/L (ref 5–32)
AST SERPL-CCNC: 23 U/L (ref 5–32)
AST SERPL-CCNC: 24 U/L (ref 5–32)
AST SERPL-CCNC: 25 U/L (ref 5–32)
AST SERPL-CCNC: 25 U/L (ref 5–32)
AST SERPL-CCNC: 27 U/L (ref 5–32)
AST SERPL-CCNC: 30 U/L (ref 5–32)
AST SERPL-CCNC: 30 U/L (ref 5–32)
AST SERPL-CCNC: 37 U/L (ref 5–32)
AST SERPL-CCNC: 37 U/L (ref 5–32)
AST SERPL-CCNC: 41 U/L (ref 5–32)
BACTERIA: NORMAL /HPF
BASE EXCESS VENOUS: 8 MMOL/L
BASOPHILS ABSOLUTE: 0 K/UL (ref 0–0.2)
BASOPHILS RELATIVE PERCENT: 0.1 % (ref 0–1)
BASOPHILS RELATIVE PERCENT: 0.1 % (ref 0–1)
BASOPHILS RELATIVE PERCENT: 0.2 % (ref 0–1)
BASOPHILS RELATIVE PERCENT: 0.3 % (ref 0–1)
BASOPHILS RELATIVE PERCENT: 0.3 % (ref 0–1)
BASOPHILS RELATIVE PERCENT: 0.4 % (ref 0–1)
BASOPHILS RELATIVE PERCENT: 0.5 % (ref 0–1)
BASOPHILS RELATIVE PERCENT: 0.5 % (ref 0–1)
BETA GLOBULIN: 0.83 G/DL (ref 0.48–1.1)
BETA-2 MICROGLOBULIN: 17.1 MG/L (ref 1.1–2.4)
BILIRUB SERPL-MCNC: 0.3 MG/DL (ref 0.2–1.2)
BILIRUB SERPL-MCNC: 0.4 MG/DL (ref 0.2–1.2)
BILIRUB SERPL-MCNC: 0.5 MG/DL (ref 0.2–1.2)
BILIRUB SERPL-MCNC: 0.6 MG/DL (ref 0.2–1.2)
BILIRUB SERPL-MCNC: 0.6 MG/DL (ref 0.2–1.2)
BILIRUB SERPL-MCNC: 0.7 MG/DL (ref 0.2–1.2)
BILIRUB SERPL-MCNC: 0.7 MG/DL (ref 0.2–1.2)
BILIRUB SERPL-MCNC: 0.8 MG/DL (ref 0.2–1.2)
BILIRUBIN URINE: ABNORMAL
BLOOD BANK DISPENSE STATUS: NORMAL
BLOOD BANK PRODUCT CODE: NORMAL
BLOOD, URINE: NEGATIVE
BPU ID: NORMAL
BUN BLDV-MCNC: 10 MG/DL (ref 8–23)
BUN BLDV-MCNC: 10 MG/DL (ref 8–23)
BUN BLDV-MCNC: 11 MG/DL (ref 8–23)
BUN BLDV-MCNC: 13 MG/DL (ref 8–23)
BUN BLDV-MCNC: 14 MG/DL (ref 8–23)
BUN BLDV-MCNC: 14 MG/DL (ref 8–23)
BUN BLDV-MCNC: 17 MG/DL (ref 8–23)
BUN BLDV-MCNC: 19 MG/DL (ref 8–23)
BUN BLDV-MCNC: 20 MG/DL (ref 8–23)
BUN BLDV-MCNC: 21 MG/DL (ref 8–23)
BUN BLDV-MCNC: 22 MG/DL (ref 8–23)
BUN BLDV-MCNC: 22 MG/DL (ref 8–23)
BUN BLDV-MCNC: 24 MG/DL (ref 8–23)
BUN BLDV-MCNC: 26 MG/DL (ref 8–23)
BUN BLDV-MCNC: 31 MG/DL (ref 8–23)
BUN BLDV-MCNC: 31 MG/DL (ref 8–23)
BUN BLDV-MCNC: 33 MG/DL (ref 8–23)
BUN BLDV-MCNC: 35 MG/DL (ref 8–23)
BUN BLDV-MCNC: 35 MG/DL (ref 8–23)
BUN BLDV-MCNC: 38 MG/DL (ref 8–23)
BUN BLDV-MCNC: 40 MG/DL (ref 8–23)
BUN BLDV-MCNC: 41 MG/DL (ref 8–23)
BUN BLDV-MCNC: 43 MG/DL (ref 8–23)
BUN BLDV-MCNC: 6 MG/DL (ref 8–23)
BUN BLDV-MCNC: 64 MG/DL (ref 8–23)
BUN BLDV-MCNC: 66 MG/DL (ref 8–23)
CA 19-9: 108 U/ML (ref 0–35)
CALCIUM SERPL-MCNC: 8.4 MG/DL (ref 8.8–10.2)
CALCIUM SERPL-MCNC: 8.5 MG/DL (ref 8.8–10.2)
CALCIUM SERPL-MCNC: 8.5 MG/DL (ref 8.8–10.2)
CALCIUM SERPL-MCNC: 8.7 MG/DL (ref 8.8–10.2)
CALCIUM SERPL-MCNC: 8.8 MG/DL (ref 8.8–10.2)
CALCIUM SERPL-MCNC: 8.8 MG/DL (ref 8.8–10.2)
CALCIUM SERPL-MCNC: 8.9 MG/DL (ref 8.8–10.2)
CALCIUM SERPL-MCNC: 8.9 MG/DL (ref 8.8–10.2)
CALCIUM SERPL-MCNC: 9 MG/DL (ref 8.8–10.2)
CALCIUM SERPL-MCNC: 9 MG/DL (ref 8.8–10.2)
CALCIUM SERPL-MCNC: 9.1 MG/DL (ref 8.8–10.2)
CALCIUM SERPL-MCNC: 9.2 MG/DL (ref 8.8–10.2)
CALCIUM SERPL-MCNC: 9.2 MG/DL (ref 8.8–10.2)
CALCIUM SERPL-MCNC: 9.3 MG/DL (ref 8.8–10.2)
CALCIUM SERPL-MCNC: 9.3 MG/DL (ref 8.8–10.2)
CALCIUM SERPL-MCNC: 9.4 MG/DL (ref 8.8–10.2)
CALCIUM SERPL-MCNC: 9.5 MG/DL (ref 8.8–10.2)
CALCIUM SERPL-MCNC: 9.6 MG/DL (ref 8.8–10.2)
CALCIUM SERPL-MCNC: 9.6 MG/DL (ref 8.8–10.2)
CALCIUM SERPL-MCNC: 9.7 MG/DL (ref 8.8–10.2)
CALCIUM SERPL-MCNC: 9.7 MG/DL (ref 8.8–10.2)
CALCIUM SERPL-MCNC: 9.9 MG/DL (ref 8.8–10.2)
CARBOXYHEMOGLOBIN: 1.6 %
CEA: 4.1 NG/ML (ref 0–4.7)
CHLORIDE BLD-SCNC: 100 MMOL/L (ref 98–111)
CHLORIDE BLD-SCNC: 101 MMOL/L (ref 98–111)
CHLORIDE BLD-SCNC: 104 MMOL/L (ref 98–111)
CHLORIDE BLD-SCNC: 90 MMOL/L (ref 98–111)
CHLORIDE BLD-SCNC: 90 MMOL/L (ref 98–111)
CHLORIDE BLD-SCNC: 91 MMOL/L (ref 98–111)
CHLORIDE BLD-SCNC: 94 MMOL/L (ref 98–111)
CHLORIDE BLD-SCNC: 95 MMOL/L (ref 98–111)
CHLORIDE BLD-SCNC: 95 MMOL/L (ref 98–111)
CHLORIDE BLD-SCNC: 96 MMOL/L (ref 98–111)
CHLORIDE BLD-SCNC: 97 MMOL/L (ref 98–111)
CHLORIDE BLD-SCNC: 98 MMOL/L (ref 98–111)
CHLORIDE BLD-SCNC: 99 MMOL/L (ref 98–111)
CHOLESTEROL, TOTAL: 115 MG/DL (ref 160–199)
CHP ED QC CHECK: NORMAL
CLARITY: ABNORMAL
CO2: 20 MMOL/L (ref 22–29)
CO2: 21 MMOL/L (ref 22–29)
CO2: 22 MMOL/L (ref 22–29)
CO2: 23 MMOL/L (ref 22–29)
CO2: 24 MMOL/L (ref 22–29)
CO2: 25 MMOL/L (ref 22–29)
CO2: 26 MMOL/L (ref 22–29)
CO2: 27 MMOL/L (ref 22–29)
CO2: 27 MMOL/L (ref 22–29)
CO2: 29 MMOL/L (ref 22–29)
CO2: 30 MMOL/L (ref 22–29)
CO2: 31 MMOL/L (ref 22–29)
CO2: 32 MMOL/L (ref 22–29)
CO2: 33 MMOL/L (ref 22–29)
CO2: 34 MMOL/L (ref 22–29)
COLOR: ABNORMAL
CREAT SERPL-MCNC: 1.2 MG/DL (ref 0.5–0.9)
CREAT SERPL-MCNC: 1.8 MG/DL (ref 0.5–0.9)
CREAT SERPL-MCNC: 2 MG/DL (ref 0.5–0.9)
CREAT SERPL-MCNC: 2.1 MG/DL (ref 0.5–0.9)
CREAT SERPL-MCNC: 2.4 MG/DL (ref 0.5–0.9)
CREAT SERPL-MCNC: 2.5 MG/DL (ref 0.5–0.9)
CREAT SERPL-MCNC: 2.8 MG/DL (ref 0.5–0.9)
CREAT SERPL-MCNC: 2.8 MG/DL (ref 0.5–0.9)
CREAT SERPL-MCNC: 3 MG/DL (ref 0.5–0.9)
CREAT SERPL-MCNC: 3.1 MG/DL (ref 0.5–0.9)
CREAT SERPL-MCNC: 3.3 MG/DL (ref 0.5–0.9)
CREAT SERPL-MCNC: 3.4 MG/DL (ref 0.5–0.9)
CREAT SERPL-MCNC: 3.5 MG/DL (ref 0.5–0.9)
CREAT SERPL-MCNC: 3.5 MG/DL (ref 0.5–0.9)
CREAT SERPL-MCNC: 3.6 MG/DL (ref 0.5–0.9)
CREAT SERPL-MCNC: 3.8 MG/DL (ref 0.5–0.9)
CREAT SERPL-MCNC: 3.9 MG/DL (ref 0.5–0.9)
CREAT SERPL-MCNC: 4.1 MG/DL (ref 0.5–0.9)
CREAT SERPL-MCNC: 4.9 MG/DL (ref 0.5–0.9)
CREAT SERPL-MCNC: 5 MG/DL (ref 0.5–0.9)
CREAT SERPL-MCNC: 5.1 MG/DL (ref 0.5–0.9)
CREAT SERPL-MCNC: 5.1 MG/DL (ref 0.5–0.9)
CREAT SERPL-MCNC: 6.6 MG/DL (ref 0.5–0.9)
DESCRIPTION BLOOD BANK: NORMAL
EKG P AXIS: 35 DEGREES
EKG P AXIS: 39 DEGREES
EKG P AXIS: 61 DEGREES
EKG P AXIS: 80 DEGREES
EKG P AXIS: 86 DEGREES
EKG P-R INTERVAL: 134 MS
EKG P-R INTERVAL: 142 MS
EKG P-R INTERVAL: 154 MS
EKG P-R INTERVAL: 160 MS
EKG P-R INTERVAL: 164 MS
EKG Q-T INTERVAL: 338 MS
EKG Q-T INTERVAL: 356 MS
EKG Q-T INTERVAL: 360 MS
EKG Q-T INTERVAL: 382 MS
EKG Q-T INTERVAL: 388 MS
EKG QRS DURATION: 104 MS
EKG QRS DURATION: 106 MS
EKG QRS DURATION: 110 MS
EKG QRS DURATION: 98 MS
EKG QRS DURATION: 98 MS
EKG QTC CALCULATION (BAZETT): 433 MS
EKG QTC CALCULATION (BAZETT): 446 MS
EKG QTC CALCULATION (BAZETT): 451 MS
EKG QTC CALCULATION (BAZETT): 456 MS
EKG QTC CALCULATION (BAZETT): 459 MS
EKG T AXIS: 100 DEGREES
EKG T AXIS: 119 DEGREES
EKG T AXIS: 138 DEGREES
EKG T AXIS: 147 DEGREES
EKG T AXIS: 168 DEGREES
EOSINOPHILS ABSOLUTE: 0 K/UL (ref 0–0.6)
EOSINOPHILS ABSOLUTE: 0.1 K/UL (ref 0–0.6)
EOSINOPHILS ABSOLUTE: 0.2 K/UL (ref 0–0.6)
EOSINOPHILS ABSOLUTE: 0.2 K/UL (ref 0–0.6)
EOSINOPHILS RELATIVE PERCENT: 0 % (ref 0–5)
EOSINOPHILS RELATIVE PERCENT: 0.1 % (ref 0–5)
EOSINOPHILS RELATIVE PERCENT: 0.1 % (ref 0–5)
EOSINOPHILS RELATIVE PERCENT: 0.2 % (ref 0–5)
EOSINOPHILS RELATIVE PERCENT: 0.5 % (ref 0–5)
EOSINOPHILS RELATIVE PERCENT: 0.9 % (ref 0–5)
EOSINOPHILS RELATIVE PERCENT: 1 % (ref 0–5)
EOSINOPHILS RELATIVE PERCENT: 1.2 % (ref 0–5)
EOSINOPHILS RELATIVE PERCENT: 1.2 % (ref 0–5)
EOSINOPHILS RELATIVE PERCENT: 1.5 % (ref 0–5)
EOSINOPHILS RELATIVE PERCENT: 1.8 % (ref 0–5)
EOSINOPHILS RELATIVE PERCENT: 1.8 % (ref 0–5)
EOSINOPHILS RELATIVE PERCENT: 2.4 % (ref 0–5)
EOSINOPHILS RELATIVE PERCENT: 2.7 % (ref 0–5)
EOSINOPHILS RELATIVE PERCENT: 3.3 % (ref 0–5)
FERRITIN: 638.8 NG/ML (ref 13–150)
FREE KAPPA LIGHT CHAINS: 10.83 MG/DL (ref 0.37–1.94)
FREE KAPPA/LAMBDA RATIO: 1.57 (ref 0.26–1.65)
FREE LAMBDA LIGHT CHAINS: 6.91 MG/DL (ref 0.57–2.63)
GAMMA GLOBULIN: 0.81 G/DL (ref 0.62–1.51)
GFR NON-AFRICAN AMERICAN: 11
GFR NON-AFRICAN AMERICAN: 11
GFR NON-AFRICAN AMERICAN: 12
GFR NON-AFRICAN AMERICAN: 12
GFR NON-AFRICAN AMERICAN: 13
GFR NON-AFRICAN AMERICAN: 14
GFR NON-AFRICAN AMERICAN: 15
GFR NON-AFRICAN AMERICAN: 15
GFR NON-AFRICAN AMERICAN: 17
GFR NON-AFRICAN AMERICAN: 17
GFR NON-AFRICAN AMERICAN: 19
GFR NON-AFRICAN AMERICAN: 20
GFR NON-AFRICAN AMERICAN: 23
GFR NON-AFRICAN AMERICAN: 24
GFR NON-AFRICAN AMERICAN: 28
GFR NON-AFRICAN AMERICAN: 44
GFR NON-AFRICAN AMERICAN: 6
GFR NON-AFRICAN AMERICAN: 8
GFR NON-AFRICAN AMERICAN: 9
GLUCOSE BLD-MCNC: 103 MG/DL (ref 70–99)
GLUCOSE BLD-MCNC: 103 MG/DL (ref 74–109)
GLUCOSE BLD-MCNC: 106 MG/DL (ref 70–99)
GLUCOSE BLD-MCNC: 112 MG/DL (ref 70–99)
GLUCOSE BLD-MCNC: 116 MG/DL (ref 74–109)
GLUCOSE BLD-MCNC: 118 MG/DL (ref 70–99)
GLUCOSE BLD-MCNC: 119 MG/DL (ref 74–109)
GLUCOSE BLD-MCNC: 120 MG/DL (ref 74–109)
GLUCOSE BLD-MCNC: 125 MG/DL (ref 74–109)
GLUCOSE BLD-MCNC: 126 MG/DL (ref 70–99)
GLUCOSE BLD-MCNC: 128 MG/DL (ref 70–99)
GLUCOSE BLD-MCNC: 130 MG/DL (ref 70–99)
GLUCOSE BLD-MCNC: 131 MG/DL (ref 70–99)
GLUCOSE BLD-MCNC: 131 MG/DL (ref 74–109)
GLUCOSE BLD-MCNC: 133 MG/DL (ref 70–99)
GLUCOSE BLD-MCNC: 134 MG/DL (ref 74–109)
GLUCOSE BLD-MCNC: 135 MG/DL (ref 70–99)
GLUCOSE BLD-MCNC: 136 MG/DL (ref 70–99)
GLUCOSE BLD-MCNC: 137 MG/DL (ref 70–99)
GLUCOSE BLD-MCNC: 139 MG/DL (ref 70–99)
GLUCOSE BLD-MCNC: 141 MG/DL (ref 70–99)
GLUCOSE BLD-MCNC: 142 MG/DL (ref 70–99)
GLUCOSE BLD-MCNC: 143 MG/DL (ref 70–99)
GLUCOSE BLD-MCNC: 144 MG/DL (ref 70–99)
GLUCOSE BLD-MCNC: 144 MG/DL (ref 70–99)
GLUCOSE BLD-MCNC: 145 MG/DL (ref 70–99)
GLUCOSE BLD-MCNC: 146 MG/DL (ref 70–99)
GLUCOSE BLD-MCNC: 148 MG/DL (ref 70–99)
GLUCOSE BLD-MCNC: 151 MG/DL (ref 74–109)
GLUCOSE BLD-MCNC: 152 MG/DL (ref 70–99)
GLUCOSE BLD-MCNC: 153 MG/DL (ref 70–99)
GLUCOSE BLD-MCNC: 154 MG/DL (ref 70–99)
GLUCOSE BLD-MCNC: 155 MG/DL (ref 74–109)
GLUCOSE BLD-MCNC: 156 MG/DL (ref 70–99)
GLUCOSE BLD-MCNC: 156 MG/DL (ref 74–109)
GLUCOSE BLD-MCNC: 156 MG/DL (ref 74–109)
GLUCOSE BLD-MCNC: 160 MG/DL (ref 70–99)
GLUCOSE BLD-MCNC: 161 MG/DL (ref 70–99)
GLUCOSE BLD-MCNC: 162 MG/DL (ref 70–99)
GLUCOSE BLD-MCNC: 163 MG/DL (ref 70–99)
GLUCOSE BLD-MCNC: 164 MG/DL (ref 74–109)
GLUCOSE BLD-MCNC: 166 MG/DL (ref 74–109)
GLUCOSE BLD-MCNC: 167 MG/DL (ref 70–99)
GLUCOSE BLD-MCNC: 167 MG/DL (ref 70–99)
GLUCOSE BLD-MCNC: 168 MG/DL (ref 70–99)
GLUCOSE BLD-MCNC: 168 MG/DL (ref 70–99)
GLUCOSE BLD-MCNC: 171 MG/DL (ref 70–99)
GLUCOSE BLD-MCNC: 172 MG/DL (ref 70–99)
GLUCOSE BLD-MCNC: 177 MG/DL (ref 70–99)
GLUCOSE BLD-MCNC: 177 MG/DL (ref 70–99)
GLUCOSE BLD-MCNC: 178 MG/DL (ref 70–99)
GLUCOSE BLD-MCNC: 179 MG/DL (ref 74–109)
GLUCOSE BLD-MCNC: 180 MG/DL (ref 70–99)
GLUCOSE BLD-MCNC: 181 MG/DL (ref 70–99)
GLUCOSE BLD-MCNC: 182 MG/DL (ref 70–99)
GLUCOSE BLD-MCNC: 183 MG/DL (ref 74–109)
GLUCOSE BLD-MCNC: 186 MG/DL (ref 70–99)
GLUCOSE BLD-MCNC: 192 MG/DL (ref 70–99)
GLUCOSE BLD-MCNC: 197 MG/DL (ref 70–99)
GLUCOSE BLD-MCNC: 198 MG/DL (ref 70–99)
GLUCOSE BLD-MCNC: 202 MG/DL (ref 70–99)
GLUCOSE BLD-MCNC: 203 MG/DL (ref 70–99)
GLUCOSE BLD-MCNC: 203 MG/DL (ref 74–109)
GLUCOSE BLD-MCNC: 204 MG/DL (ref 70–99)
GLUCOSE BLD-MCNC: 204 MG/DL (ref 74–109)
GLUCOSE BLD-MCNC: 205 MG/DL (ref 70–99)
GLUCOSE BLD-MCNC: 205 MG/DL (ref 74–109)
GLUCOSE BLD-MCNC: 206 MG/DL (ref 70–99)
GLUCOSE BLD-MCNC: 208 MG/DL (ref 70–99)
GLUCOSE BLD-MCNC: 210 MG/DL (ref 70–99)
GLUCOSE BLD-MCNC: 210 MG/DL (ref 74–109)
GLUCOSE BLD-MCNC: 211 MG/DL (ref 70–99)
GLUCOSE BLD-MCNC: 212 MG/DL (ref 70–99)
GLUCOSE BLD-MCNC: 214 MG/DL (ref 70–99)
GLUCOSE BLD-MCNC: 218 MG/DL (ref 70–99)
GLUCOSE BLD-MCNC: 220 MG/DL (ref 70–99)
GLUCOSE BLD-MCNC: 222 MG/DL (ref 70–99)
GLUCOSE BLD-MCNC: 222 MG/DL (ref 70–99)
GLUCOSE BLD-MCNC: 223 MG/DL (ref 70–99)
GLUCOSE BLD-MCNC: 224 MG/DL (ref 70–99)
GLUCOSE BLD-MCNC: 224 MG/DL (ref 70–99)
GLUCOSE BLD-MCNC: 225 MG/DL (ref 70–99)
GLUCOSE BLD-MCNC: 229 MG/DL (ref 70–99)
GLUCOSE BLD-MCNC: 232 MG/DL (ref 70–99)
GLUCOSE BLD-MCNC: 233 MG/DL (ref 70–99)
GLUCOSE BLD-MCNC: 238 MG/DL (ref 70–99)
GLUCOSE BLD-MCNC: 241 MG/DL (ref 70–99)
GLUCOSE BLD-MCNC: 243 MG/DL (ref 70–99)
GLUCOSE BLD-MCNC: 244 MG/DL (ref 70–99)
GLUCOSE BLD-MCNC: 245 MG/DL (ref 70–99)
GLUCOSE BLD-MCNC: 250 MG/DL (ref 74–109)
GLUCOSE BLD-MCNC: 252 MG/DL (ref 70–99)
GLUCOSE BLD-MCNC: 256 MG/DL (ref 70–99)
GLUCOSE BLD-MCNC: 257 MG/DL (ref 70–99)
GLUCOSE BLD-MCNC: 259 MG/DL (ref 74–109)
GLUCOSE BLD-MCNC: 261 MG/DL (ref 70–99)
GLUCOSE BLD-MCNC: 264 MG/DL (ref 70–99)
GLUCOSE BLD-MCNC: 267 MG/DL (ref 70–99)
GLUCOSE BLD-MCNC: 269 MG/DL (ref 70–99)
GLUCOSE BLD-MCNC: 271 MG/DL (ref 70–99)
GLUCOSE BLD-MCNC: 275 MG/DL (ref 70–99)
GLUCOSE BLD-MCNC: 279 MG/DL (ref 70–99)
GLUCOSE BLD-MCNC: 282 MG/DL (ref 74–109)
GLUCOSE BLD-MCNC: 296 MG/DL (ref 74–109)
GLUCOSE BLD-MCNC: 299 MG/DL (ref 70–99)
GLUCOSE BLD-MCNC: 301 MG/DL (ref 70–99)
GLUCOSE BLD-MCNC: 309 MG/DL (ref 70–99)
GLUCOSE BLD-MCNC: 311 MG/DL (ref 74–109)
GLUCOSE BLD-MCNC: 331 MG/DL (ref 70–99)
GLUCOSE BLD-MCNC: 334 MG/DL (ref 70–99)
GLUCOSE BLD-MCNC: 356 MG/DL (ref 74–109)
GLUCOSE BLD-MCNC: 371 MG/DL (ref 70–99)
GLUCOSE BLD-MCNC: 380 MG/DL (ref 70–99)
GLUCOSE BLD-MCNC: 66 MG/DL (ref 70–99)
GLUCOSE BLD-MCNC: 70 MG/DL (ref 70–99)
GLUCOSE BLD-MCNC: 76 MG/DL (ref 74–109)
GLUCOSE BLD-MCNC: 94 MG/DL (ref 74–109)
GLUCOSE BLD-MCNC: 94 MG/DL (ref 74–109)
GLUCOSE BLD-MCNC: 96 MG/DL (ref 74–109)
GLUCOSE BLD-MCNC: 98 MG/DL (ref 70–99)
GLUCOSE URINE: NEGATIVE MG/DL
HAV IGM SER IA-ACNC: NORMAL
HBA1C MFR BLD: 5.9 % (ref 4–6)
HCO3 VENOUS: 29 MMOL/L (ref 23–29)
HCT VFR BLD CALC: 24.2 % (ref 37–47)
HCT VFR BLD CALC: 24.2 % (ref 37–47)
HCT VFR BLD CALC: 24.9 % (ref 37–47)
HCT VFR BLD CALC: 25.5 % (ref 37–47)
HCT VFR BLD CALC: 25.9 % (ref 37–47)
HCT VFR BLD CALC: 26.1 % (ref 37–47)
HCT VFR BLD CALC: 26.8 % (ref 37–47)
HCT VFR BLD CALC: 27.1 % (ref 37–47)
HCT VFR BLD CALC: 27.7 % (ref 37–47)
HCT VFR BLD CALC: 28.4 % (ref 37–47)
HCT VFR BLD CALC: 29.8 % (ref 37–47)
HCT VFR BLD CALC: 29.9 % (ref 37–47)
HCT VFR BLD CALC: 30.2 % (ref 37–47)
HCT VFR BLD CALC: 30.9 % (ref 37–47)
HCT VFR BLD CALC: 31.3 % (ref 37–47)
HCT VFR BLD CALC: 31.5 % (ref 37–47)
HCT VFR BLD CALC: 31.9 % (ref 37–47)
HCT VFR BLD CALC: 32 % (ref 37–47)
HCT VFR BLD CALC: 32.1 % (ref 37–47)
HCT VFR BLD CALC: 32.7 % (ref 37–47)
HCT VFR BLD CALC: 33.3 % (ref 37–47)
HCT VFR BLD CALC: 33.7 % (ref 37–47)
HDLC SERPL-MCNC: 59 MG/DL (ref 65–121)
HEMOGLOBIN: 10 G/DL (ref 12–16)
HEMOGLOBIN: 10.1 G/DL (ref 12–16)
HEMOGLOBIN: 10.3 G/DL (ref 12–16)
HEMOGLOBIN: 10.4 G/DL (ref 12–16)
HEMOGLOBIN: 7.5 G/DL (ref 12–16)
HEMOGLOBIN: 7.8 G/DL (ref 12–16)
HEMOGLOBIN: 8.1 G/DL (ref 12–16)
HEMOGLOBIN: 8.1 G/DL (ref 12–16)
HEMOGLOBIN: 8.3 G/DL (ref 12–16)
HEMOGLOBIN: 8.5 G/DL (ref 12–16)
HEMOGLOBIN: 8.8 G/DL (ref 12–16)
HEMOGLOBIN: 9.1 G/DL (ref 12–16)
HEMOGLOBIN: 9.2 G/DL (ref 12–16)
HEMOGLOBIN: 9.2 G/DL (ref 12–16)
HEMOGLOBIN: 9.4 G/DL (ref 12–16)
HEMOGLOBIN: 9.5 G/DL (ref 12–16)
HEMOGLOBIN: 9.6 G/DL (ref 12–16)
HEMOGLOBIN: 9.8 G/DL (ref 12–16)
HEMOGLOBIN: 9.9 G/DL (ref 12–16)
HEMOGLOBIN: 9.9 G/DL (ref 12–16)
HEPATITIS B CORE IGM ANTIBODY: NORMAL
HEPATITIS B SURFACE ANTIGEN INTERPRETATION: NORMAL
HEPATITIS C ANTIBODY INTERPRETATION: NORMAL
IGA: 249 MG/DL (ref 68–408)
IGG: 937 MG/DL (ref 768–1632)
IGM: 58 MG/DL (ref 35–263)
IMMATURE GRANULOCYTES #: 0 K/UL
IMMATURE GRANULOCYTES #: 0.1 K/UL
IMMUNOFIXATION REFLEX: ABNORMAL
INR BLD: 1.08 (ref 0.88–1.18)
INR BLD: 1.09 (ref 0.88–1.18)
IRON SATURATION: 22 % (ref 14–50)
IRON SATURATION: 39 % (ref 14–50)
IRON: 107 UG/DL (ref 37–145)
IRON: 52 UG/DL (ref 37–145)
JKA ANTIGEN: NORMAL
KETONES, URINE: NEGATIVE MG/DL
LACTIC ACID: 3 MMOL/L (ref 0.5–1.9)
LDL CHOLESTEROL CALCULATED: 45 MG/DL
LEUKOCYTE ESTERASE, URINE: ABNORMAL
LIPASE: 51 U/L (ref 13–60)
LIPASE: 67 U/L (ref 13–60)
LV EF: 30 %
LV EF: 48 %
LVEF MODALITY: NORMAL
LVEF MODALITY: NORMAL
LYMPHOCYTES ABSOLUTE: 0.4 K/UL (ref 1.1–4.5)
LYMPHOCYTES ABSOLUTE: 0.4 K/UL (ref 1.1–4.5)
LYMPHOCYTES ABSOLUTE: 0.5 K/UL (ref 1.1–4.5)
LYMPHOCYTES ABSOLUTE: 0.5 K/UL (ref 1.1–4.5)
LYMPHOCYTES ABSOLUTE: 0.6 K/UL (ref 1.1–4.5)
LYMPHOCYTES ABSOLUTE: 0.7 K/UL (ref 1.1–4.5)
LYMPHOCYTES ABSOLUTE: 0.8 K/UL (ref 1.1–4.5)
LYMPHOCYTES RELATIVE PERCENT: 10.3 % (ref 20–40)
LYMPHOCYTES RELATIVE PERCENT: 10.7 % (ref 20–40)
LYMPHOCYTES RELATIVE PERCENT: 11.7 % (ref 20–40)
LYMPHOCYTES RELATIVE PERCENT: 11.9 % (ref 20–40)
LYMPHOCYTES RELATIVE PERCENT: 12.5 % (ref 20–40)
LYMPHOCYTES RELATIVE PERCENT: 13.7 % (ref 20–40)
LYMPHOCYTES RELATIVE PERCENT: 14.8 % (ref 20–40)
LYMPHOCYTES RELATIVE PERCENT: 15 % (ref 20–40)
LYMPHOCYTES RELATIVE PERCENT: 3.8 % (ref 20–40)
LYMPHOCYTES RELATIVE PERCENT: 4.4 % (ref 20–40)
LYMPHOCYTES RELATIVE PERCENT: 4.8 % (ref 20–40)
LYMPHOCYTES RELATIVE PERCENT: 5.2 % (ref 20–40)
LYMPHOCYTES RELATIVE PERCENT: 7 % (ref 20–40)
LYMPHOCYTES RELATIVE PERCENT: 7.3 % (ref 20–40)
LYMPHOCYTES RELATIVE PERCENT: 7.9 % (ref 20–40)
LYMPHOCYTES RELATIVE PERCENT: 8 % (ref 20–40)
LYMPHOCYTES RELATIVE PERCENT: 8.5 % (ref 20–40)
LYMPHOCYTES RELATIVE PERCENT: 8.8 % (ref 20–40)
LYMPHOCYTES RELATIVE PERCENT: 9.6 % (ref 20–40)
MAGNESIUM: 2 MG/DL (ref 1.6–2.4)
MAGNESIUM: 2.1 MG/DL (ref 1.6–2.4)
MAGNESIUM: 2.1 MG/DL (ref 1.6–2.4)
MCH RBC QN AUTO: 29.2 PG (ref 27–31)
MCH RBC QN AUTO: 30.7 PG (ref 27–31)
MCH RBC QN AUTO: 30.8 PG (ref 27–31)
MCH RBC QN AUTO: 30.8 PG (ref 27–31)
MCH RBC QN AUTO: 30.9 PG (ref 27–31)
MCH RBC QN AUTO: 30.9 PG (ref 27–31)
MCH RBC QN AUTO: 31.4 PG (ref 27–31)
MCH RBC QN AUTO: 31.5 PG (ref 27–31)
MCH RBC QN AUTO: 31.8 PG (ref 27–31)
MCH RBC QN AUTO: 31.9 PG (ref 27–31)
MCH RBC QN AUTO: 31.9 PG (ref 27–31)
MCH RBC QN AUTO: 32 PG (ref 27–31)
MCH RBC QN AUTO: 32.2 PG (ref 27–31)
MCH RBC QN AUTO: 32.5 PG (ref 27–31)
MCH RBC QN AUTO: 32.5 PG (ref 27–31)
MCH RBC QN AUTO: 32.6 PG (ref 27–31)
MCH RBC QN AUTO: 32.8 PG (ref 27–31)
MCH RBC QN AUTO: 32.8 PG (ref 27–31)
MCH RBC QN AUTO: 32.9 PG (ref 27–31)
MCHC RBC AUTO-ENTMCNC: 28.5 G/DL (ref 33–37)
MCHC RBC AUTO-ENTMCNC: 30.1 G/DL (ref 33–37)
MCHC RBC AUTO-ENTMCNC: 30.4 G/DL (ref 33–37)
MCHC RBC AUTO-ENTMCNC: 30.5 G/DL (ref 33–37)
MCHC RBC AUTO-ENTMCNC: 30.6 G/DL (ref 33–37)
MCHC RBC AUTO-ENTMCNC: 30.9 G/DL (ref 33–37)
MCHC RBC AUTO-ENTMCNC: 30.9 G/DL (ref 33–37)
MCHC RBC AUTO-ENTMCNC: 31 G/DL (ref 33–37)
MCHC RBC AUTO-ENTMCNC: 31.2 G/DL (ref 33–37)
MCHC RBC AUTO-ENTMCNC: 31.2 G/DL (ref 33–37)
MCHC RBC AUTO-ENTMCNC: 31.3 G/DL (ref 33–37)
MCHC RBC AUTO-ENTMCNC: 31.4 G/DL (ref 33–37)
MCHC RBC AUTO-ENTMCNC: 31.7 G/DL (ref 33–37)
MCHC RBC AUTO-ENTMCNC: 32.2 G/DL (ref 33–37)
MCHC RBC AUTO-ENTMCNC: 32.9 G/DL (ref 33–37)
MCV RBC AUTO: 100.4 FL (ref 81–99)
MCV RBC AUTO: 100.9 FL (ref 81–99)
MCV RBC AUTO: 100.9 FL (ref 81–99)
MCV RBC AUTO: 102.3 FL (ref 81–99)
MCV RBC AUTO: 102.5 FL (ref 81–99)
MCV RBC AUTO: 102.9 FL (ref 81–99)
MCV RBC AUTO: 103.1 FL (ref 81–99)
MCV RBC AUTO: 103.8 FL (ref 81–99)
MCV RBC AUTO: 104 FL (ref 81–99)
MCV RBC AUTO: 104.7 FL (ref 81–99)
MCV RBC AUTO: 104.9 FL (ref 81–99)
MCV RBC AUTO: 105.2 FL (ref 81–99)
MCV RBC AUTO: 105.4 FL (ref 81–99)
MCV RBC AUTO: 105.7 FL (ref 81–99)
MCV RBC AUTO: 112.3 FL (ref 81–99)
MCV RBC AUTO: 95.5 FL (ref 81–99)
MCV RBC AUTO: 97.5 FL (ref 81–99)
MCV RBC AUTO: 97.7 FL (ref 81–99)
MCV RBC AUTO: 99.2 FL (ref 81–99)
MCV RBC AUTO: 99.6 FL (ref 81–99)
MCV RBC AUTO: 99.7 FL (ref 81–99)
METHEMOGLOBIN VENOUS: 0.8 %
MONOCYTES ABSOLUTE: 0.4 K/UL (ref 0–0.9)
MONOCYTES ABSOLUTE: 0.4 K/UL (ref 0–0.9)
MONOCYTES ABSOLUTE: 0.5 K/UL (ref 0–0.9)
MONOCYTES ABSOLUTE: 0.5 K/UL (ref 0–0.9)
MONOCYTES ABSOLUTE: 0.6 K/UL (ref 0–0.9)
MONOCYTES ABSOLUTE: 0.7 K/UL (ref 0–0.9)
MONOCYTES ABSOLUTE: 0.8 K/UL (ref 0–0.9)
MONOCYTES ABSOLUTE: 0.8 K/UL (ref 0–0.9)
MONOCYTES ABSOLUTE: 0.9 K/UL (ref 0–0.9)
MONOCYTES ABSOLUTE: 0.9 K/UL (ref 0–0.9)
MONOCYTES ABSOLUTE: 1 K/UL (ref 0–0.9)
MONOCYTES ABSOLUTE: 1 K/UL (ref 0–0.9)
MONOCYTES RELATIVE PERCENT: 10.1 % (ref 0–10)
MONOCYTES RELATIVE PERCENT: 10.4 % (ref 0–10)
MONOCYTES RELATIVE PERCENT: 11.7 % (ref 0–10)
MONOCYTES RELATIVE PERCENT: 12 % (ref 0–10)
MONOCYTES RELATIVE PERCENT: 12.1 % (ref 0–10)
MONOCYTES RELATIVE PERCENT: 12.3 % (ref 0–10)
MONOCYTES RELATIVE PERCENT: 12.9 % (ref 0–10)
MONOCYTES RELATIVE PERCENT: 13.6 % (ref 0–10)
MONOCYTES RELATIVE PERCENT: 6.1 % (ref 0–10)
MONOCYTES RELATIVE PERCENT: 6.4 % (ref 0–10)
MONOCYTES RELATIVE PERCENT: 6.5 % (ref 0–10)
MONOCYTES RELATIVE PERCENT: 7.1 % (ref 0–10)
MONOCYTES RELATIVE PERCENT: 7.2 % (ref 0–10)
MONOCYTES RELATIVE PERCENT: 7.6 % (ref 0–10)
MONOCYTES RELATIVE PERCENT: 8 % (ref 0–10)
MONOCYTES RELATIVE PERCENT: 8.2 % (ref 0–10)
MONOCYTES RELATIVE PERCENT: 8.2 % (ref 0–10)
MONOCYTES RELATIVE PERCENT: 8.3 % (ref 0–10)
MONOCYTES RELATIVE PERCENT: 9 % (ref 0–10)
NEUTROPHILS ABSOLUTE: 10.4 K/UL (ref 1.5–7.5)
NEUTROPHILS ABSOLUTE: 10.9 K/UL (ref 1.5–7.5)
NEUTROPHILS ABSOLUTE: 3 K/UL (ref 1.5–7.5)
NEUTROPHILS ABSOLUTE: 3.1 K/UL (ref 1.5–7.5)
NEUTROPHILS ABSOLUTE: 3.7 K/UL (ref 1.5–7.5)
NEUTROPHILS ABSOLUTE: 3.9 K/UL (ref 1.5–7.5)
NEUTROPHILS ABSOLUTE: 3.9 K/UL (ref 1.5–7.5)
NEUTROPHILS ABSOLUTE: 4.1 K/UL (ref 1.5–7.5)
NEUTROPHILS ABSOLUTE: 4.7 K/UL (ref 1.5–7.5)
NEUTROPHILS ABSOLUTE: 5.2 K/UL (ref 1.5–7.5)
NEUTROPHILS ABSOLUTE: 5.6 K/UL (ref 1.5–7.5)
NEUTROPHILS ABSOLUTE: 5.6 K/UL (ref 1.5–7.5)
NEUTROPHILS ABSOLUTE: 6.3 K/UL (ref 1.5–7.5)
NEUTROPHILS ABSOLUTE: 6.6 K/UL (ref 1.5–7.5)
NEUTROPHILS ABSOLUTE: 7.2 K/UL (ref 1.5–7.5)
NEUTROPHILS ABSOLUTE: 7.6 K/UL (ref 1.5–7.5)
NEUTROPHILS ABSOLUTE: 8 K/UL (ref 1.5–7.5)
NEUTROPHILS ABSOLUTE: 9 K/UL (ref 1.5–7.5)
NEUTROPHILS ABSOLUTE: 9.7 K/UL (ref 1.5–7.5)
NEUTROPHILS RELATIVE PERCENT: 70.7 % (ref 50–65)
NEUTROPHILS RELATIVE PERCENT: 71.1 % (ref 50–65)
NEUTROPHILS RELATIVE PERCENT: 72.3 % (ref 50–65)
NEUTROPHILS RELATIVE PERCENT: 72.6 % (ref 50–65)
NEUTROPHILS RELATIVE PERCENT: 73.4 % (ref 50–65)
NEUTROPHILS RELATIVE PERCENT: 73.8 % (ref 50–65)
NEUTROPHILS RELATIVE PERCENT: 76 % (ref 50–65)
NEUTROPHILS RELATIVE PERCENT: 78.1 % (ref 50–65)
NEUTROPHILS RELATIVE PERCENT: 78.4 % (ref 50–65)
NEUTROPHILS RELATIVE PERCENT: 79.2 % (ref 50–65)
NEUTROPHILS RELATIVE PERCENT: 81.3 % (ref 50–65)
NEUTROPHILS RELATIVE PERCENT: 81.7 % (ref 50–65)
NEUTROPHILS RELATIVE PERCENT: 82.9 % (ref 50–65)
NEUTROPHILS RELATIVE PERCENT: 83.8 % (ref 50–65)
NEUTROPHILS RELATIVE PERCENT: 84.9 % (ref 50–65)
NEUTROPHILS RELATIVE PERCENT: 85.1 % (ref 50–65)
NEUTROPHILS RELATIVE PERCENT: 86.3 % (ref 50–65)
NEUTROPHILS RELATIVE PERCENT: 88.5 % (ref 50–65)
NEUTROPHILS RELATIVE PERCENT: 89.4 % (ref 50–65)
NITRITE, URINE: NEGATIVE
O2 CONTENT, VEN: 9 ML/DL
O2 SAT, VEN: 74 %
PARATHYROID HORMONE INTACT: 116.6 PG/ML (ref 15–65)
PARATHYROID HORMONE INTACT: 163.3 PG/ML (ref 15–65)
PARATHYROID HORMONE INTACT: 216.7 PG/ML (ref 15–65)
PARATHYROID HORMONE INTACT: 428.1 PG/ML (ref 15–65)
PCO2, VEN: 25 MMHG (ref 40–50)
PDW BLD-RTO: 13.7 % (ref 11.5–14.5)
PDW BLD-RTO: 14 % (ref 11.5–14.5)
PDW BLD-RTO: 14.2 % (ref 11.5–14.5)
PDW BLD-RTO: 14.3 % (ref 11.5–14.5)
PDW BLD-RTO: 14.4 % (ref 11.5–14.5)
PDW BLD-RTO: 14.6 % (ref 11.5–14.5)
PDW BLD-RTO: 14.7 % (ref 11.5–14.5)
PDW BLD-RTO: 14.8 % (ref 11.5–14.5)
PDW BLD-RTO: 15.3 % (ref 11.5–14.5)
PDW BLD-RTO: 15.4 % (ref 11.5–14.5)
PDW BLD-RTO: 15.7 % (ref 11.5–14.5)
PDW BLD-RTO: 16.5 % (ref 11.5–14.5)
PDW BLD-RTO: 18.3 % (ref 11.5–14.5)
PERFORMED ON: ABNORMAL
PERFORMED ON: NORMAL
PERFORMED ON: NORMAL
PH UA: 6 (ref 5–8)
PH VENOUS: 7.67 (ref 7.35–7.45)
PHOSPHORUS: 3.4 MG/DL (ref 2.5–4.5)
PHOSPHORUS: 3.5 MG/DL (ref 2.5–4.5)
PHOSPHORUS: 4.2 MG/DL (ref 2.5–4.5)
PHOSPHORUS: 4.5 MG/DL (ref 2.5–4.5)
PLATELET # BLD: 102 K/UL (ref 130–400)
PLATELET # BLD: 115 K/UL (ref 130–400)
PLATELET # BLD: 118 K/UL (ref 130–400)
PLATELET # BLD: 120 K/UL (ref 130–400)
PLATELET # BLD: 121 K/UL (ref 130–400)
PLATELET # BLD: 131 K/UL (ref 130–400)
PLATELET # BLD: 135 K/UL (ref 130–400)
PLATELET # BLD: 140 K/UL (ref 130–400)
PLATELET # BLD: 142 K/UL (ref 130–400)
PLATELET # BLD: 145 K/UL (ref 130–400)
PLATELET # BLD: 149 K/UL (ref 130–400)
PLATELET # BLD: 158 K/UL (ref 130–400)
PLATELET # BLD: 173 K/UL (ref 130–400)
PLATELET # BLD: 173 K/UL (ref 130–400)
PLATELET # BLD: 181 K/UL (ref 130–400)
PLATELET # BLD: 189 K/UL (ref 130–400)
PLATELET # BLD: 190 K/UL (ref 130–400)
PLATELET # BLD: 200 K/UL (ref 130–400)
PLATELET # BLD: 201 K/UL (ref 130–400)
PLATELET # BLD: 203 K/UL (ref 130–400)
PLATELET # BLD: 212 K/UL (ref 130–400)
PLATELET SLIDE REVIEW: ABNORMAL
PMV BLD AUTO: 10 FL (ref 9.4–12.3)
PMV BLD AUTO: 10 FL (ref 9.4–12.3)
PMV BLD AUTO: 10.2 FL (ref 9.4–12.3)
PMV BLD AUTO: 10.3 FL (ref 9.4–12.3)
PMV BLD AUTO: 10.4 FL (ref 9.4–12.3)
PMV BLD AUTO: 10.4 FL (ref 9.4–12.3)
PMV BLD AUTO: 10.5 FL (ref 9.4–12.3)
PMV BLD AUTO: 10.6 FL (ref 9.4–12.3)
PMV BLD AUTO: 10.6 FL (ref 9.4–12.3)
PMV BLD AUTO: 10.7 FL (ref 9.4–12.3)
PMV BLD AUTO: 10.7 FL (ref 9.4–12.3)
PMV BLD AUTO: 10.8 FL (ref 9.4–12.3)
PMV BLD AUTO: 11 FL (ref 9.4–12.3)
PMV BLD AUTO: 11.3 FL (ref 9.4–12.3)
PMV BLD AUTO: 11.4 FL (ref 9.4–12.3)
PMV BLD AUTO: 9.7 FL (ref 9.4–12.3)
PMV BLD AUTO: 9.9 FL (ref 9.4–12.3)
PO2, VEN: 30 MMHG
POTASSIUM REFLEX MAGNESIUM: 4 MMOL/L (ref 3.5–5)
POTASSIUM SERPL-SCNC: 3.3 MMOL/L (ref 3.5–5)
POTASSIUM SERPL-SCNC: 3.4 MMOL/L (ref 3.5–5)
POTASSIUM SERPL-SCNC: 3.5 MMOL/L (ref 3.5–5)
POTASSIUM SERPL-SCNC: 3.6 MMOL/L (ref 3.5–5)
POTASSIUM SERPL-SCNC: 3.7 MMOL/L (ref 3.5–5)
POTASSIUM SERPL-SCNC: 3.8 MMOL/L (ref 3.5–5)
POTASSIUM SERPL-SCNC: 3.9 MMOL/L (ref 3.5–5)
POTASSIUM SERPL-SCNC: 4 MMOL/L (ref 3.5–5)
POTASSIUM SERPL-SCNC: 4.1 MMOL/L (ref 3.5–5)
POTASSIUM SERPL-SCNC: 4.2 MMOL/L (ref 3.5–5)
POTASSIUM SERPL-SCNC: 4.3 MMOL/L (ref 3.5–5)
POTASSIUM SERPL-SCNC: 4.4 MMOL/L (ref 3.5–5)
POTASSIUM SERPL-SCNC: 4.5 MMOL/L (ref 3.5–5)
POTASSIUM SERPL-SCNC: 4.5 MMOL/L (ref 3.5–5)
POTASSIUM SERPL-SCNC: 4.7 MMOL/L (ref 3.5–5)
POTASSIUM SERPL-SCNC: 4.9 MMOL/L (ref 3.5–5)
PROTEIN UA: 100 MG/DL
PROTHROMBIN TIME: 13.4 SEC (ref 12–14.6)
PROTHROMBIN TIME: 13.5 SEC (ref 12–14.6)
RBC # BLD: 2.29 M/UL (ref 4.2–5.4)
RBC # BLD: 2.43 M/UL (ref 4.2–5.4)
RBC # BLD: 2.58 M/UL (ref 4.2–5.4)
RBC # BLD: 2.61 M/UL (ref 4.2–5.4)
RBC # BLD: 2.63 M/UL (ref 4.2–5.4)
RBC # BLD: 2.67 M/UL (ref 4.2–5.4)
RBC # BLD: 2.69 M/UL (ref 4.2–5.4)
RBC # BLD: 2.73 M/UL (ref 4.2–5.4)
RBC # BLD: 2.84 M/UL (ref 4.2–5.4)
RBC # BLD: 2.88 M/UL (ref 4.2–5.4)
RBC # BLD: 2.89 M/UL (ref 4.2–5.4)
RBC # BLD: 2.97 M/UL (ref 4.2–5.4)
RBC # BLD: 3 M/UL (ref 4.2–5.4)
RBC # BLD: 3.01 M/UL (ref 4.2–5.4)
RBC # BLD: 3.02 M/UL (ref 4.2–5.4)
RBC # BLD: 3.05 M/UL (ref 4.2–5.4)
RBC # BLD: 3.06 M/UL (ref 4.2–5.4)
RBC # BLD: 3.1 M/UL (ref 4.2–5.4)
RBC # BLD: 3.17 M/UL (ref 4.2–5.4)
RBC # BLD: 3.28 M/UL (ref 4.2–5.4)
RBC # BLD: 3.3 M/UL (ref 4.2–5.4)
REASON FOR REJECTION: NORMAL
REJECTED TEST: NORMAL
S PYO AG THROAT QL: NEGATIVE
S PYO THROAT QL CULT: NORMAL
SODIUM BLD-SCNC: 137 MMOL/L (ref 136–145)
SODIUM BLD-SCNC: 138 MMOL/L (ref 136–145)
SODIUM BLD-SCNC: 139 MMOL/L (ref 136–145)
SODIUM BLD-SCNC: 140 MMOL/L (ref 136–145)
SODIUM BLD-SCNC: 141 MMOL/L (ref 136–145)
SODIUM BLD-SCNC: 142 MMOL/L (ref 136–145)
SODIUM BLD-SCNC: 143 MMOL/L (ref 136–145)
SODIUM BLD-SCNC: 145 MMOL/L (ref 136–145)
SPE/IFE INTERPRETATION: ABNORMAL
SPECIFIC GRAVITY UA: 1.02 (ref 1–1.03)
TOTAL IRON BINDING CAPACITY: 236 UG/DL (ref 250–400)
TOTAL IRON BINDING CAPACITY: 274 UG/DL (ref 250–400)
TOTAL PROTEIN: 5.6 G/DL (ref 6.6–8.7)
TOTAL PROTEIN: 6 G/DL (ref 6.6–8.7)
TOTAL PROTEIN: 6.4 G/DL (ref 6.6–8.7)
TOTAL PROTEIN: 6.6 G/DL (ref 6.6–8.7)
TOTAL PROTEIN: 6.7 G/DL (ref 6.6–8.7)
TOTAL PROTEIN: 6.7 G/DL (ref 6.6–8.7)
TOTAL PROTEIN: 6.8 G/DL (ref 6.6–8.7)
TOTAL PROTEIN: 6.9 G/DL (ref 6.3–8.2)
TOTAL PROTEIN: 6.9 G/DL (ref 6.6–8.7)
TOTAL PROTEIN: 7 G/DL (ref 6.6–8.7)
TOTAL PROTEIN: 7.2 G/DL (ref 6.6–8.7)
TOTAL PROTEIN: 7.2 G/DL (ref 6.6–8.7)
TOTAL PROTEIN: 7.3 G/DL (ref 6.6–8.7)
TOTAL PROTEIN: 7.6 G/DL (ref 6.6–8.7)
TOTAL PROTEIN: 8 G/DL (ref 6.6–8.7)
TRIGL SERPL-MCNC: 53 MG/DL (ref 0–149)
TROPONIN: 0.09 NG/ML (ref 0–0.03)
TROPONIN: 0.09 NG/ML (ref 0–0.03)
TROPONIN: 0.3 NG/ML (ref 0–0.03)
URIC ACID, SERUM: 4.2 MG/DL (ref 2.4–5.7)
URIC ACID, SERUM: 4.5 MG/DL (ref 2.4–5.7)
URIC ACID, SERUM: 4.7 MG/DL (ref 2.4–5.7)
URINE CULTURE, ROUTINE: NORMAL
URINE REFLEX TO CULTURE: YES
UROBILINOGEN, URINE: 0.2 E.U./DL
VITAMIN D 25-HYDROXY: 46.4 NG/ML
VITAMIN D 25-HYDROXY: 51.6 NG/ML
VITAMIN D 25-HYDROXY: 58.2 NG/ML
WBC # BLD: 10.5 K/UL (ref 4.8–10.8)
WBC # BLD: 10.9 K/UL (ref 4.8–10.8)
WBC # BLD: 12.1 K/UL (ref 4.8–10.8)
WBC # BLD: 12.3 K/UL (ref 4.8–10.8)
WBC # BLD: 4.1 K/UL (ref 4.8–10.8)
WBC # BLD: 4.3 K/UL (ref 4.8–10.8)
WBC # BLD: 5.1 K/UL (ref 4.8–10.8)
WBC # BLD: 5.4 K/UL (ref 4.8–10.8)
WBC # BLD: 5.4 K/UL (ref 4.8–10.8)
WBC # BLD: 5.5 K/UL (ref 4.8–10.8)
WBC # BLD: 5.9 K/UL (ref 4.8–10.8)
WBC # BLD: 6.3 K/UL (ref 4.8–10.8)
WBC # BLD: 7.1 K/UL (ref 4.8–10.8)
WBC # BLD: 7.4 K/UL (ref 4.8–10.8)
WBC # BLD: 8 K/UL (ref 4.8–10.8)
WBC # BLD: 8.1 K/UL (ref 4.8–10.8)
WBC # BLD: 8.8 K/UL (ref 4.8–10.8)
WBC # BLD: 9.1 K/UL (ref 4.8–10.8)
WBC # BLD: 9.4 K/UL (ref 4.8–10.8)
WBC UA: NORMAL /HPF (ref 0–5)

## 2019-01-01 PROCEDURE — 93005 ELECTROCARDIOGRAM TRACING: CPT

## 2019-01-01 PROCEDURE — 3017F COLORECTAL CA SCREEN DOC REV: CPT | Performed by: INTERNAL MEDICINE

## 2019-01-01 PROCEDURE — 80053 COMPREHEN METABOLIC PANEL: CPT

## 2019-01-01 PROCEDURE — 80074 ACUTE HEPATITIS PANEL: CPT

## 2019-01-01 PROCEDURE — 99223 1ST HOSP IP/OBS HIGH 75: CPT | Performed by: PSYCHIATRY & NEUROLOGY

## 2019-01-01 PROCEDURE — 82948 REAGENT STRIP/BLOOD GLUCOSE: CPT

## 2019-01-01 PROCEDURE — 99232 SBSQ HOSP IP/OBS MODERATE 35: CPT | Performed by: INTERNAL MEDICINE

## 2019-01-01 PROCEDURE — 6370000000 HC RX 637 (ALT 250 FOR IP): Performed by: INTERNAL MEDICINE

## 2019-01-01 PROCEDURE — 99204 OFFICE O/P NEW MOD 45 MIN: CPT | Performed by: SURGERY

## 2019-01-01 PROCEDURE — 36415 COLL VENOUS BLD VENIPUNCTURE: CPT

## 2019-01-01 PROCEDURE — G8400 PT W/DXA NO RESULTS DOC: HCPCS | Performed by: CLINICAL NURSE SPECIALIST

## 2019-01-01 PROCEDURE — G8400 PT W/DXA NO RESULTS DOC: HCPCS | Performed by: INTERNAL MEDICINE

## 2019-01-01 PROCEDURE — 2500000003 HC RX 250 WO HCPCS: Performed by: FAMILY MEDICINE

## 2019-01-01 PROCEDURE — 3017F COLORECTAL CA SCREEN DOC REV: CPT | Performed by: NURSE PRACTITIONER

## 2019-01-01 PROCEDURE — 2580000003 HC RX 258: Performed by: INTERNAL MEDICINE

## 2019-01-01 PROCEDURE — 8010000000 HC HEMODIALYSIS ACUTE INPT

## 2019-01-01 PROCEDURE — 6360000002 HC RX W HCPCS: Performed by: FAMILY MEDICINE

## 2019-01-01 PROCEDURE — 1090F PRES/ABSN URINE INCON ASSESS: CPT | Performed by: SURGERY

## 2019-01-01 PROCEDURE — 99222 1ST HOSP IP/OBS MODERATE 55: CPT | Performed by: INTERNAL MEDICINE

## 2019-01-01 PROCEDURE — 82140 ASSAY OF AMMONIA: CPT

## 2019-01-01 PROCEDURE — 81003 URINALYSIS AUTO W/O SCOPE: CPT

## 2019-01-01 PROCEDURE — 2580000003 HC RX 258: Performed by: FAMILY MEDICINE

## 2019-01-01 PROCEDURE — 85025 COMPLETE CBC W/AUTO DIFF WBC: CPT

## 2019-01-01 PROCEDURE — 1036F TOBACCO NON-USER: CPT | Performed by: SURGERY

## 2019-01-01 PROCEDURE — 99232 SBSQ HOSP IP/OBS MODERATE 35: CPT | Performed by: NURSE PRACTITIONER

## 2019-01-01 PROCEDURE — 70551 MRI BRAIN STEM W/O DYE: CPT

## 2019-01-01 PROCEDURE — 74178 CT ABD&PLV WO CNTR FLWD CNTR: CPT

## 2019-01-01 PROCEDURE — 86901 BLOOD TYPING SEROLOGIC RH(D): CPT

## 2019-01-01 PROCEDURE — 99231 SBSQ HOSP IP/OBS SF/LOW 25: CPT | Performed by: INTERNAL MEDICINE

## 2019-01-01 PROCEDURE — 80048 BASIC METABOLIC PNL TOTAL CA: CPT

## 2019-01-01 PROCEDURE — 77001 FLUOROGUIDE FOR VEIN DEVICE: CPT | Performed by: SURGERY

## 2019-01-01 PROCEDURE — 3017F COLORECTAL CA SCREEN DOC REV: CPT | Performed by: SURGERY

## 2019-01-01 PROCEDURE — 93017 CV STRESS TEST TRACING ONLY: CPT

## 2019-01-01 PROCEDURE — 4040F PNEUMOC VAC/ADMIN/RCVD: CPT | Performed by: SURGERY

## 2019-01-01 PROCEDURE — 6360000002 HC RX W HCPCS: Performed by: NEUROLOGICAL SURGERY

## 2019-01-01 PROCEDURE — 6370000000 HC RX 637 (ALT 250 FOR IP): Performed by: FAMILY MEDICINE

## 2019-01-01 PROCEDURE — 99285 EMERGENCY DEPT VISIT HI MDM: CPT

## 2019-01-01 PROCEDURE — 2500000003 HC RX 250 WO HCPCS: Performed by: INTERNAL MEDICINE

## 2019-01-01 PROCEDURE — 82784 ASSAY IGA/IGD/IGG/IGM EACH: CPT

## 2019-01-01 PROCEDURE — G8484 FLU IMMUNIZE NO ADMIN: HCPCS | Performed by: INTERNAL MEDICINE

## 2019-01-01 PROCEDURE — G8598 ASA/ANTIPLAT THER USED: HCPCS | Performed by: NURSE PRACTITIONER

## 2019-01-01 PROCEDURE — 96375 TX/PRO/DX INJ NEW DRUG ADDON: CPT

## 2019-01-01 PROCEDURE — 2700000000 HC OXYGEN THERAPY PER DAY

## 2019-01-01 PROCEDURE — 96376 TX/PRO/DX INJ SAME DRUG ADON: CPT

## 2019-01-01 PROCEDURE — 0WB83ZX EXCISION OF CHEST WALL, PERCUTANEOUS APPROACH, DIAGNOSTIC: ICD-10-PCS | Performed by: RADIOLOGY

## 2019-01-01 PROCEDURE — 71045 X-RAY EXAM CHEST 1 VIEW: CPT

## 2019-01-01 PROCEDURE — 20225 BONE BIOPSY TROCAR/NDL DEEP: CPT | Performed by: NEUROLOGICAL SURGERY

## 2019-01-01 PROCEDURE — 93005 ELECTROCARDIOGRAM TRACING: CPT | Performed by: EMERGENCY MEDICINE

## 2019-01-01 PROCEDURE — 6360000002 HC RX W HCPCS: Performed by: SURGERY

## 2019-01-01 PROCEDURE — 72141 MRI NECK SPINE W/O DYE: CPT

## 2019-01-01 PROCEDURE — 6360000002 HC RX W HCPCS: Performed by: INTERNAL MEDICINE

## 2019-01-01 PROCEDURE — 80061 LIPID PANEL: CPT

## 2019-01-01 PROCEDURE — G0378 HOSPITAL OBSERVATION PER HR: HCPCS

## 2019-01-01 PROCEDURE — 1210000000 HC MED SURG R&B

## 2019-01-01 PROCEDURE — 82306 VITAMIN D 25 HYDROXY: CPT

## 2019-01-01 PROCEDURE — 6360000002 HC RX W HCPCS: Performed by: NURSE ANESTHETIST, CERTIFIED REGISTERED

## 2019-01-01 PROCEDURE — 6370000000 HC RX 637 (ALT 250 FOR IP): Performed by: NEUROLOGICAL SURGERY

## 2019-01-01 PROCEDURE — 86301 IMMUNOASSAY TUMOR CA 19-9: CPT

## 2019-01-01 PROCEDURE — 4A023N7 MEASUREMENT OF CARDIAC SAMPLING AND PRESSURE, LEFT HEART, PERCUTANEOUS APPROACH: ICD-10-PCS | Performed by: INTERNAL MEDICINE

## 2019-01-01 PROCEDURE — 88342 IMHCHEM/IMCYTCHM 1ST ANTB: CPT

## 2019-01-01 PROCEDURE — 99284 EMERGENCY DEPT VISIT MOD MDM: CPT

## 2019-01-01 PROCEDURE — 36558 INSERT TUNNELED CV CATH: CPT | Performed by: SURGERY

## 2019-01-01 PROCEDURE — 85730 THROMBOPLASTIN TIME PARTIAL: CPT

## 2019-01-01 PROCEDURE — 84550 ASSAY OF BLOOD/URIC ACID: CPT

## 2019-01-01 PROCEDURE — 86900 BLOOD TYPING SEROLOGIC ABO: CPT

## 2019-01-01 PROCEDURE — 7100000000 HC PACU RECOVERY - FIRST 15 MIN: Performed by: NEUROLOGICAL SURGERY

## 2019-01-01 PROCEDURE — 6360000004 HC RX CONTRAST MEDICATION: Performed by: INTERNAL MEDICINE

## 2019-01-01 PROCEDURE — G8417 CALC BMI ABV UP PARAM F/U: HCPCS | Performed by: CLINICAL NURSE SPECIALIST

## 2019-01-01 PROCEDURE — 71046 X-RAY EXAM CHEST 2 VIEWS: CPT

## 2019-01-01 PROCEDURE — 76937 US GUIDE VASCULAR ACCESS: CPT | Performed by: SURGERY

## 2019-01-01 PROCEDURE — 93010 ELECTROCARDIOGRAM REPORT: CPT | Performed by: INTERNAL MEDICINE

## 2019-01-01 PROCEDURE — 2580000003 HC RX 258: Performed by: NEUROLOGICAL SURGERY

## 2019-01-01 PROCEDURE — G8427 DOCREV CUR MEDS BY ELIG CLIN: HCPCS | Performed by: INTERNAL MEDICINE

## 2019-01-01 PROCEDURE — 86922 COMPATIBILITY TEST ANTIGLOB: CPT

## 2019-01-01 PROCEDURE — 84100 ASSAY OF PHOSPHORUS: CPT

## 2019-01-01 PROCEDURE — 83735 ASSAY OF MAGNESIUM: CPT

## 2019-01-01 PROCEDURE — 82105 ALPHA-FETOPROTEIN SERUM: CPT

## 2019-01-01 PROCEDURE — 94761 N-INVAS EAR/PLS OXIMETRY MLT: CPT

## 2019-01-01 PROCEDURE — 99024 POSTOP FOLLOW-UP VISIT: CPT | Performed by: NURSE PRACTITIONER

## 2019-01-01 PROCEDURE — 7100000000 HC PACU RECOVERY - FIRST 15 MIN: Performed by: INTERNAL MEDICINE

## 2019-01-01 PROCEDURE — 3700000001 HC ADD 15 MINUTES (ANESTHESIA): Performed by: INTERNAL MEDICINE

## 2019-01-01 PROCEDURE — 0QB03ZX EXCISION OF LUMBAR VERTEBRA, PERCUTANEOUS APPROACH, DIAGNOSTIC: ICD-10-PCS | Performed by: NEUROLOGICAL SURGERY

## 2019-01-01 PROCEDURE — 2720000010 HC SURG SUPPLY STERILE: Performed by: NEUROLOGICAL SURGERY

## 2019-01-01 PROCEDURE — 2580000003 HC RX 258: Performed by: NURSE ANESTHETIST, CERTIFIED REGISTERED

## 2019-01-01 PROCEDURE — 96374 THER/PROPH/DIAG INJ IV PUSH: CPT

## 2019-01-01 PROCEDURE — 90686 IIV4 VACC NO PRSV 0.5 ML IM: CPT | Performed by: FAMILY MEDICINE

## 2019-01-01 PROCEDURE — G0257 UNSCHED DIALYSIS ESRD PT HOS: HCPCS

## 2019-01-01 PROCEDURE — 3046F HEMOGLOBIN A1C LEVEL >9.0%: CPT | Performed by: CLINICAL NURSE SPECIALIST

## 2019-01-01 PROCEDURE — 84484 ASSAY OF TROPONIN QUANT: CPT

## 2019-01-01 PROCEDURE — 94762 N-INVAS EAR/PLS OXIMTRY CONT: CPT

## 2019-01-01 PROCEDURE — 2580000003 HC RX 258: Performed by: EMERGENCY MEDICINE

## 2019-01-01 PROCEDURE — 99223 1ST HOSP IP/OBS HIGH 75: CPT | Performed by: NURSE PRACTITIONER

## 2019-01-01 PROCEDURE — 7100000001 HC PACU RECOVERY - ADDTL 15 MIN: Performed by: INTERNAL MEDICINE

## 2019-01-01 PROCEDURE — 3430000000 HC RX DIAGNOSTIC RADIOPHARMACEUTICAL: Performed by: INTERNAL MEDICINE

## 2019-01-01 PROCEDURE — 83690 ASSAY OF LIPASE: CPT

## 2019-01-01 PROCEDURE — G8598 ASA/ANTIPLAT THER USED: HCPCS | Performed by: CLINICAL NURSE SPECIALIST

## 2019-01-01 PROCEDURE — 88311 DECALCIFY TISSUE: CPT

## 2019-01-01 PROCEDURE — 83550 IRON BINDING TEST: CPT

## 2019-01-01 PROCEDURE — 6360000002 HC RX W HCPCS: Performed by: NURSE PRACTITIONER

## 2019-01-01 PROCEDURE — 43239 EGD BIOPSY SINGLE/MULTIPLE: CPT | Performed by: INTERNAL MEDICINE

## 2019-01-01 PROCEDURE — C1769 GUIDE WIRE: HCPCS

## 2019-01-01 PROCEDURE — 6370000000 HC RX 637 (ALT 250 FOR IP): Performed by: PSYCHIATRY & NEUROLOGY

## 2019-01-01 PROCEDURE — 99219 PR INITIAL OBSERVATION CARE/DAY 50 MINUTES: CPT | Performed by: NURSE PRACTITIONER

## 2019-01-01 PROCEDURE — 70450 CT HEAD/BRAIN W/O DYE: CPT

## 2019-01-01 PROCEDURE — 1090F PRES/ABSN URINE INCON ASSESS: CPT | Performed by: INTERNAL MEDICINE

## 2019-01-01 PROCEDURE — 1036F TOBACCO NON-USER: CPT | Performed by: CLINICAL NURSE SPECIALIST

## 2019-01-01 PROCEDURE — 72131 CT LUMBAR SPINE W/O DYE: CPT

## 2019-01-01 PROCEDURE — 7100000001 HC PACU RECOVERY - ADDTL 15 MIN: Performed by: NEUROLOGICAL SURGERY

## 2019-01-01 PROCEDURE — 6370000000 HC RX 637 (ALT 250 FOR IP): Performed by: NURSE PRACTITIONER

## 2019-01-01 PROCEDURE — 2709999900 HC NON-CHARGEABLE SUPPLY

## 2019-01-01 PROCEDURE — 78452 HT MUSCLE IMAGE SPECT MULT: CPT

## 2019-01-01 PROCEDURE — 88305 TISSUE EXAM BY PATHOLOGIST: CPT

## 2019-01-01 PROCEDURE — 83970 ASSAY OF PARATHORMONE: CPT

## 2019-01-01 PROCEDURE — 3209999900 FLUORO FOR SURGICAL PROCEDURES

## 2019-01-01 PROCEDURE — G8400 PT W/DXA NO RESULTS DOC: HCPCS | Performed by: NURSE PRACTITIONER

## 2019-01-01 PROCEDURE — P9047 ALBUMIN (HUMAN), 25%, 50ML: HCPCS | Performed by: INTERNAL MEDICINE

## 2019-01-01 PROCEDURE — 86850 RBC ANTIBODY SCREEN: CPT

## 2019-01-01 PROCEDURE — 3017F COLORECTAL CA SCREEN DOC REV: CPT | Performed by: CLINICAL NURSE SPECIALIST

## 2019-01-01 PROCEDURE — 85027 COMPLETE CBC AUTOMATED: CPT

## 2019-01-01 PROCEDURE — 6370000000 HC RX 637 (ALT 250 FOR IP)

## 2019-01-01 PROCEDURE — G0008 ADMIN INFLUENZA VIRUS VAC: HCPCS | Performed by: FAMILY MEDICINE

## 2019-01-01 PROCEDURE — G8400 PT W/DXA NO RESULTS DOC: HCPCS | Performed by: SURGERY

## 2019-01-01 PROCEDURE — 2500000003 HC RX 250 WO HCPCS: Performed by: NEUROLOGICAL SURGERY

## 2019-01-01 PROCEDURE — 1123F ACP DISCUSS/DSCN MKR DOCD: CPT | Performed by: NURSE PRACTITIONER

## 2019-01-01 PROCEDURE — 74177 CT ABD & PELVIS W/CONTRAST: CPT

## 2019-01-01 PROCEDURE — 6360000002 HC RX W HCPCS: Performed by: EMERGENCY MEDICINE

## 2019-01-01 PROCEDURE — 87086 URINE CULTURE/COLONY COUNT: CPT

## 2019-01-01 PROCEDURE — 2580000003 HC RX 258: Performed by: ANESTHESIOLOGY

## 2019-01-01 PROCEDURE — 5A1D70Z PERFORMANCE OF URINARY FILTRATION, INTERMITTENT, LESS THAN 6 HOURS PER DAY: ICD-10-PCS | Performed by: INTERNAL MEDICINE

## 2019-01-01 PROCEDURE — 3600000005 HC SURGERY LEVEL 5 BASE: Performed by: NEUROLOGICAL SURGERY

## 2019-01-01 PROCEDURE — 1036F TOBACCO NON-USER: CPT | Performed by: INTERNAL MEDICINE

## 2019-01-01 PROCEDURE — 86870 RBC ANTIBODY IDENTIFICATION: CPT

## 2019-01-01 PROCEDURE — 88341 IMHCHEM/IMCYTCHM EA ADD ANTB: CPT

## 2019-01-01 PROCEDURE — 0DB58ZX EXCISION OF ESOPHAGUS, VIA NATURAL OR ARTIFICIAL OPENING ENDOSCOPIC, DIAGNOSTIC: ICD-10-PCS | Performed by: INTERNAL MEDICINE

## 2019-01-01 PROCEDURE — 83036 HEMOGLOBIN GLYCOSYLATED A1C: CPT

## 2019-01-01 PROCEDURE — 99999 PR OFFICE/OUTPT VISIT,PROCEDURE ONLY: CPT | Performed by: THORACIC SURGERY (CARDIOTHORACIC VASCULAR SURGERY)

## 2019-01-01 PROCEDURE — 36430 TRANSFUSION BLD/BLD COMPNT: CPT

## 2019-01-01 PROCEDURE — 99233 SBSQ HOSP IP/OBS HIGH 50: CPT | Performed by: PSYCHIATRY & NEUROLOGY

## 2019-01-01 PROCEDURE — A9500 TC99M SESTAMIBI: HCPCS | Performed by: INTERNAL MEDICINE

## 2019-01-01 PROCEDURE — 1036F TOBACCO NON-USER: CPT | Performed by: NURSE PRACTITIONER

## 2019-01-01 PROCEDURE — 71260 CT THORAX DX C+: CPT

## 2019-01-01 PROCEDURE — G8417 CALC BMI ABV UP PARAM F/U: HCPCS | Performed by: SURGERY

## 2019-01-01 PROCEDURE — 2022F DILAT RTA XM EVC RTNOPTHY: CPT | Performed by: CLINICAL NURSE SPECIALIST

## 2019-01-01 PROCEDURE — P9016 RBC LEUKOCYTES REDUCED: HCPCS

## 2019-01-01 PROCEDURE — 83605 ASSAY OF LACTIC ACID: CPT

## 2019-01-01 PROCEDURE — 1101F PT FALLS ASSESS-DOCD LE1/YR: CPT | Performed by: INTERNAL MEDICINE

## 2019-01-01 PROCEDURE — 99214 OFFICE O/P EST MOD 30 MIN: CPT | Performed by: CLINICAL NURSE SPECIALIST

## 2019-01-01 PROCEDURE — 72100 X-RAY EXAM L-S SPINE 2/3 VWS: CPT

## 2019-01-01 PROCEDURE — 1123F ACP DISCUSS/DSCN MKR DOCD: CPT | Performed by: SURGERY

## 2019-01-01 PROCEDURE — 2500000003 HC RX 250 WO HCPCS: Performed by: EMERGENCY MEDICINE

## 2019-01-01 PROCEDURE — 82728 ASSAY OF FERRITIN: CPT

## 2019-01-01 PROCEDURE — 2720000000 CT GUIDED NEEDLE PLACEMENT

## 2019-01-01 PROCEDURE — 99222 1ST HOSP IP/OBS MODERATE 55: CPT | Performed by: NEUROLOGICAL SURGERY

## 2019-01-01 PROCEDURE — 99231 SBSQ HOSP IP/OBS SF/LOW 25: CPT | Performed by: NEUROLOGICAL SURGERY

## 2019-01-01 PROCEDURE — 6360000004 HC RX CONTRAST MEDICATION: Performed by: FAMILY MEDICINE

## 2019-01-01 PROCEDURE — 72125 CT NECK SPINE W/O DYE: CPT

## 2019-01-01 PROCEDURE — 85018 HEMOGLOBIN: CPT

## 2019-01-01 PROCEDURE — 99285 EMERGENCY DEPT VISIT HI MDM: CPT | Performed by: EMERGENCY MEDICINE

## 2019-01-01 PROCEDURE — G8417 CALC BMI ABV UP PARAM F/U: HCPCS | Performed by: NURSE PRACTITIONER

## 2019-01-01 PROCEDURE — 83883 ASSAY NEPHELOMETRY NOT SPEC: CPT

## 2019-01-01 PROCEDURE — 84165 PROTEIN E-PHORESIS SERUM: CPT

## 2019-01-01 PROCEDURE — 74176 CT ABD & PELVIS W/O CONTRAST: CPT

## 2019-01-01 PROCEDURE — 96372 THER/PROPH/DIAG INJ SC/IM: CPT

## 2019-01-01 PROCEDURE — G8427 DOCREV CUR MEDS BY ELIG CLIN: HCPCS | Performed by: NURSE PRACTITIONER

## 2019-01-01 PROCEDURE — 3700000000 HC ANESTHESIA ATTENDED CARE: Performed by: INTERNAL MEDICINE

## 2019-01-01 PROCEDURE — 96365 THER/PROPH/DIAG IV INF INIT: CPT

## 2019-01-01 PROCEDURE — 3700000000 HC ANESTHESIA ATTENDED CARE: Performed by: NEUROLOGICAL SURGERY

## 2019-01-01 PROCEDURE — 96361 HYDRATE IV INFUSION ADD-ON: CPT

## 2019-01-01 PROCEDURE — 2140000000 HC CCU INTERMEDIATE R&B

## 2019-01-01 PROCEDURE — 2580000003 HC RX 258: Performed by: NURSE PRACTITIONER

## 2019-01-01 PROCEDURE — 86905 BLOOD TYPING RBC ANTIGENS: CPT

## 2019-01-01 PROCEDURE — 1123F ACP DISCUSS/DSCN MKR DOCD: CPT | Performed by: INTERNAL MEDICINE

## 2019-01-01 PROCEDURE — C8929 TTE W OR WO FOL WCON,DOPPLER: HCPCS

## 2019-01-01 PROCEDURE — 4040F PNEUMOC VAC/ADMIN/RCVD: CPT | Performed by: CLINICAL NURSE SPECIALIST

## 2019-01-01 PROCEDURE — 99238 HOSP IP/OBS DSCHRG MGMT 30/<: CPT | Performed by: INTERNAL MEDICINE

## 2019-01-01 PROCEDURE — 87081 CULTURE SCREEN ONLY: CPT

## 2019-01-01 PROCEDURE — 99214 OFFICE O/P EST MOD 30 MIN: CPT | Performed by: INTERNAL MEDICINE

## 2019-01-01 PROCEDURE — C1750 CATH, HEMODIALYSIS,LONG-TERM: HCPCS

## 2019-01-01 PROCEDURE — 4040F PNEUMOC VAC/ADMIN/RCVD: CPT | Performed by: NURSE PRACTITIONER

## 2019-01-01 PROCEDURE — 86923 COMPATIBILITY TEST ELECTRIC: CPT

## 2019-01-01 PROCEDURE — 6360000002 HC RX W HCPCS

## 2019-01-01 PROCEDURE — 85014 HEMATOCRIT: CPT

## 2019-01-01 PROCEDURE — 82378 CARCINOEMBRYONIC ANTIGEN: CPT

## 2019-01-01 PROCEDURE — 3609012800 HC EGD DIAGNOSTIC ONLY: Performed by: INTERNAL MEDICINE

## 2019-01-01 PROCEDURE — 5A1D70Z PERFORMANCE OF URINARY FILTRATION, INTERMITTENT, LESS THAN 6 HOURS PER DAY: ICD-10-PCS | Performed by: FAMILY MEDICINE

## 2019-01-01 PROCEDURE — 72128 CT CHEST SPINE W/O DYE: CPT

## 2019-01-01 PROCEDURE — B215YZZ FLUOROSCOPY OF LEFT HEART USING OTHER CONTRAST: ICD-10-PCS | Performed by: INTERNAL MEDICINE

## 2019-01-01 PROCEDURE — 2709999900 HC NON-CHARGEABLE SUPPLY: Performed by: NEUROLOGICAL SURGERY

## 2019-01-01 PROCEDURE — 82232 ASSAY OF BETA-2 PROTEIN: CPT

## 2019-01-01 PROCEDURE — G8417 CALC BMI ABV UP PARAM F/U: HCPCS | Performed by: INTERNAL MEDICINE

## 2019-01-01 PROCEDURE — B211YZZ FLUOROSCOPY OF MULTIPLE CORONARY ARTERIES USING OTHER CONTRAST: ICD-10-PCS | Performed by: INTERNAL MEDICINE

## 2019-01-01 PROCEDURE — 88307 TISSUE EXAM BY PATHOLOGIST: CPT

## 2019-01-01 PROCEDURE — 1101F PT FALLS ASSESS-DOCD LE1/YR: CPT | Performed by: SURGERY

## 2019-01-01 PROCEDURE — 3700000001 HC ADD 15 MINUTES (ANESTHESIA): Performed by: NEUROLOGICAL SURGERY

## 2019-01-01 PROCEDURE — 1090F PRES/ABSN URINE INCON ASSESS: CPT | Performed by: CLINICAL NURSE SPECIALIST

## 2019-01-01 PROCEDURE — 86334 IMMUNOFIX E-PHORESIS SERUM: CPT

## 2019-01-01 PROCEDURE — 2709999900 HC NON-CHARGEABLE SUPPLY: Performed by: INTERNAL MEDICINE

## 2019-01-01 PROCEDURE — 83540 ASSAY OF IRON: CPT

## 2019-01-01 PROCEDURE — 85610 PROTHROMBIN TIME: CPT

## 2019-01-01 PROCEDURE — 93458 L HRT ARTERY/VENTRICLE ANGIO: CPT

## 2019-01-01 PROCEDURE — 99152 MOD SED SAME PHYS/QHP 5/>YRS: CPT | Performed by: INTERNAL MEDICINE

## 2019-01-01 PROCEDURE — 72148 MRI LUMBAR SPINE W/O DYE: CPT

## 2019-01-01 PROCEDURE — 1090F PRES/ABSN URINE INCON ASSESS: CPT | Performed by: NURSE PRACTITIONER

## 2019-01-01 PROCEDURE — 2100000000 HC CCU R&B

## 2019-01-01 PROCEDURE — 99152 MOD SED SAME PHYS/QHP 5/>YRS: CPT | Performed by: SURGERY

## 2019-01-01 PROCEDURE — G0365 VESSEL MAPPING HEMO ACCESS: HCPCS

## 2019-01-01 PROCEDURE — C1894 INTRO/SHEATH, NON-LASER: HCPCS

## 2019-01-01 PROCEDURE — 99221 1ST HOSP IP/OBS SF/LOW 40: CPT | Performed by: INTERNAL MEDICINE

## 2019-01-01 PROCEDURE — 2500000003 HC RX 250 WO HCPCS

## 2019-01-01 PROCEDURE — 4040F PNEUMOC VAC/ADMIN/RCVD: CPT | Performed by: INTERNAL MEDICINE

## 2019-01-01 PROCEDURE — 82803 BLOOD GASES ANY COMBINATION: CPT

## 2019-01-01 PROCEDURE — 93458 L HRT ARTERY/VENTRICLE ANGIO: CPT | Performed by: INTERNAL MEDICINE

## 2019-01-01 PROCEDURE — 84160 ASSAY OF PROTEIN ANY SOURCE: CPT

## 2019-01-01 PROCEDURE — G8427 DOCREV CUR MEDS BY ELIG CLIN: HCPCS | Performed by: SURGERY

## 2019-01-01 PROCEDURE — 3600000015 HC SURGERY LEVEL 5 ADDTL 15MIN: Performed by: NEUROLOGICAL SURGERY

## 2019-01-01 PROCEDURE — 99152 MOD SED SAME PHYS/QHP 5/>YRS: CPT

## 2019-01-01 PROCEDURE — C1894 INTRO/SHEATH, NON-LASER: HCPCS | Performed by: NEUROLOGICAL SURGERY

## 2019-01-01 PROCEDURE — 99233 SBSQ HOSP IP/OBS HIGH 50: CPT | Performed by: NURSE PRACTITIONER

## 2019-01-01 PROCEDURE — G8427 DOCREV CUR MEDS BY ELIG CLIN: HCPCS | Performed by: CLINICAL NURSE SPECIALIST

## 2019-01-01 PROCEDURE — 99232 SBSQ HOSP IP/OBS MODERATE 35: CPT | Performed by: NEUROLOGICAL SURGERY

## 2019-01-01 PROCEDURE — 36600 WITHDRAWAL OF ARTERIAL BLOOD: CPT

## 2019-01-01 PROCEDURE — 99213 OFFICE O/P EST LOW 20 MIN: CPT | Performed by: NURSE PRACTITIONER

## 2019-01-01 PROCEDURE — 87880 STREP A ASSAY W/OPTIC: CPT

## 2019-01-01 PROCEDURE — 2500000003 HC RX 250 WO HCPCS: Performed by: NURSE ANESTHETIST, CERTIFIED REGISTERED

## 2019-01-01 PROCEDURE — 1123F ACP DISCUSS/DSCN MKR DOCD: CPT | Performed by: CLINICAL NURSE SPECIALIST

## 2019-01-01 PROCEDURE — 99153 MOD SED SAME PHYS/QHP EA: CPT | Performed by: SURGERY

## 2019-01-01 PROCEDURE — 07DS3ZX EXTRACTION OF VERTEBRAL BONE MARROW, PERCUTANEOUS APPROACH, DIAGNOSTIC: ICD-10-PCS | Performed by: NEUROLOGICAL SURGERY

## 2019-01-01 PROCEDURE — G8484 FLU IMMUNIZE NO ADMIN: HCPCS | Performed by: SURGERY

## 2019-01-01 RX ORDER — MEPERIDINE HYDROCHLORIDE 50 MG/ML
12.5 INJECTION INTRAMUSCULAR; INTRAVENOUS; SUBCUTANEOUS EVERY 5 MIN PRN
Status: DISCONTINUED | OUTPATIENT
Start: 2019-01-01 | End: 2019-01-01

## 2019-01-01 RX ORDER — DRONABINOL 2.5 MG/1
2.5 CAPSULE ORAL 2 TIMES DAILY
Status: DISCONTINUED | OUTPATIENT
Start: 2019-01-01 | End: 2019-01-01 | Stop reason: HOSPADM

## 2019-01-01 RX ORDER — MORPHINE SULFATE 4 MG/ML
2 INJECTION, SOLUTION INTRAMUSCULAR; INTRAVENOUS EVERY 4 HOURS PRN
Status: DISCONTINUED | OUTPATIENT
Start: 2019-01-01 | End: 2019-01-01 | Stop reason: HOSPADM

## 2019-01-01 RX ORDER — SUCRALFATE 1 G/1
1 TABLET ORAL EVERY 8 HOURS SCHEDULED
Status: DISCONTINUED | OUTPATIENT
Start: 2019-01-01 | End: 2019-01-01 | Stop reason: HOSPADM

## 2019-01-01 RX ORDER — CLOPIDOGREL BISULFATE 75 MG/1
1 TABLET ORAL DAILY
Refills: 3 | COMMUNITY
Start: 2019-01-01

## 2019-01-01 RX ORDER — ACETAMINOPHEN 325 MG/1
650 TABLET ORAL EVERY 4 HOURS PRN
Status: DISCONTINUED | OUTPATIENT
Start: 2019-01-01 | End: 2019-01-01 | Stop reason: HOSPADM

## 2019-01-01 RX ORDER — FAMOTIDINE 20 MG/1
20 TABLET, FILM COATED ORAL 2 TIMES DAILY
Status: DISCONTINUED | OUTPATIENT
Start: 2019-01-01 | End: 2019-01-01

## 2019-01-01 RX ORDER — BISACODYL 10 MG
10 SUPPOSITORY, RECTAL RECTAL DAILY PRN
Status: DISCONTINUED | OUTPATIENT
Start: 2019-01-01 | End: 2019-01-01 | Stop reason: HOSPADM

## 2019-01-01 RX ORDER — HEPARIN SODIUM 1000 [USP'U]/ML
3600 INJECTION, SOLUTION INTRAVENOUS; SUBCUTANEOUS
Status: ACTIVE | OUTPATIENT
Start: 2019-01-01 | End: 2019-01-01

## 2019-01-01 RX ORDER — HYDROCODONE BITARTRATE AND ACETAMINOPHEN 7.5; 325 MG/1; MG/1
1 TABLET ORAL EVERY 12 HOURS PRN
Status: DISCONTINUED | OUTPATIENT
Start: 2019-01-01 | End: 2019-01-01 | Stop reason: HOSPADM

## 2019-01-01 RX ORDER — CARVEDILOL 6.25 MG/1
6.25 TABLET ORAL 2 TIMES DAILY
Status: DISCONTINUED | OUTPATIENT
Start: 2019-01-01 | End: 2019-01-01

## 2019-01-01 RX ORDER — CLOPIDOGREL BISULFATE 75 MG/1
75 TABLET ORAL DAILY
Status: DISCONTINUED | OUTPATIENT
Start: 2019-01-01 | End: 2019-01-01

## 2019-01-01 RX ORDER — ASPIRIN 81 MG/1
81 TABLET ORAL ONCE
Status: COMPLETED | OUTPATIENT
Start: 2019-01-01 | End: 2019-01-01

## 2019-01-01 RX ORDER — LACTULOSE 10 G/15ML
20 SOLUTION ORAL 3 TIMES DAILY
Status: CANCELLED | OUTPATIENT
Start: 2019-01-01

## 2019-01-01 RX ORDER — ASPIRIN 81 MG/1
81 TABLET, CHEWABLE ORAL DAILY
Status: DISCONTINUED | OUTPATIENT
Start: 2019-01-01 | End: 2019-01-01 | Stop reason: HOSPADM

## 2019-01-01 RX ORDER — SODIUM CHLORIDE 450 MG/100ML
INJECTION, SOLUTION INTRAVENOUS CONTINUOUS PRN
Status: DISCONTINUED | OUTPATIENT
Start: 2019-01-01 | End: 2019-01-01 | Stop reason: SDUPTHER

## 2019-01-01 RX ORDER — SODIUM CHLORIDE 0.9 % (FLUSH) 0.9 %
10 SYRINGE (ML) INJECTION EVERY 12 HOURS SCHEDULED
Status: DISCONTINUED | OUTPATIENT
Start: 2019-01-01 | End: 2019-01-01 | Stop reason: HOSPADM

## 2019-01-01 RX ORDER — OXYCODONE AND ACETAMINOPHEN 7.5; 325 MG/1; MG/1
1 TABLET ORAL EVERY 4 HOURS PRN
Status: DISCONTINUED | OUTPATIENT
Start: 2019-01-01 | End: 2019-01-01 | Stop reason: HOSPADM

## 2019-01-01 RX ORDER — BUPIVACAINE HYDROCHLORIDE AND EPINEPHRINE 2.5; 5 MG/ML; UG/ML
INJECTION, SOLUTION EPIDURAL; INFILTRATION; INTRACAUDAL; PERINEURAL PRN
Status: DISCONTINUED | OUTPATIENT
Start: 2019-01-01 | End: 2019-01-01 | Stop reason: HOSPADM

## 2019-01-01 RX ORDER — TIZANIDINE 2 MG/1
TABLET ORAL
COMMUNITY

## 2019-01-01 RX ORDER — SODIUM CHLORIDE 0.9 % (FLUSH) 0.9 %
10 SYRINGE (ML) INJECTION PRN
Status: DISCONTINUED | OUTPATIENT
Start: 2019-01-01 | End: 2019-01-01 | Stop reason: SDUPTHER

## 2019-01-01 RX ORDER — ATORVASTATIN CALCIUM 40 MG/1
40 TABLET, FILM COATED ORAL NIGHTLY
Status: DISCONTINUED | OUTPATIENT
Start: 2019-01-01 | End: 2019-01-01 | Stop reason: HOSPADM

## 2019-01-01 RX ORDER — HYDROCODONE BITARTRATE AND ACETAMINOPHEN 5; 325 MG/1; MG/1
1 TABLET ORAL ONCE
Status: COMPLETED | OUTPATIENT
Start: 2019-01-01 | End: 2019-01-01

## 2019-01-01 RX ORDER — FENTANYL CITRATE 50 UG/ML
INJECTION, SOLUTION INTRAMUSCULAR; INTRAVENOUS
Status: COMPLETED | OUTPATIENT
Start: 2019-01-01 | End: 2019-01-01

## 2019-01-01 RX ORDER — DRONABINOL 2.5 MG/1
2.5 CAPSULE ORAL 2 TIMES DAILY
Qty: 60 CAPSULE | Refills: 1 | Status: SHIPPED | OUTPATIENT
Start: 2019-01-01 | End: 2019-10-24

## 2019-01-01 RX ORDER — ASPIRIN 81 MG/1
81 TABLET ORAL ONCE
Status: CANCELLED | OUTPATIENT
Start: 2019-01-01 | End: 2019-01-01

## 2019-01-01 RX ORDER — ACETAMINOPHEN 325 MG/1
650 TABLET ORAL EVERY 4 HOURS PRN
Status: CANCELLED | OUTPATIENT
Start: 2019-01-01

## 2019-01-01 RX ORDER — SUCRALFATE 1 G/1
1 TABLET ORAL EVERY 8 HOURS SCHEDULED
Status: CANCELLED | OUTPATIENT
Start: 2019-01-01

## 2019-01-01 RX ORDER — FENTANYL 25 UG/H
1 PATCH TRANSDERMAL
Qty: 10 PATCH | Refills: 0 | Status: ON HOLD | OUTPATIENT
Start: 2019-01-01 | End: 2019-01-01 | Stop reason: HOSPADM

## 2019-01-01 RX ORDER — POTASSIUM CHLORIDE 750 MG/1
10 TABLET, EXTENDED RELEASE ORAL DAILY
Status: DISCONTINUED | OUTPATIENT
Start: 2019-01-01 | End: 2019-01-01 | Stop reason: HOSPADM

## 2019-01-01 RX ORDER — 0.9 % SODIUM CHLORIDE 0.9 %
10 VIAL (ML) INJECTION PRN
Status: DISCONTINUED | OUTPATIENT
Start: 2019-01-01 | End: 2019-01-01 | Stop reason: HOSPADM

## 2019-01-01 RX ORDER — HYDRALAZINE HYDROCHLORIDE 20 MG/ML
10 INJECTION INTRAMUSCULAR; INTRAVENOUS EVERY 6 HOURS PRN
Status: DISCONTINUED | OUTPATIENT
Start: 2019-01-01 | End: 2019-01-01

## 2019-01-01 RX ORDER — LIDOCAINE HYDROCHLORIDE 10 MG/ML
INJECTION, SOLUTION INFILTRATION; PERINEURAL PRN
Status: DISCONTINUED | OUTPATIENT
Start: 2019-01-01 | End: 2019-01-01 | Stop reason: SDUPTHER

## 2019-01-01 RX ORDER — ONDANSETRON 2 MG/ML
INJECTION INTRAMUSCULAR; INTRAVENOUS PRN
Status: DISCONTINUED | OUTPATIENT
Start: 2019-01-01 | End: 2019-01-01 | Stop reason: SDUPTHER

## 2019-01-01 RX ORDER — DEXTROSE MONOHYDRATE 25 G/50ML
12.5 INJECTION, SOLUTION INTRAVENOUS PRN
Status: DISCONTINUED | OUTPATIENT
Start: 2019-01-01 | End: 2019-01-01 | Stop reason: HOSPADM

## 2019-01-01 RX ORDER — HYDRALAZINE HYDROCHLORIDE 25 MG/1
25 TABLET, FILM COATED ORAL 2 TIMES DAILY
Status: DISCONTINUED | OUTPATIENT
Start: 2019-01-01 | End: 2019-01-01 | Stop reason: HOSPADM

## 2019-01-01 RX ORDER — 0.9 % SODIUM CHLORIDE 0.9 %
10 VIAL (ML) INJECTION DAILY
Status: DISCONTINUED | OUTPATIENT
Start: 2019-01-01 | End: 2019-01-01 | Stop reason: HOSPADM

## 2019-01-01 RX ORDER — HEPARIN SODIUM 1000 [USP'U]/ML
3600 INJECTION, SOLUTION INTRAVENOUS; SUBCUTANEOUS
Status: COMPLETED | OUTPATIENT
Start: 2019-01-01 | End: 2019-01-01

## 2019-01-01 RX ORDER — PANTOPRAZOLE SODIUM 20 MG/1
20 TABLET, DELAYED RELEASE ORAL
Status: DISCONTINUED | OUTPATIENT
Start: 2019-01-01 | End: 2019-01-01

## 2019-01-01 RX ORDER — KETOROLAC TROMETHAMINE 30 MG/ML
15 INJECTION, SOLUTION INTRAMUSCULAR; INTRAVENOUS EVERY 6 HOURS PRN
Status: DISCONTINUED | OUTPATIENT
Start: 2019-01-01 | End: 2019-01-01 | Stop reason: HOSPADM

## 2019-01-01 RX ORDER — CARVEDILOL 6.25 MG/1
6.25 TABLET ORAL 2 TIMES DAILY
Status: DISCONTINUED | OUTPATIENT
Start: 2019-01-01 | End: 2019-01-01 | Stop reason: HOSPADM

## 2019-01-01 RX ORDER — DIPHENHYDRAMINE HYDROCHLORIDE 50 MG/ML
12.5 INJECTION INTRAMUSCULAR; INTRAVENOUS
Status: DISCONTINUED | OUTPATIENT
Start: 2019-01-01 | End: 2019-01-01

## 2019-01-01 RX ORDER — CARVEDILOL 6.25 MG/1
1 TABLET ORAL 2 TIMES DAILY
COMMUNITY
Start: 2019-01-01

## 2019-01-01 RX ORDER — LABETALOL 20 MG/4 ML (5 MG/ML) INTRAVENOUS SYRINGE
10 ONCE
Status: COMPLETED | OUTPATIENT
Start: 2019-01-01 | End: 2019-01-01

## 2019-01-01 RX ORDER — PROMETHAZINE HYDROCHLORIDE 25 MG/ML
6.25 INJECTION, SOLUTION INTRAMUSCULAR; INTRAVENOUS EVERY 6 HOURS PRN
Status: DISCONTINUED | OUTPATIENT
Start: 2019-01-01 | End: 2019-01-01 | Stop reason: HOSPADM

## 2019-01-01 RX ORDER — IRON POLYSACCHARIDE COMPLEX 150 MG
150 CAPSULE ORAL DAILY
Status: DISCONTINUED | OUTPATIENT
Start: 2019-01-01 | End: 2019-01-01 | Stop reason: HOSPADM

## 2019-01-01 RX ORDER — TIZANIDINE 4 MG/1
2 TABLET ORAL EVERY 8 HOURS PRN
Status: CANCELLED | OUTPATIENT
Start: 2019-01-01

## 2019-01-01 RX ORDER — ONDANSETRON 2 MG/ML
4 INJECTION INTRAMUSCULAR; INTRAVENOUS EVERY 8 HOURS PRN
Status: DISCONTINUED | OUTPATIENT
Start: 2019-01-01 | End: 2019-01-01 | Stop reason: HOSPADM

## 2019-01-01 RX ORDER — HEPARIN SODIUM 1000 [USP'U]/ML
3200 INJECTION, SOLUTION INTRAVENOUS; SUBCUTANEOUS
Status: ACTIVE | OUTPATIENT
Start: 2019-01-01 | End: 2019-01-01

## 2019-01-01 RX ORDER — MORPHINE SULFATE 1 MG/ML
2 INJECTION, SOLUTION EPIDURAL; INTRATHECAL; INTRAVENOUS EVERY 4 HOURS PRN
Status: CANCELLED | OUTPATIENT
Start: 2019-01-01

## 2019-01-01 RX ORDER — METOPROLOL TARTRATE 5 MG/5ML
1.25 INJECTION INTRAVENOUS EVERY 6 HOURS
Status: DISCONTINUED | OUTPATIENT
Start: 2019-01-01 | End: 2019-01-01

## 2019-01-01 RX ORDER — NICOTINE POLACRILEX 4 MG
15 LOZENGE BUCCAL PRN
Status: CANCELLED | OUTPATIENT
Start: 2019-01-01

## 2019-01-01 RX ORDER — PROMETHAZINE HYDROCHLORIDE 25 MG/ML
12.5 INJECTION, SOLUTION INTRAMUSCULAR; INTRAVENOUS EVERY 6 HOURS PRN
Status: DISCONTINUED | OUTPATIENT
Start: 2019-01-01 | End: 2019-01-01 | Stop reason: HOSPADM

## 2019-01-01 RX ORDER — FENTANYL 12 UG/H
1 PATCH TRANSDERMAL
Status: DISCONTINUED | OUTPATIENT
Start: 2019-01-01 | End: 2019-01-01

## 2019-01-01 RX ORDER — POTASSIUM CHLORIDE 7.45 MG/ML
10 INJECTION INTRAVENOUS ONCE
Status: COMPLETED | OUTPATIENT
Start: 2019-01-01 | End: 2019-01-01

## 2019-01-01 RX ORDER — BISACODYL 10 MG
10 SUPPOSITORY, RECTAL RECTAL DAILY PRN
Status: CANCELLED | OUTPATIENT
Start: 2019-01-01

## 2019-01-01 RX ORDER — ASPIRIN 81 MG/1
81 TABLET, CHEWABLE ORAL DAILY
Status: CANCELLED | OUTPATIENT
Start: 2019-01-01

## 2019-01-01 RX ORDER — SODIUM CHLORIDE 0.9 % (FLUSH) 0.9 %
10 SYRINGE (ML) INJECTION PRN
Status: DISCONTINUED | OUTPATIENT
Start: 2019-01-01 | End: 2019-01-01 | Stop reason: HOSPADM

## 2019-01-01 RX ORDER — SODIUM BICARBONATE 325 MG/1
325 TABLET ORAL 2 TIMES DAILY
Status: CANCELLED | OUTPATIENT
Start: 2019-01-01

## 2019-01-01 RX ORDER — METOCLOPRAMIDE HYDROCHLORIDE 5 MG/5ML
10 SOLUTION ORAL
Status: DISCONTINUED | OUTPATIENT
Start: 2019-01-01 | End: 2019-01-01 | Stop reason: DRUGHIGH

## 2019-01-01 RX ORDER — NIFEDIPINE 30 MG/1
30 TABLET, EXTENDED RELEASE ORAL 2 TIMES DAILY
Status: DISCONTINUED | OUTPATIENT
Start: 2019-01-01 | End: 2019-01-01 | Stop reason: HOSPADM

## 2019-01-01 RX ORDER — SODIUM CHLORIDE, SODIUM LACTATE, POTASSIUM CHLORIDE, AND CALCIUM CHLORIDE .6; .31; .03; .02 G/100ML; G/100ML; G/100ML; G/100ML
1000 INJECTION, SOLUTION INTRAVENOUS ONCE
Status: COMPLETED | OUTPATIENT
Start: 2019-01-01 | End: 2019-01-01

## 2019-01-01 RX ORDER — SODIUM BICARBONATE 325 MG/1
TABLET ORAL
Status: COMPLETED
Start: 2019-01-01 | End: 2019-01-01

## 2019-01-01 RX ORDER — FUROSEMIDE 40 MG/1
40 TABLET ORAL 2 TIMES DAILY
Status: DISCONTINUED | OUTPATIENT
Start: 2019-01-01 | End: 2019-01-01 | Stop reason: HOSPADM

## 2019-01-01 RX ORDER — SENNA PLUS 8.6 MG/1
TABLET ORAL
Status: COMPLETED
Start: 2019-01-01 | End: 2019-01-01

## 2019-01-01 RX ORDER — SODIUM BICARBONATE 325 MG/1
325 TABLET ORAL 2 TIMES DAILY
Status: DISCONTINUED | OUTPATIENT
Start: 2019-01-01 | End: 2019-01-01 | Stop reason: HOSPADM

## 2019-01-01 RX ORDER — HYDRALAZINE HYDROCHLORIDE 20 MG/ML
10 INJECTION INTRAMUSCULAR; INTRAVENOUS EVERY 4 HOURS PRN
Status: DISCONTINUED | OUTPATIENT
Start: 2019-01-01 | End: 2019-01-01 | Stop reason: HOSPADM

## 2019-01-01 RX ORDER — NICOTINE POLACRILEX 4 MG
15 LOZENGE BUCCAL PRN
Status: DISCONTINUED | OUTPATIENT
Start: 2019-01-01 | End: 2019-01-01 | Stop reason: HOSPADM

## 2019-01-01 RX ORDER — SODIUM CHLORIDE 0.9 % (FLUSH) 0.9 %
10 SYRINGE (ML) INJECTION EVERY 12 HOURS SCHEDULED
Status: DISCONTINUED | OUTPATIENT
Start: 2019-01-01 | End: 2019-01-01 | Stop reason: SDUPTHER

## 2019-01-01 RX ORDER — MORPHINE SULFATE 4 MG/ML
4 INJECTION, SOLUTION INTRAMUSCULAR; INTRAVENOUS ONCE
Status: COMPLETED | OUTPATIENT
Start: 2019-01-01 | End: 2019-01-01

## 2019-01-01 RX ORDER — MORPHINE SULFATE 1 MG/ML
1 INJECTION, SOLUTION EPIDURAL; INTRATHECAL; INTRAVENOUS EVERY 4 HOURS PRN
Status: CANCELLED | OUTPATIENT
Start: 2019-01-01

## 2019-01-01 RX ORDER — MORPHINE SULFATE 4 MG/ML
2 INJECTION, SOLUTION INTRAMUSCULAR; INTRAVENOUS
Status: DISCONTINUED | OUTPATIENT
Start: 2019-01-01 | End: 2019-01-01

## 2019-01-01 RX ORDER — PROMETHAZINE HYDROCHLORIDE 25 MG/ML
6.25 INJECTION, SOLUTION INTRAMUSCULAR; INTRAVENOUS EVERY 6 HOURS PRN
Status: DISCONTINUED | OUTPATIENT
Start: 2019-01-01 | End: 2019-01-01

## 2019-01-01 RX ORDER — BISACODYL 10 MG
10 SUPPOSITORY, RECTAL RECTAL DAILY PRN
COMMUNITY
Start: 2019-01-01 | End: 2019-01-01

## 2019-01-01 RX ORDER — FAMOTIDINE 20 MG/1
20 TABLET, FILM COATED ORAL DAILY
Status: DISCONTINUED | OUTPATIENT
Start: 2019-01-01 | End: 2019-01-01 | Stop reason: HOSPADM

## 2019-01-01 RX ORDER — LACTULOSE 10 G/15ML
20 SOLUTION ORAL EVERY 6 HOURS SCHEDULED
Status: DISCONTINUED | OUTPATIENT
Start: 2019-01-01 | End: 2019-01-01 | Stop reason: HOSPADM

## 2019-01-01 RX ORDER — 0.9 % SODIUM CHLORIDE 0.9 %
1000 INTRAVENOUS SOLUTION INTRAVENOUS ONCE
Status: COMPLETED | OUTPATIENT
Start: 2019-01-01 | End: 2019-01-01

## 2019-01-01 RX ORDER — PROMETHAZINE HYDROCHLORIDE 25 MG/ML
12.5 INJECTION, SOLUTION INTRAMUSCULAR; INTRAVENOUS EVERY 6 HOURS PRN
Status: DISCONTINUED | OUTPATIENT
Start: 2019-01-01 | End: 2019-01-01

## 2019-01-01 RX ORDER — 0.9 % SODIUM CHLORIDE 0.9 %
250 INTRAVENOUS SOLUTION INTRAVENOUS ONCE
Status: COMPLETED | OUTPATIENT
Start: 2019-01-01 | End: 2019-01-01

## 2019-01-01 RX ORDER — LACTULOSE 10 G/15ML
20 SOLUTION ORAL 3 TIMES DAILY
Qty: 900 ML | Refills: 1 | Status: SHIPPED | OUTPATIENT
Start: 2019-01-01

## 2019-01-01 RX ORDER — ONDANSETRON 2 MG/ML
4 INJECTION INTRAMUSCULAR; INTRAVENOUS ONCE
Status: COMPLETED | OUTPATIENT
Start: 2019-01-01 | End: 2019-01-01

## 2019-01-01 RX ORDER — METOPROLOL TARTRATE 5 MG/5ML
2.5 INJECTION INTRAVENOUS EVERY 6 HOURS
Status: DISCONTINUED | OUTPATIENT
Start: 2019-01-01 | End: 2019-01-01 | Stop reason: HOSPADM

## 2019-01-01 RX ORDER — CEFAZOLIN SODIUM 1 G/50ML
INJECTION, SOLUTION INTRAVENOUS PRN
Status: DISCONTINUED | OUTPATIENT
Start: 2019-01-01 | End: 2019-01-01 | Stop reason: SDUPTHER

## 2019-01-01 RX ORDER — CLOPIDOGREL BISULFATE 75 MG/1
75 TABLET ORAL DAILY
Status: CANCELLED | OUTPATIENT
Start: 2019-01-01

## 2019-01-01 RX ORDER — TIZANIDINE 4 MG/1
2 TABLET ORAL EVERY 8 HOURS PRN
Status: DISCONTINUED | OUTPATIENT
Start: 2019-01-01 | End: 2019-01-01 | Stop reason: HOSPADM

## 2019-01-01 RX ORDER — SODIUM CHLORIDE 9 MG/ML
INJECTION, SOLUTION INTRAVENOUS CONTINUOUS
Status: DISCONTINUED | OUTPATIENT
Start: 2019-01-01 | End: 2019-01-01

## 2019-01-01 RX ORDER — DEXTROSE MONOHYDRATE 25 G/50ML
12.5 INJECTION, SOLUTION INTRAVENOUS PRN
Status: CANCELLED | OUTPATIENT
Start: 2019-01-01

## 2019-01-01 RX ORDER — DEXTROSE MONOHYDRATE 50 MG/ML
100 INJECTION, SOLUTION INTRAVENOUS PRN
Status: CANCELLED | OUTPATIENT
Start: 2019-01-01

## 2019-01-01 RX ORDER — ASPIRIN 81 MG/1
81 TABLET, CHEWABLE ORAL DAILY
Status: DISCONTINUED | OUTPATIENT
Start: 2019-01-01 | End: 2019-01-01

## 2019-01-01 RX ORDER — OXYCODONE AND ACETAMINOPHEN 7.5; 325 MG/1; MG/1
1 TABLET ORAL EVERY 6 HOURS PRN
Qty: 120 TABLET | Refills: 0 | Status: SHIPPED | OUTPATIENT
Start: 2019-01-01 | End: 2019-01-01 | Stop reason: HOSPADM

## 2019-01-01 RX ORDER — FUROSEMIDE 40 MG/1
40 TABLET ORAL 2 TIMES DAILY
Status: ON HOLD | COMMUNITY
End: 2019-01-01 | Stop reason: HOSPADM

## 2019-01-01 RX ORDER — MORPHINE SULFATE 2 MG/ML
2 INJECTION, SOLUTION INTRAMUSCULAR; INTRAVENOUS EVERY 5 MIN PRN
Status: DISCONTINUED | OUTPATIENT
Start: 2019-01-01 | End: 2019-01-01

## 2019-01-01 RX ORDER — METHYLPREDNISOLONE 4 MG/1
TABLET ORAL
Qty: 1 KIT | Refills: 0 | Status: ON HOLD | OUTPATIENT
Start: 2019-01-01 | End: 2019-01-01 | Stop reason: HOSPADM

## 2019-01-01 RX ORDER — SCOLOPAMINE TRANSDERMAL SYSTEM 1 MG/1
1 PATCH, EXTENDED RELEASE TRANSDERMAL
Status: DISCONTINUED | OUTPATIENT
Start: 2019-01-01 | End: 2019-01-01 | Stop reason: HOSPADM

## 2019-01-01 RX ORDER — SUCRALFATE 1 G/1
1 TABLET ORAL EVERY 8 HOURS SCHEDULED
Qty: 42 TABLET | Refills: 0 | Status: SHIPPED | OUTPATIENT
Start: 2019-01-01 | End: 2019-10-08

## 2019-01-01 RX ORDER — ONDANSETRON 2 MG/ML
4 INJECTION INTRAMUSCULAR; INTRAVENOUS EVERY 4 HOURS PRN
Status: DISCONTINUED | OUTPATIENT
Start: 2019-01-01 | End: 2019-01-01

## 2019-01-01 RX ORDER — SODIUM CHLORIDE 9 MG/ML
INJECTION, SOLUTION INTRAVENOUS CONTINUOUS
Status: DISCONTINUED | OUTPATIENT
Start: 2019-01-01 | End: 2019-01-01 | Stop reason: HOSPADM

## 2019-01-01 RX ORDER — FENTANYL CITRATE 50 UG/ML
50 INJECTION, SOLUTION INTRAMUSCULAR; INTRAVENOUS ONCE
Status: COMPLETED | OUTPATIENT
Start: 2019-01-01 | End: 2019-01-01

## 2019-01-01 RX ORDER — HYDRALAZINE HYDROCHLORIDE 25 MG/1
25 TABLET, FILM COATED ORAL 2 TIMES DAILY
Status: CANCELLED | OUTPATIENT
Start: 2019-01-01

## 2019-01-01 RX ORDER — DEXTROSE MONOHYDRATE 50 MG/ML
100 INJECTION, SOLUTION INTRAVENOUS PRN
Status: DISCONTINUED | OUTPATIENT
Start: 2019-01-01 | End: 2019-01-01 | Stop reason: HOSPADM

## 2019-01-01 RX ORDER — MORPHINE SULFATE 4 MG/ML
1 INJECTION, SOLUTION INTRAMUSCULAR; INTRAVENOUS EVERY 4 HOURS PRN
Status: DISCONTINUED | OUTPATIENT
Start: 2019-01-01 | End: 2019-01-01 | Stop reason: HOSPADM

## 2019-01-01 RX ORDER — PROMETHAZINE HYDROCHLORIDE 25 MG/ML
12.5 INJECTION, SOLUTION INTRAMUSCULAR; INTRAVENOUS ONCE
Status: COMPLETED | OUTPATIENT
Start: 2019-01-01 | End: 2019-01-01

## 2019-01-01 RX ORDER — ONDANSETRON 2 MG/ML
8 INJECTION INTRAMUSCULAR; INTRAVENOUS EVERY 6 HOURS PRN
Status: CANCELLED | OUTPATIENT
Start: 2019-01-01

## 2019-01-01 RX ORDER — CLONIDINE HYDROCHLORIDE 0.1 MG/1
0.1 TABLET ORAL PRN
Status: DISCONTINUED | OUTPATIENT
Start: 2019-01-01 | End: 2019-01-01 | Stop reason: HOSPADM

## 2019-01-01 RX ORDER — SUCCINYLCHOLINE CHLORIDE 20 MG/ML
INJECTION INTRAMUSCULAR; INTRAVENOUS PRN
Status: DISCONTINUED | OUTPATIENT
Start: 2019-01-01 | End: 2019-01-01 | Stop reason: SDUPTHER

## 2019-01-01 RX ORDER — OXYCODONE AND ACETAMINOPHEN 7.5; 325 MG/1; MG/1
1 TABLET ORAL EVERY 4 HOURS PRN
COMMUNITY

## 2019-01-01 RX ORDER — HYDRALAZINE HYDROCHLORIDE 20 MG/ML
5 INJECTION INTRAMUSCULAR; INTRAVENOUS EVERY 10 MIN PRN
Status: DISCONTINUED | OUTPATIENT
Start: 2019-01-01 | End: 2019-01-01

## 2019-01-01 RX ORDER — HEPARIN SODIUM 1000 [USP'U]/ML
3200 INJECTION, SOLUTION INTRAVENOUS; SUBCUTANEOUS
Status: COMPLETED | OUTPATIENT
Start: 2019-01-01 | End: 2019-01-01

## 2019-01-01 RX ORDER — SODIUM CHLORIDE, SODIUM LACTATE, POTASSIUM CHLORIDE, CALCIUM CHLORIDE 600; 310; 30; 20 MG/100ML; MG/100ML; MG/100ML; MG/100ML
INJECTION, SOLUTION INTRAVENOUS CONTINUOUS
Status: DISCONTINUED | OUTPATIENT
Start: 2019-01-01 | End: 2019-01-01

## 2019-01-01 RX ORDER — SUCRALFATE 1 G/1
1 TABLET ORAL EVERY 6 HOURS SCHEDULED
Status: DISCONTINUED | OUTPATIENT
Start: 2019-01-01 | End: 2019-01-01

## 2019-01-01 RX ORDER — ONDANSETRON 2 MG/ML
4 INJECTION INTRAMUSCULAR; INTRAVENOUS EVERY 6 HOURS PRN
Status: DISCONTINUED | OUTPATIENT
Start: 2019-01-01 | End: 2019-01-01 | Stop reason: HOSPADM

## 2019-01-01 RX ORDER — ERGOCALCIFEROL 1.25 MG/1
50000 CAPSULE ORAL WEEKLY
Status: DISCONTINUED | OUTPATIENT
Start: 2019-01-01 | End: 2019-01-01 | Stop reason: HOSPADM

## 2019-01-01 RX ORDER — HYDRALAZINE HYDROCHLORIDE 20 MG/ML
20 INJECTION INTRAMUSCULAR; INTRAVENOUS EVERY 6 HOURS PRN
Status: DISCONTINUED | OUTPATIENT
Start: 2019-01-01 | End: 2019-01-01 | Stop reason: HOSPADM

## 2019-01-01 RX ORDER — ASPIRIN 81 MG
TABLET, DELAYED RELEASE (ENTERIC COATED) ORAL
Status: ON HOLD | COMMUNITY
End: 2019-01-01 | Stop reason: HOSPADM

## 2019-01-01 RX ORDER — LACTULOSE 10 G/15ML
20 SOLUTION ORAL 3 TIMES DAILY
Status: DISCONTINUED | OUTPATIENT
Start: 2019-01-01 | End: 2019-01-01 | Stop reason: HOSPADM

## 2019-01-01 RX ORDER — MIDAZOLAM HYDROCHLORIDE 1 MG/ML
INJECTION INTRAMUSCULAR; INTRAVENOUS
Status: COMPLETED | OUTPATIENT
Start: 2019-01-01 | End: 2019-01-01

## 2019-01-01 RX ORDER — METOPROLOL TARTRATE 5 MG/5ML
2.5 INJECTION INTRAVENOUS EVERY 6 HOURS
Status: DISCONTINUED | OUTPATIENT
Start: 2019-01-01 | End: 2019-01-01

## 2019-01-01 RX ORDER — PANTOPRAZOLE SODIUM 20 MG/1
20 TABLET, DELAYED RELEASE ORAL
Qty: 60 TABLET | Refills: 3 | Status: SHIPPED | OUTPATIENT
Start: 2019-01-01

## 2019-01-01 RX ORDER — SENNA AND DOCUSATE SODIUM 50; 8.6 MG/1; MG/1
1 TABLET, FILM COATED ORAL 2 TIMES DAILY
Status: DISCONTINUED | OUTPATIENT
Start: 2019-01-01 | End: 2019-01-01 | Stop reason: HOSPADM

## 2019-01-01 RX ORDER — ONDANSETRON 4 MG/1
4 TABLET, ORALLY DISINTEGRATING ORAL EVERY 8 HOURS PRN
Status: DISCONTINUED | OUTPATIENT
Start: 2019-01-01 | End: 2019-01-01 | Stop reason: HOSPADM

## 2019-01-01 RX ORDER — CLOPIDOGREL BISULFATE 75 MG/1
75 TABLET ORAL DAILY
Status: DISCONTINUED | OUTPATIENT
Start: 2019-01-01 | End: 2019-01-01 | Stop reason: HOSPADM

## 2019-01-01 RX ORDER — LABETALOL 20 MG/4 ML (5 MG/ML) INTRAVENOUS SYRINGE
5 EVERY 10 MIN PRN
Status: DISCONTINUED | OUTPATIENT
Start: 2019-01-01 | End: 2019-01-01

## 2019-01-01 RX ORDER — FERRIC CITRATE 210 MG/1
TABLET, COATED ORAL
Status: ON HOLD | COMMUNITY
Start: 2019-01-01 | End: 2019-01-01 | Stop reason: HOSPADM

## 2019-01-01 RX ORDER — HYDROCODONE BITARTRATE AND ACETAMINOPHEN 5; 325 MG/1; MG/1
2 TABLET ORAL EVERY 4 HOURS PRN
Status: DISCONTINUED | OUTPATIENT
Start: 2019-01-01 | End: 2019-01-01 | Stop reason: HOSPADM

## 2019-01-01 RX ORDER — SODIUM CHLORIDE 0.9 % (FLUSH) 0.9 %
10 SYRINGE (ML) INJECTION EVERY 12 HOURS SCHEDULED
Status: CANCELLED | OUTPATIENT
Start: 2019-01-01

## 2019-01-01 RX ORDER — OXYCODONE AND ACETAMINOPHEN 7.5; 325 MG/1; MG/1
1 TABLET ORAL EVERY 6 HOURS PRN
Status: DISCONTINUED | OUTPATIENT
Start: 2019-01-01 | End: 2019-01-01 | Stop reason: HOSPADM

## 2019-01-01 RX ORDER — PROMETHAZINE HYDROCHLORIDE 25 MG/ML
6.25 INJECTION, SOLUTION INTRAMUSCULAR; INTRAVENOUS EVERY 6 HOURS PRN
Status: DISCONTINUED | OUTPATIENT
Start: 2019-01-01 | End: 2019-01-01 | Stop reason: ALTCHOICE

## 2019-01-01 RX ORDER — INSULIN GLARGINE 100 [IU]/ML
30 INJECTION, SOLUTION SUBCUTANEOUS DAILY
Status: DISCONTINUED | OUTPATIENT
Start: 2019-01-01 | End: 2019-01-01

## 2019-01-01 RX ORDER — ALBUMIN (HUMAN) 12.5 G/50ML
25 SOLUTION INTRAVENOUS ONCE
Status: COMPLETED | OUTPATIENT
Start: 2019-01-01 | End: 2019-01-01

## 2019-01-01 RX ORDER — DRONABINOL 2.5 MG/1
2.5 CAPSULE ORAL 2 TIMES DAILY
Status: CANCELLED | OUTPATIENT
Start: 2019-01-01

## 2019-01-01 RX ORDER — PROMETHAZINE HYDROCHLORIDE 25 MG/ML
6.25 INJECTION, SOLUTION INTRAMUSCULAR; INTRAVENOUS
Status: DISCONTINUED | OUTPATIENT
Start: 2019-01-01 | End: 2019-01-01

## 2019-01-01 RX ORDER — HYDROCODONE BITARTRATE AND ACETAMINOPHEN 5; 325 MG/1; MG/1
1 TABLET ORAL EVERY 6 HOURS PRN
Status: ON HOLD | COMMUNITY
End: 2019-01-01 | Stop reason: HOSPADM

## 2019-01-01 RX ORDER — MORPHINE SULFATE 2 MG/ML
4 INJECTION, SOLUTION INTRAMUSCULAR; INTRAVENOUS EVERY 5 MIN PRN
Status: DISCONTINUED | OUTPATIENT
Start: 2019-01-01 | End: 2019-01-01

## 2019-01-01 RX ORDER — FAMOTIDINE 20 MG/1
20 TABLET, FILM COATED ORAL DAILY
Status: DISCONTINUED | OUTPATIENT
Start: 2019-01-01 | End: 2019-01-01

## 2019-01-01 RX ORDER — PANTOPRAZOLE SODIUM 40 MG/10ML
40 INJECTION, POWDER, LYOPHILIZED, FOR SOLUTION INTRAVENOUS DAILY
Status: CANCELLED | OUTPATIENT
Start: 2019-01-01

## 2019-01-01 RX ORDER — SENNA AND DOCUSATE SODIUM 50; 8.6 MG/1; MG/1
1 TABLET, FILM COATED ORAL 2 TIMES DAILY
Status: ON HOLD | COMMUNITY
Start: 2019-01-01 | End: 2019-01-01 | Stop reason: HOSPADM

## 2019-01-01 RX ORDER — DOCUSATE SODIUM 100 MG/1
100 CAPSULE, LIQUID FILLED ORAL 2 TIMES DAILY
Status: DISCONTINUED | OUTPATIENT
Start: 2019-01-01 | End: 2019-01-01

## 2019-01-01 RX ORDER — SCOLOPAMINE TRANSDERMAL SYSTEM 1 MG/1
1 PATCH, EXTENDED RELEASE TRANSDERMAL
Qty: 10 PATCH | Refills: 1 | Status: SHIPPED | OUTPATIENT
Start: 2019-01-01

## 2019-01-01 RX ORDER — 0.9 % SODIUM CHLORIDE 0.9 %
10 VIAL (ML) INJECTION DAILY
Status: CANCELLED | OUTPATIENT
Start: 2019-01-01

## 2019-01-01 RX ORDER — LIDOCAINE HYDROCHLORIDE 20 MG/ML
INJECTION, SOLUTION INFILTRATION; PERINEURAL PRN
Status: DISCONTINUED | OUTPATIENT
Start: 2019-01-01 | End: 2019-01-01 | Stop reason: SDUPTHER

## 2019-01-01 RX ORDER — TIZANIDINE 2 MG/1
2 TABLET ORAL EVERY 8 HOURS PRN
Status: DISCONTINUED | OUTPATIENT
Start: 2019-01-01 | End: 2019-01-01 | Stop reason: HOSPADM

## 2019-01-01 RX ORDER — CARVEDILOL 6.25 MG/1
6.25 TABLET ORAL 2 TIMES DAILY
Status: CANCELLED | OUTPATIENT
Start: 2019-01-01

## 2019-01-01 RX ORDER — 0.9 % SODIUM CHLORIDE 0.9 %
250 INTRAVENOUS SOLUTION INTRAVENOUS ONCE
Status: DISCONTINUED | OUTPATIENT
Start: 2019-01-01 | End: 2019-01-01 | Stop reason: HOSPADM

## 2019-01-01 RX ORDER — NIFEDIPINE 30 MG/1
TABLET, FILM COATED, EXTENDED RELEASE ORAL
COMMUNITY
End: 2019-01-01

## 2019-01-01 RX ORDER — HYDROCODONE BITARTRATE AND ACETAMINOPHEN 7.5; 325 MG/1; MG/1
1 TABLET ORAL EVERY 6 HOURS PRN
Status: DISCONTINUED | OUTPATIENT
Start: 2019-01-01 | End: 2019-01-01 | Stop reason: HOSPADM

## 2019-01-01 RX ORDER — PROPOFOL 10 MG/ML
INJECTION, EMULSION INTRAVENOUS PRN
Status: DISCONTINUED | OUTPATIENT
Start: 2019-01-01 | End: 2019-01-01 | Stop reason: SDUPTHER

## 2019-01-01 RX ORDER — NEBIVOLOL 5 MG/1
10 TABLET ORAL 2 TIMES DAILY
Status: DISCONTINUED | OUTPATIENT
Start: 2019-01-01 | End: 2019-01-01 | Stop reason: HOSPADM

## 2019-01-01 RX ORDER — IODIXANOL 320 MG/ML
86 INJECTION, SOLUTION INTRAVASCULAR
Status: COMPLETED | OUTPATIENT
Start: 2019-01-01 | End: 2019-01-01

## 2019-01-01 RX ORDER — CEFAZOLIN SODIUM 1 G/50ML
1 INJECTION, SOLUTION INTRAVENOUS
Status: COMPLETED | OUTPATIENT
Start: 2019-01-01 | End: 2019-01-01

## 2019-01-01 RX ORDER — HYDROCODONE BITARTRATE AND ACETAMINOPHEN 5; 325 MG/1; MG/1
1 TABLET ORAL EVERY 4 HOURS PRN
Status: DISCONTINUED | OUTPATIENT
Start: 2019-01-01 | End: 2019-01-01 | Stop reason: HOSPADM

## 2019-01-01 RX ORDER — ONDANSETRON 2 MG/ML
4 INJECTION INTRAMUSCULAR; INTRAVENOUS EVERY 6 HOURS PRN
Status: DISCONTINUED | OUTPATIENT
Start: 2019-01-01 | End: 2019-01-01

## 2019-01-01 RX ORDER — CLONIDINE HYDROCHLORIDE 0.1 MG/1
0.1 TABLET ORAL PRN
Status: CANCELLED | OUTPATIENT
Start: 2019-01-01

## 2019-01-01 RX ORDER — PANTOPRAZOLE SODIUM 20 MG/1
20 TABLET, DELAYED RELEASE ORAL
Status: DISCONTINUED | OUTPATIENT
Start: 2019-01-01 | End: 2019-01-01 | Stop reason: HOSPADM

## 2019-01-01 RX ORDER — PANTOPRAZOLE SODIUM 40 MG/10ML
40 INJECTION, POWDER, LYOPHILIZED, FOR SOLUTION INTRAVENOUS DAILY
Status: DISCONTINUED | OUTPATIENT
Start: 2019-01-01 | End: 2019-01-01 | Stop reason: HOSPADM

## 2019-01-01 RX ORDER — ONDANSETRON 2 MG/ML
8 INJECTION INTRAMUSCULAR; INTRAVENOUS EVERY 6 HOURS PRN
Status: DISCONTINUED | OUTPATIENT
Start: 2019-01-01 | End: 2019-01-01 | Stop reason: HOSPADM

## 2019-01-01 RX ORDER — SCOLOPAMINE TRANSDERMAL SYSTEM 1 MG/1
1 PATCH, EXTENDED RELEASE TRANSDERMAL
Status: CANCELLED | OUTPATIENT
Start: 2019-01-01

## 2019-01-01 RX ORDER — EPHEDRINE SULFATE 50 MG/ML
INJECTION, SOLUTION INTRAVENOUS PRN
Status: DISCONTINUED | OUTPATIENT
Start: 2019-01-01 | End: 2019-01-01 | Stop reason: SDUPTHER

## 2019-01-01 RX ORDER — ALPRAZOLAM 0.25 MG/1
0.5 TABLET ORAL
Status: COMPLETED | OUTPATIENT
Start: 2019-01-01 | End: 2019-01-01

## 2019-01-01 RX ORDER — PROMETHAZINE HYDROCHLORIDE 25 MG/ML
6.25 INJECTION, SOLUTION INTRAMUSCULAR; INTRAVENOUS EVERY 6 HOURS PRN
Status: CANCELLED | OUTPATIENT
Start: 2019-01-01

## 2019-01-01 RX ORDER — FENTANYL 12 UG/H
1 PATCH TRANSDERMAL
Status: DISCONTINUED | OUTPATIENT
Start: 2019-01-01 | End: 2019-01-01 | Stop reason: SDUPTHER

## 2019-01-01 RX ORDER — DOXAZOSIN 8 MG/1
TABLET ORAL
COMMUNITY
End: 2019-01-01 | Stop reason: ALTCHOICE

## 2019-01-01 RX ORDER — METOCLOPRAMIDE HYDROCHLORIDE 5 MG/5ML
5 SOLUTION ORAL
Status: DISCONTINUED | OUTPATIENT
Start: 2019-01-01 | End: 2019-01-01

## 2019-01-01 RX ORDER — SODIUM CHLORIDE 0.9 % (FLUSH) 0.9 %
10 SYRINGE (ML) INJECTION PRN
Status: CANCELLED | OUTPATIENT
Start: 2019-01-01

## 2019-01-01 RX ORDER — OXYCODONE AND ACETAMINOPHEN 7.5; 325 MG/1; MG/1
1 TABLET ORAL EVERY 4 HOURS PRN
Status: CANCELLED | OUTPATIENT
Start: 2019-01-01

## 2019-01-01 RX ORDER — FENTANYL 25 UG/H
1 PATCH TRANSDERMAL
Status: DISCONTINUED | OUTPATIENT
Start: 2019-01-01 | End: 2019-01-01 | Stop reason: HOSPADM

## 2019-01-01 RX ORDER — ONDANSETRON 2 MG/ML
INJECTION INTRAMUSCULAR; INTRAVENOUS
Status: COMPLETED | OUTPATIENT
Start: 2019-01-01 | End: 2019-01-01

## 2019-01-01 RX ORDER — CIPROFLOXACIN 2 MG/ML
400 INJECTION, SOLUTION INTRAVENOUS ONCE
Status: COMPLETED | OUTPATIENT
Start: 2019-01-01 | End: 2019-01-01

## 2019-01-01 RX ORDER — OXYCODONE AND ACETAMINOPHEN 7.5; 325 MG/1; MG/1
2 TABLET ORAL EVERY 6 HOURS PRN
Status: DISCONTINUED | OUTPATIENT
Start: 2019-01-01 | End: 2019-01-01 | Stop reason: HOSPADM

## 2019-01-01 RX ORDER — FUROSEMIDE 40 MG/1
40 TABLET ORAL DAILY
COMMUNITY
End: 2019-01-01 | Stop reason: SDUPTHER

## 2019-01-01 RX ORDER — FAMOTIDINE 20 MG/1
TABLET, FILM COATED ORAL
Status: ON HOLD | COMMUNITY
End: 2019-01-01 | Stop reason: HOSPADM

## 2019-01-01 RX ORDER — HEPARIN SODIUM 1000 [USP'U]/ML
3600 INJECTION, SOLUTION INTRAVENOUS; SUBCUTANEOUS ONCE
Status: COMPLETED | OUTPATIENT
Start: 2019-01-01 | End: 2019-01-01

## 2019-01-01 RX ORDER — HYDRALAZINE HYDROCHLORIDE 25 MG/1
25 TABLET, FILM COATED ORAL 2 TIMES DAILY
Qty: 60 TABLET | Refills: 5 | Status: SHIPPED | OUTPATIENT
Start: 2019-01-01

## 2019-01-01 RX ORDER — HYDRALAZINE HYDROCHLORIDE 20 MG/ML
10 INJECTION INTRAMUSCULAR; INTRAVENOUS EVERY 6 HOURS
Status: DISCONTINUED | OUTPATIENT
Start: 2019-01-01 | End: 2019-01-01 | Stop reason: SDUPTHER

## 2019-01-01 RX ORDER — SODIUM CHLORIDE 450 MG/100ML
INJECTION, SOLUTION INTRAVENOUS CONTINUOUS PRN
Status: DISCONTINUED | OUTPATIENT
Start: 2019-01-01 | End: 2019-01-01

## 2019-01-01 RX ORDER — HYDRALAZINE HYDROCHLORIDE 20 MG/ML
10 INJECTION INTRAMUSCULAR; INTRAVENOUS EVERY 4 HOURS PRN
Status: DISCONTINUED | OUTPATIENT
Start: 2019-01-01 | End: 2019-01-01

## 2019-01-01 RX ORDER — SODIUM BICARBONATE 650 MG/1
325 TABLET ORAL 2 TIMES DAILY
Status: DISCONTINUED | OUTPATIENT
Start: 2019-01-01 | End: 2019-01-01 | Stop reason: HOSPADM

## 2019-01-01 RX ORDER — HEPARIN SODIUM 1000 [USP'U]/ML
3200 INJECTION, SOLUTION INTRAVENOUS; SUBCUTANEOUS ONCE
Status: COMPLETED | OUTPATIENT
Start: 2019-01-01 | End: 2019-01-01

## 2019-01-01 RX ORDER — ATORVASTATIN CALCIUM 40 MG/1
1 TABLET, FILM COATED ORAL NIGHTLY
Refills: 3 | Status: ON HOLD | COMMUNITY
Start: 2019-01-01 | End: 2019-01-01 | Stop reason: HOSPADM

## 2019-01-01 RX ORDER — OXYCODONE HYDROCHLORIDE AND ACETAMINOPHEN 5; 325 MG/1; MG/1
1 TABLET ORAL EVERY 6 HOURS PRN
Qty: 12 TABLET | Refills: 0 | Status: ON HOLD | OUTPATIENT
Start: 2019-01-01 | End: 2019-01-01 | Stop reason: HOSPADM

## 2019-01-01 RX ORDER — PROMETHAZINE HYDROCHLORIDE 25 MG/ML
25 INJECTION, SOLUTION INTRAMUSCULAR; INTRAVENOUS ONCE
Status: COMPLETED | OUTPATIENT
Start: 2019-01-01 | End: 2019-01-01

## 2019-01-01 RX ORDER — SODIUM CHLORIDE 9 MG/ML
INJECTION, SOLUTION INTRAVENOUS CONTINUOUS
Status: CANCELLED | OUTPATIENT
Start: 2019-01-01

## 2019-01-01 RX ORDER — METOCLOPRAMIDE HYDROCHLORIDE 5 MG/ML
10 INJECTION INTRAMUSCULAR; INTRAVENOUS
Status: COMPLETED | OUTPATIENT
Start: 2019-01-01 | End: 2019-01-01

## 2019-01-01 RX ORDER — INSULIN GLARGINE 100 [IU]/ML
30 INJECTION, SOLUTION SUBCUTANEOUS DAILY
Status: DISCONTINUED | OUTPATIENT
Start: 2019-01-01 | End: 2019-01-01 | Stop reason: HOSPADM

## 2019-01-01 RX ORDER — LABETALOL HYDROCHLORIDE 5 MG/ML
10 INJECTION, SOLUTION INTRAVENOUS ONCE
Status: DISCONTINUED | OUTPATIENT
Start: 2019-01-01 | End: 2019-01-01 | Stop reason: SDUPTHER

## 2019-01-01 RX ORDER — FENTANYL CITRATE 50 UG/ML
INJECTION, SOLUTION INTRAMUSCULAR; INTRAVENOUS PRN
Status: DISCONTINUED | OUTPATIENT
Start: 2019-01-01 | End: 2019-01-01 | Stop reason: SDUPTHER

## 2019-01-01 RX ORDER — NIFEDIPINE 30 MG/1
30 TABLET, EXTENDED RELEASE ORAL 2 TIMES DAILY
Status: CANCELLED | OUTPATIENT
Start: 2019-01-01

## 2019-01-01 RX ORDER — HEPARIN SODIUM 1000 [USP'U]/ML
INJECTION, SOLUTION INTRAVENOUS; SUBCUTANEOUS
Status: COMPLETED | OUTPATIENT
Start: 2019-01-01 | End: 2019-01-01

## 2019-01-01 RX ADMIN — ONDANSETRON 8 MG: 2 INJECTION INTRAMUSCULAR; INTRAVENOUS at 16:28

## 2019-01-01 RX ADMIN — SODIUM CHLORIDE: 9 INJECTION, SOLUTION INTRAVENOUS at 08:09

## 2019-01-01 RX ADMIN — SUCCINYLCHOLINE CHLORIDE 120 MG: 20 INJECTION, SOLUTION INTRAMUSCULAR; INTRAVENOUS; PARENTERAL at 14:27

## 2019-01-01 RX ADMIN — ACETAMINOPHEN 650 MG: 325 TABLET ORAL at 20:34

## 2019-01-01 RX ADMIN — MIDAZOLAM 0.5 MG: 1 INJECTION INTRAMUSCULAR; INTRAVENOUS at 09:42

## 2019-01-01 RX ADMIN — HYDROMORPHONE HYDROCHLORIDE 1 MG: 1 INJECTION, SOLUTION INTRAMUSCULAR; INTRAVENOUS; SUBCUTANEOUS at 10:00

## 2019-01-01 RX ADMIN — Medication 10 ML: at 08:57

## 2019-01-01 RX ADMIN — ONDANSETRON 8 MG: 2 INJECTION INTRAMUSCULAR; INTRAVENOUS at 12:35

## 2019-01-01 RX ADMIN — NIFEDIPINE 30 MG: 30 TABLET, FILM COATED, EXTENDED RELEASE ORAL at 20:55

## 2019-01-01 RX ADMIN — Medication 10 ML: at 21:22

## 2019-01-01 RX ADMIN — HYDRALAZINE HYDROCHLORIDE 10 MG: 20 INJECTION INTRAMUSCULAR; INTRAVENOUS at 01:05

## 2019-01-01 RX ADMIN — HYDRALAZINE HYDROCHLORIDE 25 MG: 25 TABLET, FILM COATED ORAL at 21:37

## 2019-01-01 RX ADMIN — HYDRALAZINE HYDROCHLORIDE 10 MG: 20 INJECTION INTRAMUSCULAR; INTRAVENOUS at 06:22

## 2019-01-01 RX ADMIN — POTASSIUM CHLORIDE 10 MEQ: 7.46 INJECTION, SOLUTION INTRAVENOUS at 11:52

## 2019-01-01 RX ADMIN — INSULIN LISPRO 4 UNITS: 100 INJECTION, SOLUTION INTRAVENOUS; SUBCUTANEOUS at 12:36

## 2019-01-01 RX ADMIN — METOPROLOL TARTRATE 2.5 MG: 5 INJECTION, SOLUTION INTRAVENOUS at 06:06

## 2019-01-01 RX ADMIN — PROMETHAZINE HYDROCHLORIDE 6.25 MG: 25 INJECTION INTRAMUSCULAR; INTRAVENOUS at 04:08

## 2019-01-01 RX ADMIN — LIDOCAINE HYDROCHLORIDE 50 MG: 10 INJECTION, SOLUTION INFILTRATION; PERINEURAL at 14:27

## 2019-01-01 RX ADMIN — METOPROLOL TARTRATE 2.5 MG: 5 INJECTION, SOLUTION INTRAVENOUS at 00:31

## 2019-01-01 RX ADMIN — METOPROLOL TARTRATE 2.5 MG: 5 INJECTION, SOLUTION INTRAVENOUS at 01:05

## 2019-01-01 RX ADMIN — HYDRALAZINE HYDROCHLORIDE 25 MG: 25 TABLET, FILM COATED ORAL at 22:17

## 2019-01-01 RX ADMIN — Medication 10 ML: at 08:53

## 2019-01-01 RX ADMIN — Medication 2 MG: at 19:33

## 2019-01-01 RX ADMIN — Medication 1 G: at 11:03

## 2019-01-01 RX ADMIN — ATORVASTATIN CALCIUM 40 MG: 40 TABLET, FILM COATED ORAL at 22:52

## 2019-01-01 RX ADMIN — MIDAZOLAM 0.5 MG: 1 INJECTION INTRAMUSCULAR; INTRAVENOUS at 09:50

## 2019-01-01 RX ADMIN — HEPARIN SODIUM 3600 UNITS: 1000 INJECTION INTRAVENOUS; SUBCUTANEOUS at 11:47

## 2019-01-01 RX ADMIN — Medication 2 MG: at 20:58

## 2019-01-01 RX ADMIN — SODIUM BICARBONATE 325 MG: 325 TABLET ORAL at 08:14

## 2019-01-01 RX ADMIN — ONDANSETRON 4 MG: 2 INJECTION INTRAMUSCULAR; INTRAVENOUS at 01:58

## 2019-01-01 RX ADMIN — PROMETHAZINE HYDROCHLORIDE 12.5 MG: 25 INJECTION INTRAMUSCULAR; INTRAVENOUS at 08:01

## 2019-01-01 RX ADMIN — Medication 2 MG: at 16:46

## 2019-01-01 RX ADMIN — ATORVASTATIN CALCIUM 40 MG: 40 TABLET, FILM COATED ORAL at 20:27

## 2019-01-01 RX ADMIN — Medication 2 MG: at 03:44

## 2019-01-01 RX ADMIN — DOCUSATE SODIUM 100 MG: 100 CAPSULE, LIQUID FILLED ORAL at 22:22

## 2019-01-01 RX ADMIN — ONDANSETRON 8 MG: 2 INJECTION INTRAMUSCULAR; INTRAVENOUS at 06:44

## 2019-01-01 RX ADMIN — PROMETHAZINE HYDROCHLORIDE 6.25 MG: 25 INJECTION INTRAMUSCULAR; INTRAVENOUS at 12:03

## 2019-01-01 RX ADMIN — ATORVASTATIN CALCIUM 40 MG: 40 TABLET, FILM COATED ORAL at 21:21

## 2019-01-01 RX ADMIN — SODIUM BICARBONATE 325 MG: 650 TABLET ORAL at 08:36

## 2019-01-01 RX ADMIN — ONDANSETRON 8 MG: 2 INJECTION INTRAMUSCULAR; INTRAVENOUS at 18:36

## 2019-01-01 RX ADMIN — NIFEDIPINE 30 MG: 30 TABLET, FILM COATED, EXTENDED RELEASE ORAL at 20:57

## 2019-01-01 RX ADMIN — EPHEDRINE SULFATE 5 MG: 50 INJECTION INTRAMUSCULAR; INTRAVENOUS; SUBCUTANEOUS at 14:27

## 2019-01-01 RX ADMIN — Medication 2 MG: at 21:59

## 2019-01-01 RX ADMIN — ONDANSETRON 8 MG: 2 INJECTION INTRAMUSCULAR; INTRAVENOUS at 13:33

## 2019-01-01 RX ADMIN — METOPROLOL TARTRATE 2.5 MG: 5 INJECTION, SOLUTION INTRAVENOUS at 17:08

## 2019-01-01 RX ADMIN — Medication 1 G: at 08:14

## 2019-01-01 RX ADMIN — ONDANSETRON 4 MG: 2 INJECTION INTRAMUSCULAR; INTRAVENOUS at 22:08

## 2019-01-01 RX ADMIN — LACTULOSE 20 G: 20 SOLUTION ORAL at 06:25

## 2019-01-01 RX ADMIN — NIFEDIPINE 30 MG: 30 TABLET, FILM COATED, EXTENDED RELEASE ORAL at 14:22

## 2019-01-01 RX ADMIN — HYDROMORPHONE HYDROCHLORIDE 1 MG: 1 INJECTION, SOLUTION INTRAMUSCULAR; INTRAVENOUS; SUBCUTANEOUS at 06:44

## 2019-01-01 RX ADMIN — ATORVASTATIN CALCIUM 40 MG: 40 TABLET, FILM COATED ORAL at 20:35

## 2019-01-01 RX ADMIN — INSULIN LISPRO 6 UNITS: 100 INJECTION, SOLUTION INTRAVENOUS; SUBCUTANEOUS at 18:28

## 2019-01-01 RX ADMIN — NIFEDIPINE 30 MG: 30 TABLET, FILM COATED, EXTENDED RELEASE ORAL at 22:52

## 2019-01-01 RX ADMIN — Medication 2 MG: at 08:21

## 2019-01-01 RX ADMIN — INSULIN LISPRO 6 UNITS: 100 INJECTION, SOLUTION INTRAVENOUS; SUBCUTANEOUS at 08:15

## 2019-01-01 RX ADMIN — METOPROLOL TARTRATE 2.5 MG: 5 INJECTION, SOLUTION INTRAVENOUS at 01:28

## 2019-01-01 RX ADMIN — ONDANSETRON 8 MG: 2 INJECTION INTRAMUSCULAR; INTRAVENOUS at 06:42

## 2019-01-01 RX ADMIN — Medication 10 ML: at 21:19

## 2019-01-01 RX ADMIN — ONDANSETRON 8 MG: 2 INJECTION INTRAMUSCULAR; INTRAVENOUS at 08:54

## 2019-01-01 RX ADMIN — HYDRALAZINE HYDROCHLORIDE 25 MG: 25 TABLET, FILM COATED ORAL at 20:35

## 2019-01-01 RX ADMIN — SENNOSIDES AND DOCUSATE SODIUM 1 TABLET: 8.6; 5 TABLET ORAL at 08:26

## 2019-01-01 RX ADMIN — Medication 2 MG: at 20:26

## 2019-01-01 RX ADMIN — Medication 2 MG: at 03:14

## 2019-01-01 RX ADMIN — HEPARIN SODIUM 3200 UNITS: 1000 INJECTION INTRAVENOUS; SUBCUTANEOUS at 17:44

## 2019-01-01 RX ADMIN — SUCRALFATE 1 G: 1 TABLET ORAL at 05:43

## 2019-01-01 RX ADMIN — Medication 10 ML: at 08:30

## 2019-01-01 RX ADMIN — METOPROLOL TARTRATE 1.25 MG: 5 INJECTION, SOLUTION INTRAVENOUS at 15:59

## 2019-01-01 RX ADMIN — ONDANSETRON 4 MG: 2 INJECTION INTRAMUSCULAR; INTRAVENOUS at 03:39

## 2019-01-01 RX ADMIN — HYDROMORPHONE HYDROCHLORIDE 0.5 MG: 1 INJECTION, SOLUTION INTRAMUSCULAR; INTRAVENOUS; SUBCUTANEOUS at 05:03

## 2019-01-01 RX ADMIN — NIFEDIPINE 30 MG: 30 TABLET, FILM COATED, EXTENDED RELEASE ORAL at 08:14

## 2019-01-01 RX ADMIN — ONDANSETRON 8 MG: 2 INJECTION INTRAMUSCULAR; INTRAVENOUS at 11:45

## 2019-01-01 RX ADMIN — SODIUM BICARBONATE 325 MG: 650 TABLET ORAL at 20:40

## 2019-01-01 RX ADMIN — METOPROLOL TARTRATE 2.5 MG: 5 INJECTION, SOLUTION INTRAVENOUS at 06:25

## 2019-01-01 RX ADMIN — DRONABINOL 2.5 MG: 2.5 CAPSULE ORAL at 20:35

## 2019-01-01 RX ADMIN — Medication 2 MG: at 05:49

## 2019-01-01 RX ADMIN — INSULIN GLARGINE 30 UNITS: 100 INJECTION, SOLUTION SUBCUTANEOUS at 21:21

## 2019-01-01 RX ADMIN — Medication 1 G: at 10:14

## 2019-01-01 RX ADMIN — NIFEDIPINE 30 MG: 30 TABLET, FILM COATED, EXTENDED RELEASE ORAL at 10:15

## 2019-01-01 RX ADMIN — INSULIN LISPRO 4 UNITS: 100 INJECTION, SOLUTION INTRAVENOUS; SUBCUTANEOUS at 08:27

## 2019-01-01 RX ADMIN — INSULIN LISPRO 6 UNITS: 100 INJECTION, SOLUTION INTRAVENOUS; SUBCUTANEOUS at 18:46

## 2019-01-01 RX ADMIN — SODIUM BICARBONATE 325 MG: 650 TABLET ORAL at 22:13

## 2019-01-01 RX ADMIN — HYDRALAZINE HYDROCHLORIDE 25 MG: 25 TABLET, FILM COATED ORAL at 22:02

## 2019-01-01 RX ADMIN — SENNOSIDES AND DOCUSATE SODIUM 1 TABLET: 8.6; 5 TABLET ORAL at 08:40

## 2019-01-01 RX ADMIN — SACUBITRIL AND VALSARTAN 1 TABLET: 24; 26 TABLET, FILM COATED ORAL at 08:35

## 2019-01-01 RX ADMIN — SODIUM CHLORIDE: 4.5 INJECTION, SOLUTION INTRAVENOUS at 15:59

## 2019-01-01 RX ADMIN — INSULIN LISPRO 4 UNITS: 100 INJECTION, SOLUTION INTRAVENOUS; SUBCUTANEOUS at 12:31

## 2019-01-01 RX ADMIN — LABETALOL 20 MG/4 ML (5 MG/ML) INTRAVENOUS SYRINGE 10 MG: at 02:55

## 2019-01-01 RX ADMIN — Medication 10 ML: at 20:11

## 2019-01-01 RX ADMIN — HYDROMORPHONE HYDROCHLORIDE 0.5 MG: 1 INJECTION, SOLUTION INTRAMUSCULAR; INTRAVENOUS; SUBCUTANEOUS at 00:28

## 2019-01-01 RX ADMIN — LACTULOSE 20 G: 20 SOLUTION ORAL at 17:38

## 2019-01-01 RX ADMIN — OXYCODONE HYDROCHLORIDE AND ACETAMINOPHEN 2 TABLET: 7.5; 325 TABLET ORAL at 17:26

## 2019-01-01 RX ADMIN — SENNOSIDES AND DOCUSATE SODIUM 1 TABLET: 8.6; 5 TABLET ORAL at 22:14

## 2019-01-01 RX ADMIN — BISACODYL 10 MG: 10 SUPPOSITORY RECTAL at 09:30

## 2019-01-01 RX ADMIN — NIFEDIPINE 30 MG: 30 TABLET, FILM COATED, EXTENDED RELEASE ORAL at 08:26

## 2019-01-01 RX ADMIN — LACTULOSE 20 G: 20 SOLUTION ORAL at 18:23

## 2019-01-01 RX ADMIN — INSULIN LISPRO 2 UNITS: 100 INJECTION, SOLUTION INTRAVENOUS; SUBCUTANEOUS at 21:14

## 2019-01-01 RX ADMIN — SENNOSIDES AND DOCUSATE SODIUM 1 TABLET: 8.6; 5 TABLET ORAL at 09:57

## 2019-01-01 RX ADMIN — PANTOPRAZOLE SODIUM 20 MG: 20 TABLET, DELAYED RELEASE ORAL at 06:22

## 2019-01-01 RX ADMIN — HYDROMORPHONE HYDROCHLORIDE 0.5 MG: 1 INJECTION, SOLUTION INTRAMUSCULAR; INTRAVENOUS; SUBCUTANEOUS at 04:53

## 2019-01-01 RX ADMIN — HYDROMORPHONE HYDROCHLORIDE 0.5 MG: 1 INJECTION, SOLUTION INTRAMUSCULAR; INTRAVENOUS; SUBCUTANEOUS at 02:16

## 2019-01-01 RX ADMIN — INSULIN LISPRO 2 UNITS: 100 INJECTION, SOLUTION INTRAVENOUS; SUBCUTANEOUS at 22:03

## 2019-01-01 RX ADMIN — ONDANSETRON 8 MG: 2 INJECTION INTRAMUSCULAR; INTRAVENOUS at 20:26

## 2019-01-01 RX ADMIN — PERFLUTREN 2.2 MG: 6.52 INJECTION, SUSPENSION INTRAVENOUS at 13:58

## 2019-01-01 RX ADMIN — NIFEDIPINE 30 MG: 30 TABLET, FILM COATED, EXTENDED RELEASE ORAL at 11:09

## 2019-01-01 RX ADMIN — FENTANYL CITRATE 50 MCG: 50 INJECTION INTRAMUSCULAR; INTRAVENOUS at 14:27

## 2019-01-01 RX ADMIN — Medication 1 G: at 12:02

## 2019-01-01 RX ADMIN — PROPOFOL 100 MG: 10 INJECTION, EMULSION INTRAVENOUS at 16:10

## 2019-01-01 RX ADMIN — HYDROCODONE BITARTRATE AND ACETAMINOPHEN 1 TABLET: 7.5; 325 TABLET ORAL at 20:22

## 2019-01-01 RX ADMIN — SUCRALFATE 1 G: 1 TABLET ORAL at 05:47

## 2019-01-01 RX ADMIN — ERGOCALCIFEROL 50000 UNITS: 1.25 CAPSULE ORAL at 14:21

## 2019-01-01 RX ADMIN — ATORVASTATIN CALCIUM 40 MG: 40 TABLET, FILM COATED ORAL at 20:57

## 2019-01-01 RX ADMIN — IOPAMIDOL 90 ML: 755 INJECTION, SOLUTION INTRAVENOUS at 11:48

## 2019-01-01 RX ADMIN — Medication 10 ML: at 22:03

## 2019-01-01 RX ADMIN — SUCRALFATE 1 G: 1 TABLET ORAL at 22:14

## 2019-01-01 RX ADMIN — HYDRALAZINE HYDROCHLORIDE 10 MG: 20 INJECTION INTRAMUSCULAR; INTRAVENOUS at 05:53

## 2019-01-01 RX ADMIN — SODIUM CHLORIDE 1000 ML: 9 INJECTION, SOLUTION INTRAVENOUS at 03:23

## 2019-01-01 RX ADMIN — METOPROLOL TARTRATE 2.5 MG: 5 INJECTION, SOLUTION INTRAVENOUS at 06:20

## 2019-01-01 RX ADMIN — SUCRALFATE 1 G: 1 TABLET ORAL at 20:10

## 2019-01-01 RX ADMIN — MORPHINE SULFATE 2 MG: 4 INJECTION INTRAVENOUS at 23:25

## 2019-01-01 RX ADMIN — HYDRALAZINE HYDROCHLORIDE 25 MG: 25 TABLET, FILM COATED ORAL at 08:26

## 2019-01-01 RX ADMIN — Medication 2 MG: at 07:30

## 2019-01-01 RX ADMIN — ONDANSETRON 8 MG: 2 INJECTION INTRAMUSCULAR; INTRAVENOUS at 21:32

## 2019-01-01 RX ADMIN — ONDANSETRON 8 MG: 2 INJECTION INTRAMUSCULAR; INTRAVENOUS at 14:33

## 2019-01-01 RX ADMIN — Medication 2 MG: at 17:37

## 2019-01-01 RX ADMIN — SODIUM CHLORIDE: 9 INJECTION, SOLUTION INTRAVENOUS at 10:16

## 2019-01-01 RX ADMIN — METOPROLOL TARTRATE 2.5 MG: 5 INJECTION, SOLUTION INTRAVENOUS at 05:54

## 2019-01-01 RX ADMIN — METOPROLOL TARTRATE 2.5 MG: 5 INJECTION, SOLUTION INTRAVENOUS at 18:47

## 2019-01-01 RX ADMIN — SODIUM CHLORIDE: 9 INJECTION, SOLUTION INTRAVENOUS at 09:23

## 2019-01-01 RX ADMIN — ONDANSETRON 8 MG: 2 INJECTION INTRAMUSCULAR; INTRAVENOUS at 13:08

## 2019-01-01 RX ADMIN — SUCRALFATE 1 G: 1 TABLET ORAL at 06:19

## 2019-01-01 RX ADMIN — SUCRALFATE 1 G: 1 TABLET ORAL at 20:39

## 2019-01-01 RX ADMIN — Medication 2 MG: at 10:23

## 2019-01-01 RX ADMIN — METOPROLOL TARTRATE 2.5 MG: 5 INJECTION, SOLUTION INTRAVENOUS at 00:09

## 2019-01-01 RX ADMIN — HYDROMORPHONE HYDROCHLORIDE 0.5 MG: 1 INJECTION, SOLUTION INTRAMUSCULAR; INTRAVENOUS; SUBCUTANEOUS at 21:18

## 2019-01-01 RX ADMIN — HYDROMORPHONE HYDROCHLORIDE 1 MG: 1 INJECTION, SOLUTION INTRAMUSCULAR; INTRAVENOUS; SUBCUTANEOUS at 20:09

## 2019-01-01 RX ADMIN — SODIUM CHLORIDE: 9 INJECTION, SOLUTION INTRAVENOUS at 17:16

## 2019-01-01 RX ADMIN — SODIUM BICARBONATE 325 MG: 325 TABLET ORAL at 22:22

## 2019-01-01 RX ADMIN — METOPROLOL TARTRATE 2.5 MG: 5 INJECTION, SOLUTION INTRAVENOUS at 12:35

## 2019-01-01 RX ADMIN — SENNOSIDES AND DOCUSATE SODIUM 1 TABLET: 8.6; 5 TABLET ORAL at 20:38

## 2019-01-01 RX ADMIN — Medication 1 G: at 09:32

## 2019-01-01 RX ADMIN — INSULIN LISPRO 4 UNITS: 100 INJECTION, SOLUTION INTRAVENOUS; SUBCUTANEOUS at 09:17

## 2019-01-01 RX ADMIN — LACTULOSE 20 G: 20 SOLUTION ORAL at 22:17

## 2019-01-01 RX ADMIN — DOCUSATE SODIUM 100 MG: 100 CAPSULE, LIQUID FILLED ORAL at 08:14

## 2019-01-01 RX ADMIN — Medication 10 ML: at 22:52

## 2019-01-01 RX ADMIN — LABETALOL 20 MG/4 ML (5 MG/ML) INTRAVENOUS SYRINGE 10 MG: at 02:52

## 2019-01-01 RX ADMIN — ONDANSETRON 8 MG: 2 INJECTION INTRAMUSCULAR; INTRAVENOUS at 08:27

## 2019-01-01 RX ADMIN — HYDROMORPHONE HYDROCHLORIDE 1 MG: 1 INJECTION, SOLUTION INTRAMUSCULAR; INTRAVENOUS; SUBCUTANEOUS at 00:26

## 2019-01-01 RX ADMIN — METOPROLOL TARTRATE 2.5 MG: 5 INJECTION, SOLUTION INTRAVENOUS at 18:32

## 2019-01-01 RX ADMIN — LINAGLIPTIN 5 MG: 5 TABLET, FILM COATED ORAL at 11:15

## 2019-01-01 RX ADMIN — SODIUM BICARBONATE 325 MG: 325 TABLET ORAL at 20:09

## 2019-01-01 RX ADMIN — LIDOCAINE HYDROCHLORIDE 40 MG: 20 INJECTION, SOLUTION INFILTRATION; PERINEURAL at 16:10

## 2019-01-01 RX ADMIN — METOPROLOL TARTRATE 2.5 MG: 5 INJECTION, SOLUTION INTRAVENOUS at 00:37

## 2019-01-01 RX ADMIN — NIFEDIPINE 30 MG: 30 TABLET, FILM COATED, EXTENDED RELEASE ORAL at 08:36

## 2019-01-01 RX ADMIN — PROMETHAZINE HYDROCHLORIDE 6.25 MG: 25 INJECTION INTRAMUSCULAR; INTRAVENOUS at 20:38

## 2019-01-01 RX ADMIN — SODIUM BICARBONATE 325 MG: 650 TABLET ORAL at 22:17

## 2019-01-01 RX ADMIN — HYDROMORPHONE HYDROCHLORIDE 1 MG: 1 INJECTION, SOLUTION INTRAMUSCULAR; INTRAVENOUS; SUBCUTANEOUS at 00:07

## 2019-01-01 RX ADMIN — CEFAZOLIN SODIUM 2 G: 1 INJECTION, SOLUTION INTRAVENOUS at 14:39

## 2019-01-01 RX ADMIN — Medication 10 ML: at 20:35

## 2019-01-01 RX ADMIN — INSULIN LISPRO 4 UNITS: 100 INJECTION, SOLUTION INTRAVENOUS; SUBCUTANEOUS at 17:42

## 2019-01-01 RX ADMIN — ENOXAPARIN SODIUM 30 MG: 30 INJECTION SUBCUTANEOUS at 09:10

## 2019-01-01 RX ADMIN — SENNOSIDES AND DOCUSATE SODIUM 1 TABLET: 8.6; 5 TABLET ORAL at 22:02

## 2019-01-01 RX ADMIN — SODIUM BICARBONATE 325 MG: 650 TABLET ORAL at 22:53

## 2019-01-01 RX ADMIN — PROMETHAZINE HYDROCHLORIDE 6.25 MG: 25 INJECTION INTRAMUSCULAR; INTRAVENOUS at 15:59

## 2019-01-01 RX ADMIN — METOPROLOL TARTRATE 2.5 MG: 5 INJECTION, SOLUTION INTRAVENOUS at 12:55

## 2019-01-01 RX ADMIN — HYDROMORPHONE HYDROCHLORIDE 0.5 MG: 1 INJECTION, SOLUTION INTRAMUSCULAR; INTRAVENOUS; SUBCUTANEOUS at 01:52

## 2019-01-01 RX ADMIN — Medication 10 ML: at 11:10

## 2019-01-01 RX ADMIN — HYDROMORPHONE HYDROCHLORIDE 1 MG: 1 INJECTION, SOLUTION INTRAMUSCULAR; INTRAVENOUS; SUBCUTANEOUS at 09:46

## 2019-01-01 RX ADMIN — SODIUM CHLORIDE: 9 INJECTION, SOLUTION INTRAVENOUS at 08:20

## 2019-01-01 RX ADMIN — Medication 2 MG: at 14:33

## 2019-01-01 RX ADMIN — DEXTROSE 50 % IN WATER (D50W) INTRAVENOUS SYRINGE 12.5 G: at 17:46

## 2019-01-01 RX ADMIN — POTASSIUM CHLORIDE 10 MEQ: 10 TABLET, EXTENDED RELEASE ORAL at 14:22

## 2019-01-01 RX ADMIN — ONDANSETRON 8 MG: 2 INJECTION INTRAMUSCULAR; INTRAVENOUS at 13:47

## 2019-01-01 RX ADMIN — ATORVASTATIN CALCIUM 40 MG: 40 TABLET, FILM COATED ORAL at 20:09

## 2019-01-01 RX ADMIN — MORPHINE SULFATE 1 MG: 4 INJECTION INTRAVENOUS at 16:43

## 2019-01-01 RX ADMIN — METOPROLOL TARTRATE 2.5 MG: 5 INJECTION, SOLUTION INTRAVENOUS at 13:57

## 2019-01-01 RX ADMIN — FUROSEMIDE 40 MG: 40 TABLET ORAL at 08:25

## 2019-01-01 RX ADMIN — Medication 2 MG: at 22:54

## 2019-01-01 RX ADMIN — HYDRALAZINE HYDROCHLORIDE 10 MG: 20 INJECTION INTRAMUSCULAR; INTRAVENOUS at 14:11

## 2019-01-01 RX ADMIN — HYDROMORPHONE HYDROCHLORIDE 0.5 MG: 1 INJECTION, SOLUTION INTRAMUSCULAR; INTRAVENOUS; SUBCUTANEOUS at 04:50

## 2019-01-01 RX ADMIN — METOPROLOL TARTRATE 2.5 MG: 5 INJECTION, SOLUTION INTRAVENOUS at 20:32

## 2019-01-01 RX ADMIN — SODIUM CHLORIDE: 9 INJECTION, SOLUTION INTRAVENOUS at 09:01

## 2019-01-01 RX ADMIN — FAMOTIDINE 20 MG: 20 TABLET ORAL at 08:51

## 2019-01-01 RX ADMIN — Medication 2 MG: at 17:17

## 2019-01-01 RX ADMIN — HYDROMORPHONE HYDROCHLORIDE 1 MG: 1 INJECTION, SOLUTION INTRAMUSCULAR; INTRAVENOUS; SUBCUTANEOUS at 12:42

## 2019-01-01 RX ADMIN — Medication 10 ML: at 06:29

## 2019-01-01 RX ADMIN — LINAGLIPTIN 5 MG: 5 TABLET, FILM COATED ORAL at 08:25

## 2019-01-01 RX ADMIN — INSULIN LISPRO 6 UNITS: 100 INJECTION, SOLUTION INTRAVENOUS; SUBCUTANEOUS at 18:27

## 2019-01-01 RX ADMIN — Medication 10 ML: at 20:33

## 2019-01-01 RX ADMIN — ONDANSETRON 8 MG: 2 INJECTION INTRAMUSCULAR; INTRAVENOUS at 22:17

## 2019-01-01 RX ADMIN — METOPROLOL TARTRATE 2.5 MG: 5 INJECTION, SOLUTION INTRAVENOUS at 18:18

## 2019-01-01 RX ADMIN — METOPROLOL TARTRATE 2.5 MG: 5 INJECTION, SOLUTION INTRAVENOUS at 00:29

## 2019-01-01 RX ADMIN — ATORVASTATIN CALCIUM 40 MG: 40 TABLET, FILM COATED ORAL at 21:37

## 2019-01-01 RX ADMIN — INSULIN LISPRO 4 UNITS: 100 INJECTION, SOLUTION INTRAVENOUS; SUBCUTANEOUS at 11:52

## 2019-01-01 RX ADMIN — DRONABINOL 2.5 MG: 2.5 CAPSULE ORAL at 22:13

## 2019-01-01 RX ADMIN — Medication 10 ML: at 20:39

## 2019-01-01 RX ADMIN — Medication 10 ML: at 00:24

## 2019-01-01 RX ADMIN — NIFEDIPINE 30 MG: 30 TABLET, FILM COATED, EXTENDED RELEASE ORAL at 01:28

## 2019-01-01 RX ADMIN — OXYCODONE HYDROCHLORIDE AND ACETAMINOPHEN 1 TABLET: 7.5; 325 TABLET ORAL at 22:14

## 2019-01-01 RX ADMIN — SODIUM BICARBONATE 325 MG: 650 TABLET ORAL at 10:15

## 2019-01-01 RX ADMIN — ENOXAPARIN SODIUM 30 MG: 30 INJECTION SUBCUTANEOUS at 09:46

## 2019-01-01 RX ADMIN — HYDRALAZINE HYDROCHLORIDE 10 MG: 20 INJECTION INTRAMUSCULAR; INTRAVENOUS at 18:46

## 2019-01-01 RX ADMIN — ONDANSETRON 8 MG: 2 INJECTION INTRAMUSCULAR; INTRAVENOUS at 20:55

## 2019-01-01 RX ADMIN — INSULIN LISPRO 2 UNITS: 100 INJECTION, SOLUTION INTRAVENOUS; SUBCUTANEOUS at 18:42

## 2019-01-01 RX ADMIN — PROMETHAZINE HYDROCHLORIDE 12.5 MG: 25 INJECTION INTRAMUSCULAR; INTRAVENOUS at 02:04

## 2019-01-01 RX ADMIN — LACTULOSE 20 G: 20 SOLUTION ORAL at 18:32

## 2019-01-01 RX ADMIN — ONDANSETRON HYDROCHLORIDE 4 MG: 2 INJECTION, SOLUTION INTRAMUSCULAR; INTRAVENOUS at 09:37

## 2019-01-01 RX ADMIN — ENOXAPARIN SODIUM 30 MG: 30 INJECTION SUBCUTANEOUS at 12:54

## 2019-01-01 RX ADMIN — SUCRALFATE 1 G: 1 TABLET ORAL at 16:22

## 2019-01-01 RX ADMIN — HYDRALAZINE HYDROCHLORIDE 10 MG: 20 INJECTION INTRAMUSCULAR; INTRAVENOUS at 12:35

## 2019-01-01 RX ADMIN — PROMETHAZINE HYDROCHLORIDE 12.5 MG: 25 INJECTION INTRAMUSCULAR; INTRAVENOUS at 03:20

## 2019-01-01 RX ADMIN — NIFEDIPINE 30 MG: 30 TABLET, FILM COATED, EXTENDED RELEASE ORAL at 20:39

## 2019-01-01 RX ADMIN — ONDANSETRON 8 MG: 2 INJECTION INTRAMUSCULAR; INTRAVENOUS at 22:51

## 2019-01-01 RX ADMIN — DRONABINOL 2.5 MG: 2.5 CAPSULE ORAL at 10:24

## 2019-01-01 RX ADMIN — METOPROLOL TARTRATE 2.5 MG: 5 INJECTION, SOLUTION INTRAVENOUS at 02:42

## 2019-01-01 RX ADMIN — HYDRALAZINE HYDROCHLORIDE 10 MG: 20 INJECTION INTRAMUSCULAR; INTRAVENOUS at 19:43

## 2019-01-01 RX ADMIN — INSULIN LISPRO 8 UNITS: 100 INJECTION, SOLUTION INTRAVENOUS; SUBCUTANEOUS at 17:48

## 2019-01-01 RX ADMIN — METOPROLOL TARTRATE 2.5 MG: 5 INJECTION, SOLUTION INTRAVENOUS at 06:38

## 2019-01-01 RX ADMIN — OXYCODONE HYDROCHLORIDE AND ACETAMINOPHEN 1 TABLET: 7.5; 325 TABLET ORAL at 13:04

## 2019-01-01 RX ADMIN — PROMETHAZINE HYDROCHLORIDE 6.25 MG: 25 INJECTION INTRAMUSCULAR; INTRAVENOUS at 09:46

## 2019-01-01 RX ADMIN — SACUBITRIL AND VALSARTAN 1 TABLET: 24; 26 TABLET, FILM COATED ORAL at 21:22

## 2019-01-01 RX ADMIN — OXYCODONE HYDROCHLORIDE AND ACETAMINOPHEN 1 TABLET: 7.5; 325 TABLET ORAL at 01:28

## 2019-01-01 RX ADMIN — Medication 10 ML: at 22:57

## 2019-01-01 RX ADMIN — ONDANSETRON 4 MG: 2 INJECTION INTRAMUSCULAR; INTRAVENOUS at 03:02

## 2019-01-01 RX ADMIN — NIFEDIPINE 30 MG: 30 TABLET, FILM COATED, EXTENDED RELEASE ORAL at 21:02

## 2019-01-01 RX ADMIN — POTASSIUM CHLORIDE 10 MEQ: 10 TABLET, EXTENDED RELEASE ORAL at 11:15

## 2019-01-01 RX ADMIN — SODIUM CHLORIDE: 9 INJECTION, SOLUTION INTRAVENOUS at 04:51

## 2019-01-01 RX ADMIN — SACUBITRIL AND VALSARTAN 1 TABLET: 24; 26 TABLET, FILM COATED ORAL at 09:46

## 2019-01-01 RX ADMIN — ACETAMINOPHEN 650 MG: 325 TABLET ORAL at 21:29

## 2019-01-01 RX ADMIN — PROMETHAZINE HYDROCHLORIDE 6.25 MG: 25 INJECTION INTRAMUSCULAR; INTRAVENOUS at 10:23

## 2019-01-01 RX ADMIN — HYDROMORPHONE HYDROCHLORIDE 1 MG: 1 INJECTION, SOLUTION INTRAMUSCULAR; INTRAVENOUS; SUBCUTANEOUS at 14:11

## 2019-01-01 RX ADMIN — SODIUM CHLORIDE, POTASSIUM CHLORIDE, SODIUM LACTATE AND CALCIUM CHLORIDE 1000 ML: 600; 310; 30; 20 INJECTION, SOLUTION INTRAVENOUS at 20:04

## 2019-01-01 RX ADMIN — INSULIN LISPRO 4 UNITS: 100 INJECTION, SOLUTION INTRAVENOUS; SUBCUTANEOUS at 18:08

## 2019-01-01 RX ADMIN — Medication 10 ML: at 23:46

## 2019-01-01 RX ADMIN — MORPHINE SULFATE 2 MG: 4 INJECTION INTRAVENOUS at 12:56

## 2019-01-01 RX ADMIN — ONDANSETRON 8 MG: 2 INJECTION INTRAMUSCULAR; INTRAVENOUS at 09:32

## 2019-01-01 RX ADMIN — MORPHINE SULFATE 2 MG: 4 INJECTION INTRAVENOUS at 15:15

## 2019-01-01 RX ADMIN — Medication 10 ML: at 08:25

## 2019-01-01 RX ADMIN — HYDROMORPHONE HYDROCHLORIDE 1 MG: 1 INJECTION, SOLUTION INTRAMUSCULAR; INTRAVENOUS; SUBCUTANEOUS at 20:28

## 2019-01-01 RX ADMIN — METOCLOPRAMIDE 10 MG: 5 INJECTION, SOLUTION INTRAMUSCULAR; INTRAVENOUS at 15:39

## 2019-01-01 RX ADMIN — ONDANSETRON 4 MG: 2 INJECTION INTRAMUSCULAR; INTRAVENOUS at 20:06

## 2019-01-01 RX ADMIN — Medication 10 ML: at 20:58

## 2019-01-01 RX ADMIN — ONDANSETRON 8 MG: 2 INJECTION INTRAMUSCULAR; INTRAVENOUS at 15:15

## 2019-01-01 RX ADMIN — METOPROLOL TARTRATE 2.5 MG: 5 INJECTION, SOLUTION INTRAVENOUS at 12:02

## 2019-01-01 RX ADMIN — INSULIN LISPRO 1 UNITS: 100 INJECTION, SOLUTION INTRAVENOUS; SUBCUTANEOUS at 21:35

## 2019-01-01 RX ADMIN — ONDANSETRON 4 MG: 2 INJECTION INTRAMUSCULAR; INTRAVENOUS at 03:03

## 2019-01-01 RX ADMIN — SODIUM BICARBONATE 325 MG: 325 TABLET ORAL at 20:22

## 2019-01-01 RX ADMIN — INSULIN LISPRO 2 UNITS: 100 INJECTION, SOLUTION INTRAVENOUS; SUBCUTANEOUS at 09:06

## 2019-01-01 RX ADMIN — ATORVASTATIN CALCIUM 40 MG: 40 TABLET, FILM COATED ORAL at 21:02

## 2019-01-01 RX ADMIN — Medication 10 ML: at 00:52

## 2019-01-01 RX ADMIN — Medication 10 ML: at 12:42

## 2019-01-01 RX ADMIN — ONDANSETRON 8 MG: 2 INJECTION INTRAMUSCULAR; INTRAVENOUS at 22:49

## 2019-01-01 RX ADMIN — SENNOSIDES AND DOCUSATE SODIUM 1 TABLET: 8.6; 5 TABLET ORAL at 22:17

## 2019-01-01 RX ADMIN — FUROSEMIDE 40 MG: 40 TABLET ORAL at 21:13

## 2019-01-01 RX ADMIN — CARVEDILOL 6.25 MG: 6.25 TABLET, FILM COATED ORAL at 20:09

## 2019-01-01 RX ADMIN — IOPAMIDOL 90 ML: 755 INJECTION, SOLUTION INTRAVENOUS at 18:12

## 2019-01-01 RX ADMIN — HYDRALAZINE HYDROCHLORIDE 25 MG: 25 TABLET, FILM COATED ORAL at 08:50

## 2019-01-01 RX ADMIN — SODIUM BICARBONATE 325 MG: 325 TABLET ORAL at 08:26

## 2019-01-01 RX ADMIN — HYDRALAZINE HYDROCHLORIDE 10 MG: 20 INJECTION INTRAMUSCULAR; INTRAVENOUS at 02:09

## 2019-01-01 RX ADMIN — HYDROMORPHONE HYDROCHLORIDE 1 MG: 1 INJECTION, SOLUTION INTRAMUSCULAR; INTRAVENOUS; SUBCUTANEOUS at 06:18

## 2019-01-01 RX ADMIN — Medication 2 MG: at 17:16

## 2019-01-01 RX ADMIN — Medication 10 ML: at 10:25

## 2019-01-01 RX ADMIN — Medication 1 G: at 09:55

## 2019-01-01 RX ADMIN — SENNOSIDES AND DOCUSATE SODIUM 1 TABLET: 8.6; 5 TABLET ORAL at 10:16

## 2019-01-01 RX ADMIN — LACTULOSE 20 G: 20 SOLUTION ORAL at 08:40

## 2019-01-01 RX ADMIN — SUCRALFATE 1 G: 1 TABLET ORAL at 06:22

## 2019-01-01 RX ADMIN — HYDROCODONE BITARTRATE AND ACETAMINOPHEN 1 TABLET: 7.5; 325 TABLET ORAL at 16:27

## 2019-01-01 RX ADMIN — NIFEDIPINE 30 MG: 30 TABLET, FILM COATED, EXTENDED RELEASE ORAL at 08:34

## 2019-01-01 RX ADMIN — ACETAMINOPHEN 650 MG: 325 TABLET ORAL at 10:58

## 2019-01-01 RX ADMIN — ALPRAZOLAM 0.5 MG: 0.25 TABLET ORAL at 20:23

## 2019-01-01 RX ADMIN — Medication 2 MG: at 10:14

## 2019-01-01 RX ADMIN — ONDANSETRON 8 MG: 2 INJECTION INTRAMUSCULAR; INTRAVENOUS at 12:42

## 2019-01-01 RX ADMIN — METOPROLOL TARTRATE 2.5 MG: 5 INJECTION, SOLUTION INTRAVENOUS at 17:37

## 2019-01-01 RX ADMIN — METOPROLOL TARTRATE 2.5 MG: 5 INJECTION, SOLUTION INTRAVENOUS at 17:33

## 2019-01-01 RX ADMIN — NIFEDIPINE 30 MG: 30 TABLET, FILM COATED, EXTENDED RELEASE ORAL at 21:37

## 2019-01-01 RX ADMIN — ENOXAPARIN SODIUM 30 MG: 30 INJECTION SUBCUTANEOUS at 12:02

## 2019-01-01 RX ADMIN — SODIUM BICARBONATE 325 MG: 650 TABLET ORAL at 20:27

## 2019-01-01 RX ADMIN — FAMOTIDINE 20 MG: 20 TABLET ORAL at 09:56

## 2019-01-01 RX ADMIN — LINAGLIPTIN 5 MG: 5 TABLET, FILM COATED ORAL at 14:22

## 2019-01-01 RX ADMIN — IODIXANOL 86 ML: 320 INJECTION, SOLUTION INTRAVASCULAR at 10:32

## 2019-01-01 RX ADMIN — ONDANSETRON 8 MG: 2 INJECTION INTRAMUSCULAR; INTRAVENOUS at 08:21

## 2019-01-01 RX ADMIN — ONDANSETRON HYDROCHLORIDE 4 MG: 2 INJECTION, SOLUTION INTRAMUSCULAR; INTRAVENOUS at 14:22

## 2019-01-01 RX ADMIN — METOPROLOL TARTRATE 2.5 MG: 5 INJECTION, SOLUTION INTRAVENOUS at 06:24

## 2019-01-01 RX ADMIN — PROMETHAZINE HYDROCHLORIDE 6.25 MG: 25 INJECTION INTRAMUSCULAR; INTRAVENOUS at 06:56

## 2019-01-01 RX ADMIN — HYDRALAZINE HYDROCHLORIDE 25 MG: 25 TABLET, FILM COATED ORAL at 13:09

## 2019-01-01 RX ADMIN — ATORVASTATIN CALCIUM 40 MG: 40 TABLET, FILM COATED ORAL at 22:17

## 2019-01-01 RX ADMIN — SODIUM BICARBONATE 325 MG: 650 TABLET ORAL at 09:55

## 2019-01-01 RX ADMIN — EPHEDRINE SULFATE 5 MG: 50 INJECTION INTRAMUSCULAR; INTRAVENOUS; SUBCUTANEOUS at 14:50

## 2019-01-01 RX ADMIN — ONDANSETRON 8 MG: 2 INJECTION INTRAMUSCULAR; INTRAVENOUS at 23:12

## 2019-01-01 RX ADMIN — HYDROMORPHONE HYDROCHLORIDE 0.5 MG: 1 INJECTION, SOLUTION INTRAMUSCULAR; INTRAVENOUS; SUBCUTANEOUS at 01:58

## 2019-01-01 RX ADMIN — Medication 10 ML: at 09:10

## 2019-01-01 RX ADMIN — NIFEDIPINE 30 MG: 30 TABLET, FILM COATED, EXTENDED RELEASE ORAL at 08:25

## 2019-01-01 RX ADMIN — HEPARIN SODIUM 3600 UNITS: 1000 INJECTION INTRAVENOUS; SUBCUTANEOUS at 11:45

## 2019-01-01 RX ADMIN — HYDROMORPHONE HYDROCHLORIDE 1 MG: 1 INJECTION, SOLUTION INTRAMUSCULAR; INTRAVENOUS; SUBCUTANEOUS at 03:28

## 2019-01-01 RX ADMIN — CARVEDILOL 6.25 MG: 6.25 TABLET, FILM COATED ORAL at 21:21

## 2019-01-01 RX ADMIN — METOPROLOL TARTRATE 2.5 MG: 5 INJECTION, SOLUTION INTRAVENOUS at 18:45

## 2019-01-01 RX ADMIN — Medication 10 ML: at 19:28

## 2019-01-01 RX ADMIN — NIFEDIPINE 30 MG: 30 TABLET, FILM COATED, EXTENDED RELEASE ORAL at 20:09

## 2019-01-01 RX ADMIN — SODIUM BICARBONATE 325 MG: 325 TABLET ORAL at 11:15

## 2019-01-01 RX ADMIN — NIFEDIPINE 30 MG: 30 TABLET, FILM COATED, EXTENDED RELEASE ORAL at 22:13

## 2019-01-01 RX ADMIN — ONDANSETRON 8 MG: 2 INJECTION INTRAMUSCULAR; INTRAVENOUS at 07:30

## 2019-01-01 RX ADMIN — DOCUSATE SODIUM 100 MG: 100 CAPSULE, LIQUID FILLED ORAL at 21:02

## 2019-01-01 RX ADMIN — FERRIC CITRATE 210 MG: 210 TABLET, COATED ORAL at 08:14

## 2019-01-01 RX ADMIN — METOPROLOL TARTRATE 2.5 MG: 5 INJECTION, SOLUTION INTRAVENOUS at 06:40

## 2019-01-01 RX ADMIN — INSULIN LISPRO 2 UNITS: 100 INJECTION, SOLUTION INTRAVENOUS; SUBCUTANEOUS at 08:35

## 2019-01-01 RX ADMIN — HYDROMORPHONE HYDROCHLORIDE 1 MG: 1 INJECTION, SOLUTION INTRAMUSCULAR; INTRAVENOUS; SUBCUTANEOUS at 05:26

## 2019-01-01 RX ADMIN — ENOXAPARIN SODIUM 30 MG: 30 INJECTION SUBCUTANEOUS at 08:50

## 2019-01-01 RX ADMIN — PROMETHAZINE HYDROCHLORIDE 25 MG: 25 INJECTION INTRAMUSCULAR; INTRAVENOUS at 05:22

## 2019-01-01 RX ADMIN — ONDANSETRON 8 MG: 2 INJECTION INTRAMUSCULAR; INTRAVENOUS at 21:40

## 2019-01-01 RX ADMIN — ONDANSETRON 4 MG: 2 INJECTION INTRAMUSCULAR; INTRAVENOUS at 08:20

## 2019-01-01 RX ADMIN — HEPARIN SODIUM 3600 UNITS: 1000 INJECTION INTRAVENOUS; SUBCUTANEOUS at 10:46

## 2019-01-01 RX ADMIN — HYDRALAZINE HYDROCHLORIDE 10 MG: 20 INJECTION INTRAMUSCULAR; INTRAVENOUS at 13:37

## 2019-01-01 RX ADMIN — Medication 2 MG: at 08:27

## 2019-01-01 RX ADMIN — Medication 10 ML: at 10:19

## 2019-01-01 RX ADMIN — SODIUM BICARBONATE 325 MG: 650 TABLET ORAL at 20:35

## 2019-01-01 RX ADMIN — METOPROLOL TARTRATE 2.5 MG: 5 INJECTION, SOLUTION INTRAVENOUS at 05:26

## 2019-01-01 RX ADMIN — Medication 10 ML: at 08:27

## 2019-01-01 RX ADMIN — HYDROMORPHONE HYDROCHLORIDE 1 MG: 1 INJECTION, SOLUTION INTRAMUSCULAR; INTRAVENOUS; SUBCUTANEOUS at 10:33

## 2019-01-01 RX ADMIN — METOPROLOL TARTRATE 2.5 MG: 5 INJECTION, SOLUTION INTRAVENOUS at 02:03

## 2019-01-01 RX ADMIN — ONDANSETRON 8 MG: 2 INJECTION INTRAMUSCULAR; INTRAVENOUS at 17:16

## 2019-01-01 RX ADMIN — FENTANYL CITRATE 50 MCG: 50 INJECTION INTRAMUSCULAR; INTRAVENOUS at 02:29

## 2019-01-01 RX ADMIN — Medication 2 MG: at 18:18

## 2019-01-01 RX ADMIN — INSULIN LISPRO 4 UNITS: 100 INJECTION, SOLUTION INTRAVENOUS; SUBCUTANEOUS at 22:06

## 2019-01-01 RX ADMIN — ATORVASTATIN CALCIUM 40 MG: 40 TABLET, FILM COATED ORAL at 01:28

## 2019-01-01 RX ADMIN — INSULIN LISPRO 3 UNITS: 100 INJECTION, SOLUTION INTRAVENOUS; SUBCUTANEOUS at 20:38

## 2019-01-01 RX ADMIN — SODIUM BICARBONATE 325 MG: 325 TABLET ORAL at 21:02

## 2019-01-01 RX ADMIN — PHENYLEPHRINE HYDROCHLORIDE 50 MCG: 10 INJECTION INTRAVENOUS at 14:27

## 2019-01-01 RX ADMIN — INSULIN LISPRO 2 UNITS: 100 INJECTION, SOLUTION INTRAVENOUS; SUBCUTANEOUS at 23:45

## 2019-01-01 RX ADMIN — Medication 2 MG: at 13:47

## 2019-01-01 RX ADMIN — LACTULOSE 20 G: 20 SOLUTION ORAL at 12:55

## 2019-01-01 RX ADMIN — METOPROLOL TARTRATE 2.5 MG: 5 INJECTION, SOLUTION INTRAVENOUS at 00:52

## 2019-01-01 RX ADMIN — SENNOSIDES AND DOCUSATE SODIUM 1 TABLET: 8.6; 5 TABLET ORAL at 08:57

## 2019-01-01 RX ADMIN — ONDANSETRON 8 MG: 2 INJECTION INTRAMUSCULAR; INTRAVENOUS at 04:07

## 2019-01-01 RX ADMIN — ENOXAPARIN SODIUM 30 MG: 30 INJECTION SUBCUTANEOUS at 08:21

## 2019-01-01 RX ADMIN — Medication 2 MG: at 15:11

## 2019-01-01 RX ADMIN — Medication 2 MG: at 00:55

## 2019-01-01 RX ADMIN — INSULIN LISPRO 2 UNITS: 100 INJECTION, SOLUTION INTRAVENOUS; SUBCUTANEOUS at 17:26

## 2019-01-01 RX ADMIN — LIDOCAINE HYDROCHLORIDE: 20 SOLUTION ORAL; TOPICAL at 03:01

## 2019-01-01 RX ADMIN — HYDRALAZINE HYDROCHLORIDE 25 MG: 25 TABLET, FILM COATED ORAL at 10:20

## 2019-01-01 RX ADMIN — SENNOSIDES AND DOCUSATE SODIUM 1 TABLET: 8.6; 5 TABLET ORAL at 20:09

## 2019-01-01 RX ADMIN — METOPROLOL TARTRATE 2.5 MG: 5 INJECTION, SOLUTION INTRAVENOUS at 12:42

## 2019-01-01 RX ADMIN — ONDANSETRON 8 MG: 2 INJECTION INTRAMUSCULAR; INTRAVENOUS at 20:06

## 2019-01-01 RX ADMIN — ONDANSETRON 4 MG: 2 INJECTION INTRAMUSCULAR; INTRAVENOUS at 13:52

## 2019-01-01 RX ADMIN — ONDANSETRON 8 MG: 2 INJECTION INTRAMUSCULAR; INTRAVENOUS at 12:58

## 2019-01-01 RX ADMIN — SENNOSIDES AND DOCUSATE SODIUM 1 TABLET: 8.6; 5 TABLET ORAL at 11:09

## 2019-01-01 RX ADMIN — Medication 10 ML: at 20:01

## 2019-01-01 RX ADMIN — PANTOPRAZOLE SODIUM 20 MG: 20 TABLET, DELAYED RELEASE ORAL at 06:19

## 2019-01-01 RX ADMIN — Medication 2 MG: at 22:37

## 2019-01-01 RX ADMIN — ONDANSETRON 8 MG: 2 INJECTION INTRAMUSCULAR; INTRAVENOUS at 21:57

## 2019-01-01 RX ADMIN — ONDANSETRON 4 MG: 2 INJECTION INTRAMUSCULAR; INTRAVENOUS at 02:16

## 2019-01-01 RX ADMIN — HYDROMORPHONE HYDROCHLORIDE 1 MG: 1 INJECTION, SOLUTION INTRAMUSCULAR; INTRAVENOUS; SUBCUTANEOUS at 16:40

## 2019-01-01 RX ADMIN — INSULIN LISPRO 4 UNITS: 100 INJECTION, SOLUTION INTRAVENOUS; SUBCUTANEOUS at 09:58

## 2019-01-01 RX ADMIN — HYDRALAZINE HYDROCHLORIDE 25 MG: 25 TABLET, FILM COATED ORAL at 20:39

## 2019-01-01 RX ADMIN — ONDANSETRON 4 MG: 2 INJECTION INTRAMUSCULAR; INTRAVENOUS at 02:30

## 2019-01-01 RX ADMIN — INSULIN LISPRO 4 UNITS: 100 INJECTION, SOLUTION INTRAVENOUS; SUBCUTANEOUS at 17:22

## 2019-01-01 RX ADMIN — HYDRALAZINE HYDROCHLORIDE 10 MG: 20 INJECTION INTRAMUSCULAR; INTRAVENOUS at 00:25

## 2019-01-01 RX ADMIN — ONDANSETRON 8 MG: 2 INJECTION INTRAMUSCULAR; INTRAVENOUS at 16:46

## 2019-01-01 RX ADMIN — ONDANSETRON 4 MG: 2 INJECTION INTRAMUSCULAR; INTRAVENOUS at 03:01

## 2019-01-01 RX ADMIN — METOPROLOL TARTRATE 2.5 MG: 5 INJECTION, SOLUTION INTRAVENOUS at 18:35

## 2019-01-01 RX ADMIN — METOPROLOL TARTRATE 2.5 MG: 5 INJECTION, SOLUTION INTRAVENOUS at 19:02

## 2019-01-01 RX ADMIN — FERRIC CITRATE 210 MG: 210 TABLET, COATED ORAL at 08:26

## 2019-01-01 RX ADMIN — NIFEDIPINE 30 MG: 30 TABLET, FILM COATED, EXTENDED RELEASE ORAL at 22:01

## 2019-01-01 RX ADMIN — INSULIN LISPRO 5 UNITS: 100 INJECTION, SOLUTION INTRAVENOUS; SUBCUTANEOUS at 12:11

## 2019-01-01 RX ADMIN — METOPROLOL TARTRATE 2.5 MG: 5 INJECTION, SOLUTION INTRAVENOUS at 19:27

## 2019-01-01 RX ADMIN — Medication 10 ML: at 22:18

## 2019-01-01 RX ADMIN — HYDRALAZINE HYDROCHLORIDE 25 MG: 25 TABLET, FILM COATED ORAL at 20:09

## 2019-01-01 RX ADMIN — FAMOTIDINE 20 MG: 20 TABLET ORAL at 10:15

## 2019-01-01 RX ADMIN — HYDROCODONE BITARTRATE AND ACETAMINOPHEN 1 TABLET: 7.5; 325 TABLET ORAL at 08:36

## 2019-01-01 RX ADMIN — ONDANSETRON 8 MG: 2 INJECTION INTRAMUSCULAR; INTRAVENOUS at 16:21

## 2019-01-01 RX ADMIN — ATORVASTATIN CALCIUM 40 MG: 40 TABLET, FILM COATED ORAL at 20:10

## 2019-01-01 RX ADMIN — SODIUM BICARBONATE 325 MG: 325 TABLET ORAL at 21:18

## 2019-01-01 RX ADMIN — INSULIN LISPRO 10 UNITS: 100 INJECTION, SOLUTION INTRAVENOUS; SUBCUTANEOUS at 08:33

## 2019-01-01 RX ADMIN — FAMOTIDINE 20 MG: 20 TABLET ORAL at 08:26

## 2019-01-01 RX ADMIN — NIFEDIPINE 30 MG: 30 TABLET, FILM COATED, EXTENDED RELEASE ORAL at 09:57

## 2019-01-01 RX ADMIN — HYDRALAZINE HYDROCHLORIDE 25 MG: 25 TABLET, FILM COATED ORAL at 22:15

## 2019-01-01 RX ADMIN — METOPROLOL TARTRATE 2.5 MG: 5 INJECTION, SOLUTION INTRAVENOUS at 06:22

## 2019-01-01 RX ADMIN — CLOPIDOGREL BISULFATE 75 MG: 75 TABLET ORAL at 08:34

## 2019-01-01 RX ADMIN — Medication 1 G: at 08:57

## 2019-01-01 RX ADMIN — Medication 150 MG: at 08:34

## 2019-01-01 RX ADMIN — HEPARIN SODIUM 3600 UNITS: 1000 INJECTION, SOLUTION INTRAVENOUS; SUBCUTANEOUS at 13:33

## 2019-01-01 RX ADMIN — Medication 10 ML: at 09:21

## 2019-01-01 RX ADMIN — Medication 2 MG: at 19:27

## 2019-01-01 RX ADMIN — Medication 10 ML: at 00:17

## 2019-01-01 RX ADMIN — SUCRALFATE 1 G: 1 TABLET ORAL at 20:27

## 2019-01-01 RX ADMIN — NIFEDIPINE 30 MG: 30 TABLET, FILM COATED, EXTENDED RELEASE ORAL at 13:10

## 2019-01-01 RX ADMIN — ONDANSETRON 4 MG: 2 INJECTION INTRAMUSCULAR; INTRAVENOUS at 12:47

## 2019-01-01 RX ADMIN — NIFEDIPINE 30 MG: 30 TABLET, FILM COATED, EXTENDED RELEASE ORAL at 08:50

## 2019-01-01 RX ADMIN — OXYCODONE HYDROCHLORIDE AND ACETAMINOPHEN 1 TABLET: 7.5; 325 TABLET ORAL at 20:10

## 2019-01-01 RX ADMIN — ONDANSETRON 8 MG: 2 INJECTION INTRAMUSCULAR; INTRAVENOUS at 03:13

## 2019-01-01 RX ADMIN — ONDANSETRON 8 MG: 2 INJECTION INTRAMUSCULAR; INTRAVENOUS at 16:22

## 2019-01-01 RX ADMIN — NIFEDIPINE 30 MG: 30 TABLET, FILM COATED, EXTENDED RELEASE ORAL at 22:17

## 2019-01-01 RX ADMIN — NIFEDIPINE 30 MG: 30 TABLET, FILM COATED, EXTENDED RELEASE ORAL at 20:35

## 2019-01-01 RX ADMIN — SODIUM BICARBONATE 325 MG: 650 TABLET ORAL at 08:56

## 2019-01-01 RX ADMIN — Medication 2 MG: at 12:42

## 2019-01-01 RX ADMIN — Medication 2 MG: at 08:09

## 2019-01-01 RX ADMIN — SODIUM BICARBONATE 325 MG: 325 TABLET ORAL at 13:11

## 2019-01-01 RX ADMIN — SENNOSIDES AND DOCUSATE SODIUM 1 TABLET: 8.6; 5 TABLET ORAL at 22:52

## 2019-01-01 RX ADMIN — TETRAKIS(2-METHOXYISOBUTYLISOCYANIDE)COPPER(I) TETRAFLUOROBORATE 10 MILLICURIE: 1 INJECTION, POWDER, LYOPHILIZED, FOR SOLUTION INTRAVENOUS at 13:33

## 2019-01-01 RX ADMIN — METOPROLOL TARTRATE 2.5 MG: 5 INJECTION, SOLUTION INTRAVENOUS at 17:29

## 2019-01-01 RX ADMIN — METOPROLOL TARTRATE 2.5 MG: 5 INJECTION, SOLUTION INTRAVENOUS at 00:27

## 2019-01-01 RX ADMIN — Medication 2 MG: at 00:10

## 2019-01-01 RX ADMIN — SODIUM BICARBONATE 325 MG: 650 TABLET ORAL at 21:21

## 2019-01-01 RX ADMIN — ONDANSETRON 8 MG: 2 INJECTION INTRAMUSCULAR; INTRAVENOUS at 17:17

## 2019-01-01 RX ADMIN — METOPROLOL TARTRATE 2.5 MG: 5 INJECTION, SOLUTION INTRAVENOUS at 06:31

## 2019-01-01 RX ADMIN — ATORVASTATIN CALCIUM 40 MG: 40 TABLET, FILM COATED ORAL at 21:18

## 2019-01-01 RX ADMIN — CARVEDILOL 6.25 MG: 6.25 TABLET, FILM COATED ORAL at 08:35

## 2019-01-01 RX ADMIN — Medication 2 MG: at 22:03

## 2019-01-01 RX ADMIN — SODIUM BICARBONATE 325 MG: 650 TABLET ORAL at 11:03

## 2019-01-01 RX ADMIN — Medication 10 ML: at 05:44

## 2019-01-01 RX ADMIN — TETRAKIS(2-METHOXYISOBUTYLISOCYANIDE)COPPER(I) TETRAFLUOROBORATE 30 MILLICURIE: 1 INJECTION, POWDER, LYOPHILIZED, FOR SOLUTION INTRAVENOUS at 13:33

## 2019-01-01 RX ADMIN — HYDROMORPHONE HYDROCHLORIDE 0.5 MG: 1 INJECTION, SOLUTION INTRAMUSCULAR; INTRAVENOUS; SUBCUTANEOUS at 08:14

## 2019-01-01 RX ADMIN — ONDANSETRON 4 MG: 4 TABLET, ORALLY DISINTEGRATING ORAL at 10:44

## 2019-01-01 RX ADMIN — SODIUM BICARBONATE 325 MG: 325 TABLET ORAL at 14:22

## 2019-01-01 RX ADMIN — INSULIN LISPRO 4 UNITS: 100 INJECTION, SOLUTION INTRAVENOUS; SUBCUTANEOUS at 18:24

## 2019-01-01 RX ADMIN — CARVEDILOL 6.25 MG: 6.25 TABLET, FILM COATED ORAL at 09:48

## 2019-01-01 RX ADMIN — FENTANYL CITRATE 25 MCG: 50 INJECTION INTRAMUSCULAR; INTRAVENOUS at 09:42

## 2019-01-01 RX ADMIN — SENNOSIDES 8.6 MG: 8.6 TABLET, FILM COATED ORAL at 13:12

## 2019-01-01 RX ADMIN — ATORVASTATIN CALCIUM 40 MG: 40 TABLET, FILM COATED ORAL at 20:55

## 2019-01-01 RX ADMIN — DRONABINOL 2.5 MG: 2.5 CAPSULE ORAL at 08:36

## 2019-01-01 RX ADMIN — METOPROLOL TARTRATE 1.25 MG: 5 INJECTION, SOLUTION INTRAVENOUS at 08:33

## 2019-01-01 RX ADMIN — SODIUM BICARBONATE 325 MG: 325 TABLET ORAL at 20:55

## 2019-01-01 RX ADMIN — HYDROMORPHONE HYDROCHLORIDE 1 MG: 1 INJECTION, SOLUTION INTRAMUSCULAR; INTRAVENOUS; SUBCUTANEOUS at 15:59

## 2019-01-01 RX ADMIN — HYDRALAZINE HYDROCHLORIDE 25 MG: 25 TABLET, FILM COATED ORAL at 20:57

## 2019-01-01 RX ADMIN — HEPARIN SODIUM 3600 UNITS: 1000 INJECTION INTRAVENOUS; SUBCUTANEOUS at 10:30

## 2019-01-01 RX ADMIN — NEBIVOLOL HYDROCHLORIDE 10 MG: 5 TABLET ORAL at 14:21

## 2019-01-01 RX ADMIN — ATORVASTATIN CALCIUM 40 MG: 40 TABLET, FILM COATED ORAL at 20:38

## 2019-01-01 RX ADMIN — SENNOSIDES AND DOCUSATE SODIUM 1 TABLET: 8.6; 5 TABLET ORAL at 20:28

## 2019-01-01 RX ADMIN — Medication 150 MG: at 14:21

## 2019-01-01 RX ADMIN — SODIUM CHLORIDE: 9 INJECTION, SOLUTION INTRAVENOUS at 20:35

## 2019-01-01 RX ADMIN — Medication 2 MG: at 13:52

## 2019-01-01 RX ADMIN — Medication 2 MG: at 06:42

## 2019-01-01 RX ADMIN — PROMETHAZINE HYDROCHLORIDE 12.5 MG: 25 INJECTION INTRAMUSCULAR; INTRAVENOUS at 16:08

## 2019-01-01 RX ADMIN — PROMETHAZINE HYDROCHLORIDE 6.25 MG: 25 INJECTION INTRAMUSCULAR; INTRAVENOUS at 08:33

## 2019-01-01 RX ADMIN — METOPROLOL TARTRATE 2.5 MG: 5 INJECTION, SOLUTION INTRAVENOUS at 01:37

## 2019-01-01 RX ADMIN — OXYCODONE HYDROCHLORIDE AND ACETAMINOPHEN 1 TABLET: 7.5; 325 TABLET ORAL at 14:50

## 2019-01-01 RX ADMIN — HYDROMORPHONE HYDROCHLORIDE 0.5 MG: 1 INJECTION, SOLUTION INTRAMUSCULAR; INTRAVENOUS; SUBCUTANEOUS at 22:52

## 2019-01-01 RX ADMIN — Medication 10 ML: at 22:16

## 2019-01-01 RX ADMIN — PROPOFOL 80 MG: 10 INJECTION, EMULSION INTRAVENOUS at 14:27

## 2019-01-01 RX ADMIN — Medication 2 MG: at 20:33

## 2019-01-01 RX ADMIN — HYDROMORPHONE HYDROCHLORIDE 1 MG: 1 INJECTION, SOLUTION INTRAMUSCULAR; INTRAVENOUS; SUBCUTANEOUS at 23:15

## 2019-01-01 RX ADMIN — SODIUM BICARBONATE 325 MG: 650 TABLET ORAL at 20:10

## 2019-01-01 RX ADMIN — CARVEDILOL 6.25 MG: 6.25 TABLET, FILM COATED ORAL at 20:55

## 2019-01-01 RX ADMIN — MORPHINE SULFATE 4 MG: 4 INJECTION INTRAVENOUS at 03:23

## 2019-01-01 RX ADMIN — Medication 10 ML: at 19:34

## 2019-01-01 RX ADMIN — HYDROMORPHONE HYDROCHLORIDE 1 MG: 1 INJECTION, SOLUTION INTRAMUSCULAR; INTRAVENOUS; SUBCUTANEOUS at 02:16

## 2019-01-01 RX ADMIN — INSULIN LISPRO 3 UNITS: 100 INJECTION, SOLUTION INTRAVENOUS; SUBCUTANEOUS at 17:58

## 2019-01-01 RX ADMIN — Medication 10 ML: at 12:04

## 2019-01-01 RX ADMIN — LACTULOSE 20 G: 20 SOLUTION ORAL at 13:04

## 2019-01-01 RX ADMIN — PROMETHAZINE HYDROCHLORIDE 6.25 MG: 25 INJECTION INTRAMUSCULAR; INTRAVENOUS at 19:27

## 2019-01-01 RX ADMIN — ATORVASTATIN CALCIUM 40 MG: 40 TABLET, FILM COATED ORAL at 22:15

## 2019-01-01 RX ADMIN — DOCUSATE SODIUM 100 MG: 100 CAPSULE, LIQUID FILLED ORAL at 20:55

## 2019-01-01 RX ADMIN — PROMETHAZINE HYDROCHLORIDE 12.5 MG: 25 INJECTION INTRAMUSCULAR; INTRAVENOUS at 22:16

## 2019-01-01 RX ADMIN — HYDRALAZINE HYDROCHLORIDE 10 MG: 20 INJECTION INTRAMUSCULAR; INTRAVENOUS at 06:24

## 2019-01-01 RX ADMIN — INSULIN LISPRO 1 UNITS: 100 INJECTION, SOLUTION INTRAVENOUS; SUBCUTANEOUS at 12:37

## 2019-01-01 RX ADMIN — FAMOTIDINE 20 MG: 20 TABLET ORAL at 11:03

## 2019-01-01 RX ADMIN — METOPROLOL TARTRATE 2.5 MG: 5 INJECTION, SOLUTION INTRAVENOUS at 06:39

## 2019-01-01 RX ADMIN — METOPROLOL TARTRATE 2.5 MG: 5 INJECTION, SOLUTION INTRAVENOUS at 05:45

## 2019-01-01 RX ADMIN — Medication 10 ML: at 06:43

## 2019-01-01 RX ADMIN — PANTOPRAZOLE SODIUM 20 MG: 20 TABLET, DELAYED RELEASE ORAL at 18:24

## 2019-01-01 RX ADMIN — CEFAZOLIN SODIUM 1 G: 1 INJECTION, SOLUTION INTRAVENOUS at 09:24

## 2019-01-01 RX ADMIN — FAMOTIDINE 20 MG: 20 TABLET ORAL at 08:14

## 2019-01-01 RX ADMIN — FAMOTIDINE 20 MG: 20 TABLET ORAL at 13:00

## 2019-01-01 RX ADMIN — METOPROLOL TARTRATE 2.5 MG: 5 INJECTION, SOLUTION INTRAVENOUS at 12:06

## 2019-01-01 RX ADMIN — MORPHINE SULFATE 2 MG: 4 INJECTION INTRAVENOUS at 04:08

## 2019-01-01 RX ADMIN — NEBIVOLOL HYDROCHLORIDE 10 MG: 5 TABLET ORAL at 21:13

## 2019-01-01 RX ADMIN — LABETALOL 20 MG/4 ML (5 MG/ML) INTRAVENOUS SYRINGE 10 MG: at 03:47

## 2019-01-01 RX ADMIN — FERRIC CITRATE 210 MG: 210 TABLET, COATED ORAL at 13:00

## 2019-01-01 RX ADMIN — NIFEDIPINE 30 MG: 30 TABLET, FILM COATED, EXTENDED RELEASE ORAL at 22:22

## 2019-01-01 RX ADMIN — POTASSIUM CHLORIDE 10 MEQ: 7.46 INJECTION, SOLUTION INTRAVENOUS at 11:03

## 2019-01-01 RX ADMIN — MORPHINE SULFATE 4 MG: 4 INJECTION INTRAVENOUS at 04:40

## 2019-01-01 RX ADMIN — Medication 2 MG: at 23:45

## 2019-01-01 RX ADMIN — SODIUM CHLORIDE: 4.5 INJECTION, SOLUTION INTRAVENOUS at 14:21

## 2019-01-01 RX ADMIN — SODIUM BICARBONATE 325 MG: 650 TABLET ORAL at 22:02

## 2019-01-01 RX ADMIN — HYDROMORPHONE HYDROCHLORIDE 1 MG: 1 INJECTION, SOLUTION INTRAMUSCULAR; INTRAVENOUS; SUBCUTANEOUS at 03:02

## 2019-01-01 RX ADMIN — HYDROMORPHONE HYDROCHLORIDE 1 MG: 1 INJECTION, SOLUTION INTRAMUSCULAR; INTRAVENOUS; SUBCUTANEOUS at 19:32

## 2019-01-01 RX ADMIN — PHENYLEPHRINE HYDROCHLORIDE 100 MCG: 10 INJECTION INTRAVENOUS at 14:50

## 2019-01-01 RX ADMIN — INSULIN LISPRO 8 UNITS: 100 INJECTION, SOLUTION INTRAVENOUS; SUBCUTANEOUS at 12:45

## 2019-01-01 RX ADMIN — METOPROLOL TARTRATE 2.5 MG: 5 INJECTION, SOLUTION INTRAVENOUS at 00:17

## 2019-01-01 RX ADMIN — DOCUSATE SODIUM 100 MG: 100 CAPSULE, LIQUID FILLED ORAL at 21:18

## 2019-01-01 RX ADMIN — HYDRALAZINE HYDROCHLORIDE 10 MG: 20 INJECTION INTRAMUSCULAR; INTRAVENOUS at 05:26

## 2019-01-01 RX ADMIN — HEPARIN SODIUM 3600 UNITS: 1000 INJECTION INTRAVENOUS; SUBCUTANEOUS at 15:00

## 2019-01-01 RX ADMIN — FENTANYL CITRATE 25 MCG: 50 INJECTION INTRAMUSCULAR; INTRAVENOUS at 09:50

## 2019-01-01 RX ADMIN — INSULIN LISPRO 3 UNITS: 100 INJECTION, SOLUTION INTRAVENOUS; SUBCUTANEOUS at 08:29

## 2019-01-01 RX ADMIN — ONDANSETRON 8 MG: 2 INJECTION INTRAMUSCULAR; INTRAVENOUS at 20:01

## 2019-01-01 RX ADMIN — SODIUM BICARBONATE 325 MG: 650 TABLET ORAL at 21:37

## 2019-01-01 RX ADMIN — HYDRALAZINE HYDROCHLORIDE 25 MG: 25 TABLET, FILM COATED ORAL at 08:36

## 2019-01-01 RX ADMIN — SODIUM BICARBONATE 325 MG: 325 TABLET ORAL at 21:13

## 2019-01-01 RX ADMIN — ONDANSETRON 8 MG: 2 INJECTION INTRAMUSCULAR; INTRAVENOUS at 00:24

## 2019-01-01 RX ADMIN — HYDROCODONE BITARTRATE AND ACETAMINOPHEN 1 TABLET: 5; 325 TABLET ORAL at 02:10

## 2019-01-01 RX ADMIN — NIFEDIPINE 30 MG: 30 TABLET, FILM COATED, EXTENDED RELEASE ORAL at 20:37

## 2019-01-01 RX ADMIN — ONDANSETRON 8 MG: 2 INJECTION INTRAMUSCULAR; INTRAVENOUS at 00:35

## 2019-01-01 RX ADMIN — PROMETHAZINE HYDROCHLORIDE 12.5 MG: 25 INJECTION INTRAMUSCULAR; INTRAVENOUS at 10:33

## 2019-01-01 RX ADMIN — Medication 150 MG: at 08:25

## 2019-01-01 RX ADMIN — INSULIN LISPRO 1 UNITS: 100 INJECTION, SOLUTION INTRAVENOUS; SUBCUTANEOUS at 22:03

## 2019-01-01 RX ADMIN — Medication 150 MG: at 11:15

## 2019-01-01 RX ADMIN — METOPROLOL TARTRATE 2.5 MG: 5 INJECTION, SOLUTION INTRAVENOUS at 13:12

## 2019-01-01 RX ADMIN — ASPIRIN 81 MG: 81 TABLET, COATED ORAL at 14:22

## 2019-01-01 RX ADMIN — METOPROLOL TARTRATE 2.5 MG: 5 INJECTION, SOLUTION INTRAVENOUS at 19:34

## 2019-01-01 RX ADMIN — METOPROLOL TARTRATE 2.5 MG: 5 INJECTION, SOLUTION INTRAVENOUS at 00:26

## 2019-01-01 RX ADMIN — INFLUENZA VIRUS VACCINE 0.5 ML: 15; 15; 15; 15 SUSPENSION INTRAMUSCULAR at 13:12

## 2019-01-01 RX ADMIN — Medication 2 MG: at 12:26

## 2019-01-01 RX ADMIN — Medication 10 ML: at 20:07

## 2019-01-01 RX ADMIN — HYDROCODONE BITARTRATE AND ACETAMINOPHEN 1 TABLET: 7.5; 325 TABLET ORAL at 18:52

## 2019-01-01 RX ADMIN — SENNOSIDES AND DOCUSATE SODIUM 1 TABLET: 8.6; 5 TABLET ORAL at 21:37

## 2019-01-01 RX ADMIN — ONDANSETRON 8 MG: 2 INJECTION INTRAMUSCULAR; INTRAVENOUS at 20:46

## 2019-01-01 RX ADMIN — PROMETHAZINE HYDROCHLORIDE 6.25 MG: 25 INJECTION INTRAMUSCULAR; INTRAVENOUS at 20:06

## 2019-01-01 RX ADMIN — NIFEDIPINE 30 MG: 30 TABLET, FILM COATED, EXTENDED RELEASE ORAL at 09:46

## 2019-01-01 RX ADMIN — FUROSEMIDE 40 MG: 40 TABLET ORAL at 20:22

## 2019-01-01 RX ADMIN — SENNOSIDES AND DOCUSATE SODIUM 1 TABLET: 8.6; 5 TABLET ORAL at 20:35

## 2019-01-01 RX ADMIN — PHENYLEPHRINE HYDROCHLORIDE 100 MCG: 10 INJECTION INTRAVENOUS at 14:35

## 2019-01-01 RX ADMIN — HYDROMORPHONE HYDROCHLORIDE 1 MG: 1 INJECTION, SOLUTION INTRAMUSCULAR; INTRAVENOUS; SUBCUTANEOUS at 17:10

## 2019-01-01 RX ADMIN — METOPROLOL TARTRATE 2.5 MG: 5 INJECTION, SOLUTION INTRAVENOUS at 06:41

## 2019-01-01 RX ADMIN — Medication 10 ML: at 11:18

## 2019-01-01 RX ADMIN — SUCRALFATE 1 G: 1 TABLET ORAL at 01:28

## 2019-01-01 RX ADMIN — NIFEDIPINE 30 MG: 30 TABLET, FILM COATED, EXTENDED RELEASE ORAL at 20:22

## 2019-01-01 RX ADMIN — HYDRALAZINE HYDROCHLORIDE 10 MG: 20 INJECTION INTRAMUSCULAR; INTRAVENOUS at 06:41

## 2019-01-01 RX ADMIN — FUROSEMIDE 40 MG: 40 TABLET ORAL at 14:21

## 2019-01-01 RX ADMIN — METOPROLOL TARTRATE 2.5 MG: 5 INJECTION, SOLUTION INTRAVENOUS at 22:53

## 2019-01-01 RX ADMIN — ACETAMINOPHEN 650 MG: 325 TABLET ORAL at 15:58

## 2019-01-01 RX ADMIN — PANTOPRAZOLE SODIUM 20 MG: 20 TABLET, DELAYED RELEASE ORAL at 05:46

## 2019-01-01 RX ADMIN — Medication 10 ML: at 21:38

## 2019-01-01 RX ADMIN — METOPROLOL TARTRATE 1.25 MG: 5 INJECTION, SOLUTION INTRAVENOUS at 20:57

## 2019-01-01 RX ADMIN — INSULIN LISPRO 4 UNITS: 100 INJECTION, SOLUTION INTRAVENOUS; SUBCUTANEOUS at 12:56

## 2019-01-01 RX ADMIN — HYDROMORPHONE HYDROCHLORIDE 1 MG: 1 INJECTION, SOLUTION INTRAMUSCULAR; INTRAVENOUS; SUBCUTANEOUS at 12:34

## 2019-01-01 RX ADMIN — FUROSEMIDE 40 MG: 40 TABLET ORAL at 11:15

## 2019-01-01 RX ADMIN — SODIUM CHLORIDE, SODIUM LACTATE, POTASSIUM CHLORIDE, AND CALCIUM CHLORIDE: 600; 310; 30; 20 INJECTION, SOLUTION INTRAVENOUS at 13:53

## 2019-01-01 RX ADMIN — SODIUM BICARBONATE 325 MG: 650 TABLET ORAL at 20:38

## 2019-01-01 RX ADMIN — ONDANSETRON 4 MG: 2 INJECTION INTRAMUSCULAR; INTRAVENOUS at 18:08

## 2019-01-01 RX ADMIN — Medication 10 ML: at 01:29

## 2019-01-01 RX ADMIN — NEBIVOLOL HYDROCHLORIDE 10 MG: 5 TABLET ORAL at 20:22

## 2019-01-01 RX ADMIN — OXYCODONE HYDROCHLORIDE AND ACETAMINOPHEN 1 TABLET: 7.5; 325 TABLET ORAL at 01:17

## 2019-01-01 RX ADMIN — SODIUM BICARBONATE 325 MG: 325 TABLET ORAL at 08:25

## 2019-01-01 RX ADMIN — HYDRALAZINE HYDROCHLORIDE 10 MG: 20 INJECTION INTRAMUSCULAR; INTRAVENOUS at 12:09

## 2019-01-01 RX ADMIN — INSULIN LISPRO 2 UNITS: 100 INJECTION, SOLUTION INTRAVENOUS; SUBCUTANEOUS at 13:00

## 2019-01-01 RX ADMIN — HEPARIN SODIUM 3200 UNITS: 1000 INJECTION INTRAVENOUS; SUBCUTANEOUS at 11:30

## 2019-01-01 RX ADMIN — INSULIN LISPRO 1 UNITS: 100 INJECTION, SOLUTION INTRAVENOUS; SUBCUTANEOUS at 21:37

## 2019-01-01 RX ADMIN — CARVEDILOL 6.25 MG: 6.25 TABLET, FILM COATED ORAL at 08:26

## 2019-01-01 RX ADMIN — PROMETHAZINE HYDROCHLORIDE 12.5 MG: 25 INJECTION INTRAMUSCULAR; INTRAVENOUS at 14:58

## 2019-01-01 RX ADMIN — ONDANSETRON 4 MG: 2 INJECTION INTRAMUSCULAR; INTRAVENOUS at 10:47

## 2019-01-01 RX ADMIN — SODIUM BICARBONATE 325 MG: 650 TABLET ORAL at 20:57

## 2019-01-01 RX ADMIN — HYDROCODONE BITARTRATE AND ACETAMINOPHEN 1 TABLET: 7.5; 325 TABLET ORAL at 17:37

## 2019-01-01 RX ADMIN — HYDRALAZINE HYDROCHLORIDE 20 MG: 20 INJECTION INTRAMUSCULAR; INTRAVENOUS at 22:00

## 2019-01-01 RX ADMIN — Medication 10 ML: at 08:51

## 2019-01-01 RX ADMIN — METOPROLOL TARTRATE 1.25 MG: 5 INJECTION, SOLUTION INTRAVENOUS at 03:58

## 2019-01-01 RX ADMIN — HYDRALAZINE HYDROCHLORIDE 10 MG: 20 INJECTION INTRAMUSCULAR; INTRAVENOUS at 00:27

## 2019-01-01 RX ADMIN — INSULIN LISPRO 2 UNITS: 100 INJECTION, SOLUTION INTRAVENOUS; SUBCUTANEOUS at 17:00

## 2019-01-01 RX ADMIN — Medication 10 ML: at 20:29

## 2019-01-01 RX ADMIN — CARVEDILOL 6.25 MG: 6.25 TABLET, FILM COATED ORAL at 14:48

## 2019-01-01 RX ADMIN — SODIUM BICARBONATE 325 MG: 650 TABLET ORAL at 08:34

## 2019-01-01 RX ADMIN — NIFEDIPINE 30 MG: 30 TABLET, FILM COATED, EXTENDED RELEASE ORAL at 11:15

## 2019-01-01 RX ADMIN — FUROSEMIDE 40 MG: 40 TABLET ORAL at 21:21

## 2019-01-01 RX ADMIN — CIPROFLOXACIN 400 MG: 2 INJECTION, SOLUTION INTRAVENOUS at 05:22

## 2019-01-01 RX ADMIN — ONDANSETRON 8 MG: 2 INJECTION INTRAMUSCULAR; INTRAVENOUS at 14:11

## 2019-01-01 RX ADMIN — REGADENOSON 0.4 MG: 0.08 INJECTION, SOLUTION INTRAVENOUS at 13:33

## 2019-01-01 RX ADMIN — METOPROLOL TARTRATE 2.5 MG: 5 INJECTION, SOLUTION INTRAVENOUS at 05:43

## 2019-01-01 RX ADMIN — NIFEDIPINE 30 MG: 30 TABLET, FILM COATED, EXTENDED RELEASE ORAL at 21:18

## 2019-01-01 RX ADMIN — HYDRALAZINE HYDROCHLORIDE 25 MG: 25 TABLET, FILM COATED ORAL at 08:57

## 2019-01-01 RX ADMIN — ONDANSETRON 4 MG: 2 INJECTION INTRAMUSCULAR; INTRAVENOUS at 23:42

## 2019-01-01 RX ADMIN — HYDROMORPHONE HYDROCHLORIDE 1 MG: 1 INJECTION, SOLUTION INTRAMUSCULAR; INTRAVENOUS; SUBCUTANEOUS at 12:47

## 2019-01-01 RX ADMIN — ALBUMIN (HUMAN) 25 G: 0.25 INJECTION, SOLUTION INTRAVENOUS at 17:40

## 2019-01-01 RX ADMIN — METOPROLOL TARTRATE 2.5 MG: 5 INJECTION, SOLUTION INTRAVENOUS at 22:16

## 2019-01-01 RX ADMIN — FAMOTIDINE 20 MG: 20 TABLET ORAL at 08:57

## 2019-01-01 RX ADMIN — SUCRALFATE 1 G: 1 TABLET ORAL at 20:35

## 2019-01-01 RX ADMIN — HYDROMORPHONE HYDROCHLORIDE 1 MG: 1 INJECTION, SOLUTION INTRAMUSCULAR; INTRAVENOUS; SUBCUTANEOUS at 16:08

## 2019-01-01 RX ADMIN — LACTULOSE 20 G: 20 SOLUTION ORAL at 00:31

## 2019-01-01 RX ADMIN — POTASSIUM CHLORIDE 10 MEQ: 10 TABLET, EXTENDED RELEASE ORAL at 08:25

## 2019-01-01 RX ADMIN — NEBIVOLOL HYDROCHLORIDE 10 MG: 5 TABLET ORAL at 08:25

## 2019-01-01 RX ADMIN — NIFEDIPINE 30 MG: 30 TABLET, FILM COATED, EXTENDED RELEASE ORAL at 21:22

## 2019-01-01 RX ADMIN — ATORVASTATIN CALCIUM 40 MG: 40 TABLET, FILM COATED ORAL at 20:32

## 2019-01-01 RX ADMIN — Medication 10 ML: at 20:55

## 2019-01-01 RX ADMIN — Medication 10 ML: at 09:34

## 2019-01-01 RX ADMIN — ATORVASTATIN CALCIUM 40 MG: 40 TABLET, FILM COATED ORAL at 22:02

## 2019-01-01 RX ADMIN — HYDRALAZINE HYDROCHLORIDE 10 MG: 20 INJECTION INTRAMUSCULAR; INTRAVENOUS at 19:34

## 2019-01-01 RX ADMIN — NIFEDIPINE 30 MG: 30 TABLET, FILM COATED, EXTENDED RELEASE ORAL at 08:57

## 2019-01-01 RX ADMIN — METOPROLOL TARTRATE 2.5 MG: 5 INJECTION, SOLUTION INTRAVENOUS at 06:04

## 2019-01-01 RX ADMIN — ASPIRIN 81 MG: 81 TABLET ORAL at 08:16

## 2019-01-01 RX ADMIN — HEPARIN SODIUM 3600 UNITS: 1000 INJECTION INTRAVENOUS; SUBCUTANEOUS at 11:35

## 2019-01-01 RX ADMIN — SODIUM BICARBONATE 325 MG: 650 TABLET ORAL at 09:45

## 2019-01-01 RX ADMIN — HYDRALAZINE HYDROCHLORIDE 25 MG: 25 TABLET, FILM COATED ORAL at 01:30

## 2019-01-01 RX ADMIN — ONDANSETRON 8 MG: 2 INJECTION INTRAMUSCULAR; INTRAVENOUS at 02:51

## 2019-01-01 RX ADMIN — PROMETHAZINE HYDROCHLORIDE 6.25 MG: 25 INJECTION INTRAMUSCULAR; INTRAVENOUS at 19:34

## 2019-01-01 RX ADMIN — INSULIN LISPRO 2 UNITS: 100 INJECTION, SOLUTION INTRAVENOUS; SUBCUTANEOUS at 21:21

## 2019-01-01 RX ADMIN — Medication 2 MG: at 20:09

## 2019-01-01 RX ADMIN — INSULIN LISPRO 2 UNITS: 100 INJECTION, SOLUTION INTRAVENOUS; SUBCUTANEOUS at 18:23

## 2019-01-01 RX ADMIN — HYDRALAZINE HYDROCHLORIDE 25 MG: 25 TABLET, FILM COATED ORAL at 20:38

## 2019-01-01 RX ADMIN — PANTOPRAZOLE SODIUM 20 MG: 20 TABLET, DELAYED RELEASE ORAL at 05:42

## 2019-01-01 RX ADMIN — PROMETHAZINE HYDROCHLORIDE 6.25 MG: 25 INJECTION INTRAMUSCULAR; INTRAVENOUS at 01:52

## 2019-01-01 RX ADMIN — Medication 10 ML: at 03:14

## 2019-01-01 RX ADMIN — NIFEDIPINE 30 MG: 30 TABLET, FILM COATED, EXTENDED RELEASE ORAL at 21:13

## 2019-01-01 RX ADMIN — METOPROLOL TARTRATE 2.5 MG: 5 INJECTION, SOLUTION INTRAVENOUS at 18:24

## 2019-01-01 RX ADMIN — HYDROMORPHONE HYDROCHLORIDE 0.5 MG: 1 INJECTION, SOLUTION INTRAMUSCULAR; INTRAVENOUS; SUBCUTANEOUS at 12:41

## 2019-01-01 RX ADMIN — METOCLOPRAMIDE HYDROCHLORIDE 5 MG: 5 SOLUTION ORAL at 06:23

## 2019-01-01 RX ADMIN — SENNOSIDES AND DOCUSATE SODIUM 1 TABLET: 8.6; 5 TABLET ORAL at 20:57

## 2019-01-01 RX ADMIN — CLOPIDOGREL BISULFATE 75 MG: 75 TABLET ORAL at 09:46

## 2019-01-01 RX ADMIN — HYDRALAZINE HYDROCHLORIDE 25 MG: 25 TABLET, FILM COATED ORAL at 20:10

## 2019-01-01 RX ADMIN — Medication 150 MG: at 09:45

## 2019-01-01 RX ADMIN — NEBIVOLOL HYDROCHLORIDE 10 MG: 5 TABLET ORAL at 11:16

## 2019-01-01 RX ADMIN — HEPARIN SODIUM 6000 UNITS: 1000 INJECTION, SOLUTION INTRAVENOUS; SUBCUTANEOUS at 09:56

## 2019-01-01 RX ADMIN — ONDANSETRON 8 MG: 2 INJECTION INTRAMUSCULAR; INTRAVENOUS at 11:33

## 2019-01-01 RX ADMIN — Medication 10 ML: at 12:59

## 2019-01-01 RX ADMIN — SODIUM BICARBONATE 325 MG: 650 TABLET ORAL at 01:30

## 2019-01-01 RX ADMIN — SODIUM CHLORIDE 250 ML: 9 INJECTION, SOLUTION INTRAVENOUS at 14:18

## 2019-01-01 RX ADMIN — INSULIN LISPRO 8 UNITS: 100 INJECTION, SOLUTION INTRAVENOUS; SUBCUTANEOUS at 13:38

## 2019-01-01 RX ADMIN — Medication 2 MG: at 06:28

## 2019-01-01 RX ADMIN — Medication 10 ML: at 12:28

## 2019-01-01 RX ADMIN — Medication 10 ML: at 04:09

## 2019-01-01 RX ADMIN — INSULIN LISPRO 2 UNITS: 100 INJECTION, SOLUTION INTRAVENOUS; SUBCUTANEOUS at 18:32

## 2019-01-01 RX ADMIN — INSULIN LISPRO 4 UNITS: 100 INJECTION, SOLUTION INTRAVENOUS; SUBCUTANEOUS at 17:26

## 2019-01-01 RX ADMIN — Medication 2 MG: at 11:46

## 2019-01-01 RX ADMIN — ATORVASTATIN CALCIUM 40 MG: 40 TABLET, FILM COATED ORAL at 22:21

## 2019-01-01 RX ADMIN — HYDRALAZINE HYDROCHLORIDE 10 MG: 20 INJECTION INTRAMUSCULAR; INTRAVENOUS at 00:29

## 2019-01-01 RX ADMIN — HYDROMORPHONE HYDROCHLORIDE 1 MG: 1 INJECTION, SOLUTION INTRAMUSCULAR; INTRAVENOUS; SUBCUTANEOUS at 13:37

## 2019-01-01 RX ADMIN — HYDROCODONE BITARTRATE AND ACETAMINOPHEN 1 TABLET: 7.5; 325 TABLET ORAL at 03:49

## 2019-01-01 RX ADMIN — HYDROMORPHONE HYDROCHLORIDE 1 MG: 1 INJECTION, SOLUTION INTRAMUSCULAR; INTRAVENOUS; SUBCUTANEOUS at 17:32

## 2019-01-01 RX ADMIN — Medication 10 ML: at 21:01

## 2019-01-01 RX ADMIN — ONDANSETRON 8 MG: 2 INJECTION INTRAMUSCULAR; INTRAVENOUS at 04:50

## 2019-01-01 RX ADMIN — HYDRALAZINE HYDROCHLORIDE 25 MG: 25 TABLET, FILM COATED ORAL at 20:27

## 2019-01-01 RX ADMIN — FUROSEMIDE 40 MG: 40 TABLET ORAL at 08:34

## 2019-01-01 RX ADMIN — FUROSEMIDE 40 MG: 40 TABLET ORAL at 09:46

## 2019-01-01 ASSESSMENT — ENCOUNTER SYMPTOMS
WHEEZING: 0
COUGH: 0
BLOOD IN STOOL: 0
VOMITING: 0
VOICE CHANGE: 0
SORE THROAT: 0
VOICE CHANGE: 0
VOMITING: 0
VOICE CHANGE: 0
NAUSEA: 1
EYES NEGATIVE: 1
CHEST TIGHTNESS: 0
VOMITING: 0
EYES NEGATIVE: 1
COUGH: 0
BLOOD IN STOOL: 0
ABDOMINAL PAIN: 1
ABDOMINAL PAIN: 0
SHORTNESS OF BREATH: 1
EYE PAIN: 0
VOMITING: 0
EYES NEGATIVE: 1
BLOOD IN STOOL: 0
SORE THROAT: 0
CONSTIPATION: 0
BLOOD IN STOOL: 0
BACK PAIN: 1
EYE DISCHARGE: 0
EYE DISCHARGE: 0
COLOR CHANGE: 0
VOMITING: 0
WHEEZING: 0
SHORTNESS OF BREATH: 0
SHORTNESS OF BREATH: 0
VOMITING: 0
CONSTIPATION: 0
DIARRHEA: 0
VOICE CHANGE: 0
SHORTNESS OF BREATH: 0
DIARRHEA: 0
RHINORRHEA: 0
EYE ITCHING: 0
PHOTOPHOBIA: 0
SHORTNESS OF BREATH: 1
CONSTIPATION: 0
NAUSEA: 1
COUGH: 0
VOMITING: 0
PHOTOPHOBIA: 0
SHORTNESS OF BREATH: 0
TROUBLE SWALLOWING: 0
TROUBLE SWALLOWING: 0
COLOR CHANGE: 0
EYE DISCHARGE: 0
BACK PAIN: 1
NAUSEA: 1
SORE THROAT: 0
CONSTIPATION: 0
RHINORRHEA: 0
COLOR CHANGE: 0
SHORTNESS OF BREATH: 0
CONSTIPATION: 0
PHOTOPHOBIA: 0
RESPIRATORY NEGATIVE: 1
SHORTNESS OF BREATH: 0
NAUSEA: 0
SHORTNESS OF BREATH: 0
CONSTIPATION: 0
PHOTOPHOBIA: 0
RESPIRATORY NEGATIVE: 1
PHOTOPHOBIA: 0
NAUSEA: 1
PHOTOPHOBIA: 0
CONSTIPATION: 0
WHEEZING: 0
COUGH: 0
BACK PAIN: 1
EYE DISCHARGE: 0
ABDOMINAL DISTENTION: 1
EYE DISCHARGE: 0
CONSTIPATION: 0
VOICE CHANGE: 0
SHORTNESS OF BREATH: 0
TROUBLE SWALLOWING: 0
DIARRHEA: 0
VOMITING: 0
COUGH: 0
SINUS CONGESTION: 0
TROUBLE SWALLOWING: 0
DIARRHEA: 0
SORE THROAT: 0
SHORTNESS OF BREATH: 0
ABDOMINAL PAIN: 1
PHOTOPHOBIA: 0
BLOOD IN STOOL: 0
EYE DISCHARGE: 0
STRIDOR: 0
BACK PAIN: 1
TROUBLE SWALLOWING: 0
RESPIRATORY NEGATIVE: 1
TROUBLE SWALLOWING: 0
EYE ITCHING: 0
EYES NEGATIVE: 1
COLOR CHANGE: 0
COUGH: 0
COUGH: 0
EYE ITCHING: 0
SORE THROAT: 0
NAUSEA: 1
BACK PAIN: 1
ABDOMINAL DISTENTION: 1
VOMITING: 0
BACK PAIN: 1
BLOOD IN STOOL: 0
ABDOMINAL PAIN: 1
COLOR CHANGE: 0
ABDOMINAL PAIN: 1
WHEEZING: 0
COUGH: 0
DIARRHEA: 0
NAUSEA: 1
CONSTIPATION: 0
PHOTOPHOBIA: 0
ABDOMINAL PAIN: 0
COLOR CHANGE: 0
ABDOMINAL PAIN: 1
VOMITING: 0
DIARRHEA: 0
BACK PAIN: 1
PHOTOPHOBIA: 0
NAUSEA: 1
WHEEZING: 0
VOICE CHANGE: 0
VOMITING: 0
PHOTOPHOBIA: 0
BACK PAIN: 1
SHORTNESS OF BREATH: 0
SHORTNESS OF BREATH: 0
ABDOMINAL PAIN: 0
SORE THROAT: 0
EYES NEGATIVE: 1
WHEEZING: 0
NAUSEA: 1
ABDOMINAL DISTENTION: 1
TROUBLE SWALLOWING: 0
SHORTNESS OF BREATH: 1
COLOR CHANGE: 0
NAUSEA: 1
WHEEZING: 0
NAUSEA: 0
EYE REDNESS: 0
COUGH: 0
EYE PAIN: 0
BACK PAIN: 1
SORE THROAT: 0
CONSTIPATION: 0
BLOOD IN STOOL: 0
GASTROINTESTINAL NEGATIVE: 1
EYE DISCHARGE: 0
TROUBLE SWALLOWING: 0
TROUBLE SWALLOWING: 0
BLOOD IN STOOL: 0
WHEEZING: 0
SHORTNESS OF BREATH: 0
EYE REDNESS: 0
COUGH: 0
VOICE CHANGE: 0
VOICE CHANGE: 0
WHEEZING: 0
SORE THROAT: 0
VOMITING: 0
VOICE CHANGE: 0
CONSTIPATION: 0
SORE THROAT: 0
SHORTNESS OF BREATH: 0
SORE THROAT: 0
EYE ITCHING: 0
SORE THROAT: 0
VOMITING: 1
BACK PAIN: 1
EYE ITCHING: 0
SORE THROAT: 0
VOICE CHANGE: 0
VOICE CHANGE: 0
COUGH: 0
EYE DISCHARGE: 0
ALLERGIC/IMMUNOLOGIC NEGATIVE: 1
DIARRHEA: 0
COLOR CHANGE: 0
ABDOMINAL PAIN: 1
WHEEZING: 0
ABDOMINAL PAIN: 0
DIARRHEA: 0
VOMITING: 1
COUGH: 0
COUGH: 0
NAUSEA: 0
NAUSEA: 1
DIARRHEA: 0
ABDOMINAL DISTENTION: 1
SHORTNESS OF BREATH: 0
WHEEZING: 0
EYE DISCHARGE: 0
DIARRHEA: 0
COUGH: 0
ABDOMINAL DISTENTION: 0
EYE DISCHARGE: 0
VOMITING: 1
BACK PAIN: 1
BACK PAIN: 1
FACIAL SWELLING: 0
WHEEZING: 0
BACK PAIN: 1
ABDOMINAL DISTENTION: 1
EYE DISCHARGE: 0
COUGH: 0
VOMITING: 0
SHORTNESS OF BREATH: 1
VOMITING: 0
EYE ITCHING: 0
DIARRHEA: 0
SORE THROAT: 0
EYE ITCHING: 0
BLOOD IN STOOL: 0
TROUBLE SWALLOWING: 0
COUGH: 0
DIARRHEA: 0
SHORTNESS OF BREATH: 1
TROUBLE SWALLOWING: 0
DIARRHEA: 0
VOICE CHANGE: 0
COLOR CHANGE: 0
EYE REDNESS: 0
CONSTIPATION: 0
SHORTNESS OF BREATH: 1
DIARRHEA: 0
BACK PAIN: 1
EYE DISCHARGE: 0
BLOOD IN STOOL: 0
VOMITING: 0
GASTROINTESTINAL NEGATIVE: 1
PHOTOPHOBIA: 0
SHORTNESS OF BREATH: 0
COUGH: 0
SORE THROAT: 0
NAUSEA: 1
BACK PAIN: 1
SHORTNESS OF BREATH: 0
VOICE CHANGE: 0
COLOR CHANGE: 0
BACK PAIN: 1
NAUSEA: 1
BACK PAIN: 1
EYE ITCHING: 0
PHOTOPHOBIA: 0
EYE ITCHING: 0
BLOOD IN STOOL: 0
SORE THROAT: 0
RESPIRATORY NEGATIVE: 1
DIARRHEA: 0
SHORTNESS OF BREATH: 0
BLOOD IN STOOL: 0
BLOOD IN STOOL: 0
TROUBLE SWALLOWING: 0
WHEEZING: 0
WHEEZING: 0
SORE THROAT: 0
EYES NEGATIVE: 1
EYE DISCHARGE: 0
SHORTNESS OF BREATH: 0
CONSTIPATION: 0
COLOR CHANGE: 0
CONSTIPATION: 0
BLOOD IN STOOL: 0
BACK PAIN: 0
ABDOMINAL PAIN: 1
NAUSEA: 1
EYE ITCHING: 0
WHEEZING: 0
GASTROINTESTINAL NEGATIVE: 1
BACK PAIN: 1
WHEEZING: 0
EYE ITCHING: 0
COLOR CHANGE: 0
COLOR CHANGE: 0
VOMITING: 0
NAUSEA: 1
NAUSEA: 1
EYE DISCHARGE: 0
DIARRHEA: 0
TROUBLE SWALLOWING: 0
ABDOMINAL DISTENTION: 1
NAUSEA: 1
BACK PAIN: 1
COUGH: 0
VOICE CHANGE: 0
NAUSEA: 1
TROUBLE SWALLOWING: 0
DIARRHEA: 0
SHORTNESS OF BREATH: 0
DIARRHEA: 0
PHOTOPHOBIA: 0
DIARRHEA: 0
EYE ITCHING: 0
EYE ITCHING: 0
CONSTIPATION: 0
VOMITING: 0
WHEEZING: 0
ABDOMINAL PAIN: 0

## 2019-01-01 ASSESSMENT — PAIN SCALES - GENERAL
PAINLEVEL_OUTOF10: 0
PAINLEVEL_OUTOF10: 0
PAINLEVEL_OUTOF10: 7
PAINLEVEL_OUTOF10: 0
PAINLEVEL_OUTOF10: 0
PAINLEVEL_OUTOF10: 8
PAINLEVEL_OUTOF10: 0
PAINLEVEL_OUTOF10: 6
PAINLEVEL_OUTOF10: 8
PAINLEVEL_OUTOF10: 10
PAINLEVEL_OUTOF10: 0
PAINLEVEL_OUTOF10: 4
PAINLEVEL_OUTOF10: 0
PAINLEVEL_OUTOF10: 10
PAINLEVEL_OUTOF10: 4
PAINLEVEL_OUTOF10: 8
PAINLEVEL_OUTOF10: 6
PAINLEVEL_OUTOF10: 8
PAINLEVEL_OUTOF10: 6
PAINLEVEL_OUTOF10: 10
PAINLEVEL_OUTOF10: 9
PAINLEVEL_OUTOF10: 5
PAINLEVEL_OUTOF10: 6
PAINLEVEL_OUTOF10: 0
PAINLEVEL_OUTOF10: 10
PAINLEVEL_OUTOF10: 10
PAINLEVEL_OUTOF10: 0
PAINLEVEL_OUTOF10: 6
PAINLEVEL_OUTOF10: 10
PAINLEVEL_OUTOF10: 5
PAINLEVEL_OUTOF10: 0
PAINLEVEL_OUTOF10: 10
PAINLEVEL_OUTOF10: 8
PAINLEVEL_OUTOF10: 9
PAINLEVEL_OUTOF10: 3
PAINLEVEL_OUTOF10: 9
PAINLEVEL_OUTOF10: 10
PAINLEVEL_OUTOF10: 0
PAINLEVEL_OUTOF10: 10
PAINLEVEL_OUTOF10: 10
PAINLEVEL_OUTOF10: 0
PAINLEVEL_OUTOF10: 8
PAINLEVEL_OUTOF10: 0
PAINLEVEL_OUTOF10: 9
PAINLEVEL_OUTOF10: 6
PAINLEVEL_OUTOF10: 0
PAINLEVEL_OUTOF10: 0
PAINLEVEL_OUTOF10: 7
PAINLEVEL_OUTOF10: 8
PAINLEVEL_OUTOF10: 5
PAINLEVEL_OUTOF10: 4
PAINLEVEL_OUTOF10: 6
PAINLEVEL_OUTOF10: 8
PAINLEVEL_OUTOF10: 0
PAINLEVEL_OUTOF10: 0
PAINLEVEL_OUTOF10: 10
PAINLEVEL_OUTOF10: 2
PAINLEVEL_OUTOF10: 9
PAINLEVEL_OUTOF10: 0
PAINLEVEL_OUTOF10: 10
PAINLEVEL_OUTOF10: 0
PAINLEVEL_OUTOF10: 6
PAINLEVEL_OUTOF10: 6
PAINLEVEL_OUTOF10: 10
PAINLEVEL_OUTOF10: 6
PAINLEVEL_OUTOF10: 8
PAINLEVEL_OUTOF10: 9
PAINLEVEL_OUTOF10: 5
PAINLEVEL_OUTOF10: 0
PAINLEVEL_OUTOF10: 9
PAINLEVEL_OUTOF10: 8
PAINLEVEL_OUTOF10: 3
PAINLEVEL_OUTOF10: 9
PAINLEVEL_OUTOF10: 9
PAINLEVEL_OUTOF10: 0
PAINLEVEL_OUTOF10: 8
PAINLEVEL_OUTOF10: 8
PAINLEVEL_OUTOF10: 0
PAINLEVEL_OUTOF10: 8
PAINLEVEL_OUTOF10: 0
PAINLEVEL_OUTOF10: 10
PAINLEVEL_OUTOF10: 0
PAINLEVEL_OUTOF10: 0
PAINLEVEL_OUTOF10: 8
PAINLEVEL_OUTOF10: 8
PAINLEVEL_OUTOF10: 0
PAINLEVEL_OUTOF10: 0
PAINLEVEL_OUTOF10: 7
PAINLEVEL_OUTOF10: 10
PAINLEVEL_OUTOF10: 10
PAINLEVEL_OUTOF10: 0
PAINLEVEL_OUTOF10: 4
PAINLEVEL_OUTOF10: 0
PAINLEVEL_OUTOF10: 0
PAINLEVEL_OUTOF10: 10
PAINLEVEL_OUTOF10: 7
PAINLEVEL_OUTOF10: 0
PAINLEVEL_OUTOF10: 10
PAINLEVEL_OUTOF10: 8
PAINLEVEL_OUTOF10: 0
PAINLEVEL_OUTOF10: 10
PAINLEVEL_OUTOF10: 0
PAINLEVEL_OUTOF10: 10
PAINLEVEL_OUTOF10: 0
PAINLEVEL_OUTOF10: 6
PAINLEVEL_OUTOF10: 10
PAINLEVEL_OUTOF10: 8
PAINLEVEL_OUTOF10: 7
PAINLEVEL_OUTOF10: 10
PAINLEVEL_OUTOF10: 10
PAINLEVEL_OUTOF10: 5
PAINLEVEL_OUTOF10: 10
PAINLEVEL_OUTOF10: 7
PAINLEVEL_OUTOF10: 4
PAINLEVEL_OUTOF10: 3
PAINLEVEL_OUTOF10: 8
PAINLEVEL_OUTOF10: 10
PAINLEVEL_OUTOF10: 10
PAINLEVEL_OUTOF10: 6
PAINLEVEL_OUTOF10: 0
PAINLEVEL_OUTOF10: 0
PAINLEVEL_OUTOF10: 6
PAINLEVEL_OUTOF10: 10
PAINLEVEL_OUTOF10: 0
PAINLEVEL_OUTOF10: 10
PAINLEVEL_OUTOF10: 7
PAINLEVEL_OUTOF10: 8
PAINLEVEL_OUTOF10: 3
PAINLEVEL_OUTOF10: 0
PAINLEVEL_OUTOF10: 10
PAINLEVEL_OUTOF10: 0
PAINLEVEL_OUTOF10: 7
PAINLEVEL_OUTOF10: 0
PAINLEVEL_OUTOF10: 7
PAINLEVEL_OUTOF10: 8
PAINLEVEL_OUTOF10: 0
PAINLEVEL_OUTOF10: 10
PAINLEVEL_OUTOF10: 10
PAINLEVEL_OUTOF10: 7
PAINLEVEL_OUTOF10: 10
PAINLEVEL_OUTOF10: 0
PAINLEVEL_OUTOF10: 3
PAINLEVEL_OUTOF10: 9
PAINLEVEL_OUTOF10: 0
PAINLEVEL_OUTOF10: 10
PAINLEVEL_OUTOF10: 0
PAINLEVEL_OUTOF10: 10
PAINLEVEL_OUTOF10: 0
PAINLEVEL_OUTOF10: 4
PAINLEVEL_OUTOF10: 10
PAINLEVEL_OUTOF10: 10
PAINLEVEL_OUTOF10: 8
PAINLEVEL_OUTOF10: 0
PAINLEVEL_OUTOF10: 10
PAINLEVEL_OUTOF10: 8
PAINLEVEL_OUTOF10: 8
PAINLEVEL_OUTOF10: 10
PAINLEVEL_OUTOF10: 9
PAINLEVEL_OUTOF10: 0
PAINLEVEL_OUTOF10: 0
PAINLEVEL_OUTOF10: 10
PAINLEVEL_OUTOF10: 7
PAINLEVEL_OUTOF10: 0
PAINLEVEL_OUTOF10: 5
PAINLEVEL_OUTOF10: 9
PAINLEVEL_OUTOF10: 0
PAINLEVEL_OUTOF10: 10
PAINLEVEL_OUTOF10: 0
PAINLEVEL_OUTOF10: 8
PAINLEVEL_OUTOF10: 0
PAINLEVEL_OUTOF10: 0
PAINLEVEL_OUTOF10: 3
PAINLEVEL_OUTOF10: 10
PAINLEVEL_OUTOF10: 0
PAINLEVEL_OUTOF10: 5
PAINLEVEL_OUTOF10: 0
PAINLEVEL_OUTOF10: 0
PAINLEVEL_OUTOF10: 6
PAINLEVEL_OUTOF10: 0
PAINLEVEL_OUTOF10: 0
PAINLEVEL_OUTOF10: 6
PAINLEVEL_OUTOF10: 0
PAINLEVEL_OUTOF10: 2
PAINLEVEL_OUTOF10: 0
PAINLEVEL_OUTOF10: 10
PAINLEVEL_OUTOF10: 10
PAINLEVEL_OUTOF10: 0
PAINLEVEL_OUTOF10: 3
PAINLEVEL_OUTOF10: 7
PAINLEVEL_OUTOF10: 0
PAINLEVEL_OUTOF10: 8
PAINLEVEL_OUTOF10: 8
PAINLEVEL_OUTOF10: 2
PAINLEVEL_OUTOF10: 0
PAINLEVEL_OUTOF10: 4
PAINLEVEL_OUTOF10: 0
PAINLEVEL_OUTOF10: 7
PAINLEVEL_OUTOF10: 4
PAINLEVEL_OUTOF10: 10
PAINLEVEL_OUTOF10: 10
PAINLEVEL_OUTOF10: 0
PAINLEVEL_OUTOF10: 10
PAINLEVEL_OUTOF10: 5
PAINLEVEL_OUTOF10: 0
PAINLEVEL_OUTOF10: 10
PAINLEVEL_OUTOF10: 4
PAINLEVEL_OUTOF10: 8
PAINLEVEL_OUTOF10: 10
PAINLEVEL_OUTOF10: 0

## 2019-01-01 ASSESSMENT — PAIN DESCRIPTION - PAIN TYPE
TYPE: ACUTE PAIN
TYPE: CHRONIC PAIN
TYPE: ACUTE PAIN
TYPE: ACUTE PAIN;CHRONIC PAIN
TYPE: CHRONIC PAIN
TYPE: ACUTE PAIN
TYPE: ACUTE PAIN
TYPE: CHRONIC PAIN
TYPE: ACUTE PAIN
TYPE: ACUTE PAIN;CHRONIC PAIN
TYPE: ACUTE PAIN
TYPE: CHRONIC PAIN
TYPE_2: CHRONIC PAIN
TYPE: ACUTE PAIN
TYPE: ACUTE PAIN;CHRONIC PAIN
TYPE: ACUTE PAIN
TYPE: ACUTE PAIN
TYPE: CHRONIC PAIN
TYPE: ACUTE PAIN

## 2019-01-01 ASSESSMENT — PAIN - FUNCTIONAL ASSESSMENT
PAIN_FUNCTIONAL_ASSESSMENT: PREVENTS OR INTERFERES WITH MANY ACTIVE NOT PASSIVE ACTIVITIES
PAIN_FUNCTIONAL_ASSESSMENT: PREVENTS OR INTERFERES SOME ACTIVE ACTIVITIES AND ADLS
PAIN_FUNCTIONAL_ASSESSMENT: PREVENTS OR INTERFERES WITH MANY ACTIVE NOT PASSIVE ACTIVITIES
PAIN_FUNCTIONAL_ASSESSMENT: PREVENTS OR INTERFERES WITH ALL ACTIVE AND SOME PASSIVE ACTIVITIES
PAIN_FUNCTIONAL_ASSESSMENT: PREVENTS OR INTERFERES SOME ACTIVE ACTIVITIES AND ADLS
PAIN_FUNCTIONAL_ASSESSMENT: PREVENTS OR INTERFERES WITH ALL ACTIVE AND SOME PASSIVE ACTIVITIES
PAIN_FUNCTIONAL_ASSESSMENT: PREVENTS OR INTERFERES WITH MANY ACTIVE NOT PASSIVE ACTIVITIES
PAIN_FUNCTIONAL_ASSESSMENT: PREVENTS OR INTERFERES SOME ACTIVE ACTIVITIES AND ADLS
PAIN_FUNCTIONAL_ASSESSMENT: PREVENTS OR INTERFERES SOME ACTIVE ACTIVITIES AND ADLS
PAIN_FUNCTIONAL_ASSESSMENT: PREVENTS OR INTERFERES WITH MANY ACTIVE NOT PASSIVE ACTIVITIES
PAIN_FUNCTIONAL_ASSESSMENT: PREVENTS OR INTERFERES SOME ACTIVE ACTIVITIES AND ADLS
PAIN_FUNCTIONAL_ASSESSMENT: PREVENTS OR INTERFERES WITH ALL ACTIVE AND SOME PASSIVE ACTIVITIES
PAIN_FUNCTIONAL_ASSESSMENT: PREVENTS OR INTERFERES WITH ALL ACTIVE AND SOME PASSIVE ACTIVITIES
PAIN_FUNCTIONAL_ASSESSMENT: PREVENTS OR INTERFERES SOME ACTIVE ACTIVITIES AND ADLS
PAIN_FUNCTIONAL_ASSESSMENT: PREVENTS OR INTERFERES WITH ALL ACTIVE AND SOME PASSIVE ACTIVITIES
PAIN_FUNCTIONAL_ASSESSMENT: PREVENTS OR INTERFERES SOME ACTIVE ACTIVITIES AND ADLS
PAIN_FUNCTIONAL_ASSESSMENT: PREVENTS OR INTERFERES WITH ALL ACTIVE AND SOME PASSIVE ACTIVITIES
PAIN_FUNCTIONAL_ASSESSMENT: PREVENTS OR INTERFERES SOME ACTIVE ACTIVITIES AND ADLS

## 2019-01-01 ASSESSMENT — PAIN DESCRIPTION - PROGRESSION
CLINICAL_PROGRESSION: NOT CHANGED
CLINICAL_PROGRESSION_2: NOT CHANGED
CLINICAL_PROGRESSION: NOT CHANGED
CLINICAL_PROGRESSION: OTHER (COMMENT)
CLINICAL_PROGRESSION: NOT CHANGED
CLINICAL_PROGRESSION: GRADUALLY WORSENING
CLINICAL_PROGRESSION: NOT CHANGED
CLINICAL_PROGRESSION: NOT CHANGED

## 2019-01-01 ASSESSMENT — PAIN DESCRIPTION - DESCRIPTORS
DESCRIPTORS: CONSTANT
DESCRIPTORS: ACHING
DESCRIPTORS: ACHING
DESCRIPTORS: CONSTANT;DISCOMFORT
DESCRIPTORS: CONSTANT
DESCRIPTORS: CRAMPING
DESCRIPTORS: CRAMPING
DESCRIPTORS: CONSTANT
DESCRIPTORS: CONSTANT;DISCOMFORT
DESCRIPTORS: CONSTANT;DISCOMFORT
DESCRIPTORS: CONSTANT
DESCRIPTORS_2: CONSTANT
DESCRIPTORS: CONSTANT;DISCOMFORT
DESCRIPTORS: CONSTANT
DESCRIPTORS: CONSTANT;DISCOMFORT;HEADACHE
DESCRIPTORS: CONSTANT
DESCRIPTORS: CONSTANT;DISCOMFORT;HEADACHE

## 2019-01-01 ASSESSMENT — PAIN DESCRIPTION - ONSET
ONSET_2: GRADUAL
ONSET: ON-GOING
ONSET: SUDDEN
ONSET: GRADUAL
ONSET: ON-GOING
ONSET: GRADUAL
ONSET: ON-GOING
ONSET: GRADUAL
ONSET: ON-GOING
ONSET: GRADUAL
ONSET: ON-GOING
ONSET: GRADUAL
ONSET: SUDDEN

## 2019-01-01 ASSESSMENT — PAIN DESCRIPTION - ORIENTATION
ORIENTATION: RIGHT;LEFT;LOWER;MID;UPPER
ORIENTATION: LOWER
ORIENTATION: LOWER
ORIENTATION: MID;UPPER
ORIENTATION: LOWER;MID;UPPER;RIGHT;LEFT
ORIENTATION_2: UPPER
ORIENTATION: MID;UPPER
ORIENTATION: LOWER
ORIENTATION: RIGHT;LEFT
ORIENTATION: LOWER
ORIENTATION: MID;UPPER
ORIENTATION: UPPER;LOWER
ORIENTATION: UPPER;LOWER
ORIENTATION: LOWER
ORIENTATION: LOWER;UPPER
ORIENTATION: LOWER;UPPER
ORIENTATION: LOWER
ORIENTATION: LOWER;RIGHT;LEFT
ORIENTATION: LOWER
ORIENTATION: MID;UPPER
ORIENTATION: MID;UPPER

## 2019-01-01 ASSESSMENT — PAIN DESCRIPTION - LOCATION
LOCATION: GENERALIZED
LOCATION: HEAD;NECK
LOCATION: BACK
LOCATION: BACK;NECK
LOCATION: ABDOMEN
LOCATION: BACK;HEAD
LOCATION: NECK
LOCATION: ABDOMEN
LOCATION_2: BACK
LOCATION: BACK;NECK
LOCATION: BACK;NECK
LOCATION: ABDOMEN
LOCATION: NECK;HEAD;SHOULDER;ARM
LOCATION: BACK;NECK
LOCATION: OTHER (COMMENT)
LOCATION: LEG
LOCATION: BACK;SHOULDER;HEAD
LOCATION: OTHER (COMMENT)
LOCATION: ABDOMEN
LOCATION: ABDOMEN
LOCATION: SHOULDER;BACK
LOCATION: OTHER (COMMENT)
LOCATION: BACK;NECK
LOCATION: ABDOMEN
LOCATION: SHOULDER;BACK;NECK
LOCATION: THROAT;BACK
LOCATION: NECK
LOCATION: BACK
LOCATION: BACK
LOCATION: HEAD;NECK
LOCATION: ABDOMEN
LOCATION: BACK
LOCATION: HEAD;NECK;BACK
LOCATION: SHOULDER;BACK;HEAD
LOCATION: BACK;NECK;HEAD
LOCATION: ABDOMEN;BACK;HEAD
LOCATION: ABDOMEN

## 2019-01-01 ASSESSMENT — PAIN DESCRIPTION - FREQUENCY
FREQUENCY: CONTINUOUS

## 2019-01-01 ASSESSMENT — PAIN SCALES - WONG BAKER
WONGBAKER_NUMERICALRESPONSE: 2
WONGBAKER_NUMERICALRESPONSE: 4
WONGBAKER_NUMERICALRESPONSE: 2
WONGBAKER_NUMERICALRESPONSE: 0
WONGBAKER_NUMERICALRESPONSE: 2

## 2019-01-01 ASSESSMENT — PAIN DESCRIPTION - INTENSITY: RATING_2: 2

## 2019-01-01 ASSESSMENT — PAIN DESCRIPTION - DURATION: DURATION_2: CONTINUOUS

## 2019-02-04 PROBLEM — R94.39 ABNORMAL STRESS ECG: Status: ACTIVE | Noted: 2019-01-01

## 2019-03-06 PROBLEM — N18.5 STAGE 5 CHRONIC KIDNEY DISEASE NOT ON CHRONIC DIALYSIS (HCC): Status: ACTIVE | Noted: 2018-09-27

## 2019-04-11 NOTE — TELEPHONE ENCOUNTER
Son called wanted to go forth with scheduling cath. Talked with Dr. Sherrie Tavera he wants to see patient again in the office since it has been since 2/4/19. Spoke with son and pt was scheduled for next Thursday the 18th with MD. Escoto Scale Monday emanuel but he says mondays are not good days for him.

## 2019-04-18 NOTE — PATIENT INSTRUCTIONS
[place] at the [address] [facility] and [address] [facility] located on the first floor of [place] [address] through hospital main entrance and turn immediately to your left. Date/Time: 4/23/19 arrive at [facility] for 11AM.    Pre-operative work-up:  CBC, BMP, and Chest x-ray. Allergies:  Codeine   Contact number:  [phone] (home)     Cardiac Catheterization Instructions   · Do not eat or drink anything after midnight on the night before your procedure. You can take your morning medications with a sip of water unless otherwise directed not to. · Bring a list of the names and dosages of all the medications you are taking. · Diabetic medication should be stopped two days prior: Metformin (glucophage), Invokana (canagliflozin), Farxiga (dapagliflozin), Jardiance (empagliflozin)   · Coumadin (warfarin) should be stopped two days prior to this procedure. · Pradaxa (dabigatran) should be stopped one day prior to procedure. · You should arrange to have someone take you home rather than drive yourself. · Further plan will depend upon the result of the cardiac catheterization. If for any reason you are unable to keep this appointment, please contact [facility], [phone], as soon as possible to reschedule.  -------------------------------------------------------------------------------------------------------------------  Cardiac Catheterization   (Coronary Angiography; Coronary Arteriography; Coronary Angiogram)   Definition:  Cardiac catheterization is a test that uses a catheter (tube) and x-ray machine to assess the heart and its blood supply. Reasons for Procedure   It is used to find the cause of symptoms, like chest pain, that could mean heart problems. Cardiac catheterization helps doctors to:    Identify narrowed or clogged arteries of the heart   Measure blood pressure within the heart   Evaluate how well the heart valves and chambers function   Check heart defects   Evaluate an enlarged heart   Decide on an appropriate treatment   Possible Complications   If you are planning to have a cardiac catheterization, your doctor will review a list of possible complications, which may include:   Bleeding at the point of the catheter insertion   Damage to arteries   Heart attack or arrhythmia (abnormal heart beats)   Allergic reaction to x-ray dye   Blood clot formation   Infection   Some factors that may increase the risk of complications include: Allergies to medicines or x-ray dye   Obesity   Smoking   Bleeding disorder   Age: 61 or older   Recent pneumonia   Recent heart attack   Diabetes   Kidney disease   What to Expect Prior to Procedure   Your doctor may order:   Blood and urine tests   Electrocardiogram (ECG, EKG)a test that records the heart's activity by measuring electrical currents through the heart muscle   Chest x-ray   Stress test   Talk to your doctor about your medicines. You may be asked to stop taking some medicines before the procedure, like:   Anti-inflammatory drugs (eg, ibuprofen )   Blood thinners, like or warfarin (Coumadin)   clopidogrel (Plavix)   Metformin (Glucophage) or glyburide and metformin (Glucovance)   Leading up to your procedure:   Arrange for a ride to and from the procedure. The night before, do not eat or drink anything after midnight. Anesthesia   Local anesthesia will be used at the insertion site. A mild sedative may be given one hour before or through IV during the procedure. This will help you relax. Description of the Procedure   During the procedure, you will receive IV fluids and medicines. An EKG will be monitoring your heart's activity. You will be awake but sedated so that you will be more relaxed. Your doctor will ask you to do basic functions such as coughing, breathing out, and holding your breath. If you feel any chest pain, dizziness, nausea, tingling, or other discomfort, tell your doctor.    The area of the groin or arm where the catheter will be inserted is shaved, cleaned, and numbed. A needle will be inserted into a blood vessel. A wire will be passed through the needle and into the blood vessel. The wire will then be guided through until it reaches your heart. A soft, flexible catheter tube will then be slipped over the wire and threaded up to your heart. The doctor will be taking x-ray pictures during the procedure to know where the wire and catheter are. Dye will be injected into the arteries of the heart. This will make the arteries and heart show up on the x-ray images. You may feel warm during the dye injection. Insertion of Catheter with Guide Wire    Once in place, the catheter can be used to take measurements. Blood pressure can be taken within the heart's different chambers. Blood samples may also be taken. Multiple x-ray images will be taken to look for any disease in the arteries. An aortogram may also be done at this time. This step will give a clear image of the aorta (large artery leaving the heart). Once all the tests and images are complete, the catheter will be removed. Sometimes, the doctor will perform balloon angioplasty and stenting if he finds an area in your arteries that is narrow or clogged. These are procedures that help to open narrowed arteries. Finally, a bandage will be placed over the groin or arm area. How Long Will It Take? The procedure takes about 1-2 hours. Preparation before the test will take another 1-2 hours. How Much Will It Hurt? Although the procedure is generally not painful, it can cause some discomfort, including:   Burning sensation (when skin at catheter insertion site is anesthetized)   Pressure when catheter is inserted or replaced with other catheters   A flushing feeling or nausea when the dye is injected   Headache   Heart palpitations   Pain medicine will be given when needed.      Average Hospital Stay:  0-1 days     Postoperative Care At the Care Center   EKG and blood studies may be done. You will likely need to lie still and flat on your back for a period of time. A pressure dressing may be placed over the area where the catheter was inserted to help prevent bleeding. It is important to follow the nurse's directions. At Home   When you return home, do the following to help ensure a smooth recovery:   Do not drive until your doctor says it is okay. Do not lift heavy objects or engage in strenuous exercise or sexual activity for at least 5-7 days. Change the dressing around the incision area as instructed. Your doctor will explain to you which medicines you can take and which ones to avoid. Take medicines as instructed. Ice may help decrease discomfort at the insertion site. You may apply the ice for 15-20 minutes each hour, for the first few days. To lower your risk for further complications of heart disease, you can make lifestyle changes. These include eating a healthier diet, exercising regularly, and managing stress. Ask your doctor about when it is safe to shower, bathe, or soak in water. Be sure to follow your doctor's instructions . After arriving home, contact your doctor if any of the following occurs:   Signs of infection, including fever and chills   Extreme sweating, nausea, or vomiting   Change in sensation to affected leg, including numbness, feeling cold, or change in color   Redness, swelling, increasing pain, excessive bleeding, or discharge at point of catheter insertion   Cough, shortness of breath, or difficulty breathing   Extreme pain   Chest pain   Drooping facial muscles   Changes in vision or speech   Difficulty walking or using your limbs   In case of an emergency, Call 911.

## 2019-04-18 NOTE — PROGRESS NOTES
[facility] Progress Note                            Date:  4/18/2019  Patient: Sarah Carter  Age:  72 y.o., 1945      Reason for evaluation:         SUBJECTIVE:    Returns today for follow-up assessment. Get a blood transfusion 2 days ago. Reports feeling reasonably well denies chest pain. Dyspnea stable. She went on dialysis about a month ago. Had an abnormal stress test back in January we talked about a cardiac catheterization at that time after she got back on dialysis she's interested in proceeding. She's never had before. No other complaints. Review of Systems   Constitutional: Negative. Negative for chills, fever and unexpected weight change. HENT: Negative. Eyes: Negative. Respiratory: Negative. Negative for shortness of breath. Cardiovascular: Negative. Negative for chest pain. Gastrointestinal: Negative. Negative for diarrhea, nausea and vomiting. Endocrine: Negative. Genitourinary: Negative. Musculoskeletal: Negative. Skin: Negative. Neurological: Negative. All other systems reviewed and are negative. OBJECTIVE:     BP (!) 102/58   Pulse 72   Ht 5' 4\" (1.626 m)   Wt 178 lb (80.7 kg)   BMI 30.55 kg/m²     Labs:   CBC:   Recent Labs     04/16/19  1225   HGB 7.5*   HCT 24.2*     BMP:No results for input(s): NA, K, CO2, BUN, CREATININE, LABGLOM, GLUCOSE in the last 72 hours. BNP: No results for input(s): BNP in the last 72 hours. PT/INR: No results for input(s): PROTIME, INR in the last 72 hours. APTT:No results for input(s): APTT in the last 72 hours. CARDIAC ENZYMES:No results for input(s): CKTOTAL, CKMB, CKMBINDEX, TROPONINI in the last 72 hours. FASTING LIPID PANEL:No results found for: HDL, LDLDIRECT, LDLCALC, TRIG  LIVER PROFILE:No results for input(s): AST, ALT, LABALBU in the last 72 hours.         Past Medical History:   Diagnosis Date    Anemia     Blood circulation, collateral     Broken hip (HCC)     L hip    CHF (congestive heart failure) (HCC)     Chronic kidney disease 2018    Chronic kidney disease (CKD), stage IV (severe) (HCC)     Chronic kidney disease (CKD), stage IV (severe) (HCC)     Closed compression fracture of first lumbar vertebra (HCC) 2017    Closed compression fracture of first lumbar vertebra (HCC) 2017    Colon polyps     History of blood transfusion     Hypertension     Osteoarthritis     Palliative care encounter 2018    Type II or unspecified type diabetes mellitus without mention of complication, not stated as uncontrolled     UTI (urinary tract infection)     Gangrenous UTI with gas in urinary tract     Past Surgical History:   Procedure Laterality Date    APPENDECTOMY       SECTION      x 2    CHOLECYSTECTOMY      COLONOSCOPY  09    Dr Brandan Craft (LOWER) N/A 2016    ERCP/EUS-Dr BEKA Sutton-w/placement of a 10 [place] Zealander x7 cm temporary plastic biliary stent-Ampullary stenosis, mild dilation of the cbd w/questionable calcification vs stone, small periampullary diverticulum, removal of biliary sludge    ENDOSCOPY, COLON, DIAGNOSTIC      ERCP  2016    ERCP/EUS-Dr BEKA Sutton-w/placement of a 10 [place] Zealander x7 cm temporary plastic biliary stent-Ampullary stenosis, mild dilation of the cbd w/questionable calcification vs stone, small periampullary diverticulum, removal of biliary sludge    ERCP N/A 2017    Dr BEKA Sutton-Successful removal of indwelling biliary stent, no evidence of residual choledocholithiasis or common biliary stricture     EYE SURGERY      cataract    FEMUR FRACTURE SURGERY Left 2016    SHORT TFN INTERTROCHAN FRACTURE performed by Suyapa Villavicencio MD at [address] [place] [address]      femur fracture surgery    HIP FRACTURE SURGERY Left     pinning    [place] COLONOSCOPY W/BIOPSY SINGLE/MULTIPLE N/A 3/24/2017    Dr BEKA Sutton-Diverticular disease-Tubular AP (-) dysplasia x 5--3 yr recall    UPPER GASTROINTESTINAL Units into the skin 3 times daily as needed for High Blood Sugar Per home sliding scale      SITagliptin (JANUVIA) 50 MG tablet Take 50 mg by mouth daily      HYDROcodone-acetaminophen (NORCO) 7.5-325 MG per tablet Take 1 tablet by mouth every 12 hours as needed for Pain. .       No current facility-administered medications for this visit.       Social History     Socioeconomic History    Marital status:      Spouse name: Keshia Dennison Number of children: 2    Years of education: 15    Highest education level: Not on file   Occupational History    Not on file   Social Needs    Financial resource strain: Not on file    Food insecurity:     Worry: Not on file     Inability: Not on file   [employer] Player needs:     Medical: Not on file     Non-medical: Not on file   Tobacco Use    Smoking status: Never Smoker    Smokeless tobacco: Never Used   Substance and Sexual Activity    Alcohol use: No    Drug use: No    Sexual activity: Not on file   Lifestyle    Physical activity:     Days per week: Not on file     Minutes per session: Not on file    Stress: Not on file   Relationships    Social connections:     Talks on phone: Not on file     Gets together: Not on file     Attends Spiritism service: Not on file     Active member of club or organization: Not on file     Attends meetings of clubs or organizations: Not on file     Relationship status: Not on file    Intimate partner violence:     Fear of current or ex partner: Not on file     Emotionally abused: Not on file     Physically abused: Not on file     Forced sexual activity: Not on file   Other Topics Concern    Not on file   Social History Narrative    Born in [place]     46 years only marriage    She has one son and one daughter    Worked as a  presently retired    Education high school    Scientology [place] none    Enjoys working with a genealogy and family trees also ceramics    Denies history of tobacco usage alcohol consumption or substance usage    Physically sedentary       Physical Examination:  BP (!) 102/58   Pulse 72   Ht 5' 4\" (1.626 m)   Wt 178 lb (80.7 kg)   BMI 30.55 kg/m²   Physical Exam   Constitutional: She appears well-developed and well-nourished. Neck: No JVD present. Carotid bruit is not present. Cardiovascular: Normal rate, regular rhythm and normal heart sounds. Exam reveals no gallop and no friction rub. No murmur heard. Pulmonary/Chest: Effort normal and breath sounds normal. No respiratory distress. She has no wheezes. She has no rales. Abdominal: She exhibits no distension. There is no tenderness. Musculoskeletal: She exhibits no edema. Lymphadenopathy:     She has no cervical adenopathy. Skin: Skin is warm and dry. Vitals reviewed. ASSESSMENT:     Diagnosis Orders   1. Essential hypertension     2. Chronic diastolic heart failure ([address]     3. Dyspnea on effort     4. Abnormal stress ECG         PLAN:  No orders of the defined types were placed in this encounter. No orders of the defined types were placed in this encounter. 1. Continue present medications  2. Recommend proceeding with diagnostic catheterization patient agreeable  I have discussed with the patient regarding indications for the proposed procedure LEFT HEART CATHETERIZATION AND POSSIBLE PERCUTANEOUS INTERVENTION  along with possible alternatives benefits and risks including but not limited to risks of death, myocardial infarction, stroke, contrast induced nephropathy which in some cases may lead to acute kidney failure requiring dialysis, allergic reactions, bleeding requiring blood transfusion,  cardiac arrhthymias, respiratory failure which may require placing the patient on respiratory support such as a ventilator or breathing machine,risk of complications which may require vascular surgery, and if coronary intervention is performed emergency CABG may be required in less than 1% of cases.  The patient is awake and alert and understands the issues involved and indicates willingness to proceed as ordered. The patient does not have any contraindications to dual antiplatelet therapy. The patient does not have any known  pending surgical procedures in the next 12 months at this time. The patient is  a reasonable candidate for moderate conscious sedation. ASA score:  ASA 3 - Patient with moderate systemic disease with functional limitations    Mallampati: I (soft palate, uvula, fauces, tonsillar pillars visible)    Preferred vascular access site will be: right Right common femoral artery        Return in about 1 month (around 5/16/2019). Sol Ortiz MD 4/18/2019 8:53 AM    [facility]      Thisdictation was generated by voice recognition computer software. Although all attempts are made to edit the dictation for accuracy, there may be errors in the transcription that are not intended.

## 2019-04-18 NOTE — TELEPHONE ENCOUNTER
Patient in office scheduled for cath on 4/23/19 spoke with Luz Maria Berman in cath lab. Time of 9AM. Dr. Megan Meadows and cath lab are aware elevated creatinine and that she is on dialysis now that is why this has to be on a Tuesday since she does dialysis M,W,F.     Spoke with son explained everything he voiced understanding in the office. [place] [facility] the [facility] located on the first floor [place] [id number] through hospital main entrance and turn immediately to your left. Allergies:  Codeine   Contact number:  [phone] (home)      Cardiac Catheterization Instructions   · Do not eat or drink anything after midnight on the night before your procedure. You can take your morning medications with a sip of water unless otherwise directed not to. · Bring a list of the names and dosages of all the medications you are taking. · Diabetic medication should be stopped two days prior: Metformin (glucophage), Invokana (canagliflozin), Farxiga (dapagliflozin), Jardiance (empagliflozin)   · Coumadin (warfarin) should be stopped two days prior to this procedure. · Pradaxa (dabigatran) should be stopped one day prior to procedure. · You should arrange to have someone take you home rather than drive yourself. · Further plan will depend upon the result of the cardiac catheterization. If for any reason you are unable to keep this appointment, please contact [facility], [phone], as soon as possible to reschedule.  -------------------------------------------------------------------------------------------------------------------  Cardiac Catheterization   (Coronary Angiography; Coronary Arteriography; Coronary Angiogram)   Definition:  Cardiac catheterization is a test that uses a catheter (tube) and x-ray machine to assess the heart and its blood supply.      Reasons for Procedure   It is used to find the cause of symptoms, like chest pain, that could mean heart problems. Cardiac catheterization helps doctors to: Identify narrowed or clogged arteries of the heart   Measure blood pressure within the heart   Evaluate how well the heart valves and chambers function   Check heart defects   Evaluate an enlarged heart   Decide on an appropriate treatment   Possible Complications   If you are planning to have a cardiac catheterization, your doctor will review a list of possible complications, which may include:   Bleeding at the point of the catheter insertion   Damage to arteries   Heart attack or arrhythmia (abnormal heart beats)   Allergic reaction to x-ray dye   Blood clot formation   Infection   Some factors that may increase the risk of complications include: Allergies to medicines or x-ray dye   Obesity   Smoking   Bleeding disorder   Age: 61 or older   Recent pneumonia   Recent heart attack   Diabetes   Kidney disease   What to Expect Prior to Procedure   Your doctor may order:   Blood and urine tests   Electrocardiogram (ECG, EKG)a test that records the heart's activity by measuring electrical currents through the heart muscle   Chest x-ray   Stress test   Talk to your doctor about your medicines. You may be asked to stop taking some medicines before the procedure, like:   Anti-inflammatory drugs (eg, ibuprofen )   Blood thinners, like or warfarin (Coumadin)   clopidogrel (Plavix)   Metformin (Glucophage) or glyburide and metformin (Glucovance)   Leading up to your procedure:   Arrange for a ride to and from the procedure. The night before, do not eat or drink anything after midnight. Anesthesia   Local anesthesia will be used at the insertion site. A mild sedative may be given one hour before or through IV during the procedure. This will help you relax. Description of the Procedure   During the procedure, you will receive IV fluids and medicines. An EKG will be monitoring your heart's activity.   You will be awake but sedated so that you will be more relaxed. Your doctor will ask you to do basic functions such as coughing, breathing out, and holding your breath. If you feel any chest pain, dizziness, nausea, tingling, or other discomfort, tell your doctor. The area of the groin or arm where the catheter will be inserted is shaved, cleaned, and numbed. A needle will be inserted into a blood vessel. A wire will be passed through the needle and into the blood vessel. The wire will then be guided through until it reaches your heart. A soft, flexible catheter tube will then be slipped over the wire and threaded up to your heart. The doctor will be taking x-ray pictures during the procedure to know where the wire and catheter are. Dye will be injected into the arteries of the heart. This will make the arteries and heart show up on the x-ray images. You may feel warm during the dye injection. Insertion of Catheter with Guide Wire    Once in place, the catheter can be used to take measurements. Blood pressure can be taken within the heart's different chambers. Blood samples may also be taken. Multiple x-ray images will be taken to look for any disease in the arteries. An aortogram may also be done at this time. This step will give a clear image of the aorta (large artery leaving the heart). Once all the tests and images are complete, the catheter will be removed. Sometimes, the doctor will perform balloon angioplasty and stenting if he finds an area in your arteries that is narrow or clogged. These are procedures that help to open narrowed arteries. Finally, a bandage will be placed over the groin or arm area. How Long Will It Take? The procedure takes about 1-2 hours. Preparation before the test will take another 1-2 hours. How Much Will It Hurt?   Although the procedure is generally not painful, it can cause some discomfort, including:   Burning sensation (when skin at catheter insertion site is anesthetized)   Pressure when catheter is inserted or replaced with other catheters   A flushing feeling or nausea when the dye is injected   Headache   Heart palpitations   Pain medicine will be given when needed. Average Hospital Stay:  0-1 days     [facility] At [facility]   EKG and blood studies may be done. You will likely need to lie still and flat on your back for a period of time. A pressure dressing may be placed over the area where the catheter was inserted to help prevent bleeding. It is important to follow the nurse's directions. At Home   When you return home, do the following to help ensure a smooth recovery:   Do not drive until your doctor says it is okay. Do not lift heavy objects or engage in strenuous exercise or sexual activity for at least 5-7 days. Change the dressing around the incision area as instructed. Your doctor will explain to you which medicines you can take and which ones to avoid. Take medicines as instructed. Ice may help decrease discomfort at the insertion site. You may apply the ice for 15-20 minutes each hour, for the first few days. To lower your risk for further complications of heart disease, you can make lifestyle changes. These include eating a healthier diet, exercising regularly, and managing stress. Ask your doctor about when it is safe to shower, bathe, or soak in water. Be sure to follow your doctor's instructions . After arriving home, contact your doctor if any of the following occurs:   Signs of infection, including fever and chills   Extreme sweating, nausea, or vomiting   Change in sensation to affected leg, including numbness, feeling cold, or change in color   Redness, swelling, increasing pain, excessive bleeding, or discharge at point of catheter insertion   Cough, shortness of breath, or difficulty breathing   Extreme pain   Chest pain   Drooping facial muscles   Changes in vision or speech   Difficulty walking or using your limbs   In case of an emergency, Call 911.

## 2019-04-23 PROBLEM — Z74.09 DECREASED MOBILITY AND ENDURANCE: Chronic | Status: ACTIVE | Noted: 2019-01-01

## 2019-04-23 PROBLEM — I25.10 CORONARY ARTERY DISEASE INVOLVING NATIVE CORONARY ARTERY OF NATIVE HEART WITHOUT ANGINA PECTORIS: Status: ACTIVE | Noted: 2019-01-01

## 2019-04-23 PROBLEM — I50.32 CHRONIC DIASTOLIC HEART FAILURE (HCC): Chronic | Status: ACTIVE | Noted: 2018-09-19

## 2019-04-23 PROBLEM — I27.20 PULMONARY HYPERTENSION (HCC): Chronic | Status: ACTIVE | Noted: 2019-01-01

## 2019-04-23 PROBLEM — I50.42 CHRONIC COMBINED SYSTOLIC AND DIASTOLIC HEART FAILURE (HCC): Chronic | Status: ACTIVE | Noted: 2018-09-19

## 2019-04-23 PROBLEM — Z03.89 CORONARY ARTERY DISEASE (CAD) EXCLUDED: Status: ACTIVE | Noted: 2019-01-01

## 2019-04-23 PROBLEM — G47.34 NOCTURNAL HYPOXIA: Chronic | Status: ACTIVE | Noted: 2019-01-01

## 2019-04-23 PROBLEM — Z99.2 STAGE 5 CHRONIC KIDNEY DISEASE ON CHRONIC DIALYSIS (HCC): Chronic | Status: ACTIVE | Noted: 2018-09-27

## 2019-04-23 PROBLEM — R94.39 ABNORMAL STRESS ECG: Chronic | Status: ACTIVE | Noted: 2019-01-01

## 2019-04-23 PROBLEM — N18.6 STAGE 5 CHRONIC KIDNEY DISEASE ON CHRONIC DIALYSIS (HCC): Chronic | Status: ACTIVE | Noted: 2018-09-27

## 2019-04-23 NOTE — CONSULTS
Nephrology ([facility]) Consult Note      Patient:  Kat Murphy  YOB: 1945  Date of Service: 4/23/2019  MRN: [id number]   Acct: [de-identified]   Primary Care Physician: Idalia Castillo MD  Advance Directive: Full Code  Admit Date: 4/23/2019       Hospital Day: 0  Referring Provider: Alina Hopkins MD    Patient independently seen and examined, Chart, Consults, Notes, Operative notes, Labs, Cardiology, and Radiology studies reviewed as able. Subjective:  Kat Murphy is a 72 y.o. female  whom we were consulted for ESRD. Pt on HD MWF. Admitted and underwent cardiac cath showing significant CAD. Not felt to be a surgical candidate here. No issues with HD per pt. No f/c/n/v/d.       Allergies:  Codeine    Medicines:  Current Facility-Administered Medications   Medication Dose Route Frequency Provider Last Rate Last Dose    0.9 % sodium chloride infusion   Intravenous Continuous Alina Hopkins MD 75 mL/hr at 04/23/19 2019      sodium chloride flush 0.9 % injection 10 mL  10 mL Intravenous 2 times per day Alina Hopkins MD        sodium chloride flush 0.9 % injection 10 mL  10 mL Intravenous PRN Alina Hopkins MD        ALPRAZolam [facility]) tablet 0.5 mg  0.5 mg Oral Once PRN Alina Hopkins MD        ondansetron [facility]) injection 4 mg  4 mg Intravenous Q6H PRN Alina Hopkins MD        aspirin EC tablet 81 mg  81 mg Oral Once Alina Hopkins MD        furosemide (LASIX) tablet 40 mg  40 mg Oral BID Alina Hopkins MD        HYDROcodone-acetaminophen (NORCO) 7.5-325 MG per tablet 1 tablet  1 tablet Oral Q6H PRN Alina Hopkins MD        iron polysaccharides (NIFEREX) capsule 150 mg  150 mg Oral Daily Alina Hopkins MD        nebivolol (BYSTOLIC) tablet 10 mg  10 mg Oral BID Alina Hopkins MD        NIFEdipine (PROCARDIA XL) extended release tablet 30 mg  30 mg Oral BID MD Christina Bateman potassium chloride (KLOR-CON M) extended release tablet 10 mEq  10 mEq Oral Daily Milena Velazquez MD        linagliptin (TRADJENTA) tablet 5 mg  5 mg Oral Daily Milena Velazquez MD        sodium bicarbonate tablet 325 mg  325 mg Oral BID Milena Velazquez MD        vitamin D (ERGOCALCIFEROL) capsule 50,000 Units  50,000 Units Oral Weekly Milena Velazquez MD        insulin lispro (HUMALOG) injection vial 0-6 Units  0-6 Units Subcutaneous TID WC Milena Velazquez MD        insulin lispro (HUMALOG) injection vial 0-3 Units  0-3 Units Subcutaneous Nightly Milena Velazquez MD        glucose (GLUTOSE) 40 % oral gel 15 g  15 g Oral PRN Milena Velazquez MD        dextrose 50 % solution 12.5 g  12.5 g Intravenous PRN Milena Velazquez MD        glucagon (rDNA) injection 1 mg  1 mg Intramuscular PRN Milena Velazquez MD        dextrose 5 % solution  100 mL/hr Intravenous PRN Milena Velazquez MD           Past Medical History:  Past Medical History:   Diagnosis Date    Anemia     Blood circulation, collateral     Broken hip (HCC)     L hip    CHF (congestive heart failure) (HCC)     Chronic kidney disease 9/27/2018    Chronic kidney disease (CKD), stage IV (severe) (HCC)     Chronic kidney disease (CKD), stage IV (severe) (HCC)     Closed compression fracture of first lumbar vertebra ([facility] [address] 2/2/2017    Closed compression fracture of first lumbar vertebra ([facility] [address] 2/2/2017    Colon polyps     Coronary artery disease (CAD) excluded 4/23/2019    Hemodialysis patient ([facility] [address]     dialysis mon, wed, friday outpt dialysis at [facility]    History of blood transfusion     Hypertension     Osteoarthritis     Palliative care encounter 08/14/2018    Type II or unspecified type diabetes mellitus without mention of complication, not stated as uncontrolled     UTI (urinary tract infection) 2015    Gangrenous UTI with gas in urinary tract       Past Surgical History:  Past Surgical History:   Procedure Laterality Date    APPENDECTOMY       SECTION      x 2    CHOLECYSTECTOMY      COLONOSCOPY  09    Dr Jersey Browne (LOWER) N/A 2016    ERCP/EUS-Dr BEKA Sutton-w/placement of a 10 Azeri x7 cm temporary plastic biliary stent-Ampullary stenosis, mild dilation of the cbd w/questionable calcification vs stone, small periampullary diverticulum, removal of biliary sludge    ENDOSCOPY, COLON, DIAGNOSTIC      ERCP  2016    ERCP/EUS-Dr BEKA Sutton-w/placement of a 10 Azeri x7 cm temporary plastic biliary stent-Ampullary stenosis, mild dilation of the cbd w/questionable calcification vs stone, small periampullary diverticulum, removal of biliary sludge    ERCP N/A 2017    Dr BEKA Sutton-Successful removal of indwelling biliary stent, no evidence of residual choledocholithiasis or common biliary stricture     EYE SURGERY      cataract    FEMUR FRACTURE SURGERY Left 2016    SHORT TFN INTERTROCHAN FRACTURE performed by Ilya Camilo MD at [address] [place] [address]      femur fracture surgery    HIP FRACTURE SURGERY Left     pinning    [place] COLONOSCOPY W/BIOPSY SINGLE/MULTIPLE N/A 3/24/2017    Dr BEKA Sutton-Diverticular disease-Tubular AP (-) dysplasia x 5--3 yr recall    UPPER GASTROINTESTINAL ENDOSCOPY N/A 10/14/2016    Dr Simons-Hemorrhagic gastritis    VASCULAR SURGERY  2019    SJSUltrasound guided cannulation of right internal jugular vein. Placement of right internal jugular vein tunneled dialysis catheter bard glidepath 19 cm tip to cuff.        Family History  Family History   Problem Relation Age of Onset    Arthritis Mother     Cancer Mother     High Cholesterol Mother     Arthritis Father     Diabetes Father     High Cholesterol Father     Liver Cancer Father     Arthritis Sister     Diabetes Sister     High Blood Pressure Sister     High Cholesterol Sister     Arthritis Brother     Diabetes Brother     High Blood Pressure Brother     High Cholesterol Brother     Vision Loss Brother     Colon Cancer Neg Hx     Colon Polyps Neg Hx     Esophageal Cancer Neg Hx     Liver Disease Neg Hx     Stomach Cancer Neg Hx     Rectal Cancer Neg Hx        Social History  Social History     Socioeconomic History    Marital status:      Spouse name: Khushbu Argueta Number of children: 2    Years of education: 15    Highest education level: Not on file   Occupational History    Not on file   Social Needs    Financial resource strain: Not on file    Food insecurity:     Worry: Not on file     Inability: Not on file   [employer] needs:     Medical: Not on file     Non-medical: Not on file   Tobacco Use    Smoking status: Never Smoker    Smokeless tobacco: Never Used   Substance and Sexual Activity    Alcohol use: No    Drug use: No    Sexual activity: Not Currently     Partners: Male   Lifestyle    Physical activity:     Days per week: Not on file     Minutes per session: Not on file    Stress: Not on file   Relationships    Social connections:     Talks on phone: Not on file     Gets together: Not on file     Attends Oriental orthodox service: Not on file     Active member of club or organization: Not on file     Attends meetings of clubs or organizations: Not on file     Relationship status: Not on file    Intimate partner violence:     Fear of current or ex partner: Not on file     Emotionally abused: Not on file     Physically abused: Not on file     Forced sexual activity: Not on file   Other Topics Concern    Not on file   Social History Narrative    Born in [place]     46 years only marriage    She has one son and one daughter    Worked as a  presently retired    Education high school    [employer]op[employer] venice none    Enjoys working with a genealogy and family trees also ceramics    Denies history of tobacco usage alcohol consumption or substance usage    Physically sedentary         Review of Systems:  History Recent Labs     04/23/19  0856   ALKPHOS 55   ALT 8   AST 18   PROT 7.2   BILITOT 0.7   LABALBU 3.8     Lactic Acid: No results for input(s): LACTA in the last 72 hours. Troponin: No results for input(s): CKTOTAL, CKMB, TROPONINT in the last 72 hours. ABGs: No results for input(s): PH, PCO2, PO2, HCO3, O2SAT in the last 72 hours. CRP:  No results for input(s): CRP in the last 72 hours. Sed Rate:  No results for input(s): SEDRATE in the last 72 hours. Cultures:   No results for input(s): CULTURE in the last 72 hours. No results for input(s): BC, Umm Alu in the last 72 hours. No results for input(s): CXSURG in the last 72 hours. Radiology reports as per the Radiologist  Radiology: No results found. Assessment   End-stage renal disease  Anemia chronic kidney disease  Hypertension  Type II diabetes with diabetic nephropathy  Coronary artery disease status post cardiac catheterization April 23  Chronic systolic and diastolic congestive heart failure EF 45% as estimated by cardiac catheterization April 23, 2019    Plan:  Discussed with patient and cardiovascular surgery  Plan maintenance dialysis Monday Wednesday Friday   Continue to monitor lab work  Await attending surgical plan  Will need bp med adjustment, would consider ARB given cad/chf, deferred to cardiology in ACS    Thank you for the consult, we appreciate the opportunity to provide care to your patients. Feel free to contact me if I can be of any further assistance.       Phoenix Lainez MD  04/23/19  12:31 PM

## 2019-04-23 NOTE — CONSULTS
collateral     Broken hip (HCC)     L hip    CHF (congestive heart failure) (HCC)     Chronic kidney disease 2018    Chronic kidney disease (CKD), stage IV (severe) (HCC)     Chronic kidney disease (CKD), stage IV (severe) (HCC)     Closed compression fracture of first lumbar vertebra (HCC) 2017    Closed compression fracture of first lumbar vertebra (HCC) 2017    Colon polyps     Coronary artery disease (CAD) excluded 2019    Hemodialysis patient ([facility] [address]     dialysis mon, wed, friday outpt dialysis at [facility]    History of blood transfusion     Hypertension     Osteoarthritis     Palliative care encounter 2018    Type II or unspecified type diabetes mellitus without mention of complication, not stated as uncontrolled     UTI (urinary tract infection)     Gangrenous UTI with gas in urinary tract       Past Surgical History:    Past Surgical History:   Procedure Laterality Date    APPENDECTOMY       SECTION      x 2    CHOLECYSTECTOMY      COLONOSCOPY  09    Dr Valenzuela [place] (LOWER) N/A 2016    ERCP/EUS-Dr BEKA Sutton-w/placement of a 10 [place] x7 cm temporary plastic biliary stent-Ampullary stenosis, mild dilation of the cbd w/questionable calcification vs stone, small periampullary diverticulum, removal of biliary sludge    ENDOSCOPY, COLON, DIAGNOSTIC      ERCP  2016    ERCP/EUS-Dr BEKA Sutton-w/placement of a 10 [place] x7 cm temporary plastic biliary stent-Ampullary stenosis, mild dilation of the cbd w/questionable calcification vs stone, small periampullary diverticulum, removal of biliary sludge    ERCP N/A 2017    Dr BEKA Sutton-Successful removal of indwelling biliary stent, no evidence of residual choledocholithiasis or common biliary stricture     EYE SURGERY      cataract    FEMUR FRACTURE SURGERY Left 2016    SHORT TFN INTERTROCHAN FRACTURE performed by Jackson Frances MD at [address] [place] [address] fracture surgery    HIP FRACTURE SURGERY Left     pinning    MI COLONOSCOPY W/BIOPSY SINGLE/MULTIPLE N/A 3/24/2017    Dr BEKA Sutton-Diverticular disease-Tubular AP (-) dysplasia x 5--3 yr recall    UPPER GASTROINTESTINAL ENDOSCOPY N/A 10/14/2016    Dr Simons-Hemorrhagic gastritis    VASCULAR SURGERY  03/06/2019    [facility]ound guided cannulation of right internal jugular vein. Placement of right internal jugular vein tunneled dialysis catheter bard glidepath 19 cm tip to cuff. Home Medications:   Prior to Admission medications    Medication Sig Start Date End Date Taking?  Authorizing Provider   furosemide (LASIX) 40 MG tablet Take 40 mg by mouth 2 times daily   Yes Historical Provider, MD   doxazosin (CARDURA) 8 MG tablet 1/2 in the morning 1 tablet at night   Yes Historical Provider, MD   sodium bicarbonate 325 MG tablet Take 1 tablet by mouth 2 times daily 8/15/18  Yes Shruthi Swan MD   nebivolol (BYSTOLIC) 10 MG tablet Take 1 tablet by mouth 2 times daily 8/15/18  Yes Shruthi Swan MD   NIFEdipine (ADALAT CC) 30 MG extended release tablet Take 1 tablet by mouth 2 times daily 8/15/18  Yes Shruthi Swan MD   iron polysaccharides (NIFEREX) 150 MG capsule Take 1 capsule by mouth daily 8/16/18  Yes Shruthi Swan MD   potassium chloride (KLOR-CON M) 10 MEQ extended release tablet Take 1 tablet by mouth daily Take with the furosemide 8/16/18  Yes Shruthi Swan MD   vitamin D (ERGOCALCIFEROL) 91125 units capsule Take 1 capsule by mouth once a week 8/18/18  Yes Shruthi Swan MD   insulin glargine (BASAGLAR KWIKPEN) 100 UNIT/ML injection pen Inject 30 Units into the skin Daily   Yes Historical Provider, MD   SITagliptin (JANUVIA) 50 MG tablet Take 50 mg by mouth daily   Yes Historical Provider, MD   insulin aspart (NOVOLOG FLEXPEN) 100 UNIT/ML injection pen Inject 0-35 Units into the skin 3 times daily as needed for High Blood Sugar Per home sliding scale    Historical Provider, MD sexual activity: Not on file   Other Topics Concern    Not on file   Social History Narrative    Born in [place]     46 years only marriage    She has one son and one daughter    Worked as a  presently retired    Education high school    [employer] none    Enjoys working with a genealogy and family trees also ceramics    Denies history of tobacco usage alcohol consumption or substance usage    Physically sedentary       Family History:     Family History   Problem Relation Age of Onset    Arthritis Mother     Cancer Mother     High Cholesterol Mother     Arthritis Father     Diabetes Father     High Cholesterol Father     Liver Cancer Father     Arthritis Sister     Diabetes Sister     High Blood Pressure Sister     High Cholesterol Sister     Arthritis Brother     Diabetes Brother     High Blood Pressure Brother     High Cholesterol Brother     Vision Loss Brother     Colon Cancer Neg Hx     Colon Polyps Neg Hx     Esophageal Cancer Neg Hx     Liver Disease Neg Hx     Stomach Cancer Neg Hx     Rectal Cancer Neg Hx          Review of Systems:  Constitutional:  No fever, chills, night sweats, or weight loss. Decreased endurance; easily fatigued. Immobile - mainly uses wheelchair, except at home she is able to go from bedroom to bathroom with walker. HEENT: No vision loss, double vision, blurred vision, or tearing. No hearing loss, tinnitus, or infection. No nasal discharge or epistaxis. No dysphagia. Respiratory:  No cough, or sputum production. No history of TB exposure. Short of breath with any activity. On nocturnal oxygen, occasionally has to use during the day. Cardiovascular: No hypertension, hypotension, anginal type chest pain, dizziness, or syncopal spells. No history of palpitations. Peripheral Vascular:  No history of claudication. Gastrointestinal:  No nausea, vomiting, diarrhea, or constipation.   No reflux or gastroesophageal reflux disease. Genitourinary:  No dysuria, urgency, frequency, or history of urinary tract infections. No history of nephrolithiasis or renal insufficiency. Neurological:  No history of cerebrovascular accident, transient ischemic attack, or amaurosis fugax. No history of seizure disorder. Integumentary: No rash, history of nonhealing wounds or skin cancer removal.   Psychiatric:  No anxiety or depression. Endocrine: No polyuria, polydipsia, or significant weight gain. No heat or cold intolerance. Musculoskeletal: Limited ROM. Hematologic: No history of DVT, PE, or anemia. Physical Examination:    BP (!) 186/110 Comment: reported to [facility]  Pulse 85   Temp 99 °F (37.2 °C) (Temporal)   Resp 18   Ht 5' 4\" (1.626 m)   Wt 178 lb (80.7 kg)   SpO2 (!) 87%   BMI 30.55 kg/m²       General:  Elderly, overweight female who appears chronically ill. HEENT:  Normocephalic without evidence of old or recent trauma. Sclerae clear. Conjunctiva pink. EOMs intact. Pupils equal and round. Oral cavity/Pharynx normal, no cyanosis. Tongue protrudes midline. Neck: , no lymphadenopathy. Supple. Trachea midline. Thyroid normal.  The dialysis catheter is implanted in her right jugular vein. Difficult to tell if there is a venous distention  Respiratory: effort is unlabored, she has markedly diminished breath sounds throughout with slight amount of crackles and rales at the base  Cardiovascular: Distant HT with regular rate and rhythm. No murmurs, gallops or rubs. No carotid bruits. No edema or varicosities. Pulses: Normal  GI: abdomen soft, nondistended, no organomegaly. Musculoskeletal: strength and tone normal  Extremities: PPP, no edema  Skin: Warm & Dry. Permacath - Right subclavian.   Neuro/psychiatric: grossly intact    MEDICAL DECISION MAKING/TESTING      Labs:   CBC:   Recent Labs     04/23/19  0856   WBC 5.4   HGB 9.4*   HCT 29.9*   MCV 97.7   *     BMP:   Recent Labs     04/23/19  0856   NA 143   K 4.0      CO2 33*   BUN 11   CREATININE 2.0*     Liver Profile:  Lab Results   Component Value Date    AST 18 04/23/2019    ALT 8 04/23/2019    BILITOT 0.7 04/23/2019    ALKPHOS 55 04/23/2019     Lab Results   Component Value Date    CHOL 115 04/23/2019    HDL 59 04/23/2019    TRIG 53 04/23/2019         Diagnosis:      1. Severe Distal LV and Multivessel CAD with Heavily Calcified Vessels with Abnormal Lexiscan  2. CKD, Stage V on Hemodialysis (M-W-F)  3. DM, Type 2  4. Essential HTN  5. Chronic Combined Systolic and Diastolic CHF  6. Pulmonary HTN  7. Severe TR - Noted on 2D Echocardiogram  8. Nocturnal Hypoxia on Home Oxygen  9. Decreased Mobility and Endurance    Plan:     1. If patient agreeable to CABG, will need RHC to evaluate pulmonary HTN. 2. High risk for CABG with 10-15% mortality, 40-45% morbidity score. 3. Consult Nephrology as patient will need hemodialysis tomorrow. Dr. Regla Mo to review records/ images. He will discuss recommendations as well as R/B/A with patient and family. Further recommendations per Dr. Regla Mo. Lyle Cartagena, APRN  4/23/2019  12:28 PM  I have seen and examined the patient myself. The above was discussed with the nurse practitioner. This is a chronically ill female on maintenance hemodialysis with stage V chronic kidney disease. .  She does have significant left main and ostial LAD and circumflex disease, although the distal targets are severely and diffusely diseased. .  She has other significant cardiac pathology, which includes an ischemic cardiomyopathy, severe pulmonary hypertension. Tricuspid and mitral valve regurgitation, dilated right atrium with evidence of elevated right-sided heart pressures. As mentioned above. Her STS calculated risk score is 18% mortality with a 50% major morbidity mortality. From a surgical standpoint. Her LAD, diagonal and obtuse marginal branch could be grafted, but because of severe diffuse disease.   It is doubtful that grafting these vessels will improve her constellation of cardiac pathology. . .  Her predicted postoperative course would  therefore be very complicated. I do not believe that we have adequate resources here at [facility] to adequately support this patient during what would probably be a very complicated and prolonged postoperative course. If she opts to undergo bypass surgery, I will recommend transferred to an institution of a higher level of care. The other option that I discussed is just to continue medical management accept the expected course of someone with left main disease. I will discuss this with Dr. Filomena Whitley and see the patient and  family again tomorrow to answer any further questions that may have.  Corina Dozier MD 4/23/19  [id number]

## 2019-04-23 NOTE — PROGRESS NOTES
Tito MORGAN in patients room, called out stating cath site was oozing. Went into check cath site, patient states \"she removed band, it has been a few hours\" placed TR band back on patients wrist with 12 ml of air. No signs of hematoma or bleeding after TR band replaced. Educated patient on danger of removing band. Will monitor.

## 2019-04-23 NOTE — PROGRESS NOTES
4 Eyes Skin Assessment    Mary Wilson is being assessed upon: Admission    I agree that I, Jennifer Nugent, along with Poonam  RN (either 2 RN's or 1 LPN and 1 RN) have performed a thorough Head to Toe Skin Assessment on the patient. ALL assessment sites listed below have been assessed. Areas assessed by both nurses:     [x]   Head, Face, and Ears   [x]   Shoulders, Back, and Chest  [x]   Arms, Elbows, and Hands   [x]   Coccyx, Sacrum, and Ischium  [x]   Legs, Feet, and Heels    Does the Patient have Skin Breakdown?  No    Roger Prevention initiated: Yes  Wound Care Orders initiated: No    [facility] nurse consulted for Pressure Injury (Stage 3,4, Unstageable, DTI, NWPT, and Complex wounds) and New or Established Ostomies: No        Primary Nurse eSignature: Jennifer Nugent RN on 4/23/2019 at 11:40 AM      Co-Signer eSignature: Electronically signed by Kalyan Ortiz RN on 4/23/19 at 12:00 PM

## 2019-04-23 NOTE — PROGRESS NOTES
[place] Ev[place] arrived to room # 724-2. Presented with: Post-Cath  Mental Status: Patient is oriented, alert, coherent, logical, thought processes intact and able to concentrate and follow conversation. Vitals:    04/23/19 1121   BP: (!) 173/98   Pulse: 71   Resp: 18   Temp:    SpO2: 90%     Patient safety contract and falls prevention contract reviewed with patient Yes. Oriented Patient and Family to room. Call light within reach. Yes.   Needs, issues or concerns expressed at this time: no.      Electronically signed by Cece Urban RN on 4/23/2019 at 11:41 AM

## 2019-04-23 NOTE — H&P
Alicja Breeding in the last 72 hours.   FASTING LIPID PANEL:No results found for: HDL, LDLDIRECT, LDLCALC, TRIG  LIVER PROFILE:No results for input(s): AST, ALT, LABALBU in the last 72 hours.         Past Medical History        Past Medical History:   Diagnosis Date    Anemia      Blood circulation, collateral      Broken hip (HCC)       L hip    CHF (congestive heart failure) (HCC)      Chronic kidney disease 2018    Chronic kidney disease (CKD), stage IV (severe) (HCC)      Chronic kidney disease (CKD), stage IV (severe) (HCC)      Closed compression fracture of first lumbar vertebra (HCC) 2017    Closed compression fracture of first lumbar vertebra (HCC) 2017    Colon polyps      History of blood transfusion      Hypertension      Osteoarthritis      Palliative care encounter 2018    Type II or unspecified type diabetes mellitus without mention of complication, not stated as uncontrolled      UTI (urinary tract infection)      Gangrenous UTI with gas in urinary tract         Past Surgical History         Past Surgical History:   Procedure Laterality Date    APPENDECTOMY         SECTION         x 2    CHOLECYSTECTOMY        COLONOSCOPY   09     Dr Wodoy Ramirez (LOWER) N/A 2016     ERCP/EUS-Dr BEKA Sutton-w/placement of a 10 Azeri x7 cm temporary plastic biliary stent-Ampullary stenosis, mild dilation of the cbd w/questionable calcification vs stone, small periampullary diverticulum, removal of biliary sludge    ENDOSCOPY, COLON, DIAGNOSTIC        ERCP   2016     ERCP/EUS-Dr BEKA Sutton-w/placement of a 10 Azer[place] x7 cm temporary plastic biliary stent-Ampullary stenosis, mild dilation of the cbd w/questionable calcification vs stone, small periampullary diverticulum, removal of biliary sludge    ERCP N/A 2017     Dr BEKA Sutton-Successful removal of indwelling biliary stent, no evidence of residual choledocholithiasis or common biliary stricture     EYE SURGERY         cataract    FEMUR FRACTURE SURGERY Left 9/9/2016     SHORT TFN INTERTROCHAN FRACTURE performed by Destiny Mata MD at [address]         femur fracture surgery    HIP FRACTURE SURGERY Left       pinning    AR COLONOSCOPY W/BIOPSY SINGLE/MULTIPLE N/A 3/24/2017     Dr BEKA Sutton-Diverticular disease-Tubular AP (-) dysplasia x 5--3 yr recall    UPPER GASTROINTESTINAL ENDOSCOPY N/A 10/14/2016     Dr Simons-Hemorrhagic gastritis    VASCULAR SURGERY   03/06/2019     SJ[facility]ound guided cannulation of right internal jugular vein. Placement of right internal jugular vein tunneled dialysis catheter HouseLens glidepath 19 cm tip to cuff.         Family History         Family History   Problem Relation Age of Onset    Arthritis Mother      Cancer Mother      High Cholesterol Mother      Arthritis Father      Diabetes Father      High Cholesterol Father      Liver Cancer Father      Arthritis Sister      Diabetes Sister      High Blood Pressure Sister      High Cholesterol Sister      Arthritis Brother      Diabetes Brother      High Blood Pressure Brother      High Cholesterol Brother      Vision Loss Brother      Colon Cancer Neg Hx      Colon Polyps Neg Hx      Esophageal Cancer Neg Hx      Liver Disease Neg Hx      Stomach Cancer Neg Hx      Rectal Cancer Neg Hx                Allergies   Allergen Reactions    Codeine Nausea And Vomiting      Current Facility-Administered Medications          Current Outpatient Medications   Medication Sig Dispense Refill    furosemide (LASIX) 40 MG tablet Take 40 mg by mouth 2 times daily        doxazosin (CARDURA) 8 MG tablet 1/2 in the morning 1 tablet at night        sodium bicarbonate 325 MG tablet Take 1 tablet by mouth 2 times daily 60 tablet 11    nebivolol (BYSTOLIC) 10 MG tablet Take 1 tablet by mouth 2 times daily 60 tablet 11    NIFEdipine (ADALAT CC) 30 MG extended release tablet Take 1 tablet by mouth 2 times daily 60 tablet 11    iron polysaccharides (NIFEREX) 150 MG capsule Take 1 capsule by mouth daily 30 capsule 3    potassium chloride (KLOR-CON M) 10 MEQ extended release tablet Take 1 tablet by mouth daily Take with the furosemide 30 tablet 11    vitamin D (ERGOCALCIFEROL) 56403 units capsule Take 1 capsule by mouth once a week 4 capsule 11    insulin glargine (BASAGLAR KWIKPEN) 100 UNIT/ML injection pen Inject 30 Units into the skin Daily        insulin aspart (NOVOLOG FLEXPEN) 100 UNIT/ML injection pen Inject 0-35 Units into the skin 3 times daily as needed for High Blood Sugar Per home sliding scale        SITagliptin (JANUVIA) 50 MG tablet Take 50 mg by mouth daily        HYDROcodone-acetaminophen (NORCO) 7.5-325 MG per tablet Take 1 tablet by mouth every 12 hours as needed for Pain. .          No current facility-administered medications for this visit.          Social History               Socioeconomic History    Marital status:        Spouse name: Shan Elias Number of children: 2    Years of education: 15    Highest education level: Not on file   Occupational History    Not on file   Social Needs    Financial resource strain: Not on file    Food insecurity:       Worry: Not on file       Inability: Not on file    Transportation needs:       Medical: Not on file       Non-medical: Not on file   Tobacco Use    Smoking status: Never Smoker    Smokeless tobacco: Never Used   Substance and Sexual Activity    Alcohol use: No    Drug use: No    Sexual activity: Not on file   Lifestyle    Physical activity:       Days per week: Not on file       Minutes per session: Not on file    Stress: Not on file   Relationships    Social connections:       Talks on phone: Not on file       Gets together: Not on file       Attends Restorationism service: Not on file       Active member of club or organization: Not on file       Attends meetings of clubs or organizations: Not on file     Relationship status: Not on file    Intimate partner violence:       Fear of current or ex partner: Not on file       Emotionally abused: Not on file       Physically abused: Not on file       Forced sexual activity: Not on file   Other Topics Concern    Not on file   Social History Narrative     Born in [place]      46 years only marriage     She has one son and one daughter     Worked as a  presently retired     Education high school     Buddhist venice none     Enjoys working with a genealogy and family trees also ceramics     Denies history of tobacco usage alcohol consumption or substance usage     Physically sedentary            Physical Examination:  BP (!) 102/58   Pulse 72   Ht 5' 4\" (1.626 m)   Wt 178 lb (80.7 kg)   BMI 30.55 kg/m²   Physical Exam   Constitutional: She appears well-developed and well-nourished. Neck: No JVD present. Carotid bruit is not present. Cardiovascular: Normal rate, regular rhythm and normal heart sounds. Exam reveals no gallop and no friction rub. No murmur heard. Pulmonary/Chest: Effort normal and breath sounds normal. No respiratory distress. She has no wheezes. She has no rales. Abdominal: She exhibits no distension. There is no tenderness. Musculoskeletal: She exhibits no edema. Lymphadenopathy:     She has no cervical adenopathy. Skin: Skin is warm and dry. Vitals reviewed.              ASSESSMENT:       Diagnosis Orders   1. Essential hypertension      2. Chronic diastolic heart failure (HCC)      3. Dyspnea on effort      4. Abnormal stress ECG            PLAN:  No orders of the defined types were placed in this encounter.       Encounter Medications    No orders of the defined types were placed in this encounter.            1. Continue present medications  2.  Recommend proceeding with diagnostic catheterization patient agreeable  I have discussed with the patient regarding indications for the proposed procedure LEFT HEART which may include:   · Bleeding at the point of the catheter insertion   · Damage to arteries   · Heart attack or arrhythmia (abnormal heart beats)   · Allergic reaction to x-ray dye   · Blood clot formation   · Infection   Some factors that may increase the risk of complications include:   · Allergies to medicines or x-ray dye   · Obesity   · Smoking   · Bleeding disorder   · Age: 61 or older   · Recent pneumonia   · Recent heart attack   · Diabetes   · Kidney disease   What to Expect Prior to Procedure   Your doctor may order:   · Blood and urine tests   · Electrocardiogram (ECG, EKG)a test that records the heart's activity by measuring electrical currents through the heart muscle   · Chest x-ray   · Stress test   Talk to your doctor about your medicines. You may be asked to stop taking some medicines before the procedure, like:   · Anti-inflammatory drugs (eg, ibuprofen )   · Blood thinners, like or warfarin (Coumadin)   · clopidogrel (Plavix)   · Metformin (Glucophage) or glyburide and metformin (Glucovance)   Leading up to your procedure:   · Arrange for a ride to and from the procedure. · The night before, do not eat or drink anything after midnight. Anesthesia   Local anesthesia will be used at the insertion site. A mild sedative may be given one hour before or through IV during the procedure. This will help you relax.      Description of the Procedure   During the procedure, you will receive IV fluids and medicines. An EKG will be monitoring your heart's activity. You will be awake but sedated so that you will be more relaxed. Your doctor will ask you to do basic functions such as coughing, breathing out, and holding your breath. If you feel any chest pain, dizziness, nausea, tingling, or other discomfort, tell your doctor. The area of the groin or arm where the catheter will be inserted is shaved, cleaned, and numbed. A needle will be inserted into a blood vessel.  A wire will be passed through the needle and into the blood vessel. The wire will then be guided through until it reaches your heart. A soft, flexible catheter tube will then be slipped over the wire and threaded up to your heart. The doctor will be taking x-ray pictures during the procedure to know where the wire and catheter are. Dye will be injected into the arteries of the heart. This will make the arteries and heart show up on the x-ray images. You may feel warm during the dye injection. Insertion of Catheter with Guide Wire     Once in place, the catheter can be used to take measurements. Blood pressure can be taken within the heart's different chambers. Blood samples may also be taken. Multiple x-ray images will be taken to look for any disease in the arteries. An aortogram may also be done at this time. This step will give a clear image of the aorta (large artery leaving the heart). Once all the tests and images are complete, the catheter will be removed. Sometimes, the doctor will perform balloon angioplasty and stenting if he finds an area in your arteries that is narrow or clogged. These are procedures that help to open narrowed arteries. Finally, a bandage will be placed over the groin or arm area.      How Long Will It Take? The procedure takes about 1-2 hours. Preparation before the test will take another 1-2 hours. How Much Will It Hurt? Although the procedure is generally not painful, it can cause some discomfort, including:   · Burning sensation (when skin at catheter insertion site is anesthetized)   · Pressure when catheter is inserted or replaced with other catheters   · A flushing feeling or nausea when the dye is injected   · Headache   · Heart palpitations   Pain medicine will be given when needed.      Average Hospital Stay:  0-1 days     Postoperative Care At the [facility]   · EKG and blood studies may be done. · You will likely need to lie still and flat on your back for a period of time.  A pressure dressing may be placed over the area where the catheter was inserted to help prevent bleeding. It is important to follow the nurse's directions. At Home   When you return home, do the following to help ensure a smooth recovery:   · Do not drive until your doctor says it is okay. · Do not lift heavy objects or engage in strenuous exercise or sexual activity for at least 5-7 days. · Change the dressing around the incision area as instructed. · Your doctor will explain to you which medicines you can take and which ones to avoid. Take medicines as instructed. · Ice may help decrease discomfort at the insertion site. You may apply the ice for 15-20 minutes each hour, for the first few days. · To lower your risk for further complications of heart disease, you can make lifestyle changes. These include eating a healthier diet, exercising regularly, and managing stress. · Ask your doctor about when it is safe to shower, bathe, or soak in water.    · Be sure to follow your doctor's instructions .      After arriving home, contact your doctor if any of the following occurs:   · Signs of infection, including fever and chills   · Extreme sweating, nausea, or vomiting   · Change in sensation to affected leg, including numbness, feeling cold, or change in color   · Redness, swelling, increasing pain, excessive bleeding, or discharge at point of catheter insertion   · Cough, shortness of breath, or difficulty breathing   · Extreme pain   · Chest pain   · Drooping facial muscles   · Changes in vision or speech   · Difficulty walking or using your limbs   · In case of an emergency, Call 911.               After Visit Summary (Printed 4/18/2019)       Additional Documentation     Vitals:    /58       Pulse 72      Ht 5' 4\" (1.626 m)      Wt 178 lb (80.7 kg)      BMI 30.55 kg/m²      BSA 1.91 m²             More Vitals      Flowsheets:    Calorie Assessment         Encounter Info:    Billing Info,      Allergies, Detailed Report,      History,      Medications,      Questionnaires      No change in status since above visit

## 2019-04-24 PROBLEM — I25.10 LEFT MAIN CORONARY ARTERY DISEASE: Status: ACTIVE | Noted: 2019-01-01

## 2019-04-24 NOTE — PROGRESS NOTES
Cardiology Daily Note Morgan Castillo MD      Patient:  Homero Jauregui  [id number]    Patient Active Problem List    Diagnosis Date Noted    Coronary artery disease involving native coronary artery of native heart without angina pectoris 04/23/2019     Priority: High    Abnormal stress ECG 02/04/2019     Priority: High    Left main coronary artery disease 04/24/2019     Priority: Low    Pulmonary hypertension ([address] 04/23/2019     Priority: Low    Nocturnal hypoxia 04/23/2019     Priority: Low    Decreased mobility and endurance 04/23/2019     Priority: Low    Chronic combined systolic and diastolic heart failure ([address] 09/19/2018     Priority: Low    Abdominal pain      Priority: Low    Abnormal CT of the abdomen      Priority: Low    Closed compression fracture of second lumbar vertebra ([address] 02/02/2017     Priority: Low    Closed compression fracture of first lumbar vertebra ([facility] 02/02/2017     Priority: Low    History of biliary stent insertion      Priority: Low    Ampullary stenosis      Priority: Low    Biliary stricture      Priority: Low    Elevated CA 19-9 level      Priority: Low    History of adenomatous polyp of colon 02/19/2013     Priority: Low    Anemia 02/19/2013     Priority: Low    Essential hypertension 02/19/2013     Priority: Low    Type 2 diabetes mellitus without complication, with long-term current use of insulin ([facility] 02/19/2013     Priority: Low    Stage 5 chronic kidney disease on chronic dialysis ([facility] 09/27/2018    Abnormal electrocardiogram 09/27/2018    Dyspnea on effort 09/27/2018       Admit Date:  4/23/2019    Admission Problem List: Present on Admission:   Coronary artery disease involving native coronary artery of native heart without angina pectoris   Abnormal stress ECG   Essential hypertension   Type 2 diabetes mellitus without complication, with long-term current use of insulin (HCC)   Chronic combined systolic and diastolic heart failure (HCC)   Stage 5 chronic kidney disease on chronic dialysis ([address]   Pulmonary hypertension (HCC)   Nocturnal hypoxia   Decreased mobility and endurance   Left main coronary artery disease      Cardiac Specific Data:  Specialty Problems        Cardiology Problems    * (Principal) Coronary artery disease involving native coronary artery of native heart without angina pectoris        Essential hypertension        Chronic combined systolic and diastolic [facility] failure ([facility]        Pulmonary hypertension ([facility]        Left main coronary artery disease              Subjective:  Ms. Aakash Pereyra seen today underwent diagnostic catheterization yesterday revealing high-grade distal left main severe multivessel coronary disease. Dr. Anne Masterson saw her in consultation last evening felt that she was too high risk to be done at this facility and suggested consideration for referral to a place like [place] etc. She's presently resting well with no reported chest pain today. Objective:   /78   Pulse 73   Temp 97.8 °F (36.6 °C) (Temporal)   Resp 20   Ht 5' 4\" (1.626 m)   Wt 181 lb (82.1 kg)   SpO2 91%   BMI 31.07 kg/m²       Intake/Output Summary (Last 24 hours) at 4/24/2019 1356  Last data filed at 4/24/2019 1303  Gross per 24 hour   Intake 835 ml   Output 750 ml   Net 85 ml       Prior to Admission medications    Medication Sig Start Date End Date Taking?  Authorizing Provider   furosemide (LASIX) 40 MG tablet Take 40 mg by mouth 2 times daily   Yes Historical Provider, MD   doxazosin (CARDURA) 8 MG tablet 1/2 in the morning 1 tablet at night   Yes Historical Provider, MD   sodium bicarbonate 325 MG tablet Take 1 tablet by mouth 2 times daily 8/15/18  Yes Phoenix Ribera MD   nebivolol (BYSTOLIC) 10 MG tablet Take 1 tablet by mouth 2 times daily 8/15/18  Yes Phoenix Ribera MD   NIFEdipine (ADALAT CC) 30 MG extended release tablet Take 1 tablet by mouth 2 times daily 8/15/18  Yes Phoenix Ribera MD   iron polysaccharides (NIFEREX) 150 MG capsule Take 1 capsule by mouth daily 8/16/18  Yes Sharee Erwin MD   potassium chloride (KLOR-CON M) 10 MEQ extended release tablet Take 1 tablet by mouth daily Take with the furosemide 8/16/18  Yes Sharee Erwin MD   vitamin D (ERGOCALCIFEROL) 87008 units capsule Take 1 capsule by mouth once a week 8/18/18  Yes Sharee Erwin MD   insulin glargine (BASAGLAR KWIKPEN) 100 UNIT/ML injection pen Inject 30 Units into the skin Daily   Yes Historical Provider, MD   SITagliptin (JANUVIA) 50 MG tablet Take 50 mg by mouth daily   Yes Historical Provider, MD   insulin aspart (NOVOLOG FLEXPEN) 100 UNIT/ML injection pen Inject 0-35 Units into the skin 3 times daily as needed for High Blood Sugar Per home sliding scale    Historical Provider, MD   HYDROcodone-acetaminophen (NORCO) 7.5-325 MG per tablet Take 1 tablet by mouth every 12 hours as needed for Pain. Jaimie Treviño     Historical Provider, MD        sodium chloride flush  10 mL Intravenous 2 times per day    furosemide  40 mg Oral BID    iron polysaccharides  150 mg Oral Daily    nebivolol  10 mg Oral BID    NIFEdipine  30 mg Oral BID    potassium chloride  10 mEq Oral Daily    linagliptin  5 mg Oral Daily    sodium bicarbonate  325 mg Oral BID    vitamin D  50,000 Units Oral Weekly    insulin lispro  0-6 Units Subcutaneous TID WC    insulin lispro  0-3 Units Subcutaneous Nightly       TELEMETRY: Sinus     Physical Exam:      Physical Exam      General:  Awake, alert, NAD  Skin:  Warm and dry  Neck:  no jvd , no carotid bruits  Chest:  Clear to auscultation, no wheezing or rales  Cardiovascular:  RRR [id number] no murmurs, clicks, gallups, or rubs  Abdomen:  Soft nontender, nondistended, bowel sounds present  Extremities:  Edema: none       Lab Data:  CBC:   Recent Labs     04/23/19  0856   WBC 5.4   HGB 9.4*   HCT 29.9*   MCV 97.7   *     BMP:   Recent Labs     04/23/19  0856 04/24/19  0222    142   K 4.0 4.2    104   CO2 33* 27   PHOS  --  4.5 BUN 11 22   CREATININE 2.0* 2.8*     LIVER PROFILE:   Recent Labs     04/23/19  0856   AST 18   ALT 8   BILITOT 0.7   ALKPHOS 55     PT/INR: No results for input(s): PROTIME, INR in the last 72 hours. APTT: No results for input(s): APTT in the last 72 hours. BNP:  No results for input(s): BNP in the last 72 hours. CK, CKMB, Troponin: @LABRCNT (CKTOTAL:3, CKMB:3, TROPONINI:3)@    IMAGING:  Xr Chest Standard (2 Vw)    Result Date: 4/23/2019  EXAMINATION:  XR CHEST (2 VW)  4/23/2019 4:25 PM HISTORY: Cardiomyopathy, congestive heart failure. COMPARISON: Chest x-ray 8/10/2018. FINDINGS:  PA and lateral views of the chest were obtained. The lungs are moderately hypoaerated, there is central vascular congestion with mild interstitial edema and the heart is enlarged. No pulmonary consolidation is identified. There is mild elevation of the right hemidiaphragm. No pneumothorax or definite pleural effusion is identified. There is a new right internal jugular central venous catheter which appears to be in good position. No acute osseous abnormality is identified. Mild congestive heart failure. The new right internal jugular central venous catheter appears to be in good position. No pneumothorax is identified. Signed by Dr Percy Suarez. Keila on 4/23/2019 4:27 PM        Assessment:  1. Coronary artery disease left main and multivessel severe by catheterization 4/23/19   2. Severe exertional dyspnea  3. Lexiscan 1/11/19 abnormal ejection fraction 48% distal anterior wall and apical ischemia  4. Echocardiogram 4/23/19 on her to severe impairment of left ventricular systolic function and EF 00% biatrial enlargement dilated left ventricle mild mitral regurgitation RVSP 58 suggest pulmonary hypertension moderate tricuspid regurgitation   5. Hypertension  6. Chronic diastolic congestive heart failure  7. Anemia hemoglobin 9.4  8. Diabetes type 2  9. Hypertension  10.  Chronic kidney disease on maintenance hemodialysis line history of adenomatous colon polyps  11. History of biliary stent  12. History of lumbar compression fractures  13. Limited mobility    Plan:  1. Continue current treatment  2. Lengthy discussion with the patient very high risk for surgery discussed case extensively with Dr. Velia Velásquez last evening. Reviewed films with collates we feel her risk of attempted percutaneous intervention extremely high do only if no other option. Suggested consideration for referral to a center that is accustomed to performing high-risk high volume surgery with additional resources available Dr. Velia Velásquez concurred with this. Gave her several options including [facility] [place] etc. she will discuss with her family and let us know what she would prefer to do.     Kailyn Hernandez MD 4/24/2019 1:56 PM

## 2019-04-24 NOTE — PROGRESS NOTES
Nephrology ([address] [facility]) Consult Note      Patient:  Kat Murphy  YOB: 1945  Date of Service: 4/24/2019  MRN: [id number]   Acct: [de-identified]   Primary Care Physician: Idalia Castillo MD  Advance Directive: Full Code  Admit Date: 4/23/2019       Hospital Day: 0  Referring Provider: Alina Hopkins MD    Patient independently seen and examined, Chart, Consults, Notes, Operative notes, Labs, Cardiology, and Radiology studies reviewed as able. Subjective:  Kat Murphy is a 72 y.o. female  whom we were consulted for ESRD. Pt on HD MWF. Admitted and underwent cardiac cath showing significant CAD. Not felt to be a surgical candidate here. Today, no issues with HD per pt. No f/c/n/v/d.       Dialysis   Pt was seen on RRT  Modality: Hemodialysis  Access: Catheter  Location: right upper  QB: 400  QD: 600  UF: 310      Allergies:  Codeine    Medicines:  Current Facility-Administered Medications   Medication Dose Route Frequency Provider Last Rate Last Dose    sodium chloride flush 0.9 % injection 10 mL  10 mL Intravenous 2 times per day Alina Hopkins MD   10 mL at 04/24/19 1118    sodium chloride flush 0.9 % injection 10 mL  10 mL Intravenous PRN Alina Hopkins MD        ondansetron [facility]) injection 4 mg  4 mg Intravenous Q6H PRN Alina Hopkins MD        furosemide (LASIX) tablet 40 mg  40 mg Oral BID Alina Hopkins MD   40 mg at 04/24/19 1115    HYDROcodone-acetaminophen (NORCO) 7.5-325 MG per tablet 1 tablet  1 tablet Oral Q6H PRN Alina Hopkins MD   1 tablet at 04/23/19 2022    iron polysaccharides (NIFEREX) capsule 150 mg  150 mg Oral Daily Alina Hopkins MD   150 mg at 04/24/19 1115    nebivolol (BYSTOLIC) tablet 10 mg  10 mg Oral BID Alina Hopkins MD   10 mg at 04/24/19 1116    NIFEdipine (PROCARDIA XL) extended release tablet 30 mg  30 mg Oral BID Alina Hopkins MD   30 mg at 04/24/19 1115    potassium chloride (KLOR-CON M) extended release tablet 10 mEq  10 mEq Oral Daily Chiara Bass MD   10 mEq at 04/24/19 1115    linagliptin (TRADJENTA) tablet 5 mg  5 mg Oral Daily Chiara Bass MD   5 mg at 04/24/19 1115    sodium bicarbonate tablet 325 mg  325 mg Oral BID Chiara Bass MD   325 mg at 04/24/19 1115    vitamin D (ERGOCALCIFEROL) capsule 50,000 Units  50,000 Units Oral Weekly Chiara Bass MD   50,000 Units at 04/23/19 1421    insulin lispro (HUMALOG) injection vial 0-6 Units  0-6 Units Subcutaneous TID WC Chiara Bass MD   1 Units at 04/24/19 1237    insulin lispro (HUMALOG) injection vial 0-3 Units  0-3 Units Subcutaneous Nightly Chiara Bass MD        glucose (GLUTOSE) 40 % oral gel 15 g  15 g Oral PRN Chiara Bass MD        dextrose 50 % solution 12.5 g  12.5 g Intravenous PRN Chiara Bass MD        glucagon (rDNA) injection 1 mg  1 mg Intramuscular PRN Chiara Bass MD        dextrose 5 % solution  100 mL/hr Intravenous PRN Chiara Bass MD           Past Medical History:  Past Medical History:   Diagnosis Date    Anemia     Blood circulation, collateral     Broken hip (HCC)     L hip    CHF (congestive heart failure) (HCC)     Chronic kidney disease 9/27/2018    Chronic kidney disease [facility]CKD), [facility] IV (severe) [facility]     Chronic kidney disease (CKD), stage IV (severe) ([facility])     Closed compression fracture of first lumbar vertebra ([facility] [address] 2/2/2017    Closed compression fracture of first lumbar vertebra ([facility] [address] 2/2/2017    Colon polyps     Coronary artery disease (CAD) excluded 4/23/2019    Hemodialysis patient ([facility] [address]     dialysis mon, wed, friday outpt dialysis at [facility]    History of blood transfusion     Hypertension     Osteoarthritis     Palliative care encounter 08/14/2018    Type II or unspecified type diabetes mellitus without mention of complication, not stated as uncontrolled     UTI (urinary tract infection)     Gangrenous UTI with gas in urinary tract       Past Surgical History:  Past Surgical History:   Procedure Laterality Date    APPENDECTOMY       SECTION      x 2    CHOLECYSTECTOMY      COLONOSCOPY  09    Dr Novak Pae (LOWER) N/A 2016    ERCP/EUS-Dr BEKA Sutton-w/placement of a 10 Yi x7 cm temporary plastic biliary stent-Ampullary stenosis, mild dilation of the cbd w/questionable calcification vs stone, small periampullary diverticulum, removal of biliary sludge    ENDOSCOPY, COLON, DIAGNOSTIC      ERCP  2016    ERCP/EUS-Dr BEKA Sutton-w/placement of a 10 Yi x7 cm temporary plastic biliary stent-Ampullary stenosis, mild dilation of the cbd w/questionable calcification vs stone, small periampullary diverticulum, removal of biliary sludge    ERCP N/A 2017    Dr BEKA Sutton-Successful removal of indwelling biliary stent, no evidence of residual choledocholithiasis or common biliary stricture     EYE SURGERY      cataract    FEMUR FRACTURE SURGERY Left 2016    SHORT TFN INTERTROCHAN FRACTURE performed by Sammy Crews MD at [address] [place] [address]      femur fracture surgery    HIP FRACTURE SURGERY Left     pinning    RI COLONOSCOPY W/BIOPSY SINGLE/MULTIPLE N/A 3/24/2017    Dr BEKA Sutton-Diverticular disease-Tubular AP (-) dysplasia x 5--3 yr recall    UPPER GASTROINTESTINAL ENDOSCOPY N/A 10/14/2016    Dr Simons-Hemorrhagic gastritis    VASCULAR SURGERY  2019    SJSUltrasound guided cannulation of right internal jugular vein. Placement of right internal jugular vein tunneled dialysis catheter bard glidepath 19 cm tip to cuff.        Family History  Family History   Problem Relation Age of Onset    Arthritis Mother     Cancer Mother     High Cholesterol Mother     Arthritis Father     Diabetes Father     High Cholesterol Father     Liver Cancer Father     Arthritis Sister     Diabetes Sister     High Blood Pressure Sister  High Cholesterol Sister     Arthritis Brother     Diabetes Brother     High Blood Pressure Brother     High Cholesterol Brother     Vision Loss Brother     Colon Cancer Neg Hx     Colon Polyps Neg Hx     Esophageal Cancer Neg Hx     Liver Disease Neg Hx     Stomach Cancer Neg Hx     Rectal Cancer Neg Hx        Social History  Social History     Socioeconomic History    Marital status:      Spouse name: Venkata Arndt Number of children: 2    Years of education: 15    Highest education level: Not on file   Occupational History    Not on file   Social Needs    Financial resource strain: Not on file    Food insecurity:     Worry: Not on file     Inability: Not on file   Hubub needs:     Medical: Not on file     Non-medical: Not on file   Tobacco Use    Smoking status: Never Smoker    Smokeless tobacco: Never Used   Substance and Sexual Activity    Alcohol use: No    Drug use: No    Sexual activity: Not Currently     Partners: Male   Lifestyle    Physical activity:     Days per week: Not on file     Minutes per session: Not on file    Stress: Not on file   Relationships    Social connections:     Talks on phone: Not on file     Gets together: Not on file     Attends Zoroastrianism service: Not on file     Active member of club or organization: Not on file     Attends meetings of clubs or organizations: Not on file     Relationship status: Not on file    Intimate partner violence:     Fear of current or ex partner: Not on file     Emotionally abused: Not on file     Physically abused: Not on file     Forced sexual activity: Not on file   Other Topics Concern    Not on file   Social History Narrative    Born in [facility]     46 years only marriage    She has one son and one daughter    Vero Blankenship as a  presently retired    Education high school    Jain venice none    Enjoys working with a genealogy and family trees also ceramics    Denies history of tobacco usage alcohol consumption or substance usage    Physically sedentary         Review of Systems:  History obtained from chart review and the patient  General ROS: No fever or chills  Respiratory ROS: No cough, shortness of breath, wheezing  Cardiovascular ROS: No chest pain or palpitations  Gastrointestinal ROS: No abdominal pain or melena  Genito-Urinary ROS: No dysuria or hematuria  Musculoskeletal ROS: No joint pain or swelling   14 point ROS reviewed with the patient and negative except as noted above and in the HPI unless unable to obtain. Objective:  Patient Vitals for the past 24 hrs:   BP Temp Temp src Pulse Resp SpO2 Weight   04/24/19 1110 137/78 97.8 °F (36.6 °C) Temporal 73 20 91 % --   04/24/19 0641 120/74 97.4 °F (36.3 °C) Temporal 70 18 95 % --   04/24/19 0458 -- -- -- -- -- -- 181 lb (82.1 kg)   04/24/19 0217 131/73 97 °F (36.1 °C) Temporal 77 16 94 % --   04/23/19 2200 (!) 156/90 96.9 °F (36.1 °C) Temporal 91 16 95 % --   04/23/19 1826 (!) 178/93 98.1 °F (36.7 °C) Temporal 95 16 95 % --       Intake/Output Summary (Last 24 hours) at 4/24/2019 1427  Last data filed at 4/24/2019 1303  Gross per 24 hour   Intake 835 ml   Output 750 ml   Net 85 ml     General: awake/alert   Chest:  clear to auscultation bilaterally without respiratory distress  CVS: regular rate and rhythm  Abdominal: soft, nontender, normal bowel sounds  Extremities: no cyanosis or edema  Skin: warm and dry without rash      Labs:  BMP:   Recent Labs     04/23/19  0856 04/24/19  0222    142   K 4.0 4.2    104   CO2 33* 27   PHOS  --  4.5   BUN 11 22   CREATININE 2.0* 2.8*   CALCIUM 9.4 8.7*     CBC:   Recent Labs     04/23/19  0856   WBC 5.4   HGB 9.4*   HCT 29.9*   MCV 97.7   *     LIVER PROFILE:   Recent Labs     04/23/19  0856   AST 18   ALT 8   BILITOT 0.7   ALKPHOS 55     PT/INR: No results for input(s): PROTIME, INR in the last 72 hours. APTT: No results for input(s): APTT in the last 72 hours.   BNP:  No results for input(s): BNP in the last 72 hours. Ionized Calcium:No results for input(s): IONCA in the last 72 hours. Magnesium:  Recent Labs     04/24/19  0222   MG 2.1     Phosphorus:  Recent Labs     04/24/19  0222   PHOS 4.5     HgbA1C: No results for input(s): LABA1C in the last 72 hours. Hepatic:   Recent Labs     04/23/19  0856   ALKPHOS 55   ALT 8   AST 18   PROT 7.2   BILITOT 0.7   LABALBU 3.8     Lactic Acid: No results for input(s): LACTA in the last 72 hours. Troponin: No results for input(s): CKTOTAL, CKMB, TROPONINT in the last 72 hours. ABGs: No results for input(s): PH, PCO2, PO2, HCO3, O2SAT in the last 72 hours. CRP:  No results for input(s): CRP in the last 72 hours. Sed Rate:  No results for input(s): SEDRATE in the last 72 hours. Cultures:   No results for input(s): CULTURE in the last 72 hours. No results for input(s): BC, [place] in the last 72 hours. No results for input(s): CXSURG in the last 72 hours. Radiology reports as per the Radiologist  Radiology: No results found.      Assessment   End-stage renal disease  Anemia chronic kidney disease  Hypertension  Type II diabetes with diabetic nephropathy  Coronary artery disease status post cardiac catheterization April 23  Chronic systolic and diastolic congestive heart failure EF 45% as estimated by cardiac catheterization April 23, 2019    Plan:  Discussed with patient and RN  Plan maintenance dialysis Monday Wednesday Friday   Continue to monitor lab work  bp trending better        Era Calzada MD  04/24/19  2:27 PM

## 2019-04-25 NOTE — PROGRESS NOTES
Nephrology ([facility]) Consult Note      Patient:  Kathy Comer  YOB: 1945  Date of Service: 4/25/2019  MRN: [id number]   Acct: [de-identified]   Primary Care Physician: Yuri Armijo MD  Advance Directive: Full Code  Admit Date: 4/23/2019       Hospital Day: 1  Referring Provider: Adeline Woodruff MD    Patient independently seen and examined, Chart, Consults, Notes, Operative notes, Labs, Cardiology, and Radiology studies reviewed as able. Subjective:  Kathy Comer is a 72 y.o. female  whom we were consulted for ESRD. Pt on HD MWF. Admitted and underwent cardiac cath showing significant CAD. Not felt to be a surgical candidate here. Today, no issues with HD per pt. No f/c/n/v/d. She is hoping for discharge today after cardiology comes by later.     Allergies:  Codeine    Medicines:  Current Facility-Administered Medications   Medication Dose Route Frequency Provider Last Rate Last Dose    sodium chloride flush 0.9 % injection 10 mL  10 mL Intravenous 2 times per day Adeline Woodruff MD   10 mL at 04/25/19 0825    sodium chloride flush 0.9 % injection 10 mL  10 mL Intravenous PRN Adeline Woodruff MD        ondansetron [facility]) injection 4 mg  4 mg Intravenous Q6H PRN Adeline Woodruff MD        furosemide (LASIX) tablet 40 mg  40 mg Oral BID Adeline Woodruff MD   40 mg at 04/25/19 0825    HYDROcodone-acetaminophen ([address]) 7.5-325 MG per tablet 1 tablet  1 tablet Oral Q6H PRN Adeline Woodruff MD   1 tablet at 04/24/19 1852    iron polysaccharides (NIFEREX) capsule 150 mg  150 mg Oral Daily Adeline Woodruff MD   150 mg at 04/25/19 0825    nebivolol (BYSTOLIC) tablet 10 mg  10 mg Oral BID Adeline Woodruff MD   10 mg at 04/25/19 0825    NIFEdipine (PROCARDIA XL) extended release tablet 30 mg  30 mg Oral BID Adeline Woodruff MD   30 mg at 04/25/19 0825    potassium chloride (KLOR-CON M) extended release tablet 10 mEq  10 mEq in urinary tract       Past Surgical History:  Past Surgical History:   Procedure Laterality Date    APPENDECTOMY       SECTION      x 2    CHOLECYSTECTOMY      COLONOSCOPY  09    Dr Phoenix Workman (LOWER) N/A 2016    ERCP/EUS-Dr BEKA Sutton-w/placement of a 10 [place] x7 cm temporary plastic biliary stent-Ampullary stenosis, mild dilation of the cbd w/questionable calcification vs stone, small periampullary diverticulum, removal of biliary sludge    ENDOSCOPY, COLON, DIAGNOSTIC      ERCP  2016    ERCP/EUS-Dr BEKA Sutton-w/placement of a 10 [place] x7 cm temporary plastic biliary stent-Ampullary stenosis, mild dilation of the cbd w/questionable calcification vs stone, small periampullary diverticulum, removal of biliary sludge    ERCP N/A 2017    Dr BEKA Sutton-Successful removal of indwelling biliary stent, no evidence of residual choledocholithiasis or common biliary stricture     EYE SURGERY      cataract    FEMUR FRACTURE SURGERY Left 2016    SHORT TFN INTERTROCHAN FRACTURE performed by Godfrey Wheat MD at [address] [place] [address]      femur fracture surgery    HIP FRACTURE SURGERY Left     pinning    VA COLONOSCOPY W/BIOPSY SINGLE/MULTIPLE N/A 3/24/2017    Dr BEKA Sutton-Diverticular disease-Tubular AP (-) dysplasia x 5--3 yr recall    UPPER GASTROINTESTINAL ENDOSCOPY N/A 10/14/2016    Dr Simons-Hemorrhagic gastritis    VASCULAR SURGERY  2019    SJSUltrasound guided cannulation of right internal jugular vein. Placement of right internal jugular vein tunneled dialysis catheter bard glidepath 19 cm tip to cuff.        Family History  Family History   Problem Relation Age of Onset    Arthritis Mother     Cancer Mother     High Cholesterol Mother     Arthritis Father     Diabetes Father     High Cholesterol Father     Liver Cancer Father     Arthritis Sister     Diabetes Sister     High Blood Pressure Sister     High Cholesterol Sister     Arthritis Brother     Diabetes Brother     High Blood Pressure Brother     High Cholesterol Brother     Vision Loss Brother     Colon Cancer Neg Hx     Colon Polyps Neg Hx     Esophageal Cancer Neg Hx     Liver Disease Neg Hx     Stomach Cancer Neg Hx     Rectal Cancer Neg Hx        Social History  Social History     Socioeconomic History    Marital status:      Spouse name: Suzanne Fong Number of children: 2    Years of education: 15    Highest education level: Not on file   Occupational History    Not on file   Social Needs    Financial resource strain: Not on file    Food insecurity:     Worry: Not on file     Inability: Not on file   Langhar needs:     Medical: Not on file     Non-medical: Not on file   Tobacco Use    Smoking status: Never Smoker    Smokeless tobacco: Never Used   Substance and Sexual Activity    Alcohol use: No    Drug use: No    Sexual activity: Not Currently     Partners: Male   Lifestyle    Physical activity:     Days per week: Not on file     Minutes per session: Not on file    Stress: Not on file   Relationships    Social connections:     Talks on phone: Not on file     Gets together: Not on file     Attends Yarsanism service: Not on file     Active member of club or organization: Not on file     Attends meetings of clubs or organizations: Not on file     Relationship status: Not on file    Intimate partner violence:     Fear of current or ex partner: Not on file     Emotionally abused: Not on file     Physically abused: Not on file     Forced sexual activity: Not on file   Other Topics Concern    Not on file   Social History Narrative    Born in [place]     46 years only marriage    She has one son and one daughter    Worked as a  presently retired    Education high school    Sabianist venice none    Enjoys working with a genealogy and family trees also ceramics    Denies history of tobacco usage alcohol consumption or substance usage Physically sedentary         Review of Systems:  History obtained from chart review and the patient  General ROS: No fever or chills  Respiratory ROS: No cough, shortness of breath, wheezing  Cardiovascular ROS: No chest pain or palpitations  Gastrointestinal ROS: No abdominal pain or melena  Genito-Urinary ROS: No dysuria or hematuria  Musculoskeletal ROS: No joint pain or swelling   14 point ROS reviewed with the patient and negative except as noted above and in the HPI unless unable to obtain. Objective:  Patient Vitals for the past 24 hrs:   BP Temp Temp src Pulse Resp SpO2 Weight   04/25/19 1005 133/80 98.1 °F (36.7 °C) Temporal 95 20 94 % --   04/25/19 0630 127/75 97.2 °F (36.2 °C) Temporal 87 20 (!) 88 % --   04/25/19 0444 -- -- -- -- -- -- 181 lb 9.6 oz (82.4 kg)   04/25/19 0007 122/77 97 °F (36.1 °C) Temporal 86 16 93 % --   04/24/19 2106 -- -- -- 74 -- -- --   04/24/19 1827 134/71 97.4 °F (36.3 °C) Temporal 71 16 94 % --   04/24/19 1434 123/71 98.5 °F (36.9 °C) Temporal 94 26 91 % --       Intake/Output Summary (Last 24 hours) at 4/25/2019 1222  Last data filed at 4/25/2019 0929  Gross per 24 hour   Intake 520 ml   Output 300 ml   Net 220 ml     General: awake/alert   Chest:  clear to auscultation bilaterally without respiratory distress  CVS: regular rate and rhythm  Abdominal: soft, nontender, normal bowel sounds  Extremities: no cyanosis or edema  Skin: warm and dry without rash      Labs:  BMP:   Recent Labs     04/23/19  0856 04/24/19  0222 04/25/19  0209    142 139   K 4.0 4.2 4.3    104 99   CO2 33* 27 31*   PHOS  --  4.5  --    BUN 11 22 22   CREATININE 2.0* 2.8* 2.5*   CALCIUM 9.4 8.7* 9.2     CBC:   Recent Labs     04/23/19  0856   WBC 5.4   HGB 9.4*   HCT 29.9*   MCV 97.7   *     LIVER PROFILE:   Recent Labs     04/23/19  0856   AST 18   ALT 8   BILITOT 0.7   ALKPHOS 55     PT/INR: No results for input(s): PROTIME, INR in the last 72 hours.   APTT: No results for input(s): APTT in the last 72 hours. BNP:  No results for input(s): BNP in the last 72 hours. Ionized Calcium:No results for input(s): IONCA in the last 72 hours. Magnesium:  Recent Labs     04/24/19  0222   MG 2.1     Phosphorus:  Recent Labs     04/24/19  0222   PHOS 4.5     HgbA1C: No results for input(s): LABA1C in the last 72 hours. Hepatic:   Recent Labs     04/23/19  0856   ALKPHOS 55   ALT 8   AST 18   PROT 7.2   BILITOT 0.7   LABALBU 3.8     Lactic Acid: No results for input(s): LACTA in the last 72 hours. Troponin: No results for input(s): CKTOTAL, CKMB, TROPONINT in the last 72 hours. ABGs: No results for input(s): PH, PCO2, PO2, HCO3, O2SAT in the last 72 hours. CRP:  No results for input(s): CRP in the last 72 hours. Sed Rate:  No results for input(s): SEDRATE in the last 72 hours. Cultures:   No results for input(s): CULTURE in the last 72 hours. No results for input(s): BC, Vladimir Saloni in the last 72 hours. No results for input(s): CXSURG in the last 72 hours. Radiology reports as per the Radiologist  Radiology: No results found.      Assessment   End-stage renal disease  Anemia chronic kidney disease  Hypertension  Type II diabetes with diabetic nephropathy  Coronary artery disease status post cardiac catheterization April 23  Chronic systolic and diastolic congestive heart failure EF 45% as estimated by cardiac catheterization April 23, 2019    Plan:  Discussed with patient and RN  Plan maintenance dialysis Monday Wednesday Friday   Continue to monitor lab work  bp trending well        Anna Patterson MD  04/25/19  12:22 PM

## 2019-04-25 NOTE — PROGRESS NOTES
Cardiologist dr. Ave Talavera with patient and family has decided to transport patient to [address] [place] [address] via ground ems for a further work up and possible interventions. Report called to chase quispe at [facility] in [facility]. Will send echo films and cath films when available from heart cath lab. Will get ems ready when all paper work faxed to [facility].

## 2019-04-25 NOTE — DISCHARGE SUMMARY
Discharge Summary    Kareem Fu  :  1945  MRN:  [id number]    Admit date:  2019  Discharge date:      Admitting Physician:  Tatianna Dumont MD    Advance Directive: Full Code    Consults: Cardiovascular surgery    Primary Care Physician:  Cande Shields MD    Discharge Diagnoses:  Principal Problem:    Coronary artery disease involving native coronary artery of native heart without angina pectoris  Active Problems:    Abnormal stress ECG    Essential hypertension    Type 2 diabetes mellitus without complication, with long-term current use of insulin (HCC)    Chronic combined systolic and diastolic heart failure ([address]    Pulmonary hypertension (HCC)    Nocturnal hypoxia    Decreased mobility and endurance    Left main coronary artery disease    Stage 5 chronic kidney disease on chronic dialysis ([address]  Resolved Problems:    * No resolved hospital problems. *      Cardiology Specific Data:  Specialty Problems        Cardiology Problems    * (Principal) Coronary artery disease involving native coronary artery of native heart without angina pectoris        Essential hypertension        Chronic combined systolic and diastolic heart failure (HCC)        Pulmonary hypertension (HCC)        Left main coronary artery disease              Significant Diagnostic Studies:   Xr Chest Standard (2 Vw)    Result Date: 2019  EXAMINATION:  XR CHEST (2 VW)  2019 4:25 PM HISTORY: Cardiomyopathy, congestive heart failure. COMPARISON: Chest x-ray 8/10/2018. FINDINGS:  PA and lateral views of the chest were obtained. The lungs are moderately hypoaerated, there is central vascular congestion with mild interstitial edema and the heart is enlarged. No pulmonary consolidation is identified. There is mild elevation of the right hemidiaphragm. No pneumothorax or definite pleural effusion is identified. There is a new right internal jugular central venous catheter which appears to be in good position.  No acute osseous abnormality is identified. Mild congestive heart failure. The new right internal jugular central venous catheter appears to be in good position. No pneumothorax is identified. Signed by Dr Vanessa Lopez. Keila on 4/23/2019 4:27 PM      Pertinent Labs:   CBC:   Recent Labs     04/23/19  0856   WBC 5.4   HGB 9.4*   *     BMP:    Recent Labs     04/23/19  0856 04/24/19  0222 04/25/19  0209    142 139   K 4.0 4.2 4.3    104 99   CO2 33* 27 31*   BUN 11 22 22   CREATININE 2.0* 2.8* 2.5*   GLUCOSE 76 96 179*     INR: No results for input(s): INR in the last 72 hours. Lipids:   Recent Labs     04/23/19  0856   CHOL 115*   HDL 59*     ABGs:No results for input(s): PHART, JRZ6QSD, PO2ART, XPT2DHV, BEART, HGBAE, T3UGAKQN, CARBOXHGBART, 02THERAPY in the last 72 hours. HgBA1c:  No results for input(s): LABA1C in the last 72 hours. Procedures: 1. Diagnostic cardiac catheterization 4/23/19   2. Echocardiography 4/23/19    Hospital Course: Admitted for elective diagnostic catheterization recent abnormal stress test 1/11/19. This was performed 4/23/19 tolerated well. She had a severe runny percent distal left main and multivessel coronary artery disease. 78% mid LAD percent first diagonal lesion bifurcation lesion. 95% ostial circumflex stenosis. 70 percent distal RCA stenosis. Left ventricular function abnormal with ejection fraction 45%. Proximal vessels heavily calcified. Echocardiogram obtained suggested ejection fraction 30% moderate tricuspid regurgitation elevated RVSP 58 millimeters hg dilated biatrial and left ventricular enlargement. Dr. Ward IRELAND cardiac surgeon consulted felt that she was high risk 10 Shinavid cervantes estimated operative mortality 20%. Stress with patient and family felt that her options were quite limited and felt that in particular percutaneous intervention with rotational atherectomy very high risk.  Suggested transfer to a high risk Center that did high-volume surgery patient agreeable [facility] requested just got off the phone with Dr. Gallardo Single cardiac surgeon who has agreed to accept in transfer will be transferred tonight or in the morning. Stable at this time    Physical Exam:    Vital Signs: /82   Pulse 98   Temp 98 °F (36.7 °C) (Temporal)   Resp 16   Ht 5' 4\" (1.626 m)   Wt 181 lb 9.6 oz (82.4 kg)   SpO2 93% Comment: Rechecked after O2 was applied  BMI 31.17 kg/m²     Physical Exam      Discharge Medications:       Harsh Ng   Home Medication Instructions JHOANA:[id number]    Printed on:04/25/19 7330   Medication Information                      doxazosin (CARDURA) 8 MG tablet  1/2 in the morning 1 tablet at night             furosemide (LASIX) 40 MG tablet  Take 40 mg by mouth 2 times daily             HYDROcodone-acetaminophen (NORCO) 7.5-325 MG per tablet  Take 1 tablet by mouth every 12 hours as needed for Pain. .             insulin aspart (NOVOLOG FLEXPEN) 100 UNIT/ML injection pen  Inject 0-35 Units into the skin 3 times daily as needed for High Blood Sugar Per home sliding scale             insulin glargine (BASAGLAR KWIKPEN) 100 UNIT/ML injection pen  Inject 30 Units into the skin Daily             iron polysaccharides (NIFEREX) 150 MG capsule  Take 1 capsule by mouth daily             nebivolol (BYSTOLIC) 10 MG tablet  Take 1 tablet by mouth 2 times daily             NIFEdipine (ADALAT CC) 30 MG extended release tablet  Take 1 tablet by mouth 2 times daily             potassium chloride (KLOR-CON M) 10 MEQ extended release tablet  Take 1 tablet by mouth daily Take with the furosemide             SITagliptin (JANUVIA) 50 MG tablet  Take 50 mg by mouth daily             sodium bicarbonate 325 MG tablet  Take 1 tablet by mouth 2 times daily             vitamin D (ERGOCALCIFEROL) 87440 units capsule  Take 1 capsule by mouth once a week                 Discharge Instructions:   Karthikeyan Blackwell MD  [address] [facility]  [address]  [address] [place] [address]  [phone]              Take medications as directed. Resume activity as tolerated.     Diet: DIET RENAL; Carb Control: 4 carb choices (60 gms)/meal     Disposition: Patient is medically stable and will be discharged *    Elisabeth Hylton MD, 4/25/2019 5:05 PM

## 2019-04-25 NOTE — PLAN OF CARE
Problem: Falls - Risk of:  Goal: Will remain free from falls  Description  Will remain free from falls  Outcome: Ongoing  Goal: Absence of physical injury  Description  Absence of physical injury  Outcome: Ongoing     Problem: ABCDS Injury Assessment  Goal: Absence of physical injury  Outcome: Ongoing     Problem: Infection:  Goal: Will remain free from infection  Description  Will remain free from infection  Outcome: Ongoing     Problem: Safety:  Goal: Free from accidental physical injury  Description  Free from accidental physical injury  Outcome: Ongoing  Goal: Free from intentional harm  Description  Free from intentional harm  Outcome: Ongoing

## 2019-04-26 NOTE — PROGRESS NOTES
Transferred at this time to [facility] via stretcher by [facility]. Condition stable.  Electronically signed by Donnell Landeros RN on 4/25/2019 at 8:52 PM

## 2019-06-06 PROBLEM — E78.2 MIXED HYPERLIPIDEMIA: Status: ACTIVE | Noted: 2019-01-01

## 2019-06-06 NOTE — PROGRESS NOTES
[facility], [address], [address] [address]  Phone: [phone]  Fax: [phone]    OFFICE VISIT:  2019    Kareem Ramirez - : 1945    Reason For Visit:  Prosper Mcgee is a 69 y.o. female who is here for follow-up for CAD and recent hospitalization    HPI  Patient was admitted for elective heart catheterization on 19. She had significant disease, EF 20-25% and cardiothoracic surgeon felt she was too high risk. She was agreeable to be transferred to [facility]. Patient was deemed not to be a surgical candidate there as well, and was felt not to be a candidate for high risk PCI either. She was placed on optimal medical therapy. She is doing well at home. She continues to be short of breath which is chronic and not worse than usual. She denies orthopnea, PND, edema, sudden weight gain. She denies chest pain. She has very active at home due to chronic back pain. She is in a wheelchair today    Other history includes ESRD on dialysis since 2019, hyperlipidemia, hypertension, diabetes      is PCP.   Kareem Ramirez [facility] the following [facility]:    Patient Active Problem List    Diagnosis Date Noted    Coronary artery disease involving native coronary artery of native heart without angina pectoris 2019     Priority: High    Abnormal stress ECG 2019     Priority: High    Mixed hyperlipidemia 2019    Left main coronary artery disease 2019    Pulmonary hypertension ([address] 2019    Nocturnal hypoxia 2019    Decreased mobility and endurance 2019    Stage 5 chronic kidney disease on chronic dialysis ([address] 2018    Abnormal electrocardiogram 2018    Dyspnea on effort 2018    Chronic combined systolic and diastolic heart failure ([address] 2018    Abdominal pain     Abnormal CT of the abdomen     Closed compression fracture of second lumbar vertebra ([address] 2017    Closed compression fracture of first lumbar vertebra ([facility] 2017    History of biliary stent insertion     Ampullary stenosis     Biliary stricture     Elevated CA 19-9 level     History of adenomatous polyp of colon 2013    Anemia 2013    Essential hypertension 2013    Type 2 diabetes mellitus with complication, with long-term current use of insulin ([facility] 2013     Past Medical History:   Diagnosis Date    Anemia     Blood circulation, collateral     Broken hip (HCC)     L hip    CHF (congestive heart failure) (HCC)     Chronic kidney disease 2018    Chronic kidney disease (CKD), stage IV (severe) (HCC)     Chronic kidney disease (CKD), stage IV (severe) (HCC)     Closed compression fracture of first lumbar vertebra (HCC) 2017    Closed compression fracture of first lumbar vertebra (HCC) 2017    Colon polyps     Coronary artery disease (CAD) excluded 2019    Hemodialysis patient ([facility]     dialysis mon, wed, friday outpt dialysis at [facility]    History of blood transfusion     Hypertension     Mixed hyperlipidemia 2019    Osteoarthritis     Palliative care encounter 2018    Type II or unspecified type diabetes mellitus without mention of complication, not stated as uncontrolled     UTI (urinary tract infection) 2015    Gangrenous UTI with gas in urinary tract     Past Surgical History:   Procedure Laterality Date    APPENDECTOMY       SECTION      x 2    CHOLECYSTECTOMY      COLONOSCOPY  09    Dr Melissa Foreman (LOWER) N/A 2016    ERCP/EUS-Dr BEKA Sutton-w/placement of a 10 [place]kin[place] x7 cm temporary plastic biliary stent-Ampullary stenosis, mild dilation of the cbd w/questionable calcification vs stone, small periampullary diverticulum, removal of biliary sludge    ENDOSCOPY, COLON, DIAGNOSTIC      ERCP  2016    ERCP/EUS-Dr BEKA Costello/placement of a 10 Burkin[place] x7 cm temporary plastic biliary stent-Ampullary stenosis, mild dilation of the cbd w/questionable calcification vs stone, small periampullary diverticulum, removal of biliary sludge    ERCP N/A 1/11/2017    Dr BEKA Sutton-Successful removal of indwelling biliary stent, no evidence of residual choledocholithiasis or common biliary stricture     EYE SURGERY      cataract    FEMUR FRACTURE SURGERY Left 9/9/2016    SHORT TFN INTERTROCHAN FRACTURE performed by Naseem Bardales MD at [address]      femur fracture surgery    HIP FRACTURE SURGERY Left     pinning    NE COLONOSCOPY W/BIOPSY SINGLE/MULTIPLE N/A 3/24/2017    Dr BEKA Sutton-Diverticular disease-Tubular AP (-) dysplasia x 5--3 yr recall    UPPER GASTROINTESTINAL ENDOSCOPY N/A 10/14/2016    Dr Simons-Hemorrhagic gastritis    VASCULAR SURGERY  03/06/2019    SJSUltrasound guided cannulation of right internal jugular vein. Placement of right internal jugular vein tunneled dialysis catheter Quora glidepath 19 cm tip to cuff.      Family History   Problem Relation Age of Onset    Arthritis Mother     Cancer Mother     High Cholesterol Mother     Arthritis Father     Diabetes Father     High Cholesterol Father     Liver Cancer Father     Arthritis Sister     Diabetes Sister     High Blood Pressure Sister     High Cholesterol Sister     Arthritis Brother     Diabetes Brother     High Blood Pressure Brother     High Cholesterol Brother     Vision Loss Brother     Colon Cancer Neg Hx     Colon Polyps Neg Hx     Esophageal Cancer Neg Hx     Liver Disease Neg Hx     Stomach Cancer Neg Hx     Rectal Cancer Neg Hx      Social History     Tobacco Use    Smoking status: Never Smoker    Smokeless tobacco: Never Used   Substance Use Topics    Alcohol use: No      Current Outpatient Medications   Medication Sig Dispense Refill    atorvastatin (LIPITOR) 40 MG tablet Take 1 tablet by mouth nightly  3    carvedilol (COREG) 6.25 MG tablet Take 1 tablet by mouth 2 times daily      seizures, syncope, weakness and light-headedness. Hematological: Does not bruise/bleed easily. Psychiatric/Behavioral: Negative for agitation. The patient is not nervous/anxious. Objective  Vital Signs - /76   Pulse 84   Ht 5' 4\" (1.626 m)   Wt 178 lb (80.7 kg)   BMI 30.55 kg/m²   Physical Exam   Constitutional: She is oriented to person, place, and time. She appears well-developed and well-nourished. deconditioned   HENT:   Head: Normocephalic and atraumatic. Right Ear: Hearing and external ear normal.   Left Ear: Hearing and external ear normal.   Nose: Nose normal.   Eyes: Pupils are equal, round, and reactive to light. Right eye exhibits no discharge. Left eye exhibits no discharge. Neck: No JVD present. No tracheal deviation present. No thyromegaly present. Cardiovascular: Normal rate, regular rhythm, normal heart sounds and intact distal pulses. Exam reveals no gallop and no friction rub. No murmur heard. No carotid bruit   Pulmonary/Chest: Effort normal and breath sounds normal. No respiratory distress. She has no wheezes. She has no rales. Abdominal: Soft. There is no tenderness. Musculoskeletal: She exhibits edema (trace lower extremities). In wheelchair today   Neurological: She is alert and oriented to person, place, and time. No cranial nerve deficit. Skin: Skin is warm and dry. No rash noted. Psychiatric: She has a normal mood and affect. Her behavior is normal. Judgment normal.   Nursing note and vitals reviewed.       Data:  Echo 4/23/19  Summary   Mitral valve leaflets are mildly thickened with preserved leaflet   mobility.   Mild mitral regurgitation is present.   Aortic valve appears to be tricuspid with good mobility.   No evidence of aortic valve regurgitation .   Tricuspid valve is structurally normal.   Moderate tricuspid regurgitation.   Mildly dilated left atrium.   Mildly dilated left ventricle.   Mild concentric left ventricular hypertrophy.   Generalized hypokinesis of the left ventricle with overall moderate to   severe impairment of LV systolic function.   Left ventricular ejection fraction is visually estimated at 30%.  Pseudonormal diastolic pattern consistent with Grade II diastolic   dysfunction.   Mildly dilated right atrium.   Mildly dilated right ventricle with preserved function.   Right ventricular systolic pressure is noted at 58 mmHg which suggests   pulmonary hypertension.   The aorta is within normal limits.   Dilated IVC with poor inspiration collapse consistent with elevated right   atrial pressure.   There is a small pericardial effusion noted.     Heart Cath 4/23/19  Conclusions    Mildly decreased left ventricular systolic function   Severe distal left main and multivessel coronary artery disease   Heavily calcified vessels   Elevated LVEDP 28 mm hg    Lab Results   Component Value Date    CHOL 115 (L) 04/23/2019    TRIG 53 04/23/2019    HDL 59 (L) 04/23/2019    LDLCALC 45 04/23/2019     Lab Results   Component Value Date    ALT 8 04/23/2019    AST 18 04/23/2019     Assessment:     Diagnosis Orders   1. Coronary artery disease involving native coronary artery of native heart without angina pectoris     2. Chronic combined systolic and diastolic heart failure ([address]     3. Essential hypertension     4. Mixed hyperlipidemia     5. Type 2 diabetes mellitus with complication, with long-term current use of insulin [facility])       Reviewed [facility] in [facility]. CAD-status post recent heart cath with transfer to [address] [place] [address]. Patient is deemed not to be a surgical candidate or candidate for high risk PCI. Medical treatment only. She is doing well without angina symptoms    Chronic combined systolic and diastolic heart failure NYHA class III, stage C-appears euvolemic.  currently on a beta blocker only.   I will discuss starting Entresto with her nephrologist.  We discussed the importance of daily weights, which is done frequently with dialysis. We discussed the importance of low sodium diet    Hypertension-well controlled on current regimen    Hyperlipidemia-on statin therapy and recent lipids are at goal    Diabetes- discussed A1c goal of less than 7%    Stable cardiovascular status. No evidence of overt heart failure,angina or dysrhythmia. Plan    Return in about 3 months (around 9/6/2019) for CATHY. Consider Albaro Welsh- will discuss with Nephrology and let you know    - Weigh daily and report weight gain of 3lbs or more in 24hrs or 5lbs in one week. - Call for increasing shortness of breath or increasing swelling in feet and legs. (This could mean you are retaining too much fluid)  - 2000mg low sodium diet  - Fluid restriction of 1500ml per day (about 6 cups of fluid per day)    Keep diabetes under control to help prevent further heart disease. Keep Hemoglobin A1C less than 7%. Addendum 1600:  Spoke with CATHY Celaya at nephrology office. He and Dr. Carolyne Bauman are okay with patient starting Entresto. Notify patient & send to pharmacy    Call with any questionsor concerns  Follow up with Colby Carvajal MD for non cardiac problems  Report any new problems  Cardiovascular Fitness-Exercise as tolerated. Strive for 15 minutes of exercise most days of the week. Cardiac / HealthyDiet  Continue current medications as directed  Continue plan of treatment  It is always recommended that you bring your medicationsbottles with you to each visit - this is for your safety!        CATHY Abad

## 2019-06-06 NOTE — TELEPHONE ENCOUNTER
Please let patient know I spoke with Lyndon MORGAN at the nephrology office. They are fine with starting patient on Entresto. Please send entresto 24/26 mg twice a day to the pharmacy and notify the patient. She was given a 30 day free trial card at today's visit. Please have her decrease her Lasix to 40 mg once a day only. Let her know that the Ed Loser has a fluid pill component and that is why I am decreasing the dosage on the Lasix. She will need a BMP in about 2 weeks. If she has her blood drawn routinely for dialysis around that time, that will be fine.

## 2019-07-05 PROBLEM — Z51.5 PALLIATIVE CARE PATIENT: Status: ACTIVE | Noted: 2018-08-14

## 2019-07-05 NOTE — CONSULTS
[facility]  Cardiology Consult      Requesting MD:  Mckenzie Ryan MD   Admit Status:  Inpatient [101]       History obtained from:   [] Patient  [] Other (specify):     Patient:  Kanu Phillips  [id number]     Chief Complaint:   Chief Complaint   Patient presents with    Pharyngitis    Back Pain         HPI:     is a 68-year-old being admitted with  sorethroat  and elevated troponin. The case is complicated by the fact that  she has end-stage renal disease and she had been recently evaluated here  and then at [facility] for possible intervention from a coronary stent  but was found to be too high risk here and there for heart surgery. The patient was also noted to be significantly anemic, which may be contributing to her chest pain. She  is getting Tuesday, Thursday, and Saturday hemodialysis with a local  nephrology group as an outpatient. Review of Systems:  Review of Systems   Constitutional: Positive for fatigue. HENT: Negative. Eyes: Negative. Respiratory: Positive for shortness of breath. Cardiovascular: Negative. Gastrointestinal: Negative. Endocrine: Negative. Genitourinary: Negative. Musculoskeletal: Negative. Allergic/Immunologic: Negative. Neurological: Negative. Hematological: Negative. Psychiatric/Behavioral: Negative.         Cardiac Specific Data:  Specialty Problems        Cardiology Problems    Coronary artery disease involving native coronary artery of native heart without angina pectoris        Essential hypertension        Chronic combined systolic and diastolic heart failure (HCC)        Pulmonary hypertension (HCC)        Left main coronary artery disease              Past Medical History:  Past Medical History:   Diagnosis Date    Anemia     Blood circulation, collateral     Broken hip (HCC)     L hip    CHF (congestive heart failure) (HCC)     Chronic kidney disease 9/27/2018    Chronic kidney Eyes: Pupils are equal, round, and reactive to light. EOM are normal.   Neck: Normal range of motion. Neck supple. Cardiovascular: Normal rate, regular rhythm and normal heart sounds. Pulmonary/Chest: Effort normal and breath sounds normal.   Abdominal: Soft. Musculoskeletal: Normal range of motion. Neurological: She is alert and oriented to person, place, and time. Skin: Skin is warm and dry. Psychiatric: She has a normal mood and affect. Labs:  Recent Labs     07/05/19  0113   WBC 5.9   HGB 7.5*          Recent Labs     07/05/19  0113      K 4.9   CL 94*   CO2 31*   BUN 20   CREATININE 3.5*   LABGLOM 13*   CALCIUM 9.6       CK, CKMB, Troponin: @LABRCNT (CKTOTAL:3, CKMB:3, TROPONINI:3)@    Last 3 BNP:  No results for input(s): BNP in the last 72 hours. IMAGING:  Xr Chest Portable    Result Date: 7/5/2019  EXAMINATION: XR CHEST PORTABLE 7/5/2019 7:33 AM HISTORY: XR CHEST PORTABLE 7/5/2019 12:00 AM HISTORY: Chest pain COMPARISON: April 23, 2019. FINDINGS: The lungs are clear. Cardiac silhouettes moderately enlarged. Right internal jugular double lumen catheter is stable in position. . The osseous structures and surrounding soft tissues demonstrate no acute abnormality. 1. Cardiomegaly with no acute cardiopulmonary process. Signed by Dr Yaquelin Torres on 7/5/2019 7:34 AM      Assessment:  NSTEMI. CAD not a PCI candidate or CABG candidate. Anemia: transfuse to keep HB more than 10  DM  HTN    Recommendations:  Medical mgmt. Hb above 10  Fu dr Alexandra Eid as op  Poor prognosis.

## 2019-07-05 NOTE — CONSULTS
Subcutaneous, TID WC, Nehemiah Newman MD  insulin lispro (HUMALOG) injection vial 0-6 Units, 0-6 Units, Subcutaneous, Nightly, Nehemiah Newman MD  Outpatient Medications Marked as Taking for the 7/5/19 encounter [facility] Encounter):  aspirin 81 MG tablet, Take 81 mg by mouth daily, Disp: , Rfl:   atorvastatin (LIPITOR) 40 MG tablet, Take 1 tablet by mouth nightly, Disp: , Rfl: 3  carvedilol (COREG) 6.25 MG tablet, Take 1 tablet by mouth 2 times daily, Disp: , Rfl:   clopidogrel (PLAVIX) 75 MG tablet, Take 1 tablet by mouth daily, Disp: , Rfl: 3  sacubitril-valsartan (ENTRESTO) 24-26 MG per tablet, Take 1 tablet by mouth 2 times daily, Disp: 60 tablet, Rfl: 3  furosemide (LASIX) 40 MG tablet, Take 40 mg by mouth 2 times daily, Disp: , Rfl:   doxazosin (CARDURA) 8 MG tablet, 1/2 in the morning 1 tablet at night, Disp: , Rfl:   sodium bicarbonate 325 MG tablet, Take 1 tablet by mouth 2 times daily, Disp: 60 tablet, Rfl: 11  NIFEdipine (ADALAT CC) 30 MG extended release tablet, Take 1 tablet by mouth 2 times daily, Disp: 60 tablet, Rfl: 11  iron polysaccharides (NIFEREX) 150 MG capsule, Take 1 capsule by mouth daily, Disp: 30 capsule, Rfl: 3  vitamin D (ERGOCALCIFEROL) 38521 units capsule, Take 1 capsule by mouth once a week, Disp: 4 capsule, Rfl: 11  insulin aspart (NOVOLOG FLEXPEN) 100 UNIT/ML injection pen, Inject 0-35 Units into the skin 3 times daily as needed for High Blood Sugar Per home sliding scale, Disp: , Rfl:   SITagliptin (JANUVIA) 50 MG tablet, Take 50 mg by mouth daily, Disp: , Rfl:    HYDROcodone-acetaminophen (NORCO) 7.5-325 MG per tablet, Take 1 tablet by mouth every 12 hours as needed for Pain. ., Disp: , Rfl:         Allergies  Codeine    Family History    Review of patient's family history indicates:  Problem: Arthritis      Relation: Mother          Age of Onset: (Not Specified)  Problem: Cancer      Relation: Mother          Age of Onset: (Not Specified)  Problem: High Cholesterol

## 2019-07-05 NOTE — ED PROVIDER NOTES
[facility]  eMERGENCY dEPARTMENT eNCOUnter      Pt Name: Jean Carrillo  MRN: [id number]  [place] 1945  Date of evaluation: 7/4/2019  Provider: Tyron Paredes, [address]       Chief Complaint   Patient presents with    Pharyngitis    Back Pain         HISTORY OF PRESENT ILLNESS   (Location/Symptom, Timing/Onset,Context/Setting, Quality, Duration, Modifying Factors, Severity)  Note limiting factors. Jean Carrillo is a 69 y.o. female who presents to the emergency department with a sore throat and all over pain. Complains of back pain. She is a dialysis patient and had her last treatment yesterday she is due tomorrow for another treatment. She has had some chest pain. In April 2019 she had a heart cath with extensive disease she is not a surgical or PCI candidate. Her ejection fraction was 20 to 25%. The history is provided by the patient. Pharyngitis   Location:  Generalized  Quality:  Aching  Severity:  Severe  Onset quality:  Sudden  Duration:  5 days  Timing:  Constant  Progression:  Worsening  Chronicity:  New  Associated symptoms: chest pain    Associated symptoms: no fever, no rash, no rhinorrhea, no sinus congestion, no trouble swallowing and no voice change    Risk factors: no sick contacts    Back Pain   Associated symptoms: chest pain    Associated symptoms: no fever        NursingNotes were reviewed. REVIEW OF SYSTEMS    (2-9 systems for level 4, 10 or more for level 5)     Review of Systems   Constitutional: Negative for fever. HENT: Negative for rhinorrhea, trouble swallowing and voice change. Cardiovascular: Positive for chest pain. Musculoskeletal: Positive for arthralgias, back pain and gait problem. Skin: Negative for rash. Except as noted above the remainder of the review of systems was reviewed and negative.        PAST MEDICAL HISTORY     Past Medical History:   Diagnosis Date    Anemia     Blood circulation, collateral     Broken hip ([address] L hip    CHF (congestive heart failure) (HCC)     Chronic kidney disease 2018    Chronic kidney disease (CKD), stage IV (severe) (HCC)     Chronic kidney disease (CKD), stage IV (severe) (HCC)     Closed compression fracture of first lumbar vertebra (HCC) 2017    Closed compression fracture of first lumbar vertebra (HCC) 2017    Colon polyps     Coronary artery disease (CAD) excluded 2019    Hemodialysis patient ([facility]     dialysis mon, wed, friday outpt dialysis at [facility]    History of blood transfusion     Hypertension     Mixed hyperlipidemia 2019    Osteoarthritis     Palliative care encounter 2018    Type II or unspecified type diabetes mellitus without mention of complication, not stated as uncontrolled     UTI (urinary tract infection)     Gangrenous UTI with gas in urinary tract         SURGICALHISTORY       Past Surgical History:   Procedure Laterality Date    APPENDECTOMY       SECTION      x 2    CHOLECYSTECTOMY      COLONOSCOPY  09    Dr Jonas Toribio (LOWER) N/A 2016    ERCP/EUS-Dr BEKA Sutton-w/placement of a 10 [place] x7 cm temporary plastic biliary stent-Ampullary stenosis, mild dilation of the cbd w/questionable calcification vs stone, small periampullary diverticulum, removal of biliary sludge    ENDOSCOPY, COLON, DIAGNOSTIC      ERCP  2016    ERCP/EUS-Dr BEKA Sutton-w/placement of a 10 [place] x7 cm temporary plastic biliary stent-Ampullary stenosis, mild dilation of the cbd w/questionable calcification vs stone, small periampullary diverticulum, removal of biliary sludge    ERCP N/A 2017    Dr BEKA Sutton-Successful removal of indwelling biliary stent, no evidence of residual choledocholithiasis or common biliary stricture     EYE SURGERY      cataract    FEMUR FRACTURE SURGERY Left 2016    SHORT TFN INTERTROCHAN FRACTURE performed by Magaly Osman MD at [address] [place] [address] below findings:      Interpretation per the Radiologist below, if available at the time of this note:    XR CHEST PORTABLE    (Results Pending)     CHF  r internal jugular catheter    ED BEDSIDEULTRASOUND:   Performed by ED Physician -none    LABS:  Labs Reviewed   CBC WITH AUTO DIFFERENTIAL - Abnormal; Notable for the following components:       Result Value    RBC 2.29 (*)     Hemoglobin 7.5 (*)     Hematocrit 24.2 (*)     .7 (*)     MCH 32.8 (*)     MCHC 31.0 (*)     RDW 16.5 (*)     Neutrophils % 79.2 (*)     Lymphocytes % 10.7 (*)     Lymphocytes # 0.6 (*)     All other components within normal limits   COMPREHENSIVE METABOLIC PANEL - Abnormal; Notable for the following components:    Chloride 94 (*)     CO2 31 (*)     Glucose 296 (*)     CREATININE 3.5 (*)     GFR Non- 13 (*)     All other components within normal limits   TROPONIN - Abnormal; Notable for the following components:    Troponin 0.30 (*)     All other components within normal limits    Narrative:     CALL  [facility]  [facility] tel. ,  Chemistry results called to and read back by Efra Payton RN in ED, 07/05/2019  01:40, by [email or web address]   RAPID STREP SCREEN   CULTURE BETA STREP CONFIRM PLATE   TYPE AND SCREEN   PREPARE RBC (CROSSMATCH)       All other labs were within normal range or not returned as of this dictation. EMERGENCY DEPARTMENT COURSE and DIFFERENTIALDIAGNOSIS/MDM:   Vitals:    Vitals:    07/05/19 0049 07/05/19 0208 07/05/19 0212 07/05/19 0304   BP:  (!) 142/110  (!) 151/78   Pulse:   106    Resp:   22    Temp:       SpO2: 97%      Weight:       Height:               MDM  Spoke to Dr Vanda Walker and Dr Valeria Bar. Pt will be admitted      CONSULTS:  IP CONSULT TO CARDIOLOGY  IP CONSULT TO NEPHROLOGY    PROCEDURES:  Unless otherwise noted below, none     Procedures    FINAL IMPRESSION      1. Acute exacerbation of chronic low back pain    2. Chest pain, unspecified type    3. Acute pharyngitis, unspecified etiology    4.  Left main coronary

## 2019-07-05 NOTE — H&P
LISA [facility]ick[facility] OF [facility] [address], [address] [facility]                              HISTORY AND PHYSICAL    PATIENT NAME: Maurice Davidson                          :        1945  MED REC NO:   [id number]                              ROOM:       [facility]  ACCOUNT NO:   [de-identified]                           ADMIT DATE: 2019  PROVIDER:     Shreya Gonzalez MD      HISTORY OF PRESENT ILLNESS:  This is a 81-year-old being admitted with  angina and elevated troponin. The case is complicated by the fact that  she has end-stage renal disease and she had been recently evaluated here  and then at [facility] for possible intervention from a coronary stent  but was found to be too high risk here and there for heart surgery. She  is admitted with chest pain and elevated troponin in order to get evaluation_____  and treatment from cardiology. The patient was also noted to be  significantly anemic, which may be contributing to her chest pain. She  is getting Tuesday, Thursday, and Saturday hemodialysis with a local  nephrology group as an outpatient. PAST MEDICAL HISTORY:  Her other past medical history is significant for  CHF. She has end-stage renal disease, requiring hemodialysis. She has  coronary artery disease and is felt to be at poor risk for surgery. She  has had surgeries for the fistula formation for dialysis. She had prior  broken left hip and has had surgery for that previously. She has  hypertension, hyperlipidemia, diabetes mellitus with nephropathy and  neuropathy. PAST SURGICAL HISTORY:  She has had prior vascular surgery for fistula  formation, upper and lower endoscopies, prior left hip surgery and hip  repair in 2016. She has had prior cataract surgery bilaterally. She  has had previous ERCP for choledocholithiasis, and she had a stent and  removal of stent by Dr. Whitt Child with this.   She has had cholecystectomy,  prior , and prior

## 2019-07-06 NOTE — PROGRESS NOTES
4 Eyes Skin Assessment    Jose Saldana is being assessed upon: Admission    I agree that Omega , along with Arturo Peter (either 2 RN's or 1 LPN and 1 RN) have performed a thorough Head to Toe Skin Assessment on the patient. ALL assessment sites listed below have been assessed. Areas assessed by both nurses:     [x]   Head, Face, and Ears   [x]   Shoulders, Back, and Chest  [x]   Arms, Elbows, and Hands   [x]   Coccyx, Sacrum, and Ischium  [x]   Legs, Feet, and Heels    Does the Patient have Skin Breakdown?  No    Roger Prevention initiated: Yes  Wound Care Orders initiated: No    [facility] nurse consulted for Pressure Injury (Stage 3,4, Unstageable, DTI, NWPT, and Complex wounds) and New or Established Ostomies: No        Primary Nurse eSignature: Ihsan Rod RN on 7/5/2019 at 6:10 AM      Co-Signer eSignature: Electronically signed by Arturo Peter RN on 7/5/19 at 6:13 AM
Dr Frieda Kimball rounded. Informed of prolonged QTc today. Verbal order to have pharmacy review meds that could prolong QT. Pharmacy has been consulted and spoke with Pablo Huang in pharmacy.  Electronically signed by Suri Good RN on 7/5/2019 at 3:48 PM
Dr Vanessa Muniz has rounded. Will order dialysis for today. This is pt's normal dialysis day. Cr 3.5 per labs on admit.  Electronically signed by Maxi Griffin RN on 7/5/2019 at 11:30 AM
Patient hungry and request sandwich. Patient fell asleep [facility] through consuming sandwich.  Electronically signed by Messi Gallagher RN on 7/6/2019 at 6:48 AM
Pt c/o \"all over pain\". Requested pain med. PRN Lortab 7.5mg was pulled to give. Pt stated Lortab makes her \"sick at my stomach\". Pt wanted to know if she had any IV med. I stated Tylenol was only other pain med on MAR. Pt opted for Tylenol.  Electronically signed by Juliet Hopkins RN on 7/5/2019 at 11:09 AM
Pt c/o abdominal cramping with dialysis. Tylenol 650mg PO given. Pt has had prolonged QT today. Awaiting pharmacy to review meds that might prolong QT. Spoke with Miriam Ma in pharmacy and only med would be Zofran and Phenergan. Phenergan was 1 time dose in ER and last dose of Zofran was at 1044 this AM 4mg disintegrating tab. Pharmacy placing note on chart.  Electronically signed by Mercedes Urbina RN on 7/5/2019 at 4:06 PM
Pt has completed dialysis for today. 2.5 net liter removed. Pt had cramping in stomach and legs late in treatment and dialysis RN had to slow treatment. Was only short 700ml of goal. See dialysis notes.  Electronically signed by Francia Turner RN on 7/5/2019 at 6:23 PM
Tylenol 650mg PO now given for pain at 8 on scale. General pain. HR SR 93 /70.  Electronically signed by Ju Lindo RN on 7/5/2019 at 11:10 AM
Unit of PRBC now started as ordered. See blood flow sheet.  Electronically signed by Talisha Kinsey RN on 7/5/2019 at 12:07 PM
you for the consult.      Electronically signed by Daija Donahue, [facility] on 7/5/2019 at 3:51 PM
tip to cuff.        Family History  Family History   Problem Relation Age of Onset    Arthritis Mother     Cancer Mother     High Cholesterol Mother     Arthritis Father     Diabetes Father     High Cholesterol Father     Liver Cancer Father     Arthritis Sister     Diabetes Sister     High Blood Pressure Sister     High Cholesterol Sister     Arthritis Brother     Diabetes Brother     High Blood Pressure Brother     High Cholesterol Brother     Vision Loss Brother     Colon Cancer Neg Hx     Colon Polyps Neg Hx     Esophageal Cancer Neg Hx     Liver Disease Neg Hx     Stomach Cancer Neg Hx     Rectal Cancer Neg Hx        Social History  Social History     Socioeconomic History    Marital status:      Spouse name: Hoang Jay Number of children: 2    Years of education: 15    Highest education level: Not on file   Occupational History    Not on file   Social Needs    Financial resource strain: Not on file    Food insecurity:     Worry: Not on file     Inability: Not on file   Medium needs:     Medical: Not on file     Non-medical: Not on file   Tobacco Use    Smoking status: Never Smoker    Smokeless tobacco: Never Used   Substance and Sexual Activity    Alcohol use: No    Drug use: No    Sexual activity: Not Currently     Partners: Male   Lifestyle    Physical activity:     Days per week: Not on file     Minutes per session: Not on file    Stress: Not on file   Relationships    Social connections:     Talks on phone: Not on file     Gets together: Not on file     Attends Hinduism service: Not on file     Active member of club or organization: Not on file     Attends meetings of clubs or organizations: Not on file     Relationship status: Not on file    Intimate partner violence:     Fear of current or ex partner: Not on file     Emotionally abused: Not on file     Physically abused: Not on file     Forced sexual activity: Not on file   Other Topics Concern   

## 2019-08-22 PROBLEM — E87.6 HYPOKALEMIA: Status: ACTIVE | Noted: 2019-01-01

## 2019-08-22 PROBLEM — G89.29 CHRONIC BACK PAIN: Status: ACTIVE | Noted: 2019-01-01

## 2019-08-22 PROBLEM — N18.6 ESRD (END STAGE RENAL DISEASE) (HCC): Status: ACTIVE | Noted: 2019-01-01

## 2019-08-22 PROBLEM — E55.9 VITAMIN D DEFICIENCY: Status: ACTIVE | Noted: 2019-01-01

## 2019-08-22 PROBLEM — Z99.2 HEMODIALYSIS PATIENT (HCC): Status: ACTIVE | Noted: 2019-01-01

## 2019-08-22 PROBLEM — E11.42 DIABETIC PERIPHERAL NEUROPATHY ASSOCIATED WITH TYPE 2 DIABETES MELLITUS (HCC): Status: ACTIVE | Noted: 2019-01-01

## 2019-08-22 PROBLEM — I25.10 MULTI-VESSEL CORONARY ARTERY STENOSIS: Status: ACTIVE | Noted: 2019-01-01

## 2019-08-22 PROBLEM — I50.22 CHRONIC SYSTOLIC HEART FAILURE (HCC): Status: ACTIVE | Noted: 2018-09-19

## 2019-08-22 PROBLEM — M54.9 CHRONIC BACK PAIN: Status: ACTIVE | Noted: 2019-01-01

## 2019-08-22 PROBLEM — I50.9 CONGESTIVE HEART FAILURE (CHF) (HCC): Status: ACTIVE | Noted: 2019-01-01

## 2019-08-22 PROBLEM — G47.00 INSOMNIA: Status: ACTIVE | Noted: 2019-01-01

## 2019-08-22 NOTE — PROGRESS NOTES
Patient Care Team:  Shameka Montalvo MD as PCP - General (Family Medicine)  Shameka Montalvo MD as PCP [facility]  CATHY Proctor as Advanced Practice Nurse (Nurse Practitioner)  Mckayla Valdez MD as Consulting Physician (Interventional Cardiology)      History and Physical    She has a history of chronic kidney disease for a duration of 1 - 5 years. This is believed to be caused by unknown etiology. She has experienced no problems. She is now stage V and is in need of hemodiaylsis access. She is right handed. She has had previous permacath. They are here to discuss more permanent access. She does not have a pacemaker. Sulaiman Ellison is a 69 y.o. female with the following history reviewed and recorded in [facility]:  Patient Active Problem List    Diagnosis Date Noted    Coronary artery disease involving native coronary artery of native heart without angina pectoris 04/23/2019     Priority: High    Abnormal stress ECG 02/04/2019     Priority: High    Chronic back pain 08/22/2019    Diabetic peripheral neuropathy associated with type 2 diabetes mellitus ([address] 08/22/2019    Hypokalemia 08/22/2019    Insomnia 08/22/2019    Vitamin D deficiency 08/22/2019    Mixed hyperlipidemia 06/06/2019    Congestive heart failure (CHF) ([address] 04/25/2019     Last Assessment & Plan:   - EF 20-25% per cath  - ASA, statin, coreg  - Lasix 40mg PO BID      ESRD (end stage renal disease) ([address] 04/25/2019     Last Assessment & Plan:   - HD M/W/F  - Appreciate nephrology consult  - Not hyperkalemic or acidotic      Hemodialysis patient ([address] 04/25/2019     Last Assessment & Plan:   Pt on hemodialysis M-W-F    --consult nephrology      Multi-vessel coronary artery stenosis 04/25/2019     Last Assessment & Plan:   - Denies anginal symptoms  - ASA, statin, coreg  - Heparin drip per ACS protocol  - Not a surgical candidate.  Being evaluated for high risk PCI      Left main coronary artery disease 04/24/2019    Pulmonary hypertension ([facility] 04/23/2019    Nocturnal hypoxia 04/23/2019    Decreased mobility and endurance 04/23/2019    Stage 5 chronic kidney disease on chronic dialysis ([facility] 09/27/2018    Abnormal electrocardiogram 09/27/2018    Dyspnea on effort 09/27/2018    [facility] 09/19/2018    Palliative care patient 08/14/2018    Abdominal pain     Abnormal CT of the abdomen     Closed compression fracture of second lumbar vertebra (HCC) 02/02/2017    Closed compression fracture of first lumbar vertebra ([facility] 02/02/2017    History of biliary stent insertion     Ampullary stenosis     Biliary stricture     Elevated CA 19-9 level     History of adenomatous polyp of colon 02/19/2013    Anemia 02/19/2013    Essential hypertension, benign 02/19/2013     Last Assessment & Plan:     - Continue nifedipine, coreg and doxazosin      Diabetes mellitus, type 2 ([facility] 02/19/2013     Last Assessment & Plan:   - Home Lantus 30 units resumed  - Insulin sliding scale correctional coverage       Current Outpatient Medications   Medication Sig Dispense Refill    Docusate Sodium 100 MG TABS docusate sodium 100 mg tablet   Take 1 tablet twice a day by oral route.       doxazosin (CARDURA) 8 MG tablet doxazosin 8 mg tablet   TAKE 1/2  TABLET (4MG) IN AM AND 1 TABLET (8 MG) AT BEDTIME      famotidine (PEPCID) 20 MG tablet famotidine 20 mg tablet      AURYXIA 1  MG(Fe) TABS       tiZANidine (ZANAFLEX) 2 MG tablet tizanidine 2 mg tablet      sacubitril-valsartan (ENTRESTO) 24-26 MG per tablet Entresto 24 mg-26 mg tablet      aspirin 81 MG tablet Take 81 mg by mouth daily      atorvastatin (LIPITOR) 40 MG tablet Take 1 tablet by mouth nightly  3    carvedilol (COREG) 6.25 MG tablet Take 1 tablet by mouth 2 times daily      clopidogrel (PLAVIX) 75 MG tablet Take 1 tablet by mouth daily  3    furosemide (LASIX) 40 MG tablet Take 40 mg by mouth daily       sodium Blood Pressure Brother     High Cholesterol Brother     Vision Loss Brother     Colon Cancer Neg Hx     Colon Polyps Neg Hx     Esophageal Cancer Neg Hx     Liver Disease Neg Hx     Stomach Cancer Neg Hx     Rectal Cancer Neg Hx      Social History     Tobacco Use    Smoking status: Never Smoker    Smokeless tobacco: Never Used   Substance Use Topics    Alcohol use: No       Old records have been obtained from the referring provider. These records have been reviewed and summarized. Review of Systems    Constitutional - no significant activity change, appetite change, or unexpected weight change. No fever or chills. No diaphoresis or significant fatigue. HENT - no significant rhinorrhea or epistaxis. No tinnitus or significant hearing loss. Eyes - no sudden vision change or amaurosis. Respiratory - has shortness of breath with excertion, no wheezing, or stridor. No apnea, cough, or chest tightness associated with shortness of breath. Cardiovascular - no chest pain, syncope, or significant dizziness. No palpitations or significant leg swelling. No claudication. Gastrointestinal - no abdominal swelling or pain. No blood in stool. No severe constipation, diarrhea, nausea, or vomiting. Genitourinary - No dysuria, frequency, or urgency. No flank pain or hematuria. Musculoskeletal - no back pain, gait disturbance, or myalgia. Skin - no color change, rash, pallor, or new wound. Neurologic - no dizziness, facial asymmetry, or light headedness. No seizures. No speech difficulty or lateralizing weakness. Hematologic - no easy bruising or excessive bleeding. Psychiatric - no severe anxiety or nervousness. No confusion. All other review of systems are negative. Physical Exam    /68 (Site: Left Upper Arm, Position: Sitting, Cuff Size: Medium Adult)   Pulse 67   Resp 18     Constitutional - well developed, well nourished. No diaphoresis or acute distress.   HENT - head

## 2019-08-23 NOTE — ED PROVIDER NOTES
[facility] EMERGENCY DEPT  eMERGENCY dEPARTMENT eNCOUnter      Pt Name: Yen Lam  MRN: [id number]  [place] 1945  Date of evaluation: 8/23/2019  Provider: David Reaves MD    [address] [place] [address]       Chief Complaint   Patient presents with    Emesis     started \"after supper tonight\"    Headache    Back Pain    Shoulder Pain     both         HISTORY OF PRESENT ILLNESS   (Location/Symptom, Timing/Onset,Context/Setting, Quality, Duration, Modifying Factors, Severity)  Note limiting factors. Yen Lam is a 69 y.o. female who presents to the emergency department for concern of nausea vomiting headache neck pain bilateral shoulder pain. Patient states that her symptoms have been ongoing for the past 4 -5 hours. She denies any chest pain or shortness of breath. No trauma. She was admitted in July for chest pain and sob/fluid overload and evaluated by cardiology and has been told she is not a candidate for PCI or surgical repair per Dr. Ed Manuel and Dr. Fortune Call note. She does have a known history of hypertension and congestive heart disease. Tells me she was seen by her primary care physician yesterday for neck pain and had an x-ray. Wears 2l n/c at night. HPI    NursingNotes were reviewed. REVIEW OF SYSTEMS    (2-9 systems for level 4, 10 or more for level 5)     Review of Systems   Constitutional: Positive for fatigue. Negative for chills and fever. HENT: Negative for rhinorrhea and sore throat. Respiratory: Negative for cough and shortness of breath. Cardiovascular: Negative for chest pain and leg swelling. Gastrointestinal: Positive for nausea and vomiting. Negative for abdominal pain and diarrhea. Genitourinary: Negative for dysuria, frequency and urgency. Musculoskeletal: Positive for back pain and neck pain. Neurological: Positive for headaches. Negative for dizziness, facial asymmetry, speech difficulty, weakness and numbness. All other systems reviewed and are negative.            PAST MEDICALHISTORY     Past Medical History:   Diagnosis Date    Anemia     Blood circulation, collateral     Broken hip (HCC)     L hip    CHF (congestive heart failure) (HCC)     Chronic kidney disease 2018    Chronic kidney disease (CKD), stage IV (severe) (HCC)     Chronic kidney disease (CKD), stage IV (severe) (HCC)     Closed compression fracture of first lumbar vertebra (HCC) 2017    Closed compression fracture of first lumbar vertebra (HCC) 2017    Colon polyps     Coronary artery disease (CAD) excluded 2019    Diabetic peripheral neuropathy associated with type 2 diabetes mellitus ([facility] 2019    Hemodialysis patient ([facility] [address] [facility]     dialysis mon, wed, friday outpt dialysis at [facility]    History of blood transfusion     Hypertension     Mixed hyperlipidemia 2019    Osteoarthritis     Palliative care patient 2018    Type II or unspecified type diabetes mellitus without mention of complication, not stated as uncontrolled     UTI (urinary tract infection)     Gangrenous UTI with gas in urinary tract         SURGICAL HISTORY       Past Surgical History:   Procedure Laterality Date    APPENDECTOMY       SECTION      x 2    CHOLECYSTECTOMY      COLONOSCOPY  09    Dr Michelle Isbell (LOWER) N/A 2016    ERCP/EUS-Dr BEKA Sutton-w/placement of a 10 Ethiopian x7 cm temporary plastic biliary stent-Ampullary stenosis, mild dilation of the cbd w/questionable calcification vs stone, small periampullary diverticulum, removal of biliary sludge    ENDOSCOPY, COLON, DIAGNOSTIC      ERCP  2016    ERCP/EUS-Dr BEKA Sutton-w/placement of a 10 Ethiopian x7 cm temporary plastic biliary stent-Ampullary stenosis, mild dilation of the cbd w/questionable calcification vs stone, small periampullary diverticulum, removal of biliary sludge    ERCP N/A 2017    Dr BEKA Sutton-Successful removal of indwelling biliary stent, no evidence of residual into the skin DailyHistorical Med      SITagliptin (JANUVIA) 50 MG tablet Take 50 mg by mouth dailyHistorical Med      Docusate Sodium 100 MG TABS docusate sodium 100 mg tablet   Take 1 tablet twice a day by oral route. Historical Med      famotidine (PEPCID) 20 MG tablet famotidine 20 mg tabletHistorical Med      vitamin D (ERGOCALCIFEROL) 52339 units capsule Take 1 capsule by mouth once a week, Disp-4 capsule, R-11Normal      insulin aspart (NOVOLOG FLEXPEN) 100 UNIT/ML injection pen Inject 0-35 Units into the skin 3 times daily as needed for High Blood Sugar Per home sliding scaleHistorical Med             ALLERGIES     Codeine    FAMILY HISTORY       Family History   Problem Relation Age of Onset    Arthritis Mother     Cancer Mother     High Cholesterol Mother     Arthritis Father     Diabetes Father     High Cholesterol Father     Liver Cancer Father     Arthritis Sister     Diabetes Sister     High Blood Pressure Sister     High Cholesterol Sister     Arthritis Brother     Diabetes Brother     High Blood Pressure Brother     High Cholesterol Brother     Vision Loss Brother     Colon Cancer Neg Hx     Colon Polyps Neg Hx     Esophageal Cancer Neg Hx     Liver Disease Neg Hx     Stomach Cancer Neg Hx     Rectal Cancer Neg Hx           SOCIAL HISTORY       Social History     Socioeconomic History    Marital status:      Spouse name: Emiliano Mckeon Number of children: 2    Years of education: 15    Highest education level: None   Occupational History    None   Social Needs    Financial resource strain: None    Food insecurity:     Worry: None     Inability: None    Transportation needs:     Medical: None     Non-medical: None   Tobacco Use    Smoking status: Never Smoker    Smokeless tobacco: Never Used   Substance and Sexual Activity    Alcohol use: No    Drug use: No    Sexual activity: Not Currently     Partners: Male   Lifestyle    Physical activity:     Days per week: Musculoskeletal: Normal range of motion. She exhibits no edema. Cervical back: She exhibits tenderness and bony tenderness. Thoracic back: She exhibits no tenderness and no bony tenderness. Lumbar back: She exhibits no tenderness and no bony tenderness. Bilateral shoulders posterior rhomboid region ttp   Neurological: She is alert and oriented to person, place, and time. No cranial nerve deficit or sensory deficit. She exhibits normal muscle tone. Coordination normal. GCS eye subscore is 4. GCS verbal subscore is 5. GCS motor subscore is 6. Speech clear   Skin: Skin is warm and dry. She is not diaphoretic. Vitals reviewed. DIAGNOSTIC RESULTS     EKG: All EKG's areinterpreted by the Emergency Department Physician who either signs or Co-signs this chart in the absence of a cardiologist.    123 sinus tachycardia, left anterior fascicular block, no ST changes, nondiagnostic EKG    RADIOLOGY:  Non-plain film images such as CT, Ultrasound and MRI are read by the radiologist. Plain radiographic images are visualized and preliminarily interpreted bythe emergency physician with the below findings:        XR CHEST PORTABLE    (Results Pending)   CT Head WO Contrast    (Results Pending)   CT Cervical Spine WO Contrast    (Results Pending)   PreliminaryFindingsOnly-- See Final Report For Complete Findings  CT HEAD:  Compared to 5/23/11. No ICH, mass effect or edema. No evidence of acute cortical stroke. Periventricular small vessel ischemic change. Cerebral atrophy. Old lacunar infarcts are suspected in  the mayra, thalami, and corona radiata. Visualized mastoid air cells are clear. Focal areas of mild mucosal thickening in the paranasal sinuses. PreliminaryFindingsOnly-- See Final Report For Complete Findings  CT C SPINE:  Alytic lesion is seen involving the left side of the C3 vertebral body. Metastatic disease should be  considered.   Loss of the intervertebral disc height with anterior and/or Complexity of Data Reviewed  Clinical lab tests: ordered and reviewed  Tests in the radiology section of CPT®: ordered and reviewed  Independent visualization of images, tracings, or specimens: yes      Patient arrives hypertensive, anxious, complaining of neck pain, HA, n/v, non focal neuro exam, no cp or sob, midline and paraspinal neck pain but has normal rom, no stiffness, no concern for infectious etiology, ct head neg for ICH or mass, BP has improved after 2 doses of labetalol, pain is well controlled she has been asleep, ct neck concern for lytic lesion and some neuroforaminal stenosis, normal strength and sensation no indication for emergent MRI, will tx with pain med and steroid lower dose due to hx of DM, explained to her sugar will be slightly higher than usual, due to her presentation and complaint of b/l shoulder pain despite being reproducible and seeming musculoskeletal did check trop x2 which are stable and improved from last comparison, no cp or sob today, mild elevation likely related to ESRD, no obvious ekg findings, feel patient stable for DC And close outpatient follow up with PCP for further work up including possible MRI of c spine and ?malignancy, no prior hx of CA, has dialysis today, additional verbal instructions provided      A ANGEL report was obtained. Request number [id number]. I have reviewed this report and have considered its contents in selecting medications for outpatient care of this patient's illness. I have discussed the risks of use of these medications with patient. CONSULTS:  None    PROCEDURES:  Unless otherwise noted below, none     Procedures    FINAL IMPRESSION      1. Hypertensive urgency    2. Non-intractable vomiting with nausea, unspecified vomiting type    3. Neck pain    4.  ESRD (end stage renal disease) [facility])          DISPOSITION/PLAN   DISPOSITION Decision To Discharge 08/23/2019 04:46:11 AM      PATIENT REFERRED TO:  Deana Franco MD  [phone]

## 2019-08-24 PROBLEM — R52 INTRACTABLE PAIN: Status: ACTIVE | Noted: 2019-01-01

## 2019-08-24 NOTE — CONSULTS
[facility] x7 cm temporary plastic biliary stent-Ampullary stenosis, mild dilation of the cbd w/questionable calcification vs stone, small periampullary diverticulum, removal of biliary sludge    ERCP N/A 1/11/2017    Dr BEKA Sutton-Successful removal of indwelling biliary stent, no evidence of residual choledocholithiasis or common biliary stricture     EYE SURGERY      cataract    FEMUR FRACTURE SURGERY Left 9/9/2016    SHORT TFN INTERTROCHAN FRACTURE performed by Dacia Macias MD at [address] [place] [address]      femur fracture surgery    HIP FRACTURE SURGERY Left     pinning    DE COLONOSCOPY W/BIOPSY SINGLE/MULTIPLE N/A 3/24/2017    Dr BEKA Sutton-Diverticular disease-Tubular AP (-) dysplasia x 5--3 yr recall    UPPER GASTROINTESTINAL ENDOSCOPY N/A 10/14/2016    Dr Simons-Hemorrhagic gastritis    VASCULAR SURGERY  03/06/2019    SJSUltrasound guided cannulation of right internal jugular vein. Placement of right internal jugular vein tunneled dialysis catheter [employer] glidepath 19 cm tip to cuff.        Immunizations:    Immunization History   Administered Date(s) Administered    Influenza Vaccine, unspecified formulation 10/15/2016    Influenza Virus Vaccine 10/10/2017, 10/18/2018    Pneumococcal Conjugate 13-valent (Nancy Prayer) 10/15/2016, 11/16/2017    Pneumococcal Polysaccharide ([id number]) 10/01/2011       Current Hospital Medications:    Current Facility-Administered Medications: sodium bicarbonate tablet 325 mg, 325 mg, Oral, BID  NIFEdipine (PROCARDIA XL) extended release tablet 30 mg, 30 mg, Oral, BID  clopidogrel (PLAVIX) tablet 75 mg, 75 mg, Oral, Daily  carvedilol (COREG) tablet 6.25 mg, 6.25 mg, Oral, BID  atorvastatin (LIPITOR) tablet 40 mg, 40 mg, Oral, Nightly  aspirin chewable tablet 81 mg, 81 mg, Oral, Daily  sodium chloride flush 0.9 % injection 10 mL, 10 mL, Intravenous, 2 times per day  sodium chloride flush 0.9 % injection 10 mL, 10 mL, Intravenous, PRN  acetaminophen (TYLENOL) tablet 650 mg, 650 soft tissues are normal. There is calcification of the nuchal ligament opposite and posterior to the spinous processes of C5 and C6. The included lung apices show a tiny noncalcified nodule in the left upper lobe anteriorly, image #57 in axial plane. There are interstitial changes and groundglass opacities in the upper lungs bilaterally more so on the left upper lobe posteriorly. An accessory azygous fissure is seen. Right internal jugular venous catheter should is seen in place. An osteolytic expansile lesion involving the left side of the vertebral body C3 extending into the pedicle. Suspect a pathological fracture. No displacement. No obvious extension into the spinal canal. Further evaluation with MR imaging of the cervical spine may be obtained. Cervical spondylosis. Bilateral neural foraminal stenosis at C5-6. No spinal stenosis at any level. A tiny noncalcified nodule in the left upper lobe. This is suboptimally evaluated in this study. Further follow-up with CT scan of the chest may be obtained. The above study was initially reviewed and reported by stat rads. I do not find any discrepancies. Signed by Dr Elfego Ribera on 8/23/2019 8:18 AM    Ct Thoracic Spine Wo Contrast    Result Date: 8/24/2019  EXAMINATION: CT THORACIC SPINE WO CONTRAST 8/24/2019 7:56 AM HISTORY: CT thoracic spine without contrast 8/24/2019 12:45 AM HISTORY: Pain COMPARISON: None DLP: 1035 mGy cm TECHNIQUE: Serial helical tomographic images of the thoracic spine were obtained without the use of intravenous contrast. Multiplanar reformatted images were provided for review. FINDINGS: Evidence of kyphoplasty at T11 is noted. . Vertebral body heights and alignment are well maintained. Degenerative changes noted in the disc spaces. . There is no bony narrowing of the spinal canal or neural foramina. There is no evidence of facet lock or perch. The posterior elements are intact. The visualized soft tissues are unremarkable.     Evidence of

## 2019-08-24 NOTE — PLAN OF CARE
Problem: Falls - Risk of:  Goal: Will remain free from falls  Description  Will remain free from falls  8/24/2019 1731 by Yusuf Spencer RN  Outcome: Ongoing  8/24/2019 0457 by Don Manrique LPN  Outcome: Ongoing  Goal: Absence of physical injury  Description  Absence of physical injury  8/24/2019 1731 by Yusuf Spencer RN  Outcome: Ongoing  8/24/2019 0457 by Don Manrique LPN  Outcome: Ongoing     Problem:  Activity:  Goal: Fatigue will decrease  Description  Fatigue will decrease  8/24/2019 1731 by Yusuf Spencer RN  Outcome: Ongoing  8/24/2019 0457 by Don Manrique LPN  Outcome: Ongoing  Goal: Risk for activity intolerance will decrease  Description  Risk for activity intolerance will decrease  8/24/2019 1731 by Yusuf Spencer RN  Outcome: Ongoing  8/24/2019 0457 by Don Manrique LPN  Outcome: Ongoing     Problem: Coping:  Goal: Ability to cope will improve  Description  Ability to cope will improve  8/24/2019 1731 by Yusuf Spencer RN  Outcome: Ongoing  8/24/2019 0457 by Don Manrique LPN  Outcome: Ongoing     Problem: Fluid Volume:  Goal: Will show no signs or symptoms of fluid imbalance  Description  Will show no signs or symptoms of fluid imbalance  8/24/2019 1731 by Yusuf Spencer RN  Outcome: Ongoing  8/24/2019 0457 by Don Manrique LPN  Outcome: Ongoing     Problem: Health Behavior:  Goal: Ability to manage health-related needs will improve  Description  Ability to manage health-related needs will improve  8/24/2019 1731 by Yusuf Spencer RN  Outcome: Ongoing  8/24/2019 0457 by Don Manrique LPN  Outcome: Ongoing  Goal: Identification of resources available to assist in meeting health care needs will improve  Description  Identification of resources available to assist in meeting health care needs will improve  8/24/2019 1731 by Yusuf Spencer RN  Outcome: Ongoing  8/24/2019 0457 by Don Manrique LPN  Outcome:

## 2019-08-24 NOTE — CONSULTS
times per day Ari Beckwith MD        sodium chloride flush 0.9 % injection 10 mL  10 mL Intravenous PRN Ari Beckwith MD        acetaminophen (TYLENOL) tablet 650 mg  650 mg Oral Q4H PRN Ari Beckwith MD        enoxaparin (LOVENOX) injection 30 mg  30 mg Subcutaneous Daily Ari Beckwith MD        ondansetron [facility]) injection 4 mg  4 mg Intravenous Q4H PRN Ari Beckwith MD   4 mg at 08/24/19 0820    0.9 % sodium chloride infusion   Intravenous Continuous Ari Beckwith MD 50 mL/hr at 08/24/19 0820      Ferric Citrate TABS 210 mg  1 tablet Oral Daily Ari Beckwith MD        docusate sodium (COLACE) capsule 100 mg  100 mg Oral BID Ari Beckwith MD        famotidine (PEPCID) tablet 20 mg  20 mg Oral BID Ari Beckwith MD        hydrALAZINE (APRESOLINE) injection 10 mg  10 mg Intravenous Q4H PRN Ari Beckwith MD        glucose (GLUTOSE) 40 % oral gel 15 g  15 g Oral PRN Ari Beckwith MD        dextrose 50 % IV solution  12.5 g Intravenous PRN Ari Beckwith MD        glucagon (rDNA) injection 1 mg  1 mg Intramuscular PRN Ari Beckwith MD        dextrose 5 % solution  100 mL/hr Intravenous PRN Ari Beckwith MD        insulin lispro (HUMALOG) injection vial 0-12 Units  0-12 Units Subcutaneous TID WC Ari Beckwith MD        insulin lispro (HUMALOG) injection vial 0-6 Units  0-6 Units Subcutaneous Nightly Ari Beckwith MD        HYDROmorphone (DILAUDID) injection 0.5 mg  0.5 mg Intravenous Q3H PRHERNÁN Beckwith MD        Or    HYDROmorphone (DILAUDID) injection 1 mg  1 mg Intravenous Q3H PRN Ari Beckwith MD   1 mg at 08/24/19 0946    promethazine (PHENERGAN) injection 6.25 mg  6.25 mg Intramuscular Q6H PRN Ari Beckwith MD   6.25 mg at 08/24/19 4882       Past Medical History:  Past Medical History:   Diagnosis Date    Anemia     Blood circulation, collateral     Broken hip (HCC)     L hip    CHF (congestive heart failure) (HCC)     Chronic

## 2019-08-24 NOTE — ED NOTES
Pt reports neck and back pain. Pt states she was evaluated last night in the ED. Pt states that she had an MRI and was given pain medication. Pt states that pain started back at 2200.      Evelyn Chou RN  08/24/19 0165

## 2019-08-24 NOTE — PROGRESS NOTES
Patient has uncontrolled nausea and vomiting this morning. She states she doesn't think she can take anything by mouth right now and may try to take morning meds later. Patient given Zofran and phenergan and still feeling nauseous as of right now. Shikha Bryantet notified. Will continue to monitor.    Electronically signed by Ira Batista RN on 8/24/2019 at 10:38 AM

## 2019-08-24 NOTE — PROGRESS NOTES
Pharmacy Renal Adjustment    Jose Angel Ibarra is a 69 y.o. female. Pharmacy has renally adjusted medications per protocol. Recent Labs     08/23/19  0220 08/24/19  0200   BUN 38* 19       Recent Labs     08/23/19  0220 08/24/19  0200   CREATININE 3.5* 2.1*       Estimated Creatinine Clearance: 25 mL/min (A) (based on SCr of 2.1 mg/dL (H)). Height:   Ht Readings from Last 1 Encounters:   08/24/19 5' 4\" (1.626 m)     Weight:  Wt Readings from Last 1 Encounters:   08/24/19 178 lb (80.7 kg)         Plan: Adjust the following medications based on renal function:           Pepcid to 20mg po daily.     Electronically signed by Joo Lozano, [address] [place] [address] on 8/24/2019 at 12:27 PM

## 2019-08-24 NOTE — ED PROVIDER NOTES
[facility] EMERGENCY DEPT  eMERGENCY dEPARTMENT eNCOUnter      Pt Name: Ladonna Lopes  MRN: [id number]  [place] 1945  Date of evaluation: 8/23/2019  Provider: Gordy Navarro, [address]       Chief Complaint   Patient presents with    Back Pain         HISTORY OF PRESENT ILLNESS   (Location/Symptom, Timing/Onset,Context/Setting, Quality, Duration, Modifying Factors, Severity)  Note limiting factors. Ladonna Lopes is a 69 y.o. female who presents to the emergency department with   neck and thoraic back pain. patient was seen here earlier today and had a complete work-up. She has taken her Percocet today at home and is still in severe pain. + nausea. Patient had dialysis today. The history is provided by the patient. Back Pain   Location:  Thoracic spine  Quality:  Aching  Radiates to:  R shoulder and L shoulder  Pain severity:  Severe  Onset quality:  Sudden  Duration:  1 day  Timing:  Constant  Progression:  Worsening  Chronicity:  New  Associated symptoms: no abdominal pain, no chest pain and no fever    Risk factors: no hx of cancer        NursingNotes were reviewed. REVIEW OF SYSTEMS    (2-9 systems for level 4, 10 or more for level 5)     Review of Systems   Constitutional: Negative for fever. Respiratory: Negative for shortness of breath. Cardiovascular: Negative for chest pain. Gastrointestinal: Positive for nausea and vomiting. Negative for abdominal pain. Musculoskeletal: Positive for arthralgias, back pain and myalgias. Except as noted above the remainder of the review of systems was reviewed and negative.        PAST MEDICAL HISTORY     Past Medical History:   Diagnosis Date    Anemia     Blood circulation, collateral     Broken hip (HCC)     L hip    CHF (congestive heart failure) (HCC)     Chronic kidney disease 9/27/2018    Chronic kidney disease (CKD), stage IV (severe) (HCC)     Chronic kidney disease (CKD), stage IV (severe) (HCC)     Closed compression TABLET    tizanidine 2 mg tablet    VITAMIN D (ERGOCALCIFEROL) 08673 UNITS CAPSULE    Take 1 capsule by mouth once a week       ALLERGIES     Codeine    FAMILY HISTORY       Family History   Problem Relation Age of Onset    Arthritis Mother     Cancer Mother     High Cholesterol Mother     Arthritis Father     Diabetes Father     High Cholesterol Father     Liver Cancer Father     Arthritis Sister     Diabetes Sister     High Blood Pressure Sister     High Cholesterol Sister     Arthritis Brother     Diabetes Brother     High Blood Pressure Brother     High Cholesterol Brother     Vision Loss Brother     Colon Cancer Neg Hx     Colon Polyps Neg Hx     Esophageal Cancer Neg Hx     Liver Disease Neg Hx     Stomach Cancer Neg Hx     Rectal Cancer Neg Hx           SOCIAL HISTORY       Social History     Socioeconomic History    Marital status:      Spouse name: Martina Pardo Number of children: 2    Years of education: 15    Highest education level: None   Occupational History    None   Social Needs    Financial resource strain: None    Food insecurity:     Worry: None     Inability: None    Transportation needs:     Medical: None     Non-medical: None   Tobacco Use    Smoking status: Never Smoker    Smokeless tobacco: Never Used   Substance and Sexual Activity    Alcohol use: No    Drug use: No    Sexual activity: Not Currently     Partners: Male   Lifestyle    Physical activity:     Days per week: None     Minutes per session: None    Stress: None   Relationships    Social connections:     Talks on phone: None     Gets together: None     Attends Jainism service: None     Active member of club or organization: None     Attends meetings of clubs or organizations: None     Relationship status: None    Intimate partner violence:     Fear of current or ex partner: None     Emotionally abused: None     Physically abused: None     Forced sexual activity: None   Other Topics Concern

## 2019-08-24 NOTE — H&P
Diabetes, insulin requiring. 6.  A noncalcified nodule in the lung, which needs further evaluation. PLAN:  Consult Nephrology, Oncology, and Neurosurgery for help with  evaluation of this complicated patient.         Lory Hernandez MD    D: 08/24/2019 9:52:11      T: 08/24/2019 11:06:42     [id number]  Job#: [id number]     Doc#: [id number]    CC:

## 2019-08-25 NOTE — CONSULTS
Blood Pressure Sister     High Cholesterol Sister     Arthritis Brother     Diabetes Brother     High Blood Pressure Brother     High Cholesterol Brother     Vision Loss Brother     Colon Cancer Neg Hx     Colon Polyps Neg Hx     Esophageal Cancer Neg Hx     Liver Disease Neg Hx     Stomach Cancer Neg Hx     Rectal Cancer Neg Hx      Home Meds:  Prior to Admission medications    Medication Sig Start Date End Date Taking? Authorizing Provider   HYDROcodone-acetaminophen (NORCO) 5-325 MG per tablet Take 1 tablet by mouth every 6 hours as needed for Pain. Yes Historical Provider, MD   furosemide (LASIX) 40 MG tablet Take 40 mg by mouth 2 times daily   Yes Historical Provider, MD   methylPREDNISolone (MEDROL, NIEVES,) 4 MG tablet Take by mouth. 8/23/19  Yes Tracy Adorno MD   oxyCODONE-acetaminophen (PERCOCET) 5-325 MG per tablet Take 1 tablet by mouth every 6 hours as needed for Pain for up to 3 days. Intended supply: 3 days. Take lowest dose possible to manage pain 8/23/19 8/26/19 Yes Tracy Adorno MD   Docusate Sodium 100 MG TABS docusate sodium 100 mg tablet   Take 1 tablet twice a day by oral route.    Yes Historical Provider, MD   famotidine (PEPCID) 20 MG tablet famotidine 20 mg tablet   Yes Historical Provider, MD   AURYXIA 1  MG(Fe) TABS  6/5/19  Yes Historical Provider, MD   tiZANidine (ZANAFLEX) 2 MG tablet tizanidine 2 mg tablet   Yes Historical Provider, MD   aspirin 81 MG tablet Take 81 mg by mouth daily   Yes Historical Provider, MD   atorvastatin (LIPITOR) 40 MG tablet Take 1 tablet by mouth nightly 5/19/19  Yes Historical Provider, MD   carvedilol (COREG) 6.25 MG tablet Take 1 tablet by mouth 2 times daily 5/1/19  Yes Historical Provider, MD   clopidogrel (PLAVIX) 75 MG tablet Take 1 tablet by mouth daily 5/19/19  Yes Historical Provider, MD   sodium bicarbonate 325 MG tablet Take 1 tablet by mouth 2 times daily 8/15/18  Yes Fer Baptiste MD   NIFEdipine (ADALAT CC) 30 MG extended release recent and remote intact      Labs:     Recent Labs     08/23/19 0220 08/24/19  0200 08/25/19  0610   WBC 8.0 9.1 12.3*   RBC 3.17* 3.02* 2.97*   HGB 10.3* 9.8* 9.5*   HCT 32.0* 30.9* 31.3*   .9* 102.3* 105.4*   MCH 32.5* 32.5* 32.0*   MCHC 32.2* 31.7* 30.4*    158 190     Recent Labs     08/23/19 0220 08/24/19 0200 08/25/19  0610    137 143   K 4.5 4.4 4.9   ANIONGAP 16 16 20*   CL 90* 91* 94*   CO2 32* 30* 29   BUN 38* 19 40*   CREATININE 3.5* 2.1* 3.4*   GLUCOSE 311* 282* 356*   CALCIUM 9.9 9.7 9.6     No results for input(s): MG, PHOS in the last 72 hours. Recent Labs     08/23/19 0220 08/24/19 0200 08/25/19  0610   AST 30 27 25   ALT 16 14 13   BILITOT 0.4 0.6 0.7   ALKPHOS 79 74 69     HgBA1c:  No components found for: HGBA1C  FLP:    Lab Results   Component Value Date    TRIG 53 04/23/2019    HDL 59 04/23/2019    LDLCALC 45 04/23/2019     TSH:    Lab Results   Component Value Date    TSH 1.87 09/14/2016     Troponin T:   Recent Labs     08/23/19 0220 08/23/19  0418   TROPONINI 0.09* 0.09*     INR:   Recent Labs     08/23/19 0220   INR 1.09       No results for input(s): LIPASE, AMYLASE in the last 72 hours. Radiology:  Ct Head Wo Contrast    Result Date: 8/23/2019  EXAMINATION: CT HEAD WO CONTRAST 8/23/2019 7:13 AM HISTORY: CT BRAIN without contrast 8/23/2019 1:30 AM HISTORY: Headache COMPARISON: May 23, 2011 DLP: 770 mGy cm TECHNIQUE: Serial axial tomographic images of the brain were obtained without the use of intravenous contrast. FINDINGS: The midline structures are nondisplaced. There is mild cerebral and cerebellar volume loss, with an associated increase in the prominence of the ventricles and sulci. The basilar cisterns are normal in size and configuration. There is no evidence of intracranial hemorrhage or mass-effect. There is low attenuation in the periventricular white matter, consistent with chronic ischemic change.  Old lacunar infarcts are noted in the region of find any discrepancies. Signed by Dr Elfego Ribera on 8/23/2019 8:18 AM    Ct Thoracic Spine Wo Contrast    Result Date: 8/24/2019  EXAMINATION: CT THORACIC SPINE WO CONTRAST 8/24/2019 7:56 AM HISTORY: CT thoracic spine without contrast 8/24/2019 12:45 AM HISTORY: Pain COMPARISON: None DLP: 1035 mGy cm TECHNIQUE: Serial helical tomographic images of the thoracic spine were obtained without the use of intravenous contrast. Multiplanar reformatted images were provided for review. FINDINGS: Evidence of kyphoplasty at T11 is noted. . Vertebral body heights and alignment are well maintained. Degenerative changes noted in the disc spaces. . There is no bony narrowing of the spinal canal or neural foramina. There is no evidence of facet lock or perch. The posterior elements are intact. The visualized soft tissues are unremarkable. Evidence of kyphoplasty at T11. No acute compression deformities identified. Signed by Dr Amaya Hawley on 8/24/2019 7:58 AM    Ct Lumbar Spine Wo Contrast    Result Date: 8/24/2019  EXAMINATION: CT LUMBAR SPINE WO CONTRAST 8/24/2019 7:52 AM HISTORY: CT lumbar spine without contrast 8/24/2019 12:45 AM History: Pain Comparison: February 2017 DLP: 867 mGy cm Technique: Serial helical tomographic images of the lumbar spine were obtained without the use of intravenous contrast. Additionally, sagittal and coronal reformatted images were provided for review. Findings: Old compression fractures of L1 and L2 are noted evidence of kyphoplasty is present. There is mild retropulsion of the posterior superior endplate of L2 into the lumbar canal.. Vertebral body heights are well maintained. Vacuum phenomenon is noted at the L4-5 T12-L1 and L1-L2 levels. Also L5-S1. . A lytic lesion in the anterior centrum of L5 is noted suspicious for metastatic disease. . The posterior elements are intact. The visualized soft tissues are unremarkable. Impression: 1.  Lytic lesion at L5 is likely representing metastatic disease. 2. Evidence of kyphoplasty at T11, L1 and L2. 3. Retropulsion of superior endplates of L1 and L2) lumbar canal. Signed by Dr Bernard Ramirez on 8/24/2019 7:56 AM    Ct Abdomen Pelvis W Iv Contrast Additional Contrast? Oral    Result Date: 8/24/2019  EXAMINATION: CT ABDOMEN PELVIS W IV CONTRAST 8/24/2019 3:39 PM CT ABDOMEN AND PELVIS WITH CONTRAST 8/24/2019 8:30 AM HISTORY: Metastatic lytic lesion COMPARISON: March 25, 2017. DLP: 1647 mGy cm Automated exposure control was utilized to minimize patient radiation dose. TECHNIQUE: Following the oral ingestion and intravenous administration of contrast, helical CT tomographic images of the abdomen and pelvis were acquired. Coronal reformatted images were also provided for review. FINDINGS: The lung bases and base of the heart are unremarkable. LIVER: There is nonuniform attenuation of the left lobe of the liver. This is suspicious for metastatic disease. Decreased CT attenuation the liver is noted consistent with hepatic steatosis. BILIARY SYSTEM: The gallbladder is surgically absent. PANCREAS: No focal pancreatic lesion. SPLEEN: Unremarkable. KIDNEYS AND ADRENALS: The adrenal glands are visualized. Renal contours demonstrate symmetric enhancement. Nonobstructing calculi are present bilaterally. . The ureters are decompressed and normal in appearance. RETROPERITONEUM: No mass, lymphadenopathy or hemorrhage. GI TRACT: No evidence of obstruction or bowel wall thickening. Diverticulosis is noted. . OTHER: There is no mesenteric mass, lymphadenopathy or fluid collection. The abdominopelvic vasculature is patent. A lytic lesion is present in the anterior centrum of L5. This was not present in March 25, 2017. Kyphoplasty is at T11 L1 and L2 are noted. Orthopedic pin is present in the left hip. PELVIS: The uterus is visualized. . The urinary bladder is normal in appearance. 1. 19 mm lytic lesion in the anterior centrum of L5. Most likely this is metastatic disease.  2.

## 2019-08-25 NOTE — PROGRESS NOTES
Progress Note  8/25/2019 9:34 AM  Subjective:   Admit Date: 8/24/2019  PCP: Maryam Bustamante MD    Interval History: Still a lot of pain.     DIET CLEAR LIQUID; Carb Control: 4 carb choices (60 gms)/meal  Dietary Nutrition Supplements: Clear Liquid Oral Supplement    Intake/Output Summary (Last 24 hours) at 8/25/2019 0934  Last data filed at 8/25/2019 0641  Gross per 24 hour   Intake 203 ml   Output 400 ml   Net -197 ml     Medications:      sodium chloride 50 mL/hr at 08/24/19 0820    dextrose        sodium bicarbonate  325 mg Oral BID    NIFEdipine  30 mg Oral BID    clopidogrel  75 mg Oral Daily    carvedilol  6.25 mg Oral BID    atorvastatin  40 mg Oral Nightly    aspirin  81 mg Oral Daily    sodium chloride flush  10 mL Intravenous 2 times per day    enoxaparin  30 mg Subcutaneous Daily    Ferric Citrate  1 tablet Oral Daily    docusate sodium  100 mg Oral BID    insulin lispro  0-12 Units Subcutaneous TID WC    insulin lispro  0-6 Units Subcutaneous Nightly    famotidine  20 mg Oral Daily    metoprolol  1.25 mg Intravenous Q6H     Recent Labs     08/23/19 0220 08/24/19  0200 08/25/19  0610   WBC 8.0 9.1 12.3*   HGB 10.3* 9.8* 9.5*    158 190     Recent Labs     08/23/19 0220 08/24/19  0200 08/25/19  0610    137 143   K 4.5 4.4 4.9   CL 90* 91* 94*   CO2 32* 30* 29   BUN 38* 19 40*   CREATININE 3.5* 2.1* 3.4*   GLUCOSE 311* 282* 356*     Recent Labs     08/23/19 0220 08/24/19  0200 08/25/19  0610   AST 30 27 25   ALT 16 14 13   BILITOT 0.4 0.6 0.7   ALKPHOS 79 74 69       Objective:   Vitals: BP (!) 175/102   Pulse 106   Temp 96.3 °F (35.7 °C) (Temporal)   Resp 18   Ht 5' 4\" (1.626 m)   Wt 186 lb 3.2 oz (84.5 kg)   SpO2 97%   BMI 31.96 kg/m²   General appearance: alert and cooperative with exam  Lungs: clear to auscultation bilaterally  Heart: regular rate and rhythm, S1, S2 normal, no murmur, click, rub or gallop  Abdomen: soft, non-tender; bowel sounds normal; no masses,

## 2019-08-25 NOTE — PROGRESS NOTES
extended release tablet 30 mg  30 mg Oral BID Maryam Bustamante MD        atorvastatin (LIPITOR) tablet 40 mg  40 mg Oral Nightly Maryam Bustamante MD        sodium chloride flush 0.9 % injection 10 mL  10 mL Intravenous 2 times per day Maryam Bustamante MD   10 mL at 08/24/19 2058    sodium chloride flush 0.9 % injection 10 mL  10 mL Intravenous PRN Maryam Bustamante MD        acetaminophen (TYLENOL) tablet 650 mg  650 mg Oral Q4H PRN Maryam Bustamante MD        enoxaparin (LOVENOX) injection 30 mg  30 mg Subcutaneous Daily Maryam Bustamante MD        0.9 % sodium chloride infusion   Intravenous Continuous Maryam Bustamante MD 50 mL/hr at 08/25/19 1016      Ferric Citrate TABS 210 mg  1 tablet Oral Daily Maryam Bustamante MD        docusate sodium (COLACE) capsule 100 mg  100 mg Oral BID Maryam Bustamante MD        glucose (GLUTOSE) 40 % oral gel 15 g  15 g Oral PRN Maryam Bustamante MD        dextrose 50 % IV solution  12.5 g Intravenous PRN Maryam Bustamante MD        glucagon (rDNA) injection 1 mg  1 mg Intramuscular PRN Maryam Bustamante MD        dextrose 5 % solution  100 mL/hr Intravenous PRN Maryam Bustamante MD        insulin lispro (HUMALOG) injection vial 0-12 Units  0-12 Units Subcutaneous TID WC Maryam Bustamante MD   10 Units at 08/25/19 4851    insulin lispro (HUMALOG) injection vial 0-6 Units  0-6 Units Subcutaneous Nightly Maryam Bustamante MD        HYDROmorphone (DILAUDID) injection 0.5 mg  0.5 mg Intravenous Q3H PRN Maryam Bsutamante MD        Or    HYDROmorphone (DILAUDID) injection 1 mg  1 mg Intravenous Q3H PRN Maryam Bustamante MD   1 mg at 08/25/19 1000    famotidine (PEPCID) tablet 20 mg  20 mg Oral Daily Maryam Bustamante MD           Past Medical History:  Past Medical History:   Diagnosis Date    Anemia     Blood circulation, collateral     Broken hip (HCC)     L hip    [facility]estive [facility] failure) [facility] [address]     Chronic kidney disease 9/27/2018    Chronic kidney disease (CKD), stage together: Not on file     Attends Hoahaoism service: Not on file     Active member of club or organization: Not on file     Attends meetings of clubs or organizations: Not on file     Relationship status: Not on file    Intimate partner violence:     Fear of current or ex partner: Not on file     Emotionally abused: Not on file     Physically abused: Not on file     Forced sexual activity: Not on file   Other Topics Concern    Not on file   Social History Narrative    Born in [place]     46 years only marriage    She has one son and one daughter    Worked as a  presently retired    Education high school    Evangel[employer] ven[employer] none    Enjoys working with a [employer] and [employer] also ceramics    Denies history of tobacco usage alcohol consumption or substance usage    Physically sedentary         Review of Systems:  History obtained from chart review and the patient  General ROS: No fever or chills  Respiratory ROS: No cough, shortness of breath, wheezing  Cardiovascular ROS: No chest pain or palpitations  Gastrointestinal ROS: No abdominal pain or melena  Genito-Urinary ROS: No dysuria or hematuria  Musculoskeletal ROS: Severe neck and back pain. 14 point ROS reviewed with the patient and negative except as noted above and in the HPI unless unable to obtain.     Objective:  Patient Vitals for the past 24 hrs:   BP Temp Temp src Pulse Resp SpO2 Weight   08/25/19 0723 (!) 175/102 -- -- 106 -- 97 % --   08/25/19 0648 -- -- -- -- -- 97 % --   08/25/19 0621 (!) 173/117 96.3 °F (35.7 °C) Temporal 122 18 94 % --   08/25/19 0050 (!) 183/94 98.9 °F (37.2 °C) Temporal 104 18 92 % 186 lb 3.2 oz (84.5 kg)   08/24/19 1856 (!) 172/92 97.2 °F (36.2 °C) Temporal 96 20 92 % --       Intake/Output Summary (Last 24 hours) at 8/25/2019 1037  Last data filed at 8/25/2019 1016  Gross per 24 hour   Intake 1087 ml   Output 400 ml   Net 687 ml     General: awake/alert    HEENT: Normocephalic atraumatic  Neck: Supple with

## 2019-08-25 NOTE — PROGRESS NOTES
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    Biliary stricture K83.1    Elevated CA 19-9 level R97.8    History of biliary stent insertion Z98.890    Closed compression fracture of second lumbar vertebra (HCC) S32.020A    Closed compression fracture of first lumbar vertebra (HCC) S32.010A    Abdominal pain R10.9    Abnormal CT of the abdomen R93.5    Chronic systolic heart failure ([facility]) I50.22    Stage 5 chronic kidney disease on chronic dialysis ([facility]) N18.6, Z99.2    Abnormal electrocardiogram R94.31    Dyspnea on effort R06.09    Abnormal stress ECG R94.39    Coronary artery disease involving native coronary artery of native heart without angina pectoris I25.10    Pulmonary hypertension ([facility]) I27.20    Nocturnal hypoxia G47.34    Decreased mobility and endurance Z74.09    Left main coronary artery disease I25.10    Mixed hyperlipidemia E78.2    Palliative care patient Z51.5    Chronic back pain M54.9, G89.29    Congestive heart failure (CHF) ([facility]) I50.9    Diabetic peripheral neuropathy associated with type 2 diabetes mellitus ([facility]) E11.42    ESRD (end stage renal disease) ([facility] [address] N18.6    Hemodialysis patient ([facility] [address] Z99.2    Hypokalemia E87.6    Insomnia G47.00    Multi-vessel coronary artery stenosis I25.10    Vitamin D deficiency E55.9    Intractable pain R52       Subjective: Continues to have nausea and pain. Added Fentanyl patch and adjusted antiemetics. Anticipating MRI's today. HISTORY OF PRESENT ILLNESS:   Ms. Steven Jasso is a 72-year-old female who presented to [address] [place] [address] emergency room with severe back pain along with nausea and vomiting for 3 days prior to presentation.     Arleth Husbands denied any Skin: Skin is warm. No rash noted. She is not diaphoretic. No erythema. No pallor. Psychiatric: She has a normal mood and affect. Her behavior is normal. Thought content normal.   Vitals reviewed. BMP:   Recent Labs     08/24/19  0200 08/25/19  0610    143   K 4.4 4.9   CL 91* 94*   CO2 30* 29   BUN 19 40*   CREATININE 2.1* 3.4*   GLUCOSE 282* 356*   CALCIUM 9.7 9.6     CBC:   Recent Labs     08/24/19  0200 08/25/19  0610   WBC 9.1 12.3*   HGB 9.8* 9.5*   HCT 30.9* 31.3*   .3* 105.4*    190     CMP:    Recent Labs     08/24/19  0200 08/25/19  0610    143   K 4.4 4.9   CL 91* 94*   CO2 30* 29   BUN 19 40*   CREATININE 2.1* 3.4*   LABGLOM 23* 13*   GLUCOSE 282* 356*   PROT 8.0 7.3   LABALBU 4.3 4.2   CALCIUM 9.7 9.6   BILITOT 0.6 0.7   ALKPHOS 74 69   AST 27 25   ALT 14 13           Radiology:   Ct Head Wo Contrast    Result Date: 8/23/2019  EXAMINATION: CT HEAD WO CONTRAST 8/23/2019 7:13 AM HISTORY: CT BRAIN without contrast 8/23/2019 1:30 AM HISTORY: Headache COMPARISON: May 23, 2011 DLP: 770 mGy cm TECHNIQUE: Serial axial tomographic images of the brain were obtained without the use of intravenous contrast. FINDINGS: The midline structures are nondisplaced. There is mild cerebral and cerebellar volume loss, with an associated increase in the prominence of the ventricles and sulci. The basilar cisterns are normal in size and configuration. There is no evidence of intracranial hemorrhage or mass-effect. There is low attenuation in the periventricular white matter, consistent with chronic ischemic change. Old lacunar infarcts are noted in the region of the mayra and corona radiata. There are no abnormal extra-axial fluid collections. There is no evidence of tonsillar herniation. The included orbits and their contents are unremarkable. The visualized paranasal sinuses, mastoid air cells and middle ear cavities are clear.  The visualized osseous structures and overlying soft tissues of is complete occlusion of the anterior lateral wall suggesting a pathological fracture. No displacement. No obvious extension of the mass into the spinal canal. There is mild anterolisthesis of C2 over C3. The remaining vertebral body alignments are normal. Chronic degenerative changes are seen in the form of facet arthropathy and anterior and posterior osteophytes most pronounced at C5-C6. There is bilateral neural foraminal stenosis at C5-C6. No significant spinal canal stenosis at any level. The prevertebral soft tissues are normal. There is calcification of the nuchal ligament opposite and posterior to the spinous processes of C5 and C6. The included lung apices show a tiny noncalcified nodule in the left upper lobe anteriorly, image #57 in axial plane. There are interstitial changes and groundglass opacities in the upper lungs bilaterally more so on the left upper lobe posteriorly. An accessory azygous fissure is seen. Right internal jugular venous catheter should is seen in place. An osteolytic expansile lesion involving the left side of the vertebral body C3 extending into the pedicle. Suspect a pathological fracture. No displacement. No obvious extension into the spinal canal. Further evaluation with MR imaging of the cervical spine may be obtained. Cervical spondylosis. Bilateral neural foraminal stenosis at C5-6. No spinal stenosis at any level. A tiny noncalcified nodule in the left upper lobe. This is suboptimally evaluated in this study. Further follow-up with CT scan of the chest may be obtained. The above study was initially reviewed and reported by [facility]. I do not find any discrepancies.  Signed by Dr Rob Collazo on 8/23/2019 8:18 AM    Ct Thoracic Spine Wo Contrast    Result Date: 8/24/2019  EXAMINATION: CT THORACIC SPINE WO CONTRAST 8/24/2019 7:56 AM HISTORY: CT thoracic spine without contrast 8/24/2019 12:45 AM HISTORY: Pain COMPARISON: None DLP: 1035 mGy cm TECHNIQUE: Serial helical tomographic images of the thoracic spine were obtained without the use of intravenous contrast. Multiplanar reformatted images were provided for review. FINDINGS: Evidence of kyphoplasty at T11 is noted. . Vertebral body heights and alignment are well maintained. Degenerative changes noted in the disc spaces. . There is no bony narrowing of the spinal canal or neural foramina. There is no evidence of facet lock or perch. The posterior elements are intact. The visualized soft tissues are unremarkable. Evidence of kyphoplasty at T11. No acute compression deformities identified. Signed by Dr Yuan Parra on 8/24/2019 7:58 AM    Ct Lumbar Spine Wo Contrast    Result Date: 8/24/2019  EXAMINATION: CT LUMBAR SPINE WO CONTRAST 8/24/2019 7:52 AM HISTORY: CT lumbar spine without contrast 8/24/2019 12:45 AM History: Pain Comparison: February 2017 DLP: 867 mGy cm Technique: Serial helical tomographic images of the lumbar spine were obtained without the use of intravenous contrast. Additionally, sagittal and coronal reformatted images were provided for review. Findings: Old compression fractures of L1 and L2 are noted evidence of kyphoplasty is present. There is mild retropulsion of the posterior superior endplate of L2 into the lumbar canal.. Vertebral body heights are well maintained. Vacuum phenomenon is noted at the L4-5 T12-L1 and L1-L2 levels. Also L5-S1. . A lytic lesion in the anterior centrum of L5 is noted suspicious for metastatic disease. . The posterior elements are intact. The visualized soft tissues are unremarkable. Impression: 1. Lytic lesion at L5 is likely representing metastatic disease.  2. Evidence of kyphoplasty at T11, L1 and L2. 3. Retropulsion of superior endplates of L1 and L2) lumbar canal. Signed by Dr Yuan Parra on 8/24/2019 7:56 AM    Ct Abdomen Pelvis W Iv Contrast Additional Contrast? Oral    Result Date: 8/24/2019  EXAMINATION: CT ABDOMEN PELVIS W IV CONTRAST 8/24/2019 3:39 PM CT ABDOMEN

## 2019-08-25 NOTE — CONSULTS
bilaterally  No clonus or Hoffmans sign  Moderate myofacial tenderness to palpation along lumbar and cervical spine      DATA:  Nursing/pcp notes, imaging,labs and vitals reviewed. PT,OT and/or speech notes reviewed    Lab Results   Component Value Date    WBC 12.3 (H) 08/25/2019    HGB 9.5 (L) 08/25/2019    HCT 31.3 (L) 08/25/2019    .4 (H) 08/25/2019     08/25/2019     Lab Results   Component Value Date     08/25/2019    K 4.9 08/25/2019    CL 94 (L) 08/25/2019    CO2 29 08/25/2019    BUN 40 (H) 08/25/2019    CREATININE 3.4 (H) 08/25/2019    GLUCOSE 356 (H) 08/25/2019    CALCIUM 9.6 08/25/2019    PROT 7.3 08/25/2019    LABALBU 4.2 08/25/2019    BILITOT 0.7 08/25/2019    ALKPHOS 69 08/25/2019    AST 25 08/25/2019    ALT 13 08/25/2019    LABGLOM 13 (A) 08/25/2019    GLOB 2.5 03/29/2017     Lab Results   Component Value Date    INR 1.09 08/23/2019    INR 0.87 (L) 11/02/2016    INR 0.92 09/09/2016    PROTIME 13.5 08/23/2019    PROTIME 11.9 (L) 11/02/2016    PROTIME 12.4 09/09/2016     Examination. CT CERVICAL SPINE WO CONTRAST   History: Trauma and neck pain. DLP: 549 mGycm. The CT scan of the cervical spine is performed without intravenous or   intrathecal contrast enhancement. The images are acquired in axial   plane with subsequent reconstruction in coronal and sagittal planes. There is no previous study for comparison. There is an area of osteolysis involving the left lung   The vertebral body C3 partly extending into the pedicle. There is   moderate expansion of the vertebral body. There is complete occlusion   of the anterior lateral wall suggesting a pathological fracture. No   displacement. No obvious extension of the mass into the spinal canal.   There is mild anterolisthesis of C2 over C3.    The remaining vertebral body alignments are normal.   Chronic degenerative changes are seen in the form of facet arthropathy   and anterior and posterior osteophytes most pronounced at

## 2019-08-26 NOTE — BRIEF OP NOTE
Brief Postoperative Note  ______________________________________________________________    Patient: Catalina Eden  YOB: 1945  MRN: [id number]  Date of Procedure: 8/26/2019    Pre-Op Diagnosis: Lesion of L5 vetebral body    Post-Op Diagnosis: Same       Procedure(s):  Biopsy of L5 bone lesion    Anesthesia: General    Surgeon(s):  Doris Mukherjee DO    Assistant: None    Estimated Blood Loss (mL): less than 50     Complications: None    Specimens:   ID Type Source Tests Collected by Time Destination   A : Bone Biopsy L5 Bone Spine SURGICAL PATHOLOGY Doris Mukherjee DO 8/26/2019 1448    B : Bone Marrow L5 Body Fluid Spine SURGICAL PATHOLOGY, FLOW CYTOMETRY LEUKEMIA/LYMPHOMA NODES OR FLUIDS Doris Mukherjee DO 8/26/2019 1452        Implants:  * No implants in log *      Drains: * No LDAs found *    Findings: Anterolateral left portion of L5 lesion sent for path. Bone marrow sent as well.       Doris Mukherjee DO  Date: 8/26/2019  Time: 2:59 PM

## 2019-08-26 NOTE — PROGRESS NOTES
alignment are well maintained. Degenerative changes noted in the disc   spaces. . There is no bony narrowing of the spinal canal or neural   foramina. There is no evidence of facet lock or perch. The posterior   elements are intact. The visualized soft tissues are unremarkable.       Impression   Evidence of kyphoplasty at T11. No acute compression deformities   identified. Signed by Dr Hayley Enriquez on 8/24/2019 7:58 AM     EXAMINATION: CT LUMBAR SPINE [address] 8/24/2019 7:52 AM   HISTORY: CT lumbar spine without contrast 8/24/2019 12:45 AM   History: Pain   Comparison: February 2017    DLP: 867 mGy cm   Technique: Serial helical tomographic images of the lumbar spine were   obtained without the use of intravenous contrast. Additionally,   sagittal and coronal reformatted images were provided for review. Findings:    Old compression fractures of L1 and L2 are noted evidence of   kyphoplasty is present. There is mild retropulsion of the posterior   superior endplate of L2 into the lumbar canal.. Vertebral body heights   are well maintained. Vacuum phenomenon is noted at the L4-5 T12-L1 and   L1-L2 levels. Also L5-S1. . A lytic lesion in the anterior centrum of   L5 is noted suspicious for metastatic disease. . The posterior elements   are intact. The visualized soft tissues are unremarkable.        Impression   Impression:    1. Lytic lesion at L5 is likely representing metastatic disease. 2. Evidence of kyphoplasty at T11, L1 and L2.   3. Retropulsion of superior endplates of L1 and L2) lumbar canal.   Signed by Dr Hayley Enriquez on 8/24/2019 7:56 AM           IMPRESSION  69year old female with severe cervical and low back pain with lytic lesions at C3 and L5 concerning for metastatic disease with hx of multiple compression fractures    RECOMMENDATIONS:     oCrry Aric and I had a long discussion. I explained that we don't have a clear etiology of the C3 and L5 lesions.   I am recommending biopsy of the

## 2019-08-26 NOTE — ANESTHESIA PRE PROCEDURE
Department of Anesthesiology  Preprocedure Note       Name:  Melissa Espinal   Age:  69 y.o.  :  1945                                          MRN:  [id number]         Date:  2019      Surgeon: Katja Dhaliwal):  Yin Benz DO    Procedure: Biopsy of L5 bone lesion (N/A )    Medications prior to admission:   Prior to Admission medications    Medication Sig Start Date End Date Taking? Authorizing Provider   HYDROcodone-acetaminophen (NORCO) 5-325 MG per tablet Take 1 tablet by mouth every 6 hours as needed for Pain. Yes Historical Provider, MD   furosemide (LASIX) 40 MG tablet Take 40 mg by mouth 2 times daily   Yes Historical Provider, MD   methylPREDNISolone (MEDROL, NIEVES,) 4 MG tablet Take by mouth. 19  Yes Kamryn Kiran MD   oxyCODONE-acetaminophen (PERCOCET) 5-325 MG per tablet Take 1 tablet by mouth every 6 hours as needed for Pain for up to 3 days. Intended supply: 3 days. Take lowest dose possible to manage pain 19 Yes Kamryn Kiran MD   Docusate Sodium 100 MG TABS docusate sodium 100 mg tablet   Take 1 tablet twice a day by oral route.    Yes Historical Provider, MD   famotidine (PEPCID) 20 MG tablet famotidine 20 mg tablet   Yes Historical Provider, MD   AURYXIA 1  MG(Fe) TABS  19  Yes Historical Provider, MD   tiZANidine (ZANAFLEX) 2 MG tablet tizanidine 2 mg tablet   Yes Historical Provider, MD   aspirin 81 MG tablet Take 81 mg by mouth daily   Yes Historical Provider, MD   atorvastatin (LIPITOR) 40 MG tablet Take 1 tablet by mouth nightly 19  Yes Historical Provider, MD   carvedilol (COREG) 6.25 MG tablet Take 1 tablet by mouth 2 times daily 19  Yes Historical Provider, MD   clopidogrel (PLAVIX) 75 MG tablet Take 1 tablet by mouth daily 19  Yes Historical Provider, MD   sodium bicarbonate 325 MG tablet Take 1 tablet by mouth 2 times daily 8/15/18  Yes Anthony Kramer MD   NIFEdipine (ADALAT CC) 30 MG extended release tablet Take 1 tablet by mouth 2 times daily Last 3 Encounters:   08/26/19 (!) 140/78   08/23/19 (!) 141/82   08/22/19 134/68       NPO Status: Time of last liquid consumption: 2350                        Time of last solid consumption: 2350                        Date of last liquid consumption: 08/25/19                        Date of last solid food consumption: 08/25/19    BMI:   Wt Readings from Last 3 Encounters:   08/26/19 174 lb 3 oz (79 kg)   08/23/19 178 lb (80.7 kg)   07/05/19 180 lb 9.6 oz (81.9 kg)     Body mass index is 29.9 kg/m².     CBC:   Lab Results   Component Value Date    WBC 12.1 08/26/2019    RBC 2.88 08/26/2019    HGB 9.2 08/26/2019    HCT 30.2 08/26/2019    HCT 36.6 05/23/2011    .9 08/26/2019    RDW 14.6 08/26/2019     08/26/2019     05/23/2011       CMP:   Lab Results   Component Value Date     08/26/2019     05/23/2011    K 4.7 08/26/2019    K 4.0 04/23/2019    K 3.2 05/23/2011    CL 97 08/26/2019     05/23/2011    CO2 29 08/26/2019    BUN 64 08/26/2019    CREATININE 5.1 08/26/2019    CREATININE 0.9 05/23/2011    LABGLOM 8 08/26/2019    GLUCOSE 204 08/26/2019    PROT 6.7 08/26/2019    PROT 7.7 05/23/2011    CALCIUM 9.7 08/26/2019    BILITOT 0.8 08/26/2019    ALKPHOS 69 08/26/2019    ALKPHOS 64 05/23/2011    AST 41 08/26/2019    ALT 20 08/26/2019       POC Tests:   Recent Labs     08/26/19  1225   POCGLU 130*       Coags:   Lab Results   Component Value Date    PROTIME 13.5 08/23/2019    INR 1.09 08/23/2019    APTT 24.8 08/23/2019       HCG (If Applicable): No results found for: PREGTESTUR, PREGSERUM, HCG, HCGQUANT     ABGs:   Lab Results   Component Value Date    PHART 7.380 08/10/2018    PO2ART 65.0 08/10/2018    CKP1KPJ 30.0 08/10/2018    RTE8WON 17.7 08/10/2018    BEART -6.5 08/10/2018    E0PZDMYZ 93.2 08/10/2018        Type & Screen (If Applicable):  No results found for: LABABO, [address]    Anesthesia Evaluation  Patient summary reviewed no history of anesthetic complications:   Airway: Mallampati: II  TM distance: >3 FB   Neck ROM: full  Mouth opening: > = 3 FB Dental: normal exam         Pulmonary: breath sounds clear to auscultation  (+) shortness of breath:                            ROS comment: Pulmonary HTN   Cardiovascular:    (+) hypertension:, CAD (multivessel disease):, CHF:, COWAN:,                   Neuro/Psych:   (+) neuromuscular disease:,             GI/Hepatic/Renal: Neg GI/Hepatic/Renal ROS  (+) renal disease: CRI,           Endo/Other:    (+) Diabetes, . Abdominal:           Vascular: negative vascular ROS. Anesthesia Plan      general     ASA 4       Induction: intravenous. Anesthetic plan and risks discussed with patient.         Attending anesthesiologist reviewed and agrees with [id number], APRN - CRNA   8/26/2019

## 2019-08-26 NOTE — OP NOTE
NEUROSURGICAL DEPARTMENT REPORT       DATE OF SERVICE: 8/26/2019     PREOPERATIVE DIAGNOSES    L5 vertebral body lesion     POSTOPERATIVE DIAGNOSES    Same     OPERATIVE PROCEDURES  Percutaneous biopsy of L5 vertebral body     SURGEON  Eric Worley,      FIRST ASSIST    None     DESCRIPTION OF PROCEDURE    The patient was brought to the operating room where sedation was introduced. The patient was positioned on the open table in the prone position. All pressure points were carefully checked and padded. The back was clipped and prepared for 10 minutes with Betadine scrub, Betadine paint and DuraPrep. The patient was draped in the usual sterile fashion. The C-arm fluoroscope was draped. After the patient's skin was prepped and draped, the L5 level was localized fluoroscopically and marked. Local was injected. A 15 blade was used to create a small stab incision. A pedicle accessing needle was inserted into the stab incision and using AP and lateral fluoroscopy navigated through the pedicle into the vertebral body. The biopsy cannula was then used to obtain a core biopsy completely through the abnormal area on MRI. A nice specimen of bone was sent. Bone marrow was aspirated and sent as well. The needle was removed. Hemostasis was achieved. The wound was closed in layered fashion. A sterile dressing was applied. The patient was returned to the stretcher in the supine position and then returned recovery room in a stable condition.

## 2019-08-26 NOTE — PROGRESS NOTES
mild numbness in the left thumb. CT scan of the cervical spine without contrast on 8/23/2019 dignified an osteolytic expansile lesion involving the left side of the vertebral body C3 extending into the pedicle. Suspect a pathological fracture. CT scan of the lumbar spine without contrast on 8/24/2019 identified a lytic lesion at L5 likely representing metastatic disease. Evidence of kyphoplasty completed at T11, L1 and L2.    CT scan of the thoracic spine without contrast on 8/24/2019 documented no acute compression deformities. Review of Systems   Constitutional: Positive for fatigue. Negative for chills, diaphoresis and fever. HENT: Negative. Negative for congestion, ear pain, hearing loss, nosebleeds, sore throat and tinnitus. Eyes: Negative. Negative for pain, discharge and redness. Respiratory: Negative. Negative for cough, shortness of breath and wheezing. Cardiovascular: Negative. Negative for chest pain, palpitations and leg swelling. Gastrointestinal: Positive for nausea. Negative for abdominal pain, blood in stool, constipation, diarrhea and vomiting. Endocrine: Negative for polydipsia. Genitourinary: Negative for dysuria, flank pain, frequency, hematuria and urgency. Musculoskeletal: Positive for back pain and neck pain. Negative for myalgias. Skin: Negative. Negative for rash. Neurological: Negative. Negative for dizziness, tremors, seizures, weakness and headaches. Hematological: Does not bruise/bleed easily. Psychiatric/Behavioral: Negative. The patient is not nervous/anxious.         Current Pain Assessment  Pain Assessment  Pain Assessment: 0-10  Pain Level: 8  Pain Type: Acute pain  Pain Location: Shoulder, Back, Neck  Pain Orientation: Mid, Upper  Pain Descriptors: Constant  Pain Frequency: Continuous  Pain Onset: On-going  Clinical Progression: Not changed  Functional Pain Assessment: Prevents or interferes some active activities and lesion COMPARISON: March 25, 2017. DLP: 1647 mGy cm Automated exposure control was utilized to minimize patient radiation dose. TECHNIQUE: Following the oral ingestion and intravenous administration of contrast, helical CT tomographic images of the abdomen and pelvis were acquired. Coronal reformatted images were also provided for review. FINDINGS: The lung bases and base of the heart are unremarkable. LIVER: There is nonuniform attenuation of the left lobe of the liver. This is suspicious for metastatic disease. Decreased CT attenuation the liver is noted consistent with hepatic steatosis. BILIARY SYSTEM: The gallbladder is surgically absent. PANCREAS: No focal pancreatic lesion. SPLEEN: Unremarkable. KIDNEYS AND ADRENALS: The adrenal glands are visualized. Renal contours demonstrate symmetric enhancement. Nonobstructing calculi are present bilaterally. . The ureters are decompressed and normal in appearance. RETROPERITONEUM: No mass, lymphadenopathy or hemorrhage. GI TRACT: No evidence of obstruction or bowel wall thickening. Diverticulosis is noted. . OTHER: There is no mesenteric mass, lymphadenopathy or fluid collection. The abdominopelvic vasculature is patent. A lytic lesion is present in the anterior centrum of L5. This was not present in March 25, 2017. Kyphoplasty is at T11 L1 and L2 are noted. Orthopedic pin is present in the left hip. PELVIS: The uterus is visualized. . The urinary bladder is normal in appearance. 1. 19 mm lytic lesion in the anterior centrum of L5. Most likely this is metastatic disease. 2. Nonuniform attenuation of the left lobe of the liver and infiltrating neoplasm cannot be excluded. 3. Hepatic steatosis. Signed by Dr Gautam Ramos on 8/24/2019 3:45 PM    Xr Chest Portable    Result Date: 8/23/2019  EXAMINATION: XR CHEST PORTABLE 8/23/2019 8:05 AM HISTORY: Chest pain. Report: Comparison is made with the chest x-ray 7/5/2019.  Lungs are hypoaerated, no focal infiltrate or significant neuroforaminal or central canal stenosis is seen. 1. 2 cm marrow replacement left side of C3 consistent with a metastatic lesion. This extends to the left and appears to partially encase the left vertebral artery. Signed by Dr Xochilt De La O on 8/25/2019 5:10 PM    Mri Lumbar Spine Wo Contrast    Result Date: 8/25/2019  EXAMINATION: MRI LUMBAR SPINE WO CONTRAST 8/25/2019 4:10 PM HISTORY: Lytic lesion at L5 MRI LUMBAR SPINE WO CONTRAST 8/25/2019 3:00 PM HISTORY: COMPARISON: None TECHNIQUE: Multiplanar, multisequence MRI of the lumbar spine was performed without the use of contrast. FINDINGS: Alignment: There are presumed to be 5 lumbar-type vertebrae, with the most inferior being labeled as L5. Normal lumbar lordosis is maintained. There is no evidence of listhesis or subluxation. Marrow signal: No evidence of kyphoplasty at T11 L1 and L2 is noted. The inferior endplate of L4 there is subtle signal alteration suspicious for marrow replacement. The anterior left centrum of L5 demonstrates marrow replacement consistent with metastatic disease. . Cord/Canal: The conus medullaris terminates at the level of L1. The spinal cord is normal in signal and morphology. Soft tissues: The surrounding soft tissues are unremarkable. Levels: L1-L2: Kyphoplasty is are present at L1 and L2. There is mild retropulsion of the posterior superior endplate of L2 into the lumbar canal. There is moderate central canal stenosis. Neural foramen are patent. L2-L3: Facet joints are visualized. There is mild hypertrophy the left neural foramen and right neural foramen are patent. . L3-L4: Mild hypertrophy of the ligamentum flavum is noted however the lateral recesses are patent there is minimal central canal encroachment. [facility] L4-L5: Facet joint hypertrophy is present. There is moderate encroachment on the left neural foramen right neural foramen also demonstrates marked moderate encroachment by neural foraminal hypertrophy. [facility]  L5-S1: Small

## 2019-08-26 NOTE — PROGRESS NOTES
1. Lytic lesions L5 and C3, suspicious for mets: CT C/A/P unrevealing as to a source. Dr Tami Corrales is planning a biopsy later today. Dr Mere Cotto is helping with metastatic work-up. 2. Neck and low back pain, severe, requiring IV pain medicines: A little better. 3. Intractable N&V: A little better. 4. HTN: Adjust the medicines. 5. ESRD on HD: Dr Radha Weller is seeing and helping with this patient. 6. DM, type 2 with insulin requirement: Continue as is. 7. Neuropathy & nephropathy due the DM: Stable. Advance Directive: Full Code  DVT prophylaxis with enoxaparin 30 mg sub-Q daily. Discharge planning: ? 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  Liya Modi MD

## 2019-08-26 NOTE — PROGRESS NOTES
hydrALAZINE (APRESOLINE) injection 10 mg  10 mg Intravenous Q4H PRN [facility]        sodium bicarbonate tablet 325 mg  325 mg Oral BID [facility], DO        NIFEdipine (PROCARDIA XL) extended release tablet 30 mg  30 mg Oral BID [facility], DO        atorvastatin (LIPITOR) tablet 40 mg  40 mg Oral Nightly [facility], DO        sodium chloride flush 0.9 % injection 10 mL  10 mL Intravenous 2 times per day [facility], DO   10 mL at 08/24/19 2058    sodium chloride flush 0.9 % injection 10 mL  10 mL Intravenous PRN [facility], DO        acetaminophen (TYLENOL) tablet 650 mg  650 mg Oral Q4H PRN [facility], DO        0.9 % sodium chloride infusion   Intravenous Continuous [facility], DO 50 mL/hr at 08/25/19 1016      Ferric Citrate TABS 210 mg  1 tablet Oral Daily [facility], DO        docusate sodium (COLACE) capsule 100 mg  100 mg Oral BID [facility], DO        glucose (GLUTOSE) 40 % oral gel 15 g  15 g Oral PRN [facility], DO        dextrose 50 % IV solution  12.5 g Intravenous PRN [facility], DO        glucagon (rDNA) injection 1 mg  1 mg Intramuscular PRN [facility], DO        dextrose 5 % solution  100 mL/hr Intravenous PRN [facility], DO        insulin lispro (HUMALOG) injection vial 0-12 Units  0-12 Units Subcutaneous TID WC [facility], DO   6 Units at 08/25/19 1846    insulin lispro (HUMALOG) injection vial 0-6 Units  0-6 Units Subcutaneous Nightly [facility], DO        HYDROmorphone (DILAUDID) injection 0.5 mg  0.5 mg Intravenous Q3H PRN [facility], DO        Or    HYDROmorphone (DILAUDID) injection 1 mg  1 mg Intravenous Q3H PRN [facility], DO   1 mg at 08/26/19 1608    famotidine (PEPCID) tablet 20 mg  20 mg Oral Daily [facility], DO         Facility-Administered Medications Ordered in Other Encounters   Medication Dose Route Frequency Provider Last Rate Last Dose    propofol injection    PRN Don Simpson Brother     Diabetes Brother     High Blood Pressure Brother     High Cholesterol Brother     Vision Loss Brother     Colon Cancer Neg Hx     Colon Polyps Neg Hx     Esophageal Cancer Neg Hx     Liver Disease Neg Hx     Stomach Cancer Neg Hx     Rectal Cancer Neg Hx        Social History  Social History     Socioeconomic History    Marital status:      Spouse name: Delton Paget Number of children: 2    Years of education: 15    Highest education level: Not on file   Occupational History    Not on file   Social Needs    Financial resource strain: Not on file    Food insecurity:     Worry: Not on file     Inability: Not on file   Polar needs:     Medical: Not on file     Non-medical: Not on file   Tobacco Use    Smoking status: Never Smoker    Smokeless tobacco: Never Used   Substance and Sexual Activity    Alcohol use: No    Drug use: No    Sexual activity: Not Currently     Partners: Male   Lifestyle    Physical activity:     Days per week: Not on file     Minutes per session: Not on file    Stress: Not on file   Relationships    Social connections:     Talks on phone: Not on file     Gets together: Not on file     Attends Jehovah's witness service: Not on file     Active member of club or organization: Not on file     Attends meetings of clubs or organizations: Not on file     Relationship status: Not on file    Intimate partner violence:     Fear of current or ex partner: Not on file     Emotionally abused: Not on file     Physically abused: Not on file     Forced sexual activity: Not on file   Other Topics Concern    Not on file   Social History Narrative    Born in [place]     46 years only marriage    She has one son and one daughter    Worked as a  presently retired    Education high school    Jew venice none    Enjoys working with a genealogy and family trees also ceramics    Denies history of tobacco usage alcohol consumption or substance usage Physically sedentary         Review of Systems:  History obtained from chart review and the patient  General ROS: No fever or chills  Respiratory ROS: No cough, shortness of breath, wheezing  Cardiovascular ROS: No chest pain or palpitations  Gastrointestinal ROS: No abdominal pain or melena  Genito-Urinary ROS: No dysuria or hematuria  Musculoskeletal ROS: No joint pain or swelling         Objective:  Patient Vitals for the past 24 hrs:   BP Temp Temp src Pulse Resp SpO2 Weight   08/26/19 1552 134/79 97 °F (36.1 °C) Temporal 80 14 98 % --   08/26/19 1540 132/69 97.9 °F (36.6 °C) Temporal 91 16 100 % --   08/26/19 1535 132/61 -- -- 89 15 99 % --   08/26/19 1530 138/61 97.8 °F (36.6 °C) Temporal 92 16 99 % --   08/26/19 1525 133/67 97.9 °F (36.6 °C) Temporal 92 14 99 % --   08/26/19 1522 -- -- -- 90 -- -- --   08/26/19 1520 133/67 -- -- 104 15 99 % --   08/26/19 1515 124/66 -- -- 111 14 93 % --   08/26/19 1510 123/66 97.9 °F (36.6 °C) Temporal 95 14 94 % --   08/26/19 1225 (!) 140/78 97.2 °F (36.2 °C) Temporal 75 18 91 % --   08/26/19 0638 (!) 140/85 97.1 °F (36.2 °C) Temporal 103 18 94 % --   08/26/19 0444 (!) 156/96 97 °F (36.1 °C) Temporal 105 18 94 % 174 lb 3 oz (79 kg)   08/25/19 1839 (!) 167/101 96.6 °F (35.9 °C) Temporal 115 16 95 % --       Intake/Output Summary (Last 24 hours) at 8/26/2019 1709  Last data filed at 8/26/2019 1509  Gross per 24 hour   Intake 1058 ml   Output 2600 ml   Net -1542 ml     General: awake/alert   Chest:  clear to auscultation bilaterally without respiratory distress  CVS: regular rate and rhythm  Abdominal: soft, nontender, normal bowel sounds  Extremities: no cyanosis or edema  Skin: warm and dry without rash    Labs:  BMP:   Recent Labs     08/24/19  0200 08/25/19  0610 08/26/19  0423    143 145   K 4.4 4.9 4.7   CL 91* 94* 97*   CO2 30* 29 29   BUN 19 40* 64*   CREATININE 2.1* 3.4* 5.1*   CALCIUM 9.7 9.6 9.7     CBC:   Recent Labs     08/24/19 0200 08/25/19  0610 08/26/19  0423   WBC 9.1 12.3* 12.1*   HGB 9.8* 9.5* 9.2*   HCT 30.9* 31.3* 30.2*   .3* 105.4* 104.9*    190 189     LIVER PROFILE:   Recent Labs     08/24/19  0200 08/25/19  0610 08/26/19  0423   AST 27 25 41*   ALT 14 13 20   BILITOT 0.6 0.7 0.8   ALKPHOS 74 69 69     PT/INR: No results for input(s): PROTIME, INR in the last 72 hours. APTT: No results for input(s): APTT in the last 72 hours. BNP:  No results for input(s): BNP in the last 72 hours. Ionized Calcium:No results for input(s): IONCA in the last 72 hours. Magnesium:No results for input(s): MG in the last 72 hours. Phosphorus:No results for input(s): PHOS in the last 72 hours. HgbA1C:   Recent Labs     08/24/19 0200   LABA1C 5.9     Hepatic:   Recent Labs     08/24/19  0200 08/25/19  0610 08/26/19  0423   ALKPHOS 74 69 69   ALT 14 13 20   AST 27 25 41*   PROT 8.0 7.3 6.7   BILITOT 0.6 0.7 0.8   LABALBU 4.3 4.2 3.9     Lactic Acid: No results for input(s): LACTA in the last 72 hours. Troponin: No results for input(s): CKTOTAL, CKMB, TROPONINT in the last 72 hours. ABGs:   Lab Results   Component Value Date    PHART 7.380 08/10/2018    PO2ART 65.0 08/10/2018    SZC9SBI 30.0 08/10/2018     CRP:  No results for input(s): CRP in the last 72 hours. Sed Rate:  No results for input(s): SEDRATE in the last 72 hours. Culture Results:   Blood Culture Recent: No results for input(s): BC in the last 720 hours. Urine Culture Recent : No results for input(s): LABURIN in the last 720 hours. Radiology reports as per the Radiologist  Radiology: Xr Lumbar Spine (2-3 Views)    Result Date: 8/26/2019  Exam INTRAOPERATIVE FLUOROSCOPIC GUIDANCE 8/26/2019 INDICATION: Intraoperative fluoroscopic guidance. L5 biopsy TECHNIQUE: 4 fluoroscopic images from the operating room were submitted for evaluation. Please note, no radiologist was in attendance for acquisition of these images.  These images are available for future reference to the attending vertebral artery. However flow is present in the left vertebral artery. . Cord/Canal: The spinal cord is normal in signal and morphology. Soft tissues: The surrounding soft tissues are unremarkable. Levels: C2-C3: No disc bulge is present. No significant neuroforaminal or central canal stenosis is seen. C3-C4: No disc bulge is present. No significant neuroforaminal or central canal stenosis is seen. C4-C5: No disc bulge is present. No significant neuroforaminal or central canal stenosis is seen. C5-C6: Broad-based disc hypertrophic osteophytes present. There is some uncinate spurring compromising the right and left neural foramen. Carlynn Sarika C6-C7: Broad-based disc is present. There is some uncinate spurring compromising the neural foramen bilaterally. . C7-T1: No disc bulge is present. No significant neuroforaminal or central canal stenosis is seen. 1. 2 cm marrow replacement left side of C3 consistent with a metastatic lesion. This extends to the left and appears to partially encase the left vertebral artery. Signed by Dr Benjie lEi on 8/25/2019 5:10 PM    Mri Lumbar Spine Wo Contrast    Result Date: 8/25/2019  EXAMINATION: MRI LUMBAR SPINE WO CONTRAST 8/25/2019 4:10 PM HISTORY: Lytic lesion at L5 MRI LUMBAR SPINE WO CONTRAST 8/25/2019 3:00 PM HISTORY: COMPARISON: None TECHNIQUE: Multiplanar, multisequence MRI of the lumbar spine was performed without the use of contrast. FINDINGS: Alignment: There are presumed to be 5 lumbar-type vertebrae, with the most inferior being labeled as L5. Normal lumbar lordosis is maintained. There is no evidence of listhesis or subluxation. Marrow signal: No evidence of kyphoplasty at T11 L1 and L2 is noted. The inferior endplate of L4 there is subtle signal alteration suspicious for marrow replacement. The anterior left centrum of L5 demonstrates marrow replacement consistent with metastatic disease. . Cord/Canal: The conus medullaris terminates at the level of L1.  The spinal cord is

## 2019-08-27 NOTE — PROGRESS NOTES
Palliative Care: Met with pt and  to initiate Palliative Care. Past Medical History:        Past Medical History:   Diagnosis Date    Anemia     Blood circulation, collateral     Broken hip (HCC)     L hip    CHF (congestive heart failure) (HCC)     Chronic kidney disease 9/27/2018    Chronic kidney disease (CKD), stage IV (severe) (HCC)     Chronic kidney disease (CKD), stage IV (severe) (HCC)     Closed compression fracture of first lumbar vertebra (HCC) 2/2/2017    Closed compression fracture of first lumbar vertebra (HCC) 2/2/2017    Colon polyps     Coronary artery disease (CAD) excluded 4/23/2019    Diabetic peripheral neuropathy associated with type 2 diabetes mellitus ([facility] [address] 8/22/2019    Hemodialysis patient ([facility] [address]     dialysis mon, wed, friday [facility]pt dialysis [facility]    History of blood transfusion     Hypertension     Mixed hyperlipidemia 6/6/2019    Osteoarthritis     Palliative care patient 08/14/2018    Type II or unspecified type diabetes mellitus without mention of complication, not stated as uncontrolled     UTI (urinary tract infection) 2015    Gangrenous UTI with gas in urinary tract       Advance Directives:   Pt has a LW scanned to MR. Pain/Other Symptoms:    Pt is having extreme pain but  says the caregivers are trying to keep it controlled. Activity:      Pt to return to activity as tolerated. Psychological/Spiritual:    Pt is L[employer]-day.         Patient/Family Discussion:      Pt has one son, one daughter, and a grandson. Plan:    Plans are to be determined, pt and  awaiting results from biopsy taken yesterday. Patients goal, what they hope for:    Goal is to get through this and to get better. Provided spiritual care with sustaining presence, nurtured hope, and prayer. Pt and  expressed gratitude for spiritual care.         Electronically signed by Matti Higuera on 8/27/2019 at 3:12 PM

## 2019-08-27 NOTE — PROGRESS NOTES
8/24/2019 12:45 AM HISTORY: Pain COMPARISON: None DLP: 1035 mGy cm TECHNIQUE: Serial helical tomographic images of the thoracic spine were obtained without the use of intravenous contrast. Multiplanar reformatted images were provided for review. FINDINGS: Evidence of kyphoplasty at T11 is noted. . Vertebral body heights and alignment are well maintained. Degenerative changes noted in the disc spaces. . There is no bony narrowing of the spinal canal or neural foramina. There is no evidence of facet lock or perch. The posterior elements are intact. The visualized soft tissues are unremarkable. Evidence of kyphoplasty at T11. No acute compression deformities identified. Signed by Dr Glenis Paz on 8/24/2019 7:58 AM    Ct Lumbar Spine Wo Contrast    Result Date: 8/24/2019  EXAMINATION: CT LUMBAR SPINE WO CONTRAST 8/24/2019 7:52 AM HISTORY: CT lumbar spine without contrast 8/24/2019 12:45 AM History: Pain Comparison: February 2017 DLP: 867 mGy cm Technique: Serial helical tomographic images of the lumbar spine were obtained without the use of intravenous contrast. Additionally, sagittal and coronal reformatted images were provided for review. Findings: Old compression fractures of L1 and L2 are noted evidence of kyphoplasty is present. There is mild retropulsion of the posterior superior endplate of L2 into the lumbar canal.. Vertebral body heights are well maintained. Vacuum phenomenon is noted at the L4-5 T12-L1 and L1-L2 levels. Also L5-S1. . A lytic lesion in the anterior centrum of L5 is noted suspicious for metastatic disease. . The posterior elements are intact. The visualized soft tissues are unremarkable. Impression: 1. Lytic lesion at L5 is likely representing metastatic disease.  2. Evidence of kyphoplasty at T11, L1 and L2. 3. Retropulsion of superior endplates of L1 and L2) lumbar canal. Signed by Dr Glenis Paz on 8/24/2019 7:56 AM    Ct Abdomen Pelvis W Iv Contrast Additional Contrast? Oral    Result encroachment on the left neural foramen right neural foramen also demonstrates marked moderate encroachment by neural foraminal hypertrophy. Bre Dong L5-S1: Small cyst posterior disc osteophyte complex is present. The left neural foramen is compromised by disc and facet joint. The right neural foramen demonstrates moderate stenosis due to a right lateral disc and facet joint hypertrophy. .     1. Definite marrow replacement at L5 probably metastatic disease.  There may also be marrow replacement inferior L4. 2. Postsurgical and degenerative spondylosis as described above Signed by Dr Gautam Ramos on 8/25/2019 4:18 PM    Medications  Current Facility-Administered Medications   Medication Dose Route Frequency Provider Last Rate Last Dose    sodium bicarbonate tablet 325 mg  325 mg Oral BID Munira Connors MD        NIFEdipine (PROCARDIA XL) extended release tablet 30 mg  30 mg Oral BID Munira Connors MD        clopidogrel (PLAVIX) tablet 75 mg  75 mg Oral Daily Munira Connors MD        carvedilol (COREG) tablet 6.25 mg  6.25 mg Oral BID Munira Connors MD        atorvastatin (LIPITOR) tablet 40 mg  40 mg Oral Nightly Munira Connors MD        aspirin chewable tablet 81 mg  81 mg Oral Daily Munira Connors MD        sodium chloride flush 0.9 % injection 10 mL  10 mL Intravenous 2 times per day Munira Connors MD   10 mL at 08/24/19 2058    sodium chloride flush 0.9 % injection 10 mL  10 mL Intravenous PRN Munira Connors MD        acetaminophen (TYLENOL) tablet 650 mg  650 mg Oral Q4H PRN Munira Connors MD        enoxaparin (LOVENOX) injection 30 mg  30 mg Subcutaneous Daily Munira Connors MD        ondansetron [facility]) injection 4 mg  4 mg Intravenous Q4H PRN Munira Connors MD   4 mg at 08/25/19 0302    0.9 % sodium chloride infusion   Intravenous Continuous Munira Connors MD 50 mL/hr at 08/24/19 0820      Ferric Citrate TABS 210 mg  1 tablet Oral Daily Munira Connors MD        docusate sodium

## 2019-08-27 NOTE — PROGRESS NOTES
Progress Note  8/27/2019 6:33 AM  Subjective:   Admit Date: 8/24/2019  PCP: Fer Baptiste MD    Interval History: Still some pain and nausea, but better. DIET CARB CONTROL; Intake/Output Summary (Last 24 hours) at 8/27/2019 1955  Last data filed at 8/26/2019 1509  Gross per 24 hour   Intake 300 ml   Output 2600 ml   Net -2300 ml     Medications:      sodium chloride 50 mL/hr at 08/26/19 2035    dextrose        fentaNYL  1 patch Transdermal Q72H    metoprolol  2.5 mg Intravenous Q6H    hydrALAZINE  10 mg Intravenous Q6H    sodium bicarbonate  325 mg Oral BID    NIFEdipine  30 mg Oral BID    atorvastatin  40 mg Oral Nightly    sodium chloride flush  10 mL Intravenous 2 times per day    Ferric Citrate  1 tablet Oral Daily    docusate sodium  100 mg Oral BID    insulin lispro  0-12 Units Subcutaneous TID WC    insulin lispro  0-6 Units Subcutaneous Nightly    famotidine  20 mg Oral Daily     Recent Labs     08/25/19  0610 08/26/19  0423 08/27/19  0500   WBC 12.3* 12.1* 10.5   HGB 9.5* 9.2* 10.0*    189 200     Recent Labs     08/25/19  0610 08/26/19  0423 08/27/19  0501    145 140   K 4.9 4.7 4.2   CL 94* 97* 96*   CO2 29 29 25   BUN 40* 64* 41*   CREATININE 3.4* 5.1* 3.6*   GLUCOSE 356* 204* 166*     Recent Labs     08/25/19  0610 08/26/19  0423   AST 25 41*   ALT 13 20   BILITOT 0.7 0.8   ALKPHOS 69 69       Objective:   Vitals: /73   Pulse 97   Temp 97.9 °F (36.6 °C) (Temporal)   Resp 18   Ht 5' 4\" (1.626 m)   Wt 174 lb 3 oz (79 kg)   SpO2 97%   BMI 29.90 kg/m²   General appearance: alert and cooperative with exam  Lungs: clear to auscultation bilaterally  Heart: regular rate and rhythm, S1, S2 normal, no murmur, click, rub or gallop  Abdomen: soft, non-tender; bowel sounds normal; no masses,  no organomegaly  Extremities: extremities normal, atraumatic, no cyanosis or edema  Neurologic: No obvious focal neurologic deficits. Assessment and Plan:   1.  Lytic lesions L5 and C3, suspicious for mets: CT C/A/P unrevealing as to a source. Dr Salty Nelson did a bone bx and BMBx. Await the bx results. Dr Sarah Pringle is helping with metastatic work-up. 2. Neck and low back pain, severe, requiring IV pain medicines: A little better. 3. Intractable N&V: A little better. 4. HTN: Adjust the medicines. 5. ESRD on HD: Dr Thony Ballesteros is seeing and helping with this patient. 6. DM, type 2 with insulin requirement: Continue as is. 7. Neuropathy & nephropathy due the DM: Stable. Advance Directive: Full Code  DVT prophylaxis with enoxaparin 30 mg sub-Q daily. Discharge planning: ? Active Problems:    Intractable pain    Distant metastasis staging category M1b involving bone (HCC)    Liver lesion    Lytic bone lesions on xray  Resolved Problems:    * No resolved hospital problems.  Kisha Porras MD

## 2019-08-27 NOTE — PROGRESS NOTES
Neurosurgery  Patient seen and examined  Some pain this AM  CUELLO well, no new neurological issues    Wound:  C/D/I    Plan:   Will follow up results from biopsy  May get OOB with assist

## 2019-08-28 NOTE — PROGRESS NOTES
some active activities and ADLs  Non-Pharmaceutical Pain Intervention(s): Elevation, Cold applied, Repositioned, Rest, Splinting  Response to Pain Intervention: Asleep with RR greater than 10  Multiple Pain Sites: Yes    OBJECTIVE:  VITALS: BP (!) 175/102   Pulse 106   Temp 96.3 °F (35.7 °C) (Temporal)   Resp 18   Ht 5' 4\" (1.626 m)   Wt 186 lb 3.2 oz (84.5 kg)   SpO2 97%   BMI 31.96 kg/m²   I&O:     Intake/Output Summary (Last 24 hours) at 8/25/2019 0830  Last data filed at 8/25/2019 1158  Gross per 24 hour   Intake 203 ml   Output 400 ml   Net -197 ml         Physical Exam   Constitutional: She is oriented to person, place, and time. She appears well-developed and well-nourished. No distress. HENT:   Head: Normocephalic and atraumatic. Mouth/Throat: Uvula is midline, oropharynx is clear and moist and mucous membranes are normal. No tonsillar exudate. Eyes: Pupils are equal, round, and reactive to light. Conjunctivae, EOM and lids are normal. Right eye exhibits no discharge. Left eye exhibits no discharge. No scleral icterus. Neck: Trachea normal and normal range of motion. Neck supple. No JVD present. No tracheal deviation present. No thyroid mass and no thyromegaly present. Cardiovascular: Normal rate, regular rhythm, normal heart sounds and intact distal pulses. Exam reveals no gallop and no friction rub. No murmur heard. Pulmonary/Chest: Effort normal and breath sounds normal. No respiratory distress. She has no wheezes. She has no rales. She exhibits no tenderness. Abdominal: Soft. Bowel sounds are normal. She exhibits no distension and no mass. There is no tenderness. There is no rebound and no guarding. No hernia. Musculoskeletal: She exhibits no edema, tenderness or deformity. Range of motion within normal limits x4 extremities   Neurological: She is alert and oriented to person, place, and time. No cranial nerve deficit. Coordination normal.   Skin: Skin is warm. No rash noted. She is not diaphoretic. No erythema. No pallor. Dressing to lumbar/back clean, dry and intact. Psychiatric: She has a normal mood and affect. Her behavior is normal. Thought content normal.   Vitals reviewed. BMP:   Recent Labs     08/24/19  0200 08/25/19  0610    143   K 4.4 4.9   CL 91* 94*   CO2 30* 29   BUN 19 40*   CREATININE 2.1* 3.4*   GLUCOSE 282* 356*   CALCIUM 9.7 9.6     CBC:   Recent Labs     08/24/19  0200 08/25/19  0610   WBC 9.1 12.3*   HGB 9.8* 9.5*   HCT 30.9* 31.3*   .3* 105.4*    190     CMP:    Recent Labs     08/24/19  0200 08/25/19  0610    143   K 4.4 4.9   CL 91* 94*   CO2 30* 29   BUN 19 40*   CREATININE 2.1* 3.4*   LABGLOM 23* 13*   GLUCOSE 282* 356*   PROT 8.0 7.3   LABALBU 4.3 4.2   CALCIUM 9.7 9.6   BILITOT 0.6 0.7   ALKPHOS 74 69   AST 27 25   ALT 14 13           Radiology:   Ct Head Wo Contrast    Result Date: 8/23/2019  EXAMINATION: CT HEAD WO CONTRAST 8/23/2019 7:13 AM HISTORY: CT BRAIN without contrast 8/23/2019 1:30 AM HISTORY: Headache COMPARISON: May 23, 2011 DLP: 770 mGy cm TECHNIQUE: Serial axial tomographic images of the brain were obtained without the use of intravenous contrast. FINDINGS: The midline structures are nondisplaced. There is mild cerebral and cerebellar volume loss, with an associated increase in the prominence of the ventricles and sulci. The basilar cisterns are normal in size and configuration. There is no evidence of intracranial hemorrhage or mass-effect. There is low attenuation in the periventricular white matter, consistent with chronic ischemic change. Old lacunar infarcts are noted in the region of the mayra and corona radiata. There are no abnormal extra-axial fluid collections. There is no evidence of tonsillar herniation. The included orbits and their contents are unremarkable. The visualized paranasal sinuses, mastoid air cells and middle ear cavities are clear.  The visualized osseous structures and overlying soft body. There is complete occlusion of the anterior lateral wall suggesting a pathological fracture. No displacement. No obvious extension of the mass into the spinal canal. There is mild anterolisthesis of C2 over C3. The remaining vertebral body alignments are normal. Chronic degenerative changes are seen in the form of facet arthropathy and anterior and posterior osteophytes most pronounced at C5-C6. There is bilateral neural foraminal stenosis at C5-C6. No significant spinal canal stenosis at any level. The prevertebral soft tissues are normal. There is calcification of the nuchal ligament opposite and posterior to the spinous processes of C5 and C6. The included lung apices show a tiny noncalcified nodule in the left upper lobe anteriorly, image #57 in axial plane. There are interstitial changes and groundglass opacities in the upper lungs bilaterally more so on the left upper lobe posteriorly. An accessory azygous fissure is seen. Right internal jugular venous catheter should is seen in place. An osteolytic expansile lesion involving the left side of the vertebral body C3 extending into the pedicle. Suspect a pathological fracture. No displacement. No obvious extension into the spinal canal. Further evaluation with MR imaging of the cervical spine may be obtained. Cervical spondylosis. Bilateral neural foraminal stenosis at C5-6. No spinal stenosis at any level. A tiny noncalcified nodule in the left upper lobe. This is suboptimally evaluated in this study. Further follow-up with CT scan of the chest may be obtained. The above study was initially reviewed and reported by [facility]. I do not find any discrepancies.  Signed by Dr Michelle Jackson on 8/23/2019 8:18 AM    Ct Thoracic Spine Wo Contrast    Result Date: 8/24/2019  EXAMINATION: CT THORACIC SPINE WO CONTRAST 8/24/2019 7:56 AM HISTORY: CT thoracic spine without contrast 8/24/2019 12:45 AM HISTORY: Pain COMPARISON: None DLP: 1035 mGy cm TECHNIQUE: Serial ABDOMEN AND PELVIS WITH CONTRAST 8/24/2019 8:30 AM HISTORY: Metastatic lytic lesion COMPARISON: March 25, 2017. DLP: 1647 mGy cm Automated exposure control was utilized to minimize patient radiation dose. TECHNIQUE: Following the oral ingestion and intravenous administration of contrast, helical CT tomographic images of the abdomen and pelvis were acquired. Coronal reformatted images were also provided for review. FINDINGS: The lung bases and base of the heart are unremarkable. LIVER: There is nonuniform attenuation of the left lobe of the liver. This is suspicious for metastatic disease. Decreased CT attenuation the liver is noted consistent with hepatic steatosis. BILIARY SYSTEM: The gallbladder is surgically absent. PANCREAS: No focal pancreatic lesion. SPLEEN: Unremarkable. KIDNEYS AND ADRENALS: The adrenal glands are visualized. Renal contours demonstrate symmetric enhancement. Nonobstructing calculi are present bilaterally. . The ureters are decompressed and normal in appearance. RETROPERITONEUM: No mass, lymphadenopathy or hemorrhage. GI TRACT: No evidence of obstruction or bowel wall thickening. Diverticulosis is noted. . OTHER: There is no mesenteric mass, lymphadenopathy or fluid collection. The abdominopelvic vasculature is patent. A lytic lesion is present in the anterior centrum of L5. This was not present in March 25, 2017. Kyphoplasty is at T11 L1 and L2 are noted. Orthopedic pin is present in the left hip. PELVIS: The uterus is visualized. . The urinary bladder is normal in appearance. 1. 19 mm lytic lesion in the anterior centrum of L5. Most likely this is metastatic disease. 2. Nonuniform attenuation of the left lobe of the liver and infiltrating neoplasm cannot be excluded. 3. Hepatic steatosis. Signed by Dr Cristiane Golden on 8/24/2019 3:45 PM    Xr Chest Portable    Result Date: 8/23/2019  EXAMINATION: XR CHEST PORTABLE 8/23/2019 8:05 AM HISTORY: Chest pain.  Report: Comparison is made with the

## 2019-08-28 NOTE — PROGRESS NOTES
([facility]     L hip    CHF (congestive heart failure) (HCC)     Chronic kidney disease 2018    Chronic kidney disease (CKD), stage IV (severe) (HCC)     Chronic kidney disease (CKD), stage IV (severe) (HCC)     Closed compression fracture of first lumbar vertebra (HCC) 2017    Closed compression fracture of first lumbar vertebra (HCC) 2017    Colon polyps     Coronary artery disease (CAD) excluded 2019    Diabetic peripheral neuropathy associated with type 2 diabetes mellitus ([facility] 2019    Hemodialysis patient ([facility]     dialysis mon, wed, friday outpt dialysis at [facility]    History of blood transfusion     Hypertension     Mixed hyperlipidemia 2019    Osteoarthritis     Palliative care patient 2018    Type II or unspecified type diabetes mellitus without mention of complication, not stated as uncontrolled     UTI (urinary tract infection)     Gangrenous UTI with gas in urinary tract       Past Surgical History:  Past Surgical History:   Procedure Laterality Date    APPENDECTOMY       SECTION      x 2    CHOLECYSTECTOMY      COLONOSCOPY  09    Dr Yaima Sherwood (LOWER) N/A 2016    ERCP/EUS-Dr BEKA Sutton-w/placement of a 10 Kazakh x7 cm temporary plastic biliary stent-Ampullary stenosis, mild dilation of the cbd w/questionable calcification vs stone, small periampullary diverticulum, removal of biliary sludge    ENDOSCOPY, COLON, DIAGNOSTIC      ERCP  2016    ERCP/EUS-Dr BEKA Sutton-w/placement of a 10 [place] x7 cm temporary plastic biliary stent-Ampullary stenosis, mild dilation of the cbd w/questionable calcification vs stone, small periampullary diverticulum, removal of biliary sludge    ERCP N/A 2017    Dr BEKA Sutton-Successful removal of indwelling biliary stent, no evidence of residual choledocholithiasis or common biliary stricture     EYE SURGERY      cataract    FEMUR FRACTURE SURGERY Left 2016    SHORT TFN INTERTROCHAN FRACTURE performed by Santiago Isaac MD at [address] [place] [address]      femur fracture surgery    HIP FRACTURE SURGERY Left     pinning    KYPHOSIS SURGERY N/A 8/26/2019    Biopsy of L5 bone lesion performed [facility]in DO [facility] COLONOSCOPY W/BIOPSY SINGLE/MULTIPLE N/A 3/24/2017    Dr BEAK Sutton-Diverticular disease-Tubular AP (-) dysplasia x 5--3 yr recall    UPPER GASTROINTESTINAL ENDOSCOPY N/A 10/14/2016    Dr Simons-Hemorrhagic gastritis    VASCULAR SURGERY  03/06/2019    [facility]JSU[facility]ound guided cannulation of right internal jugular vein. Placement of right internal jugular vein tunneled dialysis catheter [facility] glidepath 19 cm tip to cuff.        Family History  Family History   Problem Relation Age of Onset    Arthritis Mother     Cancer Mother     High Cholesterol Mother     Arthritis Father     Diabetes Father     High Cholesterol Father     Liver Cancer Father     Arthritis Sister     Diabetes Sister     High Blood Pressure Sister     High Cholesterol Sister     Arthritis Brother     Diabetes Brother     High Blood Pressure Brother     High Cholesterol Brother     Vision Loss Brother     Colon Cancer Neg Hx     Colon Polyps Neg Hx     Esophageal Cancer Neg Hx     Liver Disease Neg Hx     Stomach Cancer Neg Hx     Rectal Cancer Neg Hx        Social History  Social History     Socioeconomic History    Marital status:      Spouse name: Jason Bates Number of children: 2    Years of education: 15    Highest education level: Not on file   Occupational History    Not on file   Social Needs    Financial resource strain: Not on file    Food insecurity:     Worry: Not on file     Inability: Not on file   Entrec needs:     Medical: Not on file     Non-medical: Not on file   Tobacco Use    Smoking status: Never Smoker    Smokeless tobacco: Never Used   Substance and Sexual Activity    Alcohol use: No    Drug use: No    Sexual activity: Not Currently     Partners: Male   Lifestyle    Physical activity:     Days per week: Not on file     Minutes per session: Not on file    Stress: Not on file   Relationships    Social connections:     Talks on phone: Not on file     Gets together: Not on file     Attends Zoroastrian service: Not on file     Active member of club or organization: Not on file     Attends meetings of clubs or organizations: Not on file     Relationship status: Not on file    Intimate partner violence:     Fear of current or ex partner: Not on file     Emotionally abused: Not on file     Physically abused: Not on file     Forced sexual activity: Not on file   Other Topics Concern    Not on file   Social History Narrative    Born in [facility]     46 years only marriage    She has one son and one daughter    Worked as a  presently retired    Education high school    Rastafari venice none    Enjoys working with a [employer] and [employer] also ceramics    Denies history of tobacco usage alcohol consumption or substance usage    Physically sedentary         Review of Systems:  History obtained from chart review and the patient  General ROS: No fever or chills  Respiratory ROS: No cough, shortness of breath, wheezing  Cardiovascular ROS: No chest pain or palpitations  Gastrointestinal ROS: No abdominal pain or melena  Genito-Urinary ROS: No dysuria or hematuria  Musculoskeletal ROS: No joint pain or swelling         Objective:  Patient Vitals for the past 24 hrs:   BP Temp Temp src Pulse Resp SpO2   08/28/19 0954 104/67 97.7 °F (36.5 °C) Temporal 115 -- --   08/28/19 0627 123/77 97.1 °F (36.2 °C) Temporal 110 -- 96 %   08/28/19 0450 (!) 154/83 96.3 °F (35.7 °C) Temporal 84 18 96 %   08/27/19 2256 (!) 147/83 98.2 °F (36.8 °C) Temporal 93 16 92 %   08/27/19 1854 133/75 98.3 °F (36.8 °C) Temporal 98 18 96 %   08/27/19 1847 -- -- -- -- -- 93 %   08/27/19 1718 125/78 -- -- 93 -- 95 %   08/27/19 1404 (!) 154/87 -- -- 110 -- 96 % Intake/Output Summary (Last 24 hours) at 8/28/2019 1008  Last data filed at 8/28/2019 0602  Gross per 24 hour   Intake 1145.6 ml   Output 50 ml   Net 1095.6 ml     General: awake/alert   Chest:  clear to auscultation bilaterally without respiratory distress  CVS: regular rate and rhythm  Abdominal: soft, nontender, normal bowel sounds  Extremities: no cyanosis or edema  Skin: warm and dry without rash    Labs:  BMP:   Recent Labs     08/26/19 0423 08/27/19  0501 08/28/19  0507    140 137   K 4.7 4.2 4.2   CL 97* 96* 95*   CO2 29 25 20*   BUN 64* 41* 66*   CREATININE 5.1* 3.6* 4.9*   CALCIUM 9.7 8.7* 8.7*     CBC:   Recent Labs     08/26/19 0423 08/27/19  0500 08/28/19  0507   WBC 12.1* 10.5 8.8   HGB 9.2* 10.0* 9.9*   HCT 30.2* 32.1* 31.9*   .9* 105.2* 102.9*    200 201     LIVER PROFILE:   Recent Labs     08/26/19 0423   AST 41*   ALT 20   BILITOT 0.8   ALKPHOS 69     PT/INR: No results for input(s): PROTIME, INR in the last 72 hours. APTT: No results for input(s): APTT in the last 72 hours. BNP:  No results for input(s): BNP in the last 72 hours. Ionized Calcium:No results for input(s): IONCA in the last 72 hours. Magnesium:No results for input(s): MG in the last 72 hours. Phosphorus:No results for input(s): PHOS in the last 72 hours. HgbA1C:   No results for input(s): LABA1C in the last 72 hours. Hepatic:   Recent Labs     08/26/19 0423   ALKPHOS 69   ALT 20   AST 41*   PROT 6.7   BILITOT 0.8   LABALBU 3.9     Lactic Acid: No results for input(s): LACTA in the last 72 hours. Troponin: No results for input(s): CKTOTAL, CKMB, TROPONINT in the last 72 hours. ABGs:   Lab Results   Component Value Date    PHART 7.380 08/10/2018    PO2ART 65.0 08/10/2018    IXL3OJP 30.0 08/10/2018     CRP:  No results for input(s): CRP in the last 72 hours. Sed Rate:  No results for input(s): SEDRATE in the last 72 hours.     Culture Results:   Blood Culture Recent: No results for input(s): BC in structures and overlying soft tissues of the skull and face are intact. Changes of aging with no acute intracranial abnormality Signed by Dr Courtenay Gowers on 8/23/2019 7:14 AM    Ct Chest W Contrast    Result Date: 8/24/2019  EXAMINATION: CT CHEST W CONTRAST 8/24/2019 3:35 PM HISTORY: CT CHEST W CONTRAST 8/24/2019 8:30 AM HISTORY: Metastatic bone disease COMPARISON: None DLP: 1647 mGy cm TECHNIQUE: Serial helical tomographic images of the chest were acquired following the infusion of Isovue contrast intravenously. Bone and soft tissue algorithms were provided. FINDINGS: Neck base: The imaged portion of the neck and thyroid gland is unremarkable. Lungs: Dependent atelectasis is noted in the lungs. No pulmonary nodules are identified. . No pleural effusion is present. The trachea and bronchial tree are patent. Heart: The heart is normal in size. Coronary calcifications are present. Nannie Broccoli vessels: The great vessels are normal in caliber and are patent. The pulmonary arteries are patent within limits of this study and are normal in caliber. Lymph nodes: No significant hilar, mediastinal or axillary lymphadenopathy is appreciated. Skeletal and soft tissues: Evidence of kyphoplasty at T11 L1 and L2 is noted. [place] Upper abdomen: The abdomen will be dictated under a separate report. 1. No acute cardiopulmonary process. 2. Kyphoplasty said T11, L1 and L2. Signed by Dr Courtenay Gowers on 8/24/2019 3:39 PM    Ct Cervical Spine Wo Contrast    Result Date: 8/23/2019  Examination. CT CERVICAL SPINE WO CONTRAST History: Trauma and neck pain. DLP: 549 mGycm. The CT scan of the cervical spine is performed without intravenous or intrathecal contrast enhancement. The images are acquired in axial plane with subsequent reconstruction in coronal and sagittal planes. There is no previous study for comparison. There is an area of osteolysis involving the left lung The vertebral body C3 partly extending into the pedicle.  There is moderate FINDINGS: Alignment: There are presumed to be 5 lumbar-type vertebrae, with the most inferior being labeled as L5. Normal lumbar lordosis is maintained. There is no evidence of listhesis or subluxation. Marrow signal: No evidence of kyphoplasty at T11 L1 and L2 is noted. The inferior endplate of L4 there is subtle signal alteration suspicious for marrow replacement. The anterior left centrum of L5 demonstrates marrow replacement consistent with metastatic disease. . Cord/Canal: The conus medullaris terminates at the level of L1. The spinal cord is normal in signal and morphology. Soft tissues: The surrounding soft tissues are unremarkable. Levels: L1-L2: Kyphoplasty is are present at L1 and L2. There is mild retropulsion of the posterior superior endplate of L2 into the lumbar canal. There is moderate central canal stenosis. Neural foramen are patent. L2-L3: Facet joints are visualized. There is mild hypertrophy the left neural foramen and right neural foramen are patent. . L3-L4: Mild hypertrophy of the ligamentum flavum is noted however the lateral recesses are patent there is minimal central canal encroachment. Frutoso Golder L4-L5: Facet joint hypertrophy is present. There is moderate encroachment on the left neural foramen right neural foramen also demonstrates marked moderate encroachment by neural foraminal hypertrophy. Frutoso Golder L5-S1: Small cyst posterior disc osteophyte complex is present. The left neural foramen is compromised by disc and facet joint. The right neural foramen demonstrates moderate stenosis due to a right lateral disc and facet joint hypertrophy. .     1. Definite marrow replacement at L5 probably metastatic disease.  There may also be marrow replacement inferior L4. 2. Postsurgical and degenerative spondylosis as described above Signed by Dr Keaton Craft on 8/25/2019 4:18 PM    Ct Abdomen Pelvis W Iv Contrast Additional Contrast? Oral    Result Date: 8/24/2019  EXAMINATION: CT ABDOMEN PELVIS W IV CONTRAST is made with the chest x-ray 7/5/2019. Lungs are hypoaerated, no focal infiltrate or consolidation is identified. There are coarse interstitial markings as before. Heart size is normal. The right-sided dialysis catheter appears in satisfactory, unchanged. No pneumothorax or pleural effusion is identified. No acute osseous abnormality is identified. The upper abdomen appears unremarkable. Stable chest, no acute abnormality is identified, the lungs are hypoaerated. Chronic changes present as described. Signed by Dr Roxana Viera.  Keila on 8/23/2019 8:06 AM       Assessment   End-stage renal disease  Diabetes type 2 with diabetic nephropathy  Lytic lesions of C3 and L5  Anemia of chronic kidney disease  Secondary hyperparathyroidism      Plan:  Discussed with patient, nursing  Hemodialysis Monday Wednesday Friday  Monitor labs      Jackson Andrade MD  08/28/19  10:08 AM

## 2019-08-28 NOTE — PROGRESS NOTES
Relationships    Social connections:     Talks on phone: Not on file     Gets together: Not on file     Attends [employer] service: Not on file     Active member of club or organization: Not on file     Attends meetings of clubs or organizations: Not on file     Relationship status: Not on file    Intimate partner violence:     Fear of current or ex partner: Not on file     Emotionally abused: Not on file     Physically abused: Not on file     Forced sexual activity: Not on file   Other Topics Concern    Not on file   Social History Narrative    Born in [place]     46 years only marriage    She has one son and one daughter    Worked as a  presently retired    Education high school    Rastafarian venice none    Enjoys working with a genealogy and family trees also [employer]    Denies history of tobacco usage alcohol consumption or substance usage    Physically sedentary         Review of Systems:  History obtained from chart review and the patient  General ROS: No fever or chills  Respiratory ROS: No cough, shortness of breath, wheezing  Cardiovascular ROS: No chest pain or palpitations  Gastrointestinal ROS: No abdominal pain or melena  Genito-Urinary ROS: No dysuria or hematuria  Musculoskeletal ROS: No joint pain or swelling         Objective:  Patient Vitals for the past 24 hrs:   BP Temp Temp src Pulse Resp SpO2   08/27/19 1854 133/75 98.3 °F (36.8 °C) Temporal 98 18 96 %   08/27/19 1847 -- -- -- -- -- 93 %   08/27/19 1718 125/78 -- -- 93 -- 95 %   08/27/19 1404 (!) 154/87 -- -- 110 -- 96 %   08/27/19 0630 123/73 97.9 °F (36.6 °C) Temporal 97 16 97 %   08/27/19 0214 139/81 97.3 °F (36.3 °C) -- 94 18 95 %   08/26/19 2242 (!) 151/84 97.6 °F (36.4 °C) Temporal 114 16 99 %       Intake/Output Summary (Last 24 hours) at 8/27/2019 2156  Last data filed at 8/27/2019 1734  Gross per 24 hour   Intake 665.6 ml   Output --   Net 665.6 ml     General: awake/alert   Chest:  clear to auscultation bilaterally Spine (2-3 Views)    Result Date: 8/26/2019  Exam INTRAOPERATIVE FLUOROSCOPIC GUIDANCE 8/26/2019 INDICATION: Intraoperative fluoroscopic guidance. L5 biopsy TECHNIQUE: 4 fluoroscopic images from the operating room were submitted for evaluation. Please note, no radiologist was in attendance for acquisition of these images. These images are available for future reference to the attending surgeon. Radiation: 0.04 minutes. 1 6.123 mGy FINDINGS: Intraoperative images related to biopsy of L5 vertebral body lesion. 1. Intraoperative fluoroscopic guidance as described. 2. Please refer to real-time fluoroscopy and operative report for full details. Signed by Dr Jose Armando Zepeda on 8/26/2019 3:34 PM    Ct Head Wo Contrast    Result Date: 8/23/2019  EXAMINATION: CT HEAD WO CONTRAST 8/23/2019 7:13 AM HISTORY: CT BRAIN without contrast 8/23/2019 1:30 AM HISTORY: Headache COMPARISON: May 23, 2011 DLP: 770 mGy cm TECHNIQUE: Serial axial tomographic images of the brain were obtained without the use of intravenous contrast. FINDINGS: The midline structures are nondisplaced. There is mild cerebral and cerebellar volume loss, with an associated increase in the prominence of the ventricles and sulci. The basilar cisterns are normal in size and configuration. There is no evidence of intracranial hemorrhage or mass-effect. There is low attenuation in the periventricular white matter, consistent with chronic ischemic change. Old lacunar infarcts are noted in the region of the mayra and corona radiata. There are no abnormal extra-axial fluid collections. There is no evidence of tonsillar herniation. The included orbits and their contents are unremarkable. The visualized paranasal sinuses, mastoid air cells and middle ear cavities are clear. The visualized osseous structures and overlying soft tissues of the skull and face are intact.      Changes of aging with no acute intracranial abnormality Signed by Dr Yaquelin Torres on 8/23/2019 7:14 normal. The right-sided dialysis catheter appears in satisfactory, unchanged. No pneumothorax or pleural effusion is identified. No acute osseous abnormality is identified. The upper abdomen appears unremarkable. Stable chest, no acute abnormality is identified, the lungs are hypoaerated. Chronic changes present as described. Signed by Dr Carine Tompkins.  Keila on 8/23/2019 8:06 AM       Assessment   End-stage renal disease  Diabetes type 2 with diabetic nephropathy  Lytic lesions of C3 and L5  Anemia of chronic kidney disease  Secondary hyperparathyroidism      Plan:  Discussed with patient, nursing  Hemo-dialysis Monday Wednesday Friday  Monitor labs      Debora Davis MD  08/27/19  9:56 PM

## 2019-08-28 NOTE — PROGRESS NOTES
now s/p biopsy of the L5 bone lesion and is POD #2.     RECOMMENDATIONS:    -Await L5 biopsy pathology   -Recommend PT/OT  -At this point we will follow along peripherally and will be available to answer any questions       Umesh Olmedo, APRN

## 2019-08-28 NOTE — PROGRESS NOTES
mets: CT C/A/P unrevealing as to a source. Dr Jerson King did a bone bx and BMBx. Await the bx results. Dr Yogi Brannon is helping with metastatic work-up. 2. Neck and low back pain, severe, requiring IV pain medicines: A little better. 3. Intractable N&V: A little better. 4. HTN: Adjust the medicines. 5. ESRD on HD: Dr Michael Miles is seeing and helping with this patient. She is getting her routine HD. 6. DM, type 2 with insulin requirement: Continue as is. 7. Neuropathy & nephropathy due the DM: Stable. Advance Directive: Full Code  DVT prophylaxis with enoxaparin 30 mg sub-Q daily. Discharge planning: ? Active Problems:    Intractable pain    Distant metastasis staging category M1b involving bone (HCC)    Liver lesion    Lytic bone lesions on xray  Resolved Problems:    * No resolved hospital problems.  Shannon Reis MD

## 2019-08-29 NOTE — PROGRESS NOTES
kidney disease (CKD), stage IV (severe) (HCC)     Chronic kidney disease (CKD), stage IV (severe) (HCC)     Closed compression fracture of first lumbar vertebra (HCC) 2017    Closed compression fracture of first lumbar vertebra (HCC) 2017    Colon polyps     Coronary artery disease (CAD) excluded 2019    Diabetic peripheral neuropathy associated with type 2 diabetes mellitus ([facility] [address] 2019    Hemodialysis patient ([facility] [address]     dialysis mon, wed, friday outpt dialysis at [facility]    History of blood transfusion     Hypertension     Mixed hyperlipidemia 2019    Osteoarthritis     Palliative care patient 2018    Type II or unspecified type diabetes mellitus without mention of complication, not stated as uncontrolled     UTI (urinary tract infection)     Gangrenous UTI with gas in urinary tract       Past Surgical History:  Past Surgical History:   Procedure Laterality Date    APPENDECTOMY       SECTION      x 2    CHOLECYSTECTOMY      COLONOSCOPY  09    Dr Deepali Fajardo (LOWER) N/A 2016    ERCP/EUS-Dr BEKA Sutton-w/placement of a 10 Irish x7 cm temporary plastic biliary stent-Ampullary stenosis, mild dilation of the cbd w/questionable calcification vs stone, small periampullary diverticulum, removal of biliary sludge    ENDOSCOPY, COLON, DIAGNOSTIC      ERCP  2016    ERCP/EUS-Dr BEKA Sutton-w/placement of a 10 Irish x7 cm temporary plastic biliary stent-Ampullary stenosis, mild dilation of the cbd w/questionable calcification vs stone, small periampullary diverticulum, removal of biliary sludge    ERCP N/A 2017    Dr BEKA Sutton-Successful removal of indwelling biliary stent, no evidence of residual choledocholithiasis or common biliary stricture     EYE SURGERY      cataract    FEMUR FRACTURE SURGERY Left 2016    SHORT TFN INTERTROCHAN FRACTURE performed by Renetta Simmons MD at [address] [place] [address]      femur fracture the pedicle. There is moderate expansion of the vertebral body. There is complete occlusion of the anterior lateral wall suggesting a pathological fracture. No displacement. No obvious extension of the mass into the spinal canal. There is mild anterolisthesis of C2 over C3. The remaining vertebral body alignments are normal. Chronic degenerative changes are seen in the form of facet arthropathy and anterior and posterior osteophytes most pronounced at C5-C6. There is bilateral neural foraminal stenosis at C5-C6. No significant spinal canal stenosis at any level. The prevertebral soft tissues are normal. There is calcification of the nuchal ligament opposite and posterior to the spinous processes of C5 and C6. The included lung apices show a tiny noncalcified nodule in the left upper lobe anteriorly, image #57 in axial plane. There are interstitial changes and groundglass opacities in the upper lungs bilaterally more so on the left upper lobe posteriorly. An accessory azygous fissure is seen. Right internal jugular venous catheter should is seen in place. An osteolytic expansile lesion involving the left side of the vertebral body C3 extending into the pedicle. Suspect a pathological fracture. No displacement. No obvious extension into the spinal canal. Further evaluation with MR imaging of the cervical spine may be obtained. Cervical spondylosis. Bilateral neural foraminal stenosis at C5-6. No spinal stenosis at any level. A tiny noncalcified nodule in the left upper lobe. This is suboptimally evaluated in this study. Further follow-up with CT scan of the chest may be obtained. The above study was initially reviewed and reported by [facility]. I do not find any discrepancies.  Signed by Dr Vielka Golden on 8/23/2019 8:18 AM    Ct Thoracic Spine Wo Contrast    Result Date: 8/24/2019  EXAMINATION: CT THORACIC SPINE WO CONTRAST 8/24/2019 7:56 AM HISTORY: CT thoracic spine without contrast 8/24/2019 12:45 AM HISTORY: without the use of contrast. FINDINGS: Alignment: There are presumed to be 5 lumbar-type vertebrae, with the most inferior being labeled as L5. Normal lumbar lordosis is maintained. There is no evidence of listhesis or subluxation. Marrow signal: No evidence of kyphoplasty at T11 L1 and L2 is noted. The inferior endplate of L4 there is subtle signal alteration suspicious for marrow replacement. The anterior left centrum of L5 demonstrates marrow replacement consistent with metastatic disease. . Cord/Canal: The conus medullaris terminates at the level of L1. The spinal cord is normal in signal and morphology. Soft tissues: The surrounding soft tissues are unremarkable. Levels: L1-L2: Kyphoplasty is are present at L1 and L2. There is mild retropulsion of the posterior superior endplate of L2 into the lumbar canal. There is moderate central canal stenosis. Neural foramen are patent. L2-L3: Facet joints are visualized. There is mild hypertrophy the left neural foramen and right neural foramen are patent. . L3-L4: Mild hypertrophy of the ligamentum flavum is noted however the lateral recesses are patent there is minimal central canal encroachment. Freestone Dials L4-L5: Facet joint hypertrophy is present. There is moderate encroachment on the left neural foramen right neural foramen also demonstrates marked moderate encroachment by neural foraminal hypertrophy. Freestone Dials L5-S1: Small cyst posterior disc osteophyte complex is present. The left neural foramen is compromised by disc and facet joint. The right neural foramen demonstrates moderate stenosis due to a right lateral disc and facet joint hypertrophy. .     1. Definite marrow replacement at L5 probably metastatic disease.  There may also be marrow replacement inferior L4. 2. Postsurgical and degenerative spondylosis as described above Signed by Dr Araceli Hirsch on 8/25/2019 4:18 PM    Ct Abdomen Pelvis W Iv Contrast Additional Contrast? Oral    Result Date: 8/24/2019  EXAMINATION: CT ABDOMEN

## 2019-08-29 NOTE — PROGRESS NOTES
immunoglobulins, free light chains and beta-2 micro globin-pending  · MRI of the lumbar spine on 8/25/2019 documented definite marrow replacement at L5 and may also be marrow replacement inferior L4.   · MRI of the cervical spine on 8/25/2019 documented 2 cm marrow replacement left side of C3 consistent with a metastatic lesion. · Dr. Roby Saravia completed a biopsy of L5 vertebrae on 8/26/2019-pathology negative for evidence of malignancy  · Elevated CA 19-9 of 91 on 1/11/2017 and was elevated at 153 on 11/2/2016  · AFP 1.9 on 8/25/2019    Myeloma studies in process, requested 8/25/2019  · Beta-2 micrglobulin 17.1  · Kappa light chain 10.83  · Kappa/lambda ratio 1.57  · Lambda light chain 6.91  · SPEP-SusSuspicious band in the increased alpha-2 region, which may be beta-lipoprotein (LDL).  JAMESON shows a very faint band in IgM lambda suggestive of a specific immune response or an   early monoclonal protein.  Close clinical correlation with JAMESON follow-up is suggested. Neck/Back pain secondary to lesions  · Increase fentanyl patch 25 mcg/24hr   · Continue Dilaudid    Suspicious hepatic lesion  · May need further workup with outpatient PET or MRI liver  · LFTs within normal limits    Nausea secondary to pain/pain medications  · Continue Zofran to 8 mg IV q 6 hr  · Continue Phenergan to 12.5 mg IV q 6 hr    Chronic kidney disease stage V on HD  · Dr. Farooq Stoddard following  · Creatinine 3.9/GFR 12 today, 8/29/2019    Macrocytic anemia of chronic disease  · Hemoglobin 9.9 today, 8/29/2019  · .7    Labs on 8/24/2019  Ferritin 638.8  Iron 107  TIBC 274  Iron saturation 39%    # Leukocytosis with neutrophilia -improving  · WBC 8.1 today 8/29/2019  · ANC 7.2  · Lymphocytes 8.8%  · Afebrile    # Diabetes mellitus type 2-attending managing. Not controlled    #Headaches/neck pain- neuropathic ??   · May need a MRI brain if persistent despite negative CT w/o contrast      Continue current pain and antimetic medication

## 2019-08-29 NOTE — PROGRESS NOTES
Nutrition Assessment    Type and Reason for Visit: Reassess    Nutrition Recommendations: Continue current POC    Nutrition Assessment: Pt remains nutritionally compromised d/t labs, decreased po intake, losses from dialysis. A1C obtained--5.9. Has lost some weight with dialysis    Malnutrition Assessment:  · Malnutrition Status: At risk for malnutrition  · Context: Acute illness or injury  · Findings of the 6 clinical characteristics of malnutrition (Minimum of 2 out of 6 clinical characteristics is required to make the diagnosis of moderate or severe Protein Calorie Malnutrition based on AND/ASPEN Guidelines):  1. Energy Intake-Less than or equal to 50% of estimated energy requirement, Greater than or equal to 5 days    2. Weight Loss-2% loss or greater, in 3 months  3. Fat Loss-No significant subcutaneous fat loss,    4. Muscle Loss-No significant muscle mass loss,    5. Fluid Accumulation-Mild fluid accumulation, Extremities  6.  Strength-Not measured    Nutrition Risk Level:  Moderate    Nutrient Needs:  · Estimated Daily Total Kcal: 0502-9415 kcals/day  · Estimated Daily Protein (g): 73-92 g/PRO/day  · Estimated Daily Total Fluid (ml/day):   1500-1830ml    Nutrition Diagnosis:   · Problem: Altered nutrition-related lab values  · Etiology: related to Endocrine dysfunction     Signs and symptoms:  as evidenced by Lab values    Objective Information:  · Nutrition-Focused Physical Findings:    · Wound Type: Surgical Wound  · Current Nutrition Therapies:  · Oral Diet Orders: Carb Control 4 Carbs/Meal   · Oral Diet intake: 1-25%  · Oral Nutrition Supplement (ONS) Orders: None  · ONS intake: Refused     · Anthropometric Measures:  · Ht: 5' 4\" (162.6 cm)   · Current Body Wt: 174 lb 3 oz (79 kg)  · Admission Body Wt: 178 lb (80.7 kg)  · Usual Body Wt: 178 lb (80.7 kg)  · % Weight Change:  ,  2.1% weight loss in 3 months  · Ideal Body Wt: 120 lb (54.4 kg), % Ideal Body 145.2%  · Adjusted Body Wt: 135 lb (61.2

## 2019-08-30 NOTE — PROGRESS NOTES
osseous structures and overlying soft tissues of the skull and face are intact. Changes of aging with no acute intracranial abnormality Signed by Dr Ebonie Toledo on 8/23/2019 7:14 AM    Ct Chest W Contrast    Result Date: 8/24/2019  EXAMINATION: CT CHEST W CONTRAST 8/24/2019 3:35 PM HISTORY: CT CHEST W CONTRAST 8/24/2019 8:30 AM HISTORY: Metastatic bone disease COMPARISON: None DLP: 1647 mGy cm TECHNIQUE: Serial helical tomographic images of the chest were acquired following the infusion of Isovue contrast intravenously. Bone and soft tissue algorithms were provided. FINDINGS: Neck base: The imaged portion of the neck and thyroid gland is unremarkable. Lungs: Dependent atelectasis is noted in the lungs. No pulmonary nodules are identified. . No pleural effusion is present. The trachea and bronchial tree are patent. Heart: The heart is normal in size. Coronary calcifications are present. Lemons Felty vessels: The great vessels are normal in caliber and are patent. The pulmonary arteries are patent within limits of this study and are normal in caliber. Lymph nodes: No significant hilar, mediastinal or axillary lymphadenopathy is appreciated. Skeletal and soft tissues: Evidence of kyphoplasty at T11 L1 and L2 is noted. Betha Lute Upper abdomen: The abdomen will be dictated under a separate report. 1. No acute cardiopulmonary process. 2. Kyphoplasty said T11, L1 and L2. Signed by Dr Ebonie Toledo on 8/24/2019 3:39 PM    Ct Cervical Spine Wo Contrast    Result Date: 8/23/2019  Examination. CT CERVICAL SPINE WO CONTRAST History: Trauma and neck pain. DLP: 549 mGycm. The CT scan of the cervical spine is performed without intravenous or intrathecal contrast enhancement. The images are acquired in axial plane with subsequent reconstruction in coronal and sagittal planes. There is no previous study for comparison. There is an area of osteolysis involving the left lung The vertebral body C3 partly extending into the pedicle.  There is DLP: 1035 mGy cm TECHNIQUE: Serial helical tomographic images of the thoracic spine were obtained without the use of intravenous contrast. Multiplanar reformatted images were provided for review. FINDINGS: Evidence of kyphoplasty at T11 is noted. . Vertebral body heights and alignment are well maintained. Degenerative changes noted in the disc spaces. . There is no bony narrowing of the spinal canal or neural foramina. There is no evidence of facet lock or perch. The posterior elements are intact. The visualized soft tissues are unremarkable. Evidence of kyphoplasty at T11. No acute compression deformities identified. Signed by Dr Claudio Perales on 8/24/2019 7:58 AM    Ct Lumbar Spine Wo Contrast    Result Date: 8/24/2019  EXAMINATION: CT LUMBAR SPINE WO CONTRAST 8/24/2019 7:52 AM HISTORY: CT lumbar spine without contrast 8/24/2019 12:45 AM History: Pain Comparison: February 2017 DLP: 867 mGy cm Technique: Serial helical tomographic images of the lumbar spine were obtained without the use of intravenous contrast. Additionally, sagittal and coronal reformatted images were provided for review. Findings: Old compression fractures of L1 and L2 are noted evidence of kyphoplasty is present. There is mild retropulsion of the posterior superior endplate of L2 into the lumbar canal.. Vertebral body heights are well maintained. Vacuum phenomenon is noted at the L4-5 T12-L1 and L1-L2 levels. Also L5-S1. . A lytic lesion in the anterior centrum of L5 is noted suspicious for metastatic disease. . The posterior elements are intact. The visualized soft tissues are unremarkable. Impression: 1. Lytic lesion at L5 is likely representing metastatic disease.  2. Evidence of kyphoplasty at T11, L1 and L2. 3. Retropulsion of superior endplates of L1 and L2) lumbar canal. Signed by Dr Claudio Perales on 8/24/2019 7:56 AM    Mri Cervical Spine Wo Contrast    Result Date: 8/25/2019  EXAMINATION: MRI CERVICAL SPINE WO CONTRAST 8/25/2019 4:59

## 2019-08-30 NOTE — PROGRESS NOTES
Changes of aging with no acute intracranial abnormality Signed by Dr Bernard Ramirez on 8/23/2019 7:14 AM    Ct Chest W Contrast    Result Date: 8/24/2019  EXAMINATION: CT CHEST W CONTRAST 8/24/2019 3:35 PM HISTORY: CT CHEST W CONTRAST 8/24/2019 8:30 AM HISTORY: Metastatic bone disease COMPARISON: None DLP: 1647 mGy cm TECHNIQUE: Serial helical tomographic images of the chest were acquired following the infusion of Isovue contrast intravenously. Bone and soft tissue algorithms were provided. FINDINGS: Neck base: The imaged portion of the neck and thyroid gland is unremarkable. Lungs: Dependent atelectasis is noted in the lungs. No pulmonary nodules are identified. . No pleural effusion is present. The trachea and bronchial tree are patent. Heart: The heart is normal in size. Coronary calcifications are present. [place] vessels: The great vessels are normal in caliber and are patent. The pulmonary arteries are patent within limits of this study and are normal in caliber. Lymph nodes: No significant hilar, mediastinal or axillary lymphadenopathy is appreciated. Skeletal and soft tissues: Evidence of kyphoplasty at T11 L1 and L2 is noted. Lavada Saucer Upper abdomen: The abdomen will be dictated under a separate report. 1. No acute cardiopulmonary process. 2. Kyphoplasty said T11, L1 and L2. Signed by Dr Bernard Ramirez on 8/24/2019 3:39 PM    Ct Cervical Spine Wo Contrast    Result Date: 8/23/2019  Examination. CT CERVICAL SPINE WO CONTRAST History: Trauma and neck pain. DLP: 549 mGycm. The CT scan of the cervical spine is performed without intravenous or intrathecal contrast enhancement. The images are acquired in axial plane with subsequent reconstruction in coronal and sagittal planes. There is no previous study for comparison. There is an area of osteolysis involving the left lung The vertebral body C3 partly extending into the pedicle. There is moderate expansion of the vertebral body.  There is complete occlusion of the spine on 8/25/2019 documented definite marrow replacement at L5 and may also be marrow replacement inferior L4.   · MRI of the cervical spine on 8/25/2019 documented 2 cm marrow replacement left side of C3 consistent with a metastatic lesion. · Dr. Ricky Castillo completed a biopsy of L5 vertebrae on 8/26/2019-pathology negative for evidence of malignancy  · Elevated CA 19-9 of 91 on 1/11/2017 and was elevated at 153 on 11/2/2016  · AFP 1.9 on 8/25/2019      Dr. Ricky Castillo documented C3 lesion is in a very difficult location for surgical removal/biopsy due to increased risk of neurological complications to include ischemic stroke. Plans on following closely and anticipating a repeat MRI of the cervical spine in 6 to 8 weeks as outpatient. Myeloma studies in process, requested 8/25/2019  · Beta-2 micrglobulin 17.1  · Kappa light chain 10.83  · Kappa/lambda ratio 1.57  · Lambda light chain 6.91  · SPEP-SusSuspicious band in the increased alpha-2 region, which may be beta-lipoprotein (LDL).  JAMESON shows a very faint band in IgM lambda suggestive of a specific immune response or an early monoclonal protein.  Close clinical correlation with JAMESON follow-up is suggested. Will repeat myeloma studies in the outpatient setting.     Neck/Back pain secondary to lesions-overall controlled  · Continue fentanyl patch 25 mcg/24hr   · Continue Percocet 7.5/325 mg 1 to 2 tablets every 6 hours as needed pain    Suspicious hepatic lesion  · May need further workup with outpatient PET or MRI liver  · LFTs within normal limits    Nausea secondary to pain/pain medications  · Continue Zofran to 8 mg IV q 6 hr  · Continue Phenergan to 12.5 mg IV q 6 hr    Chronic kidney disease stage V on HD  · Dr. Coreen Henson following  · Creatinine 4.9/GFR 9 today, 8/30/2019    Macrocytic anemia of chronic disease  · Hemoglobin 8.5 today, 8/30/2019  · .8    Labs on 8/24/2019  Ferritin 638.8  Iron 107  TIBC 274  Iron saturation 39%    # Leukocytosis with neutrophilia -improving  · WBC 7.4 today 8/30/2019  · ANC 5.6  · Lymphocytes 8.5%  · Afebrile    # Diabetes mellitus type 2-attending managing. Not controlled    #Headaches/neck pain- neuropathic ?? · May need a MRI brain if persistent despite negative CT w/o contrast      Okay from an oncology standpoint to discharge when appropriate with others. Oncology will provide prescription for Duragesic and Norco at discharge. Follow-up with Dr. Tahmina Lundy as recommended follow-up MRI of cervical spine  Follow up appointment in clinic with Chris Bustamante on 9/27/2019 at [facility], [address], [place] [address]  8/30/2019  [id number]      Physician's attestation/substantial contribution:  I independently performed an evaluation on Devaughn Morales. I have reviewed relevant medical information/data to include but not limited to medication list, relevant appropriate labs and imaging when applicable. I reviewed other physician's notes, ancillary services and nurses assessment. I have reviewed the above documentation completed by the Nurse Practitioner/Physician Assistant. Please see my additional contributions to the history of present illness, physical examination, and assessment/medical decision-making that reflect my findings and impressions. I discussed essential elements of the care plan with the NP/PA and the patient as well. I answered all the questions to the patient's satisfaction. I concur with above stated. Subjective-neck pain is improved. Objective- full range of motion of the neck. Lungs clear. Regular rate and rhythm. Assessment/plan: Suspicious C3 lytic lesion-I discussed this with neurosurgery. It is a concern that this could also represent a hemangioma. I agree with short imaging surveillance. We will schedule follow-up appointment in clinic.         Sarthak Castillo MD

## 2019-08-31 NOTE — PROGRESS NOTES
tablet Oral Q6H PRN Jimbo Sahu MD   2 tablet at 08/29/19 1726    fentaNYL (DURAGESIC) 25 MCG/HR 1 patch  1 patch Transdermal Q72H CATHY Roberts   1 patch at 08/30/19 0828    promethazine (PHENERGAN) injection 12.5 mg  12.5 mg Intramuscular Q6H PRN Helane Deep, DO   12.5 mg at 08/30/19 2216    ondansetron (ZOFRAN) injection 8 mg  8 mg Intravenous Q6H PRN Helane Deep, DO   8 mg at 08/29/19 2055    hydrALAZINE (APRESOLINE) injection 10 mg  10 mg Intravenous Q4H PRN Helane Deep, DO        sodium bicarbonate tablet 325 mg  325 mg Oral BID Helane Deep, DO   325 mg at 08/31/19 0119    NIFEdipine (PROCARDIA XL) extended release tablet 30 mg  30 mg Oral BID Helane Deep, DO   30 mg at 08/31/19 9928    atorvastatin (LIPITOR) tablet 40 mg  40 mg Oral Nightly Helane Deep, DO   40 mg at 08/30/19 2009    sodium chloride flush 0.9 % injection 10 mL  10 mL Intravenous 2 times per day Helane Deep, DO   10 mL at 08/31/19 0830    sodium chloride flush 0.9 % injection 10 mL  10 mL Intravenous PRN Helane Deep, DO        acetaminophen (TYLENOL) tablet 650 mg  650 mg Oral Q4H PRN Helane Deep, DO        Ferric Citrate TABS 210 mg  1 tablet Oral Daily Helane Deep, DO   210 mg at 08/31/19 0826    glucose (GLUTOSE) 40 % oral gel 15 g  15 g Oral PRN Helane Deep, DO        dextrose 50 % IV solution  12.5 g Intravenous PRN Helane Deep, DO        glucagon (rDNA) injection 1 mg  1 mg Intramuscular PRN Helane Deep, DO        dextrose 5 % solution  100 mL/hr Intravenous PRN Helane Deep, DO        insulin lispro (HUMALOG) injection vial 0-12 Units  0-12 Units Subcutaneous TID WC Helane Deep, DO   4 Units at 08/31/19 0827    insulin lispro (HUMALOG) injection vial 0-6 Units  0-6 Units Subcutaneous Nightly Helane Deep, DO   4 Units at 08/30/19 2206    famotidine (PEPCID) tablet 20 mg  20 mg Oral Daily Helane Deep, DO   20 mg at 08/31/19 2174     Current Outpatient Medications Medication Sig Dispense Refill    hydrALAZINE (APRESOLINE) 25 MG tablet Take 1 tablet by mouth 2 times daily 60 tablet 5    bisacodyl (DULCOLAX) 10 MG suppository Place 1 suppository rectally daily as needed for Constipation      sennosides-docusate sodium (SENOKOT-S) 8.6-50 MG tablet Take 1 tablet by mouth 2 times daily      HYDROcodone-acetaminophen (NORCO) 5-325 MG per tablet Take 1 tablet by mouth every 6 hours as needed for Pain.  famotidine (PEPCID) 20 MG tablet famotidine 20 mg tablet      tiZANidine (ZANAFLEX) 2 MG tablet tizanidine 2 mg tablet      aspirin 81 MG tablet Take 81 mg by mouth daily      atorvastatin (LIPITOR) 40 MG tablet Take 1 tablet by mouth nightly  3    carvedilol (COREG) 6.25 MG tablet Take 1 tablet by mouth 2 times daily      clopidogrel (PLAVIX) 75 MG tablet Take 1 tablet by mouth daily  3    sodium bicarbonate 325 MG tablet Take 1 tablet by mouth 2 times daily 60 tablet 11    NIFEdipine (ADALAT CC) 30 MG extended release tablet Take 1 tablet by mouth 2 times daily 60 tablet 11    iron polysaccharides (NIFEREX) 150 MG capsule Take 1 capsule by mouth daily 30 capsule 3    vitamin D (ERGOCALCIFEROL) 61951 units capsule Take 1 capsule by mouth once a week 4 capsule 11    insulin glargine (BASAGLAR KWIKPEN) 100 UNIT/ML injection pen Inject 30 Units into the skin Daily      insulin aspart (NOVOLOG FLEXPEN) 100 UNIT/ML injection pen Inject 0-35 Units into the skin 3 times daily as needed for High Blood Sugar Per home sliding scale      fentaNYL (DURAGESIC) 25 MCG/HR Place 1 patch onto the skin every 3 days for 30 days.  Intended supply: 30 days 10 patch 0       Past Medical History:  Past Medical History:   Diagnosis Date    Anemia     Blood circulation, collateral     Broken hip (HCC)     L hip    CHF (congestive heart failure) (HCC)     Chronic kidney disease 9/27/2018    Chronic kidney disease (CKD), stage IV (severe) (HCC)     Chronic kidney disease (CKD), Biopsy of L5 bone lesion performed by Rosamaria Jerome DO at [facility] COLONOSCOPY W/BIOPSY SINGLE/MULTIPLE N/A 3/24/2017    Dr BEKA Sutton-Diverticular disease-Tubular AP (-) dysplasia x 5--3 yr recall    UPPER GASTROINTESTINAL ENDOSCOPY N/A 10/14/2016    Dr Simons-Hemorrhagic gastritis    VASCULAR SURGERY  03/06/2019    SJSUltrasound guided cannulation of right internal jugular vein. Placement of right internal jugular vein tunneled dialysis catheter bard glidepath 19 cm tip to cuff.        Family History  Family History   Problem Relation Age of Onset    Arthritis Mother     Cancer Mother     High Cholesterol Mother     Arthritis Father     Diabetes Father     High Cholesterol Father     Liver Cancer Father     Arthritis Sister     Diabetes Sister     High Blood Pressure Sister     High Cholesterol Sister     Arthritis Brother     Diabetes Brother     High Blood Pressure Brother     High Cholesterol Brother     Vision Loss Brother     Colon Cancer Neg Hx     Colon Polyps Neg Hx     Esophageal Cancer Neg Hx     Liver Disease Neg Hx     Stomach Cancer Neg Hx     Rectal Cancer Neg Hx        Social History  Social History     Socioeconomic History    Marital status:      Spouse name: Quinton Newton Number of children: 2    Years of education: 15    Highest education level: Not on file   Occupational History    Not on file   Social Needs    Financial resource strain: Not on file    Food insecurity:     Worry: Not on file     Inability: Not on file   [employer] needs:     Medical: Not on file     Non-medical: Not on file   Tobacco Use    Smoking status: Never Smoker    Smokeless tobacco: Never Used   Substance and Sexual Activity    Alcohol use: No    Drug use: No    Sexual activity: Not Currently     Partners: Male   Lifestyle    Physical activity:     Days per week: Not on file     Minutes per session: Not on file    Stress: Not on file   Relationships    Social intact. The visualized soft tissues are unremarkable. Evidence of kyphoplasty at T11. No acute compression deformities identified. Signed by Dr Hayley Enriquez on 8/24/2019 7:58 AM    Ct Lumbar Spine Wo Contrast    Result Date: 8/24/2019  EXAMINATION: CT LUMBAR SPINE WO CONTRAST 8/24/2019 7:52 AM HISTORY: CT lumbar spine without contrast 8/24/2019 12:45 AM History: Pain Comparison: February 2017 DLP: 867 mGy cm Technique: Serial helical tomographic images of the lumbar spine were obtained without the use of intravenous contrast. Additionally, sagittal and coronal reformatted images were provided for review. Findings: Old compression fractures of L1 and L2 are noted evidence of kyphoplasty is present. There is mild retropulsion of the posterior superior endplate of L2 into the lumbar canal.. Vertebral body heights are well maintained. Vacuum phenomenon is noted at the L4-5 T12-L1 and L1-L2 levels. Also L5-S1. . A lytic lesion in the anterior centrum of L5 is noted suspicious for metastatic disease. . The posterior elements are intact. The visualized soft tissues are unremarkable. Impression: 1. Lytic lesion at L5 is likely representing metastatic disease. 2. Evidence of kyphoplasty at T11, L1 and L2. 3. Retropulsion of superior endplates of L1 and L2) lumbar canal. Signed by Dr Hayley Enriquez on 8/24/2019 7:56 AM    Mri Cervical Spine Wo Contrast    Result Date: 8/25/2019  EXAMINATION: MRI CERVICAL SPINE WO CONTRAST 8/25/2019 4:59 PM HISTORY: MRI CERVICAL SPINE WO CONTRAST 8/25/2019 3:00 PM HISTORY: COMPARISON: None TECHNIQUE: Multiplanar, multisequence MRI of the cervical spine was obtained without contrast. FINDINGS: Imaged portions of the cerebellum and brainstem are unremarkable. Alignment: Normal cervical lordosis is maintained. There is no evidence of listhesis or subluxation. Marrow signal: There is abnormal marrow signal left side of the central of C3. This is consistent with metastatic disease.  This conus medullaris terminates at the level of L1. The spinal cord is normal in signal and morphology. Soft tissues: The surrounding soft tissues are unremarkable. Levels: L1-L2: Kyphoplasty is are present at L1 and L2. There is mild retropulsion of the posterior superior endplate of L2 into the lumbar canal. There is moderate central canal stenosis. Neural foramen are patent. L2-L3: Facet joints are visualized. There is mild hypertrophy the left neural foramen and right neural foramen are patent. . L3-L4: Mild hypertrophy of the ligamentum flavum is noted however the lateral recesses are patent there is minimal central canal encroachment. Annamary Ripa L4-L5: Facet joint hypertrophy is present. There is moderate encroachment on the left neural foramen right neural foramen also demonstrates marked moderate encroachment by neural foraminal hypertrophy. Annamary Ripa L5-S1: Small cyst posterior disc osteophyte complex is present. The left neural foramen is compromised by disc and facet joint. The right neural foramen demonstrates moderate stenosis due to a right lateral disc and facet joint hypertrophy. .     1. Definite marrow replacement at L5 probably metastatic disease. There may also be marrow replacement inferior L4. 2. Postsurgical and degenerative spondylosis as described above Signed by Dr Reid Ames on 8/25/2019 4:18 PM    Ct Abdomen Pelvis W Iv Contrast Additional Contrast? Oral    Result Date: 8/24/2019  EXAMINATION: CT ABDOMEN PELVIS W IV CONTRAST 8/24/2019 3:39 PM CT ABDOMEN AND PELVIS WITH CONTRAST 8/24/2019 8:30 AM HISTORY: Metastatic lytic lesion COMPARISON: March 25, 2017. DLP: 1647 mGy cm Automated exposure control was utilized to minimize patient radiation dose. TECHNIQUE: Following the oral ingestion and intravenous administration of contrast, helical CT tomographic images of the abdomen and pelvis were acquired. Coronal reformatted images were also provided for review.  FINDINGS: The lung bases and base of the heart are

## 2019-08-31 NOTE — PROGRESS NOTES
and plan to discharge tomorrow as she is not requiring IV pain meds since yesterday afternoon. I offered home health and she declines. 2. Neck and low back pain, severe, requiring IV pain medicines: A lot better. 3. Intractable N&V: Much better and she is eating better according to her  and the patient agrees. 4. Suspicious hepatic lesion: AFP is negative and CA 19-9 was elevated, but less than it had been in the past. Dr Katerina Mukherjee states he will eval further as an outpatient. 5. HTN: Adjust the medicines. 6. ESRD on HD: Dr Benjamin Bishop is seeing and helping with this patient. She is getting her routine HD. 7. Anemia of chronic disease: Stable and they follow this closely at HD. 8. DM, type 2 with insulin requirement: Continue as is. 9. Neuropathy & nephropathy due the DM: Stable. Advance Directive: Full Code  DVT prophylaxis with enoxaparin 30 mg sub-Q daily. Discharge planning: ? Active Problems:    Intractable pain    Distant metastasis staging category M1b involving bone (HCC)    Liver lesion    Lytic bone lesions on xray  Resolved Problems:    * No resolved hospital problems.  Yojana Romano MD

## 2019-09-04 NOTE — TELEPHONE ENCOUNTER
Ever Dears, pt's son called and states that Mrs. Corry Corbett has been vomiting for the past couple of days and is wondering if this could be a reaction to the Duragesic patch. It is due to come off today. I have recc. That they take it off and leave it off to see if this gets better. If it does, he will call back before the weekend to get a different long acting pain medication.

## 2019-09-09 NOTE — TELEPHONE ENCOUNTER
Pt's son called and states that they pulled the Duragesic patch off on Friday for vomiting and she has not vomited since then and has been able to eat. Spoke with Dr. Ector Morrell and she will continue taking her Percocet every 6hrs PRN and we will hold off on giving any long acting narcotics at this time. They will call back if needed.

## 2019-09-10 NOTE — ED PROVIDER NOTES
[facility] EMERGENCY DEPT  eMERGENCY dEPARTMENT eNCOUnter      Pt Name: Antonia Crockett  MRN: [id number]  [place] 1945  Date of evaluation: 9/10/2019  Provider: Ray Mccurdy MD    [address] [place] [address]       Chief Complaint   Patient presents with    Nausea & Vomiting         HISTORY OF PRESENT ILLNESS   (Location/Symptom, Timing/Onset,Context/Setting, Quality, Duration, Modifying Factors, Severity)  Note limiting factors. Antonia Crockett is a 69 y.o. female who presents to the emergency department for evaluation of abdominal pain with episodes of vomiting. Patient reports the symptoms began yesterday evening have been a moderate severity. She describes crampy, moderate severity pain across her lower abdomen. Denies any radiation of pain. Reports she is had multiple episodes of vomiting not really able to keep anything down. She had a recent inpatient hospitalization and underwent evaluation for an osteolytic lesion involving her cervical spine and lumbar spine. Apparently she underwent a biopsy of the lumbar spine lesion however the results were inconclusive. There is also an area noted involving the left lobe of the liver that was also concerning for metastatic disease. She has an appointment for a bone scan later this week and has been following with Dr. Bipin Espinal. Patient denies any fevers or chills at this time. There have really been no relieving factors for the symptoms. HPI    NursingNotes were reviewed. REVIEW OF SYSTEMS    (2-9 systems for level 4, 10 or more for level 5)     Review of Systems   Constitutional: Negative for chills and fever. Respiratory: Negative for shortness of breath. Cardiovascular: Negative for chest pain. Gastrointestinal: Positive for abdominal pain, nausea and vomiting. Negative for diarrhea. Genitourinary: Negative for dysuria. Neurological: Negative for dizziness and syncope. All other systems reviewed and are negative.            PAST MEDICALHISTORY     Past Social connections:     Talks on phone: None     Gets together: None     Attends Oriental orthodox service: None     Active member of club or organization: None     Attends meetings of clubs or organizations: None     Relationship status: None    Intimate partner violence:     Fear of current or ex partner: None     Emotionally abused: None     Physically abused: None     Forced sexual activity: None   Other Topics Concern    None   Social History Narrative    Born in [place]     46 years only marriage    She has one son and one daughter    Worked as a  presently retired    Education high school    Pentecostal venice none    Enjoys working with a genealogy and family trees also ceramics    Denies history of tobacco usage alcohol consumption or substance usage    Physically sedentary       [facility]    (up to 7 for level 4, 8 or more for level 5)     ED Triage Vitals [09/10/19 0216]   BP Temp Temp Source Pulse Resp SpO2 Height Weight   (!) 169/80 98.3 °F (36.8 °C) Oral 92 14 93 % 5' 4\" (1.626 m) 164 lb (74.4 kg)       Physical Exam   Constitutional: She is oriented to person, place, and time. She is cooperative. HENT:   Head: Atraumatic. Mouth/Throat: Oropharynx is clear and moist. Mucous membranes are not dry. No posterior oropharyngeal erythema. Eyes: Pupils are equal, round, and reactive to light. No scleral icterus. Neck: No tracheal deviation present. Cardiovascular: Normal rate, regular rhythm, normal heart sounds and intact distal pulses. No murmur heard. Pulmonary/Chest: Effort normal and breath sounds normal. No stridor. No respiratory distress. Abdominal: Soft. She exhibits no distension. There is tenderness (moderate, diffuse). There is no guarding. Musculoskeletal: She exhibits no edema. Neurological: She is alert and oriented to person, place, and time. She has normal strength. Skin: Capillary refill takes less than 2 seconds. No rash noted.  No pallor. Nursing note and vitals reviewed. DIAGNOSTIC RESULTS     EKG: All EKG's areinterpreted by the Emergency Department Physician who either signs or Co-signs this chart in the absence of a cardiologist.    Ros Chen: NSR @ 96    RADIOLOGY:  Non-plain film images such as CT, Ultrasound and MRI are read by the radiologist. Plain radiographic images are visualized and preliminarily interpreted bythe emergency physician with the below findings:    CT ABDOMEN & PELVIS Without Contrast:    Comparison: 8/24/2019. Cirrhosis. Geographic area of decreased aeration is seen predominantly involving the left hepatic lobe, measuring up to 14.6 cm in greatest axial dimension, interval worsening since prior study, with associated adjacent perihepatic free fluid, new since prior study. Findings concerning for infiltrative neoplasm, such as [address] Hepatic infarct would be included in the differential, although less likely. Status post cystectomy. Calcified splenic granulomas. Remaining solid organs are grossly unchanged from prior study without evidence of acute pathology. Normal appendix. Colonic diverticulosis. Early acute diverticulitis involving the proximal sigmoid colon is suspected. Correlate clinically. No free air. No definitive fluid collection. Small hiatal hernia. No other significant interval change.         CT ABDOMEN PELVIS WO CONTRAST Additional Contrast? None    (Results Pending)           LABS:  Labs Reviewed   CBC WITH AUTO DIFFERENTIAL - Abnormal; Notable for the following components:       Result Value    RBC 3.30 (*)     Hemoglobin 10.4 (*)     Hematocrit 33.3 (*)     .9 (*)     MCH 31.5 (*)     MCHC 31.2 (*)     Neutrophils % 78.4 (*)     Lymphocytes % 11.7 (*)     Lymphocytes Absolute 0.8 (*)     All other components within normal limits   COMPREHENSIVE METABOLIC PANEL - Abnormal; Notable for the following components:    Chloride 94 (*)     CO2 34 (*)     BUN 6 (*)     CREATININE

## 2019-09-10 NOTE — CONSULTS
awareness of illness:   [] Terminal [] Life-Threatening [] Serious [] Nonlife-threatening [] Not serious   [x] Not discussed    Diet NPO Effective Now  Diet NPO, After Midnight    Medications:   sodium chloride 50 mL/hr at 09/10/19 0809    dextrose       Current Facility-Administered Medications   Medication Dose Route Frequency Provider Last Rate Last Dose    metronidazole (FLAGYL) 500 mg in NaCl 100 mL IVPB premix  500 mg Intravenous Once Roberta Anaya MD        sodium chloride flush 0.9 % injection 10 mL  10 mL Intravenous 2 times per day Gabe Guan MD        sodium chloride flush 0.9 % injection 10 mL  10 mL Intravenous PRN Gabe Guan MD        acetaminophen (TYLENOL) tablet 650 mg  650 mg Oral Q4H PRN Gabe Guan MD        enoxaparin (LOVENOX) injection 30 mg  30 mg Subcutaneous Once Gabe Guan MD        morphine injection 2 mg  2 mg Intravenous Q2H PRN Gabe Guan MD   2 mg at 09/10/19 0809    aspirin chewable tablet 81 mg  81 mg Oral Daily Gabe Guan MD   Stopped at 09/10/19 0814    atorvastatin (LIPITOR) tablet 40 mg  40 mg Oral Nightly Gabe Guan MD        bisacodyl (DULCOLAX) suppository 10 mg  10 mg Rectal Daily PRN Gabe Guan MD        carvedilol (COREG) tablet 6.25 mg  6.25 mg Oral BID Gabe Guan MD   Stopped at 09/10/19 0814    clopidogrel (PLAVIX) tablet 75 mg  75 mg Oral Daily Gabe Guan MD   Stopped at 09/10/19 0815    famotidine (PEPCID) tablet 20 mg  20 mg Oral Daily Gabe Guan MD   Stopped at 09/10/19 0815    hydrALAZINE (APRESOLINE) tablet 25 mg  25 mg Oral BID Gabe Guan MD   Stopped at 09/10/19 0815    NIFEdipine (PROCARDIA XL) extended release tablet 30 mg  30 mg Oral BID Gabe Guan MD   Stopped at 09/10/19 0815    oxyCODONE-acetaminophen (PERCOCET) 7.5-325 MG per tablet 1 tablet  1 tablet Oral Q4H PRN Gabe Guan MD        sennosides-docusate sodium (SENOKOT-S) 8.6-50 MG tablet 1 tablet  1 Results   Component Value Date    TRIG 53 04/23/2019    HDL 59 04/23/2019    LDLCALC 45 04/23/2019     TSH:    Lab Results   Component Value Date    TSH 1.87 09/14/2016     Troponin T: No results for input(s): TROPONINI in the last 72 hours. INR: No results for input(s): INR in the last 72 hours. Objective:   Vitals: BP (!) 145/65   Pulse 98   Temp 97.4 °F (36.3 °C) (Temporal)   Resp 16   Ht 5' 4\" (1.626 m)   Wt 164 lb (74.4 kg)   SpO2 97%   BMI 28.15 kg/m²   24HR INTAKE/OUTPUT:  No intake or output data in the 24 hours ending 09/10/19 0953  Physical Exam   Constitutional: She is oriented to person, place, and time. She appears well-developed and well-nourished. Appears ill   Eyes: Pupils are equal, round, and reactive to light. Conjunctivae are normal. No scleral icterus. Neck: Normal range of motion. Neck supple. Cardiovascular: Normal rate and regular rhythm. Pulmonary/Chest: Effort normal and breath sounds normal. She has no wheezes. She has no rales. Abdominal: Bowel sounds are normal. There is tenderness. Musculoskeletal: Normal range of motion. She exhibits no edema, tenderness or deformity. Neurological: She is alert and oriented to person, place, and time. Skin: Skin is warm and dry. Psychiatric: She has a normal mood and affect. Her behavior is normal. Judgment and thought content normal.       Assessment and Plan: Active Problems:    Abdominal pain  Resolved Problems:    * No resolved hospital problems. *      Visit Summary: I saw the patient at the bedside with no family present. I discussed palliative medicine services with the patient, patient is very nauseated and complaining of back pain during my visit. Patient had a few episodes of emesis during my visit, was difficult to discuss her health issues or goals of care due to her discomfort.   I did request medication for pain and nausea on the patient's behalf per her request.  Due to the patient's current n/v, I was

## 2019-09-10 NOTE — ED NOTES
Dr JoseM Gama paged @ 0717 [facility]  09/10/19 Daniel Matters returned call @ 0500     Scott Randhawa  09/10/19 2240

## 2019-09-10 NOTE — CONSULTS
Intravenous PRN Fer Baptiste MD        glucagon (rDNA) injection 1 mg  1 mg Intramuscular PRN Fer Baptiste MD        dextrose 5 % solution  100 mL/hr Intravenous PRN Fer Baptiste MD        cefepime (MAXIPIME) 1 g in sodium chloride (PF) 10 mL IV syringe  1 g Intravenous Q24H Fer Baptiste MD   1 g at 09/10/19 7388       Past Medical History:  Past Medical History:   Diagnosis Date    Anemia     Blood circulation, collateral     Broken hip (HCC)     L hip    CHF (congestive heart failure) (HCC)     Chronic kidney disease 2018    Chronic kidney disease (CKD), stage IV (severe) (HCC)     Chronic kidney disease (CKD), stage IV (severe) (HCC)     Closed compression fracture of first lumbar vertebra ([facility] 2017    Closed compression fracture of first lumbar vertebra ([facility] 2017    Colon polyps     Coronary artery disease (CAD) excluded 2019    Diabetic peripheral neuropathy associated with type 2 diabetes mellitus ([facility] [address] [facility] 2019    Hemodialysis patient ([facility] [address] [facility]     dialysis mon, wed, friday outpt dialysis [facility]    History of blood transfusion     Hypertension     Mixed hyperlipidemia 2019    Osteoarthritis     Palliative care patient 2018    Type II or unspecified type diabetes mellitus without mention of complication, not stated as uncontrolled     UTI (urinary tract infection)     Gangrenous UTI with gas in urinary tract       Past Surgical History:  Past Surgical History:   Procedure Laterality Date    APPENDECTOMY       SECTION      x 2    CHOLECYSTECTOMY      COLONOSCOPY  09    Dr Jonette Fothergill ([facility]) N/A 2016    ERCP/EUS-Dr BEKA Sutton-w/placement of a 10 [place] x7 cm temporary plastic biliary stent-Ampullary stenosis, mild dilation of the cbd w/questionable calcification vs stone, small periampullary diverticulum, removal of biliary sludge    ENDOSCOPY, COLON, DIAGNOSTIC      ERCP  2016 ERCP/EUS-Dr BEKA Sutton-w/placement of a 10 Czech x7 cm temporary plastic biliary stent-Ampullary stenosis, mild dilation of the cbd w/questionable calcification vs stone, small periampullary diverticulum, removal of biliary sludge    ERCP N/A 1/11/2017    Dr BEKA Sutton-Successful removal of indwelling biliary stent, no evidence of residual choledocholithiasis or common biliary stricture     EYE SURGERY      cataract    FEMUR FRACTURE SURGERY Left 9/9/2016    SHORT TFN INTERTROCHAN FRACTURE performed by Jeannette Meza MD at [address] [place] [address]      femur fracture surgery    HIP FRACTURE SURGERY Left     pinning    KYPHOSIS SURGERY N/A 8/26/2019    Biopsy of L5 bone lesion performed by Bernabe Russo DO at [facility] COLONOSCOPY W/BIOPSY SINGLE/MULTIPLE N/A 3/24/2017    Dr BEKA Sutton-Diverticular disease-Tubular AP (-) dysplasia x 5--3 yr recall    UPPER GASTROINTESTINAL ENDOSCOPY N/A 10/14/2016    Dr Simons-Hemorrhagic gastritis    VASCULAR SURGERY  03/06/2019    SJSUltrasound guided cannulation of right internal jugular vein. Placement of right internal jugular vein tunneled dialysis catheter bard glidepath 19 cm tip to cuff.        Family History  Family History   Problem Relation Age of Onset    Arthritis Mother     Cancer Mother     High Cholesterol Mother     Arthritis Father     Diabetes Father     High Cholesterol Father     Liver Cancer Father     Arthritis Sister     Diabetes Sister     High Blood Pressure Sister     High Cholesterol Sister     Arthritis Brother     Diabetes Brother     High Blood Pressure Brother     High Cholesterol Brother     Vision Loss Brother     Colon Cancer Neg Hx     Colon Polyps Neg Hx     Esophageal Cancer Neg Hx     Liver Disease Neg Hx     Stomach Cancer Neg Hx     Rectal Cancer Neg Hx        Social History  Social History     Socioeconomic History    Marital status:      Spouse name: Princess Ford Number of children: 2    Years of education:

## 2019-09-10 NOTE — CONSULTS
biliary stricture     EYE SURGERY      cataract    FEMUR FRACTURE SURGERY Left 9/9/2016    SHORT TFN INTERTROCHAN FRACTURE performed by Aide Mccain MD at [facility]      femur fracture surgery    HIP FRACTURE SURGERY Left     pinning    KYPHOSIS SURGERY N/A 8/26/2019    Biopsy of L5 bone lesion performed by Shannon Spencer DO at [facility] COLONOSCOPY W/BIOPSY SINGLE/MULTIPLE N/A 3/24/2017    Dr BEKA Sutton-Diverticular disease-Tubular AP (-) dysplasia x 5--3 yr recall    UPPER GASTROINTESTINAL ENDOSCOPY N/A 10/14/2016    Dr Simons-Hemorrhagic gastritis    VASCULAR SURGERY  03/06/2019    SJSUltrasound guided cannulation of right internal jugular vein. Placement of right internal jugular vein tunneled dialysis catheter bard glidepath 19 cm tip to cuff.        Social History:    Social History     Socioeconomic History    Marital status:      Spouse name: Minh Martinez Number of children: 2    Years of education: 15    Highest education level: Not on file   Occupational History    Not on file   Social Needs    Financial resource strain: Not on file    Food insecurity:     Worry: Not on file     Inability: Not on file   [employer] needs:     Medical: Not on file     Non-medical: Not on file   Tobacco Use    Smoking status: Never Smoker    Smokeless tobacco: Never Used   Substance and Sexual Activity    Alcohol use: No    Drug use: No    Sexual activity: Not Currently     Partners: Male   Lifestyle    Physical activity:     Days per week: Not on file     Minutes per session: Not on file    Stress: Not on file   Relationships    Social connections:     Talks on phone: Not on file     Gets together: Not on file     Attends Judaism service: Not on file     Active member of club or organization: Not on file     Attends meetings of clubs or organizations: Not on file     Relationship status: Not on file    Intimate partner violence:     Fear of current or ex partner: Not on file superior endplates of L1 and L2) lumbar canal. Signed by Dr Meka Zepeda on 8/24/2019 7:56 AM    Mri Cervical Spine Wo Contrast    Result Date: 8/25/2019  1. 2 cm marrow replacement left side of C3 consistent with a metastatic lesion. This extends to the left and appears to partially encase the left vertebral artery. Signed by Dr Meka Zepeda on 8/25/2019 5:10 PM    Mri Lumbar Spine Wo Contrast    Result Date: 8/25/2019  1. Definite marrow replacement at L5 probably metastatic disease. There may also be marrow replacement inferior L4. 2. Postsurgical and degenerative spondylosis as described above Signed by Dr Meka Zepeda on 8/25/2019 4:18 PM    Ct Abdomen Pelvis W Iv Contrast Additional Contrast? Oral    Result Date: 8/24/2019  1. 19 mm lytic lesion in the anterior centrum of L5. Most likely this is metastatic disease. 2. Nonuniform attenuation of the left lobe of the liver and infiltrating neoplasm cannot be excluded. 3. Hepatic steatosis. Signed by Dr Meka Zepeda on 8/24/2019 3:45 PM    Xr Chest Portable    Result Date: 8/23/2019  Stable chest, no acute abnormality is identified, the lungs are hypoaerated. Chronic changes present as described. Signed by Dr Roxana Pedraza on 8/23/2019 8:06 AM  Ct Abdomen Pelvis Wo Contrast Additional Contrast? None    Result Date: 9/10/2019  Exam: CT ABDOMEN PELVIS WO CONTRAST Indication: Diffuse abdominal pain, nausea/vomiting, history of cancer Comparison: 8/4/2019 DLP: 1245 mGy cm. In order to have a CT radiation dose as low as reasonably achievable, Automated Exposure Control was utilized for adjustment of the mA and/or KV according to patient size. Findings: The lung bases are clear. 3.4 x 2 x 1 cm soft tissue mass expands the anterolateral left sixth rib. Enlarging 2 cm lytic lesion in the anterior L5 vertebral body. Increased infiltrating low density throughout the anterior left liver involving segments 2, 3, and 4 with multiple adjacent low-density lesions.  There

## 2019-09-11 NOTE — PROGRESS NOTES
Progress Note  9/11/2019 6:29 AM  Subjective:   Admit Date: 9/10/2019  PCP: Tj Ratliff MD    Interval History:     Diet NPO, After Midnight    Intake/Output Summary (Last 24 hours) at 9/11/2019 0629  Last data filed at 9/10/2019 2349  Gross per 24 hour   Intake 0 ml   Output 50 ml   Net -50 ml     Medications:      sodium chloride 50 mL/hr at 09/10/19 0809    dextrose        metoprolol  2.5 mg Intravenous Q6H    fentaNYL  1 patch Transdermal Q72H    metroNIDAZOLE  500 mg Intravenous Once    sodium chloride flush  10 mL Intravenous 2 times per day    enoxaparin  30 mg Subcutaneous Once    aspirin  81 mg Oral Daily    atorvastatin  40 mg Oral Nightly    clopidogrel  75 mg Oral Daily    famotidine  20 mg Oral Daily    hydrALAZINE  25 mg Oral BID    NIFEdipine  30 mg Oral BID    sennosides-docusate sodium  1 tablet Oral BID    sodium bicarbonate  325 mg Oral BID    insulin lispro  0-12 Units Subcutaneous TID WC    insulin lispro  0-6 Units Subcutaneous Nightly    cefepime  1 g Intravenous Q24H     Recent Labs     09/10/19  0221   WBC 7.1   HGB 10.4*        Recent Labs     09/10/19  0221      K 3.5   CL 94*   CO2 34*   BUN 6*   CREATININE 1.8*   GLUCOSE 94     Recent Labs     09/10/19  0221   AST 37*   ALT 9   BILITOT 0.5   ALKPHOS 97       Objective:   Vitals: BP (!) 158/80   Pulse 95   Temp 97.4 °F (36.3 °C) (Temporal)   Resp 18   Ht 5' 4\" (1.626 m)   Wt 164 lb (74.4 kg)   SpO2 96%   BMI 28.15 kg/m²   General appearance: alert and cooperative with exam  Lungs: clear to auscultation bilaterally  Heart: regular rate and rhythm, S1, S2 normal, no murmur, click, rub or gallop  Abdomen: Diffusely tender. Extremities: extremities normal, atraumatic, no cyanosis or edema  Neurologic: No obvious focal neurologic deficits. Assessment and Plan:   1. N/V: Adjust meds  2. ? Liver mass: W/U in progress  3.  Diverticulitis: Continue IV abx.  4. DM: Watch BS.  5. ESRD: HD to

## 2019-09-11 NOTE — PROGRESS NOTES
Bilateral neural foraminal stenosis at C5-6. No spinal stenosis at any level. A tiny noncalcified nodule in the left upper lobe. This is suboptimally evaluated in this study. Further follow-up with CT scan of the chest may be obtained. The above study was initially reviewed and reported by stat rads. I do not find any discrepancies. Signed by Dr Wing Stockton on 8/23/2019 8:18 AM     Ct Thoracic Spine Wo Contrast     Result Date: 8/24/2019  Evidence of kyphoplasty at T11. No acute compression deformities identified. Signed by Dr Jer Barnes on 8/24/2019 7:58 AM     Ct Lumbar Spine Wo Contrast     Result Date: 8/24/2019  Impression: 1. Lytic lesion at L5 is likely representing metastatic disease. 2. Evidence of kyphoplasty at T11, L1 and L2. 3. Retropulsion of superior endplates of L1 and L2) lumbar canal. Signed by Dr Jer Barnes on 8/24/2019 7:56 AM     Mri Cervical Spine Wo Contrast     Result Date: 8/25/2019  1. 2 cm marrow replacement left side of C3 consistent with a metastatic lesion. This extends to the left and appears to partially encase the left vertebral artery. Signed by Dr Jer Barnes on 8/25/2019 5:10 PM     Mri Lumbar Spine Wo Contrast     Result Date: 8/25/2019  1. Definite marrow replacement at L5 probably metastatic disease. There may also be marrow replacement inferior L4. 2. Postsurgical and degenerative spondylosis as described above Signed by Dr Jer Barnes on 8/25/2019 4:18 PM     Ct Abdomen Pelvis W Iv Contrast Additional Contrast? Oral     Result Date: 8/24/2019  1. 19 mm lytic lesion in the anterior centrum of L5. Most likely this is metastatic disease. 2. Nonuniform attenuation of the left lobe of the liver and infiltrating neoplasm cannot be excluded. 3. Hepatic steatosis. Signed by Dr Jer Barnes on 8/24/2019 3:45 PM     Xr Chest Portable     Result Date: 8/23/2019  Stable chest, no acute abnormality is identified, the lungs are hypoaerated.  Chronic changes present as

## 2019-09-11 NOTE — PROGRESS NOTES
CHOLECYSTECTOMY      COLONOSCOPY  11-02-09    Dr Oconnell Beat ([facility]) N/A 11/4/2016    ERCP/EUS-Dr BEKA Sutton-w/placement of a 10 Mongolian x7 cm temporary plastic biliary stent-Ampullary stenosis, mild dilation of the cbd w/questionable calcification vs stone, small periampullary diverticulum, removal of biliary sludge    ENDOSCOPY, COLON, DIAGNOSTIC      ERCP  11/4/2016    ERCP/EUS-Dr BEKA Sutton-w/placement of a 10 Mongolian x7 cm temporary plastic biliary stent-Ampullary stenosis, mild dilation of the cbd w/questionable calcification vs stone, small periampullary diverticulum, removal of biliary sludge    ERCP N/A 1/11/2017    Dr BEKA Sutton-Successful removal of indwelling biliary stent, no evidence of residual choledocholithiasis or common biliary stricture     EYE SURGERY      cataract    FEMUR FRACTURE SURGERY Left 9/9/2016    SHORT TFN INTERTROCHAN FRACTURE performed by Enrique Zamorano MD at [address] [place] [address]      femur fracture surgery    HIP FRACTURE SURGERY Left     pinning    KYPHOSIS SURGERY N/A 8/26/2019    Biopsy of L5 bone lesion performed by Fabian Bar DO at [facility] COLONOSCOPY W/BIOPSY SINGLE/MULTIPLE N/A 3/24/2017    Dr BEKA Sutton-Diverticular disease-Tubular AP (-) dysplasia x 5--3 yr recall    UPPER GASTROINTESTINAL ENDOSCOPY N/A 10/14/2016    Dr Simons-Hemorrhagic gastritis    VASCULAR SURGERY  03/06/2019    SJSUltrasound guided cannulation of right internal jugular vein. Placement of right internal jugular vein tunneled dialysis catheter bard glidepath 19 cm tip to cuff.        Family History  Family History   Problem Relation Age of Onset    Arthritis Mother     Cancer Mother     High Cholesterol Mother     Arthritis Father     Diabetes Father     High Cholesterol Father     Liver Cancer Father     Arthritis Sister     Diabetes Sister     High Blood Pressure Sister     High Cholesterol Sister     Arthritis Brother     Diabetes Brother     High Blood hemodialysis  2. Diabetic nephropathy. 3.  Type 2 diabetes. 4.  Metastatic lesion at liver? .  5.  Nausea vomiting related to #4.  6.  Anemia of chronic kidney disease. Plan:  1. Tolerating hemodialysis very well  2. Ongoing malignancy work-up. 3.  Plan was discussed with Dr. Shalini Blankenship.       Wai Maria MD  09/11/19  10:58 AM

## 2019-09-12 NOTE — PROGRESS NOTES
Spoke with [facility] regarding patient being scheduled for outpatient bone scan today. Will call Dr. Magaly Bailey office regarding scan to see if they would like this ordered in-patient.

## 2019-09-12 NOTE — PROGRESS NOTES
Progress Note  9/12/2019 6:48 AM  Subjective:   Admit Date: 9/10/2019  PCP: Thania Crisostomo MD    Interval History: I answered questions and spoke to her daughter and GS, by phone. Diet NPO, After Midnight    Intake/Output Summary (Last 24 hours) at 9/12/2019 0648  Last data filed at 9/11/2019 1934  Gross per 24 hour   Intake 0 ml   Output 1800 ml   Net -1800 ml     Medications:      sodium chloride 50 mL/hr at 09/10/19 0809    dextrose        metoprolol  2.5 mg Intravenous Q6H    fentaNYL  1 patch Transdermal Q72H    enoxaparin  30 mg Subcutaneous Daily    metroNIDAZOLE  500 mg Intravenous Once    sodium chloride flush  10 mL Intravenous 2 times per day    aspirin  81 mg Oral Daily    atorvastatin  40 mg Oral Nightly    clopidogrel  75 mg Oral Daily    famotidine  20 mg Oral Daily    hydrALAZINE  25 mg Oral BID    NIFEdipine  30 mg Oral BID    sennosides-docusate sodium  1 tablet Oral BID    sodium bicarbonate  325 mg Oral BID    insulin lispro  0-12 Units Subcutaneous TID WC    insulin lispro  0-6 Units Subcutaneous Nightly    cefepime  1 g Intravenous Q24H     Recent Labs     09/10/19  0221 09/11/19  0630   WBC 7.1 10.9*   HGB 10.4* 9.2*    173     Recent Labs     09/10/19  0221 09/11/19  0630    141   K 3.5 3.7   CL 94* 95*   CO2 34* 33*   BUN 6* 24*   CREATININE 1.8* 3.1*   GLUCOSE 94 259*     Recent Labs     09/10/19  0221 09/11/19  0630   AST 37* 24   ALT 9 6   BILITOT 0.5 0.4   ALKPHOS 97 83       Objective:   Vitals: /74   Pulse 101   Temp 97.2 °F (36.2 °C) (Temporal)   Resp 18   Ht 5' 4\" (1.626 m)   Wt 168 lb (76.2 kg)   SpO2 94%   BMI 28.84 kg/m²   General appearance: alert and cooperative with exam  Lungs: clear to auscultation bilaterally  Heart: regular rate and rhythm, S1, S2 normal, no murmur, click, rub or gallop  Abdomen: Diffusely tender.   Extremities: extremities normal, atraumatic, no cyanosis or edema  Neurologic: No obvious focal neurologic

## 2019-09-12 NOTE — PROGRESS NOTES
PROGRESS NOTE  Patient name: Sara Porter  Patient : 1945  Room: 307      SUBJECTIVE: Continues to complain of significant nausea and inability to tolerate oral intake. Reports not feeling hungry. Pain presently controlled, will add Duragesic patch 12 mcg to see if this will assist with the nausea (concern that the Norco is enhancing her nausea). INTERVAL HISTORY  Ms. Johnson Yeh is a 72-year-old female who presented to [facility] emergency room with persistent nausea and vomiting accompanied by abdominal pain. The patient was recently discharged from a prior hospitalization. Of note she has bony lesions concerning for metastatic disease. An attempted biopsy of the lumbar L5 lesion was nondiagnostic.        · 2019-CT scan of the cervical spine without contrast- dignified an osteolytic expansile lesion involving the left side of the vertebral body C3 extending into the pedicle. Suspect a pathological fracture. · 2019-T scan of the lumbar spine identified a lytic lesion at [facility] representing metastatic disease.  Evidence of kyphoplasty completed at T11, L1 and L2. · 2019-CT scan of the thoracic spine without contrast documented no acute compression deformities. · 09/10/2019- CT Abdomen and pellvis- 3.4 x 2 x 1 cm soft tissue mass expands the anterolateral left sixth rib. Enlarging 2 cm lytic lesion in the anterior L5 vertebral body. Increased infiltrating low density throughout the anterior left liver involving segments 2, 3, and 4 with multiple adjacent low-density  lesions. There is also a 7 mm low-density lesion in the inferior right liver on image 47 that was not seen on the prior study. Findings of periportal widening indicating chronic liver disease. Enlarging lytic lesion in the left anterolateral sixth rib and within the L5 vertebral body, compatible with osseous metastatic disease.  Progressive peritoneal and omental stranding with multiple small nodules, worrisome for omental/peritoneal carcinomatosis. Subjective   REVIEW OF SYSTEMS:   Review of Systems   Constitutional: Positive for fatigue. Negative for chills, diaphoresis and fever. HENT: Negative for mouth sores, nosebleeds, sore throat, trouble swallowing and voice change. Eyes: Negative for photophobia, discharge and itching. Respiratory: Negative for cough, shortness of breath and wheezing. Cardiovascular: Negative for chest pain, palpitations and leg swelling. Gastrointestinal: Positive for abdominal distention, abdominal pain and nausea. Negative for blood in stool, constipation, diarrhea and vomiting. Endocrine: Negative for cold intolerance, heat intolerance, polydipsia and polyuria. Genitourinary: Negative for difficulty urinating, dysuria, hematuria and urgency. Musculoskeletal: Positive for arthralgias and back pain. Negative for joint swelling and myalgias. Skin: Negative for color change and rash. Neurological: Negative for dizziness, tremors and seizures. Hematological: Negative for adenopathy. Does not bruise/bleed easily. Psychiatric/Behavioral: Negative for behavioral problems and suicidal ideas. The patient is not nervous/anxious. All other systems reviewed and are negative. Objective   /74   Pulse 101   Temp 97.2 °F (36.2 °C) (Temporal)   Resp 18   Ht 5' 4\" (1.626 m)   Wt 168 lb (76.2 kg)   SpO2 94%   BMI 28.84 kg/m²     PHYSICAL EXAM:  Physical Exam   Constitutional: She is oriented to person, place, and time. Weak appearing   HENT:   Head: Normocephalic and atraumatic. Mouth/Throat: Oropharynx is clear and moist.   Eyes: Conjunctivae and EOM are normal. No scleral icterus. Neck: Normal range of motion. Neck supple. No tracheal deviation present. Cardiovascular: Normal rate, regular rhythm and normal heart sounds. No murmur heard. Pulmonary/Chest: Effort normal and breath sounds normal. No respiratory distress. Abdominal: Soft.

## 2019-09-12 NOTE — PROGRESS NOTES
Past Surgical History:  Past Surgical History:   Procedure Laterality Date    APPENDECTOMY       SECTION      x 2    CHOLECYSTECTOMY      COLONOSCOPY  09    Dr Vicky Oneil (LOWER) N/A 2016    ERCP/EUS-Dr BEKA Sutton-w/placement of a 10 Hebrew x7 cm temporary plastic biliary stent-Ampullary stenosis, mild dilation of the cbd w/questionable calcification vs stone, small periampullary diverticulum, removal of biliary sludge    ENDOSCOPY, COLON, DIAGNOSTIC      ERCP  2016    ERCP/EUS-Dr BEKA Sutton-w/placement of a 10 Hebrew x7 cm temporary plastic biliary stent-Ampullary stenosis, mild dilation of the cbd w/questionable calcification vs stone, small periampullary diverticulum, removal of biliary sludge    ERCP N/A 2017    Dr BEKA Sutton-Successful removal of indwelling biliary stent, no evidence of residual choledocholithiasis or common biliary stricture     EYE SURGERY      cataract    FEMUR FRACTURE SURGERY Left 2016    SHORT TFN INTERTROCHAN FRACTURE performed by Aubrey Hinton MD at [address] [place] [address]      femur fracture surgery    HIP FRACTURE SURGERY Left     pinning    KYPHOSIS SURGERY N/A 2019    Biopsy of L5 bone lesion performed by Richie Meza DO at [facility] COLONOSCOPY W/BIOPSY SINGLE/MULTIPLE N/A 3/24/2017    Dr BEKA Sutton-Diverticular disease-Tubular AP (-) dysplasia x 5--3 yr recall    UPPER GASTROINTESTINAL ENDOSCOPY N/A 10/14/2016    Dr Simons-Hemorrhagic gastritis    VASCULAR SURGERY  2019    SJSUltrasound guided cannulation of right internal jugular vein. Placement of right internal jugular vein tunneled dialysis catheter bard glidepath 19 cm tip to cuff.        Family History  Family History   Problem Relation Age of Onset    Arthritis Mother     Cancer Mother     High Cholesterol Mother     Arthritis Father     Diabetes Father     High Cholesterol Father     Liver Cancer Father     Arthritis Sister    Parveen Arevalo

## 2019-09-13 PROBLEM — R25.9 INVOLUNTARY MOVEMENTS: Status: ACTIVE | Noted: 2019-01-01

## 2019-09-13 PROBLEM — R27.8 ASTERIXIS: Status: ACTIVE | Noted: 2019-01-01

## 2019-09-13 NOTE — CONSULTS
GI Consult Note    Pt Name: Sharlene Patrick  MRN: [id number]  [id number]  YOB: 1945  Admit Date: 9/10/2019  2:13 AM  Date of evaluation: 9/13/2019  Primary Care Physician: Aung Xie MD   [id number]       CC:  Hepatic mass, possible metastatic lesions, distal esophageal thickening, nausea, vomiting    HPI: Riddhi Meadow female with PMHx known liver lesion, possible metastatic lesions, elevated , ERSD presented to the hospital with continued nausea and vomiting. She was here recently for the nausea and vomiting and found to have possible metastatic lesions. One of the vertebrae was biopsied, but the results were not conclusive for metastatic disease. She is currently scheduled for a biopsy of her chest wall lesion early next week. On CT abdomen, she has a large liver lesion and a long standing history of  elevation. No known pancreatic lesions, but the liver lesion is concerning for either metastatic or primacy CA. AFP level was not>400 which would be suggest possible hepatocellular carcinoma. On CT abdomen, she also had distal esophageal thickening which may be secondary to esophagitis, though unable to confirm that without visualization. Currently, pt was in the dialysis center at the time of consultation, and she denies abdominal pain, fever, chills.        Past Medical History:        Diagnosis Date    Anemia     Blood circulation, collateral     Broken hip (HCC)     L hip    CHF (congestive heart failure) (HCC)     Chronic kidney disease 9/27/2018    Chronic kidney disease (CKD), stage IV (severe) (HCC)     Chronic kidney disease (CKD), stage IV (severe) (HCC)     Closed compression fracture of first lumbar vertebra (HCC) 2/2/2017    Closed compression fracture of first lumbar vertebra (HCC) 2/2/2017    Colon polyps     Coronary artery disease (CAD) excluded 4/23/2019    Diabetic peripheral neuropathy associated with type 2 diabetes mellitus ([facility] [address] 8/22/2019    Hemodialysis patient famotidine 20 mg tablet    Historical Provider, MD   tiZANidine (ZANAFLEX) 2 MG tablet tizanidine 2 mg tablet    Historical Provider, MD   aspirin 81 MG tablet Take 81 mg by mouth daily    Historical Provider, MD   atorvastatin (LIPITOR) 40 MG tablet Take 1 tablet by mouth nightly 5/19/19   Historical Provider, MD   carvedilol (COREG) 6.25 MG tablet Take 1 tablet by mouth 2 times daily 5/1/19   Historical Provider, MD   clopidogrel (PLAVIX) 75 MG tablet Take 1 tablet by mouth daily 5/19/19   Historical Provider, MD   sodium bicarbonate 325 MG tablet Take 1 tablet by mouth 2 times daily 8/15/18   Thania Crisostomo MD   NIFEdipine (ADALAT CC) 30 MG extended release tablet Take 1 tablet by mouth 2 times daily 8/15/18   Thania Crisostomo MD   iron polysaccharides (NIFEREX) 150 MG capsule Take 1 capsule by mouth daily 8/16/18   Thania Crisostomo MD   vitamin D (ERGOCALCIFEROL) 78972 units capsule Take 1 capsule by mouth once a week 8/18/18   Thania Crisostomo MD   insulin glargine (BASAGLAR KWIKPEN) 100 UNIT/ML injection pen Inject 30 Units into the skin Daily    Historical Provider, MD   insulin aspart (NOVOLOG FLEXPEN) 100 UNIT/ML injection pen Inject 0-35 Units into the skin 3 times daily as needed for High Blood Sugar Per home sliding scale    Historical Provider, MD      Allergies:  Codeine      Current Meds:      heparin (porcine)  3,600 Units Intracatheter Once in dialysis    metoprolol  2.5 mg Intravenous Q6H    enoxaparin  30 mg Subcutaneous Daily    sodium chloride flush  10 mL Intravenous 2 times per day    atorvastatin  40 mg Oral Nightly    famotidine  20 mg Oral Daily    hydrALAZINE  25 mg Oral BID    NIFEdipine  30 mg Oral BID    sennosides-docusate sodium  1 tablet Oral BID    sodium bicarbonate  325 mg Oral BID    insulin lispro  0-12 Units Subcutaneous TID     insulin lispro  0-6 Units Subcutaneous Nightly    cefepime  1 g Intravenous Q24H        sodium chloride 50 mL/hr at 09/10/19 0809    In order to have a CT radiation dose as low as reasonably achievable, Automated Exposure Control was utilized for adjustment of the mA and/or KV according to patient size. TECHNIQUE: Precontrast images of the abdomen followed by postcontrast images of the abdomen and pelvis were obtained with intravenous and oral contrast. FINDINGS: There is thickening of the distal esophagus. Small pleural effusions are present bilaterally. There is an infiltrative enhancing lesion in the anterior aspect of the left hepatic lobe that involves segments 2, 3, and 4. This is highly suspicious for metastatic disease and measures 13.2 cm in width, 7.2 cm in length, and 7.4 cm in AP dimension. The gallbladder has been removed. Pancreatic ductal dilation is noted. Pancreatic calcifications are likely due to previous pancreatitis. The spleen is unchanged. Mild renal atrophy is present. Renal artery calcifications are noted. The adrenal glands are unremarkable. The ureters are decompressed. Omental disease is noted anterior to the bladder and in the lower quadrants, bilaterally. There is no GI tract obstruction. The major vasculature is patent but does demonstrate scattered atherosclerosis. Bone metastases are demonstrated in the L5 vertebral body and in the 6th left rib. Multiple levels of kyphoplasty are noted. 1. Suspected large metastasis or primary lesion in the majority of the left hepatic lobe. This may represent an infiltrative hepatocellular carcinoma. 2. Bone metastases in the L5 vertebral body and in the 6th lateral left rib. The left rib lesion could be biopsied for cytologic evaluation. 3. Distal esophageal thickening is likely due to esophagitis. 4. Small pleural effusions, bilaterally. 5. Suspected omental disease in the lower abdomen.  Signed by Dr Georgette Matthews on 9/11/2019 6:29 PM    Ct Abdomen Pelvis Wo Contrast Additional Contrast? None    Result Date: 9/10/2019  Exam: CT ABDOMEN PELVIS WO CONTRAST Indication: Diffuse underlying chronic liver disease. Enlarging lytic lesion in the left anterolateral sixth rib and within the L5 vertebral body, compatible with osseous metastatic disease. Progressive peritoneal and omental stranding with multiple small nodules, worrisome for omental/peritoneal carcinomatosis. No definite acute bowel pathology. There are some diverticula within the sigmoid colon that have adjacent inflammation but inflammation is felt to be secondary to carcinomatosis rather than acute bowel inflammation although clinical correlation is necessary. Signed by Dr Leigh Sewell on 9/10/2019 8:33 AM    Xr Lumbar Spine (2-3 Views)    Result Date: 8/26/2019  Exam INTRAOPERATIVE FLUOROSCOPIC GUIDANCE 8/26/2019 INDICATION: Intraoperative fluoroscopic guidance. L5 biopsy TECHNIQUE: 4 fluoroscopic images from the operating room were submitted for evaluation. Please note, no radiologist was in attendance for acquisition of these images. These images are available for future reference to the attending surgeon. Radiation: 0.04 minutes. 1 6.123 mGy FINDINGS: Intraoperative images related to biopsy of L5 vertebral body lesion. 1. Intraoperative fluoroscopic guidance as described. 2. Please refer to real-time fluoroscopy and operative report for full details. Signed by Dr Juan Jerez on 8/26/2019 3:34 PM    Ct Head Wo Contrast    Result Date: 8/23/2019  EXAMINATION: CT HEAD WO CONTRAST 8/23/2019 7:13 AM HISTORY: CT BRAIN without contrast 8/23/2019 1:30 AM HISTORY: Headache COMPARISON: May 23, 2011 DLP: 770 mGy cm TECHNIQUE: Serial axial tomographic images of the brain were obtained without the use of intravenous contrast. FINDINGS: The midline structures are nondisplaced. There is mild cerebral and cerebellar volume loss, with an associated increase in the prominence of the ventricles and sulci. The basilar cisterns are normal in size and configuration. There is no evidence of intracranial hemorrhage or mass-effect.  There is WO CONTRAST History: Trauma and neck pain. DLP: 549 mGycm. The CT scan of the cervical spine is performed without intravenous or intrathecal contrast enhancement. The images are acquired in axial plane with subsequent reconstruction in coronal and sagittal planes. There is no previous study for comparison. There is an area of osteolysis involving the left lung The vertebral body C3 partly extending into the pedicle. There is moderate expansion of the vertebral body. There is complete occlusion of the anterior lateral wall suggesting a pathological fracture. No displacement. No obvious extension of the mass into the spinal canal. There is mild anterolisthesis of C2 over C3. The remaining vertebral body alignments are normal. Chronic degenerative changes are seen in the form of facet arthropathy and anterior and posterior osteophytes most pronounced at C5-C6. There is bilateral neural foraminal stenosis at C5-C6. No significant spinal canal stenosis at any level. The prevertebral soft tissues are normal. There is calcification of the nuchal ligament opposite and posterior to the spinous processes of C5 and C6. The included lung apices show a tiny noncalcified nodule in the left upper lobe anteriorly, image #57 in axial plane. There are interstitial changes and groundglass opacities in the upper lungs bilaterally more so on the left upper lobe posteriorly. An accessory azygous fissure is seen. Right internal jugular venous catheter should is seen in place. An osteolytic expansile lesion involving the left side of the vertebral body C3 extending into the pedicle. Suspect a pathological fracture. No displacement. No obvious extension into the spinal canal. Further evaluation with MR imaging of the cervical spine may be obtained. Cervical spondylosis. Bilateral neural foraminal stenosis at C5-6. No spinal stenosis at any level. A tiny noncalcified nodule in the left upper lobe.  This is suboptimally evaluated in this study. Further follow-up with CT scan of the chest may be obtained. The above study was initially reviewed and reported by [facility]. I do not find any discrepancies. Signed by Dr Javier Kevin on 8/23/2019 8:18 AM    Ct Thoracic Spine Wo Contrast    Result Date: 8/24/2019  EXAMINATION: CT THORACIC SPINE WO CONTRAST 8/24/2019 7:56 AM HISTORY: CT thoracic spine without contrast 8/24/2019 12:45 AM HISTORY: Pain COMPARISON: None DLP: 1035 mGy cm TECHNIQUE: Serial helical tomographic images of the thoracic spine were obtained without the use of intravenous contrast. Multiplanar reformatted images were provided for review. FINDINGS: Evidence of kyphoplasty at T11 is noted. . Vertebral body heights and alignment are well maintained. Degenerative changes noted in the disc spaces. . There is no bony narrowing of the spinal canal or neural foramina. There is no evidence of facet lock or perch. The posterior elements are intact. The visualized soft tissues are unremarkable. Evidence of kyphoplasty at T11. No acute compression deformities identified. Signed by Dr Gautam Ramos on 8/24/2019 7:58 AM    Ct Lumbar Spine Wo Contrast    Result Date: 8/24/2019  EXAMINATION: CT LUMBAR SPINE WO CONTRAST 8/24/2019 7:52 AM HISTORY: CT lumbar spine without contrast 8/24/2019 12:45 AM History: Pain Comparison: February 2017 DLP: 867 mGy cm Technique: Serial helical tomographic images of the lumbar spine were obtained without the use of intravenous contrast. Additionally, sagittal and coronal reformatted images were provided for review. Findings: Old compression fractures of L1 and L2 are noted evidence of kyphoplasty is present. There is mild retropulsion of the posterior superior endplate of L2 into the lumbar canal.. Vertebral body heights are well maintained. Vacuum phenomenon is noted at the L4-5 T12-L1 and L1-L2 levels. Also L5-S1. . A lytic lesion in the anterior centrum of L5 is noted suspicious for metastatic disease. . The

## 2019-09-13 NOTE — PROGRESS NOTES
neurologic deficits. Assessment and Plan:   1. N/V: Better  2. ? Liver mass: W/U in progress. 3. Possible bone mets: She could not hold sill for the bone scan and he test will have to be rescheduled next week. 4. Diverticulitis: Continue IV abx.   5. DM: Watch BS. 6. ESRD: HD to continue. Advance Directive: Full Code  DVT prophylaxis with enoxaparin 40 mg sub-Q daily. Discharge planning: ? Active Problems:    Nausea and vomiting    Abdominal pain    Osteolytic lesion due to metastasis with unknown primary site [facility])  Resolved Problems:    * No resolved hospital problems.  Nelly Chew MD

## 2019-09-13 NOTE — PROGRESS NOTES
SJSUltrasound guided cannulation of right internal jugular vein. Placement of right internal jugular vein tunneled dialysis catheter bard glidepath 19 cm tip to cuff.        Family History  Family History   Problem Relation Age of Onset    Arthritis Mother     Cancer Mother     High Cholesterol Mother     Arthritis Father     Diabetes Father     High Cholesterol Father     Liver Cancer Father     Arthritis Sister     Diabetes Sister     High Blood Pressure Sister     High Cholesterol Sister     Arthritis Brother     Diabetes Brother     High Blood Pressure Brother     High Cholesterol Brother     Vision Loss Brother     Colon Cancer Neg Hx     Colon Polyps Neg Hx     Esophageal Cancer Neg Hx     Liver Disease Neg Hx     Stomach Cancer Neg Hx     Rectal Cancer Neg Hx        Social History  Social History     Socioeconomic History    Marital status:      Spouse name: Andres Paget Number of children: 2    Years of education: 15    Highest education level: Not on file   Occupational History    Not on file   Social Needs    Financial resource strain: Not on file    Food insecurity:     Worry: Not on file     Inability: Not on file   [employer] needs:     Medical: Not on file     Non-medical: Not on file   Tobacco Use    Smoking status: Never Smoker    Smokeless tobacco: Never Used   Substance and Sexual Activity    Alcohol use: No    Drug use: No    Sexual activity: Not Currently     Partners: Male   Lifestyle    Physical activity:     Days per week: Not on file     Minutes per session: Not on file    Stress: Not on file   Relationships    Social connections:     Talks on phone: Not on file     Gets together: Not on file     Attends [employer] service: Not on file     Active member of club or organization: Not on file     Attends meetings of clubs or organizations: Not on file     Relationship status: Not on file    Intimate partner violence:     Fear of current or ex partner: soft, nontender, normal bowel sounds  Extremities: no cyanosis or edema  Skin: warm and dry without rash      Labs:  BMP:   Recent Labs     09/11/19 0630 09/13/19 0440    141   K 3.7 3.8   CL 95* 96*   CO2 33* 25   BUN 24* 35*   CREATININE 3.1* 4.1*   CALCIUM 9.3 9.5     CBC:   Recent Labs     09/11/19 0630 09/13/19 0440   WBC 10.9* 9.4   HGB 9.2* 9.6*   HCT 29.8* 33.7*   .1* 112.3*    173     LIVER PROFILE:   Recent Labs     09/11/19 0630 09/13/19 0440   AST 24 30   ALT 6 6   BILITOT 0.4 0.5   ALKPHOS 83 95     PT/INR: No results for input(s): PROTIME, INR in the last 72 hours. APTT: No results for input(s): APTT in the last 72 hours. BNP:  No results for input(s): BNP in the last 72 hours. Ionized Calcium:No results for input(s): IONCA in the last 72 hours. Magnesium:No results for input(s): MG in the last 72 hours. Phosphorus:No results for input(s): PHOS in the last 72 hours. HgbA1C: No results for input(s): LABA1C in the last 72 hours. Hepatic:   Recent Labs     09/11/19 0630 09/13/19 0440   ALKPHOS 83 95   ALT 6 6   AST 24 30   PROT 6.7 6.6   BILITOT 0.4 0.5   LABALBU 3.5 3.6     Lactic Acid: No results for input(s): LACTA in the last 72 hours. Troponin: No results for input(s): CKTOTAL, CKMB, TROPONINT in the last 72 hours. ABGs: No results for input(s): PH, PCO2, PO2, HCO3, O2SAT in the last 72 hours. CRP:  No results for input(s): CRP in the last 72 hours. Sed Rate:  No results for input(s): SEDRATE in the last 72 hours. Cultures:   No results for input(s): CULTURE in the last 72 hours. No results for input(s): BC, Rose Meres in the last 72 hours. No results for input(s): CXSURG in the last 72 hours.     Radiology reports as per the Radiologist  Radiology: Ct Abdomen Pelvis Wo Contrast Additional Contrast? None    Result Date: 9/10/2019  Exam: CT ABDOMEN PELVIS WO CONTRAST Indication: Diffuse abdominal pain, nausea/vomiting, history of cancer Comparison: 8/4/2019 anterolateral sixth rib and within the L5 vertebral body, compatible with osseous metastatic disease. Progressive peritoneal and omental stranding with multiple small nodules, worrisome for omental/peritoneal carcinomatosis. No definite acute bowel pathology. There are some diverticula within the sigmoid colon that have adjacent inflammation but inflammation is felt to be secondary to carcinomatosis rather than acute bowel inflammation although clinical correlation is necessary. Signed by Dr Frankey Jupiter on 9/10/2019 8:33 AM       Assessment   1. End-stage renal disease/currently seen on hemodialysis  2. Diabetic nephropathy. 3.  Type 2 diabetes. 4.?  Hepatocellular CA  5. Metastatic disease to the bone. 6.  Anemia of chronic kidney disease. 7.  3.4 x 2 cm soft tissue mass at anterolateral to sixth left rib. Plan:  1. Tolerating hemodialysis well. 2.  Possible EGD today.   3.  Family is leaning towards palliative care      Regina Stevens MD  09/13/19  9:40 AM

## 2019-09-13 NOTE — PROGRESS NOTES
Review of Systems   Constitutional: Positive for fatigue. Negative for chills, diaphoresis and fever. HENT: Negative for mouth sores, nosebleeds, sore throat, trouble swallowing and voice change. Eyes: Negative for photophobia, discharge and itching. Respiratory: Negative for cough, shortness of breath and wheezing. Cardiovascular: Negative for chest pain, palpitations and leg swelling. Gastrointestinal: Positive for abdominal distention, abdominal pain and nausea. Negative for blood in stool, constipation, diarrhea and vomiting. Endocrine: Negative for cold intolerance, heat intolerance, polydipsia and polyuria. Genitourinary: Negative for difficulty urinating, dysuria, hematuria and urgency. Musculoskeletal: Positive for arthralgias and back pain. Negative for joint swelling and myalgias. Skin: Negative for color change and rash. Neurological: Negative for dizziness, tremors and seizures. Hematological: Negative for adenopathy. Does not bruise/bleed easily. Psychiatric/Behavioral: Negative for behavioral problems and suicidal ideas. The patient is not nervous/anxious. All other systems reviewed and are negative. Objective   BP (!) 164/82   Pulse 89   Temp 97.2 °F (36.2 °C) (Temporal)   Resp 18   Ht 5' 4\" (1.626 m)   Wt 168 lb 4.8 oz (76.3 kg)   SpO2 97%   BMI 28.89 kg/m²     PHYSICAL EXAM:  Physical Exam   Constitutional: She is oriented to person, place, and time. Weak appearing   HENT:   Head: Normocephalic and atraumatic. Mouth/Throat: Oropharynx is clear and moist.   Eyes: Conjunctivae and EOM are normal. No scleral icterus. Neck: Normal range of motion. Neck supple. No tracheal deviation present. Cardiovascular: Normal rate, regular rhythm and normal heart sounds. No murmur heard. Pulmonary/Chest: Effort normal and breath sounds normal. No respiratory distress. Abdominal: Soft. Bowel sounds are normal. She exhibits distension. There is tenderness. relevant appropriate labs and imaging when applicable. I reviewed other physician's notes, ancillary services and nurses assessment. I have reviewed the above documentation completed by the Nurse Practitioner or Physician Assistant. Please see my additional contributions to the history of present illness, physical examination, and assessment/medical decision-making that reflect my findings and impressions. I discussed essential elements of the care plan with the NP or PA and the patient as well. I answered all the questions to the patient's satisfaction. I concur with above stated. Subjective-the patient is sleepy this morning, but easily arousable. Nausea has improved. Objective- sleepy. Lungs are clear. Abdomen nontender. Assessment/plan:  Metastatic disease-primary site is unknown planning for a CT-guided biopsy. Performance status poor overall. Pain is better controlled. Distal esophageal thickening- given history of persistent nausea and vomiting and possibility of a upper GI tract cancer it is reasonable to consider an upper endoscopy.     Yuliya Danielle MD

## 2019-09-13 NOTE — CONSULTS
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 [facility])     Palliative care patient 2018    Type II or unspecified type diabetes mellitus without mention of complication, not stated as uncontrolled     UTI (urinary tract infection)     Gangrenous UTI with gas in urinary tract       Past Surgical History:      Procedure Laterality Date    APPENDECTOMY       SECTION      x 2    CHOLECYSTECTOMY      COLONOSCOPY  09    Dr Shania Krishnan (LOWER) N/A 2016    ERCP/EUS-Dr BEKA Sutton-w/placement of a 10 Sloven[place] x7 cm temporary plastic biliary stent-Ampullary stenosis, mild dilation of the cbd w/questionable calcification vs stone, small periampullary diverticulum, removal of biliary sludge    ENDOSCOPY, COLON, DIAGNOSTIC      ERCP  2016    ERCP/EUS-Dr BEKA Sutton-w/placement of a 10 Slovenian x7 cm temporary plastic biliary stent-Ampullary stenosis, mild dilation of the cbd w/questionable calcification vs stone, small periampullary diverticulum, removal of biliary sludge    ERCP N/A 2017    Dr BEKA Sutton-Successful removal of indwelling biliary stent, no evidence of residual choledocholithiasis or common biliary stricture     EYE SURGERY      cataract    FEMUR FRACTURE SURGERY Left 2016    SHORT TFN INTERTROCHAN FRACTURE performed by Dana Miller MD at [address] [place] [address]      femur fracture surgery    HIP FRACTURE SURGERY Left     pinning    KYPHOSIS SURGERY N/A 8/26/2019    Biopsy of L5 bone lesion performed by Jovita Yeh DO at [facility] COLONOSCOPY W/BIOPSY SINGLE/MULTIPLE N/A 3/24/2017    Dr BEKA Sutton-Diverticular disease-Tubular AP (-) dysplasia x 5--3 yr recall    UPPER GASTROINTESTINAL ENDOSCOPY N/A 10/14/2016    Dr Simons-Hemorrhagic gastritis    VASCULAR SURGERY  03/06/2019    SJSUltrasound guided cannulation of right internal jugular vein. Placement of right internal jugular vein tunneled dialysis catheter bard glidepath 19 cm tip to cuff.        Medications in Hospital:      Current Facility-Administered Medications:     HYDROcodone-acetaminophen (NORCO) 7.5-325 MG per tablet 1 tablet, 1 tablet, Oral, Q6H PRN, Katharine Spring MD    technetium Tc 99m oxidronate (TECHNESCAN HDP) injection 91.0 millicurie, 89.3 millicurie, Intravenous, ONCE PRN, Katharine Spring MD    ondansetron [facility]) injection 8 mg, 8 mg, Intravenous, Q6H PRN, Merline Small MD, 8 mg at 09/12/19 2157    hydrALAZINE (APRESOLINE) injection 20 mg, 20 mg, Intravenous, Q6H PRN, Merline Small MD, 20 mg at 09/11/19 2200    metoprolol (LOPRESSOR) injection 2.5 mg, 2.5 mg, Intravenous, Q6H, Merline Small MD, 2.5 mg at 09/13/19 0624    enoxaparin (LOVENOX) injection 30 mg, 30 mg, Subcutaneous, Daily, Merline Small MD, 30 mg at 09/11/19 1202    sodium chloride flush 0.9 % injection 10 mL, 10 mL, Intravenous, 2 times per day, Merline Small MD, 10 mL at 09/12/19 2138    sodium chloride flush 0.9 % injection 10 mL, 10 mL, Intravenous, PRN, Merline Small MD    acetaminophen (TYLENOL) tablet 650 mg, 650 mg, Oral, Q4H PRN, Merline Small MD    morphine injection 2 mg, 2 mg, Intravenous, Q2H PRN, Merline Small MD, 2 mg at 09/12/19 0010    atorvastatin (LIPITOR) tablet 40 mg, 40 mg, Oral, Nightly, Merline Small MD, 40 mg at 09/12/19 0997    bisacodyl (DULCOLAX) suppository 10 mg, 10 mg, Rectal, Daily PRN, Jen Linda MD    famotidine (PEPCID) tablet 20 mg, 20 mg, Oral, Daily, Jen Linda MD, 20 mg at 09/13/19 0956    hydrALAZINE (APRESOLINE) tablet 25 mg, 25 mg, Oral, BID, Jen Linda MD, 25 mg at 09/12/19 2137    NIFEdipine (PROCARDIA XL) extended release tablet 30 mg, 30 mg, Oral, BID, Jen Linda MD, 30 mg at 09/13/19 0957    oxyCODONE-acetaminophen (PERCOCET) 7.5-325 MG per tablet 1 tablet, 1 tablet, Oral, Q4H PRN, Jen Linda MD    sennosides-docusate sodium (SENOKOT-S) 8.6-50 MG tablet 1 tablet, 1 tablet, Oral, BID, Jen Linda MD, 1 tablet at 09/13/19 0957    sodium bicarbonate tablet 325 mg, 325 mg, Oral, BID, Jen Linda MD, 325 mg at 09/13/19 0955    tiZANidine (ZANAFLEX) tablet 2 mg, 2 mg, Oral, Q8H PRN, Jen Linda MD    0.9 % sodium chloride infusion, , Intravenous, Continuous, Jen Linda MD, Last Rate: 50 mL/hr at 09/10/19 0809    promethazine (PHENERGAN) injection 6.25 mg, 6.25 mg, Intramuscular, Q6H PRN, Jen Linda MD, 6.25 mg at 09/11/19 1927    insulin lispro (HUMALOG) injection vial 0-12 Units, 0-12 Units, Subcutaneous, TID WC, Jen Linda MD, 4 Units at 09/13/19 0958    insulin lispro (HUMALOG) injection vial 0-6 Units, 0-6 Units, Subcutaneous, Nightly, Jen Linda MD, 2 Units at 09/11/19 2203    glucose (GLUTOSE) 40 % oral gel 15 g, 15 g, Oral, PRN, Jen Linda MD    dextrose 50 % IV solution, 12.5 g, Intravenous, PRN, Jen Linda MD, 12.5 g at 09/12/19 1746    glucagon (rDNA) injection 1 mg, 1 mg, Intramuscular, PRN, Jen Linda MD    dextrose 5 % solution, 100 mL/hr, Intravenous, PRN, Jen Linda MD    cefepime (MAXIPIME) 1 g in sodium chloride (PF) 10 mL IV syringe, 1 g, Intravenous, Q24H, Jen Linda MD, 1 g at 09/13/19 6986    Allergies:  Codeine    Social History:   TOBACCO:   reports that she has never smoked.  She has never used smokeless tobacco.  ETOH:   reports that

## 2019-09-13 NOTE — PROGRESS NOTES
(rDNA) injection 1 mg  1 mg Intramuscular PRN Munira Connors MD        dextrose 5 % solution  100 mL/hr Intravenous PRN Munira Connors MD        cefepime (MAXIPIME) 1 g in sodium chloride (PF) 10 mL IV syringe  1 g Intravenous Q24H Munira Connors MD   1 g at 09/13/19 0955        Labs:     Recent Labs     09/11/19  0630 09/13/19  0440   WBC 10.9* 9.4   RBC 2.89* 3.00*   HGB 9.2* 9.6*   HCT 29.8* 33.7*   .1* 112.3*   MCH 31.8* 32.0*   MCHC 30.9* 28.5*    173     Recent Labs     09/11/19 0630 09/13/19  0440    141   K 3.7 3.8   ANIONGAP 13 20*   CL 95* 96*   CO2 33* 25   BUN 24* 35*   CREATININE 3.1* 4.1*   GLUCOSE 259* 183*   CALCIUM 9.3 9.5     No results for input(s): MG, PHOS in the last 72 hours. Recent Labs     09/11/19 0630 09/13/19 0440   AST 24 30   ALT 6 6   BILITOT 0.4 0.5   ALKPHOS 83 95     ABGs:No results for input(s): PHART, GCI7LWB, PO2ART, MHJ9MJK, BEART, HGBAE, J8MFARHT, CARBOXHGBART, 02THERAPY in the last 72 hours. HgBA1c: No results for input(s): LABA1C in the last 72 hours. FLP:    Lab Results   Component Value Date    TRIG 53 04/23/2019    HDL 59 04/23/2019    LDLCALC 45 04/23/2019     TSH:    Lab Results   Component Value Date    TSH 1.87 09/14/2016     Troponin T: No results for input(s): TROPONINI in the last 72 hours. INR: No results for input(s): INR in the last 72 hours. Objective:   Vitals: BP (!) 183/83   Pulse 84   Temp 98.5 °F (36.9 °C) (Temporal)   Resp 18   Ht 5' 4\" (1.626 m)   Wt 168 lb 4.8 oz (76.3 kg)   SpO2 98%   BMI 28.89 kg/m²   24HR INTAKE/OUTPUT:      Intake/Output Summary (Last 24 hours) at 9/13/2019 1026  Last data filed at 9/13/2019 0031  Gross per 24 hour   Intake --   Output 300 ml   Net -300 ml     Physical Exam   Constitutional: She appears well-developed and well-nourished. No distress. HENT:   Head: Normocephalic and atraumatic. Eyes: Pupils are equal, round, and reactive to light.  Conjunctivae and EOM are normal. No scleral icterus. Neck: Normal range of motion. Neck supple. No tracheal deviation present. Cardiovascular: Normal rate and regular rhythm. Pulmonary/Chest: Effort normal and breath sounds normal.   Abdominal: Soft. Bowel sounds are normal.   Genitourinary:   Genitourinary Comments: Deferred   Neurological: She is alert. Coordination abnormal.   Alert to self, follows simple commands, noted tremor and coordination issues   Skin: Skin is warm and dry. 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 *        Visit Summary:  I saw the patient at the bedside with her daughter present. She is sitting up in bed, noted tremor and coordination issues of upper extremities. Patient is nonconversant, drifts off easy, but does follow commands. I spoke with the daughter about Palliative Medicine services, daughter is a former nurse at this hospital, and discussed our assistance with symptom management and goals of care. At this time, family is awaiting GI input and biopsy/recommendations from Oncology. Family will be able to make better decisions regarding goals of care once they receive further information. I do believe that she would be appropriate for Hospice if they elect/or are unable to pursue aggressive therapies. Palliative Medicine will continue to follow. Recommendations: Daughter reports plans for biopsy and recommendations per Oncology, Awaiting recommendations from GI, I do not believe that family will make decisions regarding goals of care until this information is obtained, would be a candidate for Hospice care dependent or recommendations from the above    Thank you for consulting Palliative Care and allowing us to participate in the care of this patient.        Electronically signed by

## 2019-09-13 NOTE — PROGRESS NOTES
Patient called out at this time requesting help to go to the bathroom. Much more A&O at this time. She is able to give me her name, , present location. She states the year is 36. Regardless, much improved as compared to earlier. 300mL urine out. Requesting popsicle; was provided for patient. Will cont. To monitor.   Electronically signed by Beau Morris RN on 2019 at 12:33 AM

## 2019-09-14 NOTE — PLAN OF CARE
Problem: Falls - Risk of:  Goal: Will remain free from falls  Description  Will remain free from falls  9/14/2019 1022 by Dhara Crowe RN  Outcome: Ongoing  9/14/2019 0300 by Lindsey Pfeiffer RN  Outcome: Ongoing  Goal: Absence of physical injury  Description  Absence of physical injury  9/14/2019 1022 by Dhara Crowe RN  Outcome: Ongoing  9/14/2019 0300 by Lidnsey Pfeiffer RN  Outcome: Ongoing     Problem: Risk for Impaired Skin Integrity  Goal: Tissue integrity - skin and mucous membranes  Description  Structural intactness and normal physiological function of skin and  mucous membranes.   9/14/2019 1022 by Dhara Crowe RN  Outcome: Ongoing  9/14/2019 0300 by Lindsey Pfeiffer RN  Outcome: Ongoing     Problem: Pain:  Goal: Pain level will decrease  Description  Pain level will decrease  9/14/2019 1022 by Dhara Crowe RN  Outcome: Ongoing  9/14/2019 0300 by Lindsey Pfeiffer RN  Outcome: Ongoing  Goal: Control of acute pain  Description  Control of acute pain  9/14/2019 1022 by Dhara Crowe RN  Outcome: Ongoing  9/14/2019 0300 by Lindsey Pfeiffer RN  Outcome: Ongoing  Goal: Control of chronic pain  Description  Control of chronic pain  9/14/2019 1022 by Dhara Crowe RN  Outcome: Ongoing  9/14/2019 0300 by Lindsey Pfeiffer RN  Outcome: Ongoing

## 2019-09-14 NOTE — PROGRESS NOTES
0809      promethazine (PHENERGAN) injection 6.25 mg  6.25 mg Intramuscular Q6H PRHERNÁN Darling MD   6.25 mg at 09/14/19 0656    insulin lispro (HUMALOG) injection vial 0-12 Units  0-12 Units Subcutaneous TID WC Ana M Darling MD   2 Units at 09/14/19 2008    insulin lispro (HUMALOG) injection vial 0-6 Units  0-6 Units Subcutaneous Nightly Ana M Darling MD   3 Units at 09/13/19 2038    glucose (GLUTOSE) 40 % oral gel 15 g  15 g Oral PRN Ana M Darling MD        dextrose 50 % IV solution  12.5 g Intravenous PRHERNÁN Darling MD   12.5 g at 09/12/19 1746    glucagon (rDNA) injection 1 mg  1 mg Intramuscular PRN Ana M Darling MD        dextrose 5 % solution  100 mL/hr Intravenous PRN Ana M Darling MD        cefepime (MAXIPIME) 1 g in sodium chloride (PF) 10 mL IV syringe  1 g Intravenous Q24H Ana M Darling MD   1 g at 09/14/19 0857       Past Medical History:  Past Medical History:   Diagnosis Date    Anemia     Blood circulation, collateral     Broken hip (HCC)     L hip    CHF (congestive heart failure) (HCC)     Chronic kidney disease 9/27/2018    Chronic kidney disease (CKD), stage IV (severe) (HCC)     Chronic kidney disease (CKD), stage IV (severe) (HCC)     Closed compression fracture of first lumbar vertebra ([facility] [address] 2/2/2017    Closed compression fracture of first lumbar vertebra ([facility] [address] 2/2/2017    Colon polyps     Coronary artery disease (CAD) excluded 4/23/2019    Diabetic peripheral neuropathy associated with type 2 diabetes mellitus ([facility] [address] 8/22/2019    Hemodialysis patient (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

## 2019-09-14 NOTE — PROGRESS NOTES
(H) 09/13/2019    GLUCOSE 183 (H) 09/13/2019    CALCIUM 9.5 09/13/2019    PROT 6.6 09/13/2019    LABALBU 3.6 09/13/2019    BILITOT 0.5 09/13/2019    ALKPHOS 95 09/13/2019    AST 30 09/13/2019    ALT 6 09/13/2019    LABGLOM 11 (A) 09/13/2019    GLOB 2.5 03/29/2017     Lab Results   Component Value Date    INR 1.09 08/23/2019    INR 0.87 (L) 11/02/2016    INR 0.92 09/09/2016    PROTIME 13.5 08/23/2019    PROTIME 11.9 (L) 11/02/2016    PROTIME 12.4 09/09/2016             RECORD REVIEW: Previous medical records, medications were reviewed at today's visit    IMPRESSION:   Asterixis-classically seen with liver disease     Despite normal liver function studies suspect this is the most liketly etiology for her symptoms as  CT abdomen noted to have suspected large metastasis in a majority of the left hepatic lobe suggestive of an infiltrative hepatocellular carcinoma      MRI brain with no evidence of metastatic disease-chronic lacunar infarcts noted        Ammonia level 57  Check daily level     Start lactulose to see if there is improvement in tremor and cognition    Dr Dr Samuel Zendejas

## 2019-09-14 NOTE — PROGRESS NOTES
stranding with multiple small nodules, worrisome for omental/peritoneal carcinomatosis. Subjective   REVIEW OF SYSTEMS:   Review of Systems   Constitutional: Positive for fatigue. Negative for chills, diaphoresis and fever. HENT: Negative for mouth sores, nosebleeds, sore throat, trouble swallowing and voice change. Eyes: Negative for photophobia, discharge and itching. Respiratory: Negative for cough, shortness of breath and wheezing. Cardiovascular: Negative for chest pain, palpitations and leg swelling. Gastrointestinal: Positive for abdominal distention, abdominal pain and nausea. Negative for blood in stool, constipation, diarrhea and vomiting. Endocrine: Negative for cold intolerance, heat intolerance, polydipsia and polyuria. Genitourinary: Negative for difficulty urinating, dysuria, hematuria and urgency. Musculoskeletal: Positive for arthralgias and back pain. Negative for joint swelling and myalgias. Skin: Negative for color change and rash. Neurological: Negative for dizziness, tremors and seizures. Hematological: Negative for adenopathy. Does not bruise/bleed easily. Psychiatric/Behavioral: Negative for behavioral problems and suicidal ideas. The patient is not nervous/anxious. All other systems reviewed and are negative. Objective   /75   Pulse 89   Temp 97.4 °F (36.3 °C) (Temporal)   Resp 18   Ht 5' 4\" (1.626 m)   Wt 168 lb 8 oz (76.4 kg)   SpO2 90%   BMI 28.92 kg/m²     PHYSICAL EXAM:  Physical Exam   Constitutional: She is oriented to person, place, and time. Weak appearing   HENT:   Head: Normocephalic and atraumatic. Mouth/Throat: Oropharynx is clear and moist.   Eyes: Conjunctivae and EOM are normal. No scleral icterus. Neck: Normal range of motion. Neck supple. No tracheal deviation present. Cardiovascular: Normal rate, regular rhythm and normal heart sounds. No murmur heard.   Pulmonary/Chest: Effort normal and breath sounds normal. No example, mildly enlarged right retroperitoneal lymph node on image 49 is unchanged. Left femoral IM nail partially imaged. T11, L1, and L2 compression fracture status post augmentation appear stable in degree of height loss compared to the prior CT.     Progressive infiltrative hypodensity throughout the left liver with multiple adjacent low-density lesions. Findings are concerning for progressive primary liver neoplasm or metastasis. Hepatic periportal widening suggests underlying chronic liver disease. Enlarging lytic lesion in the left anterolateral sixth rib and within the L5 vertebral body, compatible with osseous metastatic disease. Progressive peritoneal and omental stranding with multiple small nodules, worrisome for omental/peritoneal carcinomatosis. No definite acute bowel pathology. There are some diverticula within the sigmoid colon that have adjacent inflammation but inflammation is felt to be secondary to carcinomatosis rather than acute bowel inflammation although clinical correlation is necessary. Signed by Dr Love Vargas on 9/10/2019 8:33 AM           ASSESSMENT:  #Nausea/vomiting-unknown etiology, suspect secondary to liver lesion. · CT abdomen and pelvis without contrast 9/10/2019 revealed progressive infiltrative density throughout the left liver with multiple adjacent low-density lesions. This is concerning for a primary liver neoplasm versus metastases. 3.4 x 2 x 1 cm soft tissue mass expanding the anterolateral left sixth rib. An enlarging 2 cm lytic lesion in the anterior L5 vertebral body. · CT of Abdomen and pelvis on 9/11/2019 There is an infiltrative enhancing lesion in the anterior aspect of the left hepatic lobe that involves segments 2, 3, and 4. This is highly suspicious for metastatic disease and measures 13.2 cm in width, 7.2 cm in length, and 7.4 cm in AP dimension. Bone metastases are demonstrated in the L5 vertebral body and in the 6th left rib.  Multiple levels of

## 2019-09-15 NOTE — PROGRESS NOTES
Nephrology ([address] [facility]) Progress  Note      Patient:  Sharlene Patrick  YOB: 1945  Date of Service: 9/15/2019  MRN: [id number]   Acct: [de-identified]   Primary Care Physician: Aung Xie MD  Advance Directive: [facility]  Admit Date: 9/10/2019       Hospital Day: 4  Referring Provider: Aung Xie MD    Patient independently seen and examined, Chart, Consults, Notes, Operative notes, Labs, Cardiology, and Radiology studies reviewed as able. Chief complaints: End-stage renal disease. Subjective:  Sharlene Patrick is a 69 y.o. female  whom we were consulted for end-stage renal disease. Patient goes to [facility] dialysis on Monday Wednesday Friday. She presented with severe nausea vomiting. CT scan of the abdomen shows patient has \"nodules\" in the liver and carcinomatosis consistent with possible malignancy. Patient was also found to have lytic lesions at L5 vertebrae. During last hospitalization she had a biopsy and biopsy was nonconclusive. Now she is presenting with severe nausea and vomiting for the last 2 days. Oncology has suspected metastatic disease to liver causing  this nausea and vomiting. Recent CT scan of the abdomen did show patient has possible hepatocellular carcinoma affecting left liver lobe. She also has 3.4 x 2 cm soft tissue mass on anterolateral left sixth rib. This morning she is feeling much better.     Allergies:  Codeine    Medicines:  Current Facility-Administered Medications   Medication Dose Route Frequency Provider Last Rate Last Dose    scopolamine (TRANSDERM-SCOP) transdermal patch 1 patch  1 patch Transdermal Q72H Ashok Asher DO   1 patch at 09/14/19 1817    lactulose (CHRONULAC) 10 GM/15ML solution 20 g  20 g Oral 4 times per day Boubacar Pederson MD   20 g at 09/14/19 1255    HYDROcodone-acetaminophen (NORCO) 7.5-325 MG per tablet 1 tablet  1 tablet Oral Q6H PRN Soraida Whitfield MD        technetium Tc 99m oxidronate (Jennifervaldez Mayerris calcifications noted. No hydronephrosis. No focal urinary bladder abnormality. No abnormal bowel distention or focal wall thickening. Normal appendix. A few colonic diverticula are noted. Small hiatal hernia. Small duodenal diverticulum. Small volume perihepatic ascites. There is progressive stranding and nodularity in the anterior peritoneum/omentum. No pelvic mass organized pelvic collection. Uterus and adnexa appear unremarkable. Abdominal aorta is normal in caliber and contains atherosclerotic calcification. Borderline enlarged retroperitoneal lymph nodes are stable. For example, mildly enlarged right retroperitoneal lymph node on image 49 is unchanged. Left femoral IM nail partially imaged. T11, L1, and L2 compression fracture status post augmentation appear stable in degree of height loss compared to the prior CT. Progressive infiltrative hypodensity throughout the left liver with multiple adjacent low-density lesions. Findings are concerning for progressive primary liver neoplasm or metastasis. Hepatic periportal widening suggests underlying chronic liver disease. Enlarging lytic lesion in the left anterolateral sixth rib and within the L5 vertebral body, compatible with osseous metastatic disease. Progressive peritoneal and omental stranding with multiple small nodules, worrisome for omental/peritoneal carcinomatosis. No definite acute bowel pathology. There are some diverticula within the sigmoid colon that have adjacent inflammation but inflammation is felt to be secondary to carcinomatosis rather than acute bowel inflammation although clinical correlation is necessary. Signed by Dr Maynor Waggoner on 9/10/2019 8:33 AM       Assessment   1. End-stage renal disease. 2.  Diabetic nephropathy. 3.  Type 2 diabetes. 4.?  Hepatocellular CA versus metastatic disease. 5.  Metastatic disease to the bone. 6.  Anemia of chronic kidney disease.   7.  3.4 x 2 cm soft tissue mass at anterolateral to sixth left rib.    Plan:  1. Routine dialysis tomorrow. 2.  Possible biopsy of mass at left sixth rib.   3.  Family is leaning towards palliative care      Jose Solitario MD  09/15/19  10:24 AM

## 2019-09-15 NOTE — PROGRESS NOTES
REVIEW OF SYSTEMS:   Review of Systems   Constitutional: Positive for fatigue. Negative for chills, diaphoresis and fever. HENT: Negative for mouth sores, nosebleeds, sore throat, trouble swallowing and voice change. Eyes: Negative for photophobia, discharge and itching. Respiratory: Negative for cough, shortness of breath and wheezing. Cardiovascular: Negative for chest pain, palpitations and leg swelling. Gastrointestinal: Positive for abdominal distention, abdominal pain and nausea. Negative for blood in stool, constipation, diarrhea and vomiting. Endocrine: Negative for cold intolerance, heat intolerance, polydipsia and polyuria. Genitourinary: Negative for difficulty urinating, dysuria, hematuria and urgency. Musculoskeletal: Positive for arthralgias and back pain. Negative for joint swelling and myalgias. Skin: Negative for color change and rash. Neurological: Negative for dizziness, tremors and seizures. Hematological: Negative for adenopathy. Does not bruise/bleed easily. Psychiatric/Behavioral: Negative for behavioral problems and suicidal ideas. The patient is not nervous/anxious. All other systems reviewed and are negative. Objective   /73   Pulse 96   Temp 98.2 °F (36.8 °C)   Resp 18   Ht 5' 4\" (1.626 m)   Wt 168 lb 8 oz (76.4 kg)   SpO2 97%   BMI 28.92 kg/m²     PHYSICAL EXAM:  Physical Exam   Constitutional: She is oriented to person, place, and time. Weak appearing   HENT:   Head: Normocephalic and atraumatic. Mouth/Throat: Oropharynx is clear and moist.   Eyes: Conjunctivae and EOM are normal. No scleral icterus. Neck: Normal range of motion. Neck supple. No tracheal deviation present. Cardiovascular: Normal rate, regular rhythm and normal heart sounds. No murmur heard. Pulmonary/Chest: Effort normal and breath sounds normal. No respiratory distress. Abdominal: Soft. Bowel sounds are normal. She exhibits distension. There is tenderness. mass.  · AFP  - 1.7  · CEA - 4.1  · CA 19-9 - 108 on 9/12/2019 (153 on 11/2/2016 and 91 on 1/11/2017)  · Alpha-fetoprotein 1.7 on 9/12/2019  · Ammonia level 57 on 9/14/2019  · Continue Zofran and Phenergan  · Reglan before meals and at bedtime   · Scopolamine patch    Had prior colonoscopy last year per daughter. #Esophageal thickening  Gastroenterology assisting   Anticipating EGD in AM    #Acute kidney injury/CKD, HD dependent  Creatinine 4.1 on 9/14/19  Nephrology assisting    #Bone lytic lesions- prior L5 biopsy was nondiagnostic. Neurosurgery following as outpatient. #Liver infiltration- Differential diagnosis of metastatic disease or primary hepatic carcinoma with an enhancing lesion in the anterior aspect of the left hepatic lobe that involves segments 2, 3, and 4. This is highly suspicious for metastatic disease and measures 13.2 cm in width, 7.2 cm in length, and 7.4 cm in AP dimension. · Anticipating CT-guided chest wall biopsy next week on 9/17/2019, delayed due to Plavix needing to be held for 7 days prior to procedure due to increased risk for bleeding. · Liver enzymes stable. #Pain secondary to disease process  · Norco 7.5 tablet every 6 hours as needed for breakthrough      #Diabetes- uncontrolled hyperglycemia.      PLAN:  Continue Maxipime 1 g IV daily, s/p Flagyl 500 x 1  Norco 7.5 tablet every 6 hours PRN  Morphine 2 mg IV every 2 hr PRN   Plavix and aspirin will remain on hold last dose taken 9/9/2019 for anticipated CT-guided biopsy of the chest wall on 9/17/2019. Anticipating EGD in AM.      CATHY Hernandez    09/15/19  8:52 AM  Physician's attestation/substantial contribution:  I, Dr Simpson Nurse, independently performed an evaluation on [facility]. I have reviewed relevant medical information/data to include but not limited to medication list, relevant appropriate labs and imaging when applicable.  I reviewed other physician's notes, ancillary services and nurses

## 2019-09-15 NOTE — PROGRESS NOTES
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 [facility])     Palliative care patient 2018    Type II or unspecified type diabetes mellitus without mention of complication, not stated as uncontrolled     UTI (urinary tract infection)     Gangrenous UTI with gas in urinary tract       Past Surgical History:      Procedure Laterality Date    APPENDECTOMY       SECTION      x 2    CHOLECYSTECTOMY      COLONOSCOPY  09    Dr Zhao Al (LOWER) N/A 2016    ERCP/EUS-Dr BEKA Sutton-w/placement of a 10 Armenian x7 cm temporary plastic biliary stent-Ampullary stenosis, mild dilation of the cbd w/questionable calcification vs stone, small periampullary diverticulum, removal of biliary sludge    ENDOSCOPY, COLON, DIAGNOSTIC      ERCP  2016    ERCP/EUS-Dr BEKA Sutton-w/placement of a 10 Armenian x7 cm temporary plastic biliary stent-Ampullary stenosis, mild dilation of the cbd w/questionable calcification vs stone, small periampullary diverticulum, removal of biliary sludge    ERCP N/A 2017    Dr BEKA Sutton-Successful removal of indwelling biliary stent, no evidence of residual choledocholithiasis or common biliary stricture     EYE SURGERY      cataract    FEMUR FRACTURE SURGERY Left 2016    SHORT TFN INTERTROCHAN FRACTURE performed by Aldo Anaya MD at [address] [place] [address]      femur fracture surgery    HIP FRACTURE SURGERY Left     pinning    KYPHOSIS SURGERY N/A 2019    Biopsy of L5 bone lesion performed by Shirley Baer DO at [facility] [address] COLONOSCOPY W/BIOPSY SINGLE/MULTIPLE N/A 3/24/2017    Dr BEKA Sutton-Diverticular disease-Tubular AP (-) dysplasia x 5--3 yr recall    UPPER GASTROINTESTINAL ENDOSCOPY N/A 10/14/2016    Dr Simons-Hemorrhagic gastritis    VASCULAR SURGERY  2019    SJSUltrasound guided cannulation of right internal jugular vein.Placement of right internal jugular vein tunneled dialysis catheter bard glidepath 19 cm tip to cuff.        Medications in Hospital:      Current Facility-Administered Medications:     scopolamine (TRANSDERM-SCOP) transdermal patch 1 patch, 1 patch, Transdermal, Q72H, Pavan Asher DO, 1 patch at 09/14/19 1817    lactulose (CHRONULAC) 10 GM/15ML solution 20 g, 20 g, Oral, 4 times per day, Manasa Oswald MD, 20 g at 09/14/19 1255    HYDROcodone-acetaminophen (NORCO) 7.5-325 MG per tablet 1 tablet, 1 tablet, Oral, Q6H PRN, Fernie Blankenship MD    technetium Tc 99m oxidronate (TECHNESCAN HDP) injection 48.9 millicurie, 21.6 millicurie, Intravenous, ONCE PRN, Fernie Blankenship MD    [facility]) injection 8 mg, 8 mg, Intravenous, Q6H PRN, Jaclyn Stone MD, 8 mg at 09/14/19 0854    hydrALAZINE (APRESOLINE) injection 20 mg, 20 mg, Intravenous, Q6H PRN, Jaclyn Stone MD, 20 mg at 09/15/19 1020    metoprolol (LOPRESSOR) injection 2.5 mg, 2.5 mg, Intravenous, Q6H, Jaclyn Stone MD, 2.5 mg at 09/14/19 1818    enoxaparin (LOVENOX) injection 30 mg, 30 mg, Subcutaneous, Daily, Jaclyn Stone MD, 30 mg at 09/11/19 1202    sodium chloride flush 0.9 % injection 10 mL, 10 mL, Intravenous, 2 times per day, Jaclyn Stone MD, 10 mL at 09/15/19 1019    sodium chloride flush 0.9 % injection 10 mL, 10 mL, Intravenous, PRN, Jaclyn Stone MD    acetaminophen (TYLENOL) tablet 650 mg, 650 mg, Oral, Q4H PRN, Jaclyn Stone MD    morphine injection 2 mg, 2 mg, Intravenous, Q2H PRN, Jaclyn Stone MD, 2 mg at 09/15/19 1014    atorvastatin (LIPITOR) tablet 40 mg, 40 mg, Oral, Nightly, Jaclyn Stone MD, 40 mg at 09/14/19 2202    bisacodyl (DULCOLAX) suppository 10 mg, 10 mg, Rectal, Daily PRN, Jaclyn Stone MD    famotidine (PEPCID) tablet 20 mg, 20 mg, Oral, Daily, Jaclyn Stone MD, 20 mg at 09/15/19 1015    hydrALAZINE (APRESOLINE) tablet 25 mg, 25 mg, Oral, BID, Coral Noble,

## 2019-09-15 NOTE — PROGRESS NOTES
liver and she did have an elevated ammonia level. This is much better with the lactulose. 5. DNR status per daughter and the patient. 6. Diverticulitis: Continue IV abx and she is on Day #6 of cefepime and I will stop after the doses tomorrow. The pain is better, she is afebrile and her WBC ct is now normal.  7. DM: Watch BS. 8. ESRD: HD to continue. Advance Directive: DNR-CC  DVT prophylaxis with enoxaparin 40 mg sub-Q daily. Discharge planning: ? 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)    Asterixis    Involuntary movements  Resolved Problems:    * No resolved hospital problems.  Kisha Porras MD

## 2019-09-16 NOTE — PROGRESS NOTES
Progress Note  9/16/2019 6:11 AM  Subjective:   Admit Date: 9/10/2019  PCP: Viktoria Gusman MD    Interval History: She has been better. I spoke to the daughter. Diet NPO Time Specified    Intake/Output Summary (Last 24 hours) at 9/16/2019 0611  Last data filed at 9/15/2019 1751  Gross per 24 hour   Intake 240 ml   Output --   Net 240 ml     Medications:      sodium chloride 50 mL/hr at 09/15/19 1716    dextrose        sodium chloride flush  10 mL Intravenous 2 times per day    scopolamine  1 patch Transdermal Q72H    lactulose  20 g Oral 4 times per day    metoprolol  2.5 mg Intravenous Q6H    enoxaparin  30 mg Subcutaneous Daily    atorvastatin  40 mg Oral Nightly    famotidine  20 mg Oral Daily    hydrALAZINE  25 mg Oral BID    NIFEdipine  30 mg Oral BID    sennosides-docusate sodium  1 tablet Oral BID    sodium bicarbonate  325 mg Oral BID    insulin lispro  0-12 Units Subcutaneous TID WC    insulin lispro  0-6 Units Subcutaneous Nightly    cefepime  1 g Intravenous Q24H     Recent Labs     09/16/19  0423   WBC 5.1   HGB 9.1*        Recent Labs     09/16/19  0423      K 3.4*      CO2 22   BUN 33*   CREATININE 5.0*   GLUCOSE 156*     Recent Labs     09/16/19  0423   AST 23   ALT 6   BILITOT 0.4   ALKPHOS 87       Objective:   Vitals: /76   Pulse 87   Temp 97.7 °F (36.5 °C) (Temporal)   Resp 16   Ht 5' 4\" (1.626 m)   Wt 168 lb 8 oz (76.4 kg)   SpO2 91%   BMI 28.92 kg/m²   General appearance: alert and cooperative with exam  Lungs: clear to auscultation bilaterally  Heart: regular rate and rhythm, S1, S2 normal, no murmur, click, rub or gallop  Abdomen: Diffusely tender. Extremities: extremities normal, atraumatic, no cyanosis or edema  Neurologic: No obvious focal neurologic deficits. Assessment and Plan:   1. N/V: Is better. EGD tomorrow this am.  2. Liver mass: W/U in progress.   3. Possible bone mets: She will get a rib biopsy on

## 2019-09-16 NOTE — PROGRESS NOTES
PROGRESS NOTE  Patient name: Jose Saldana  Patient : 1945  Room: 307      SUBJECTIVE: Resting okay this morning. Nausea overall is better. Awaiting endoscopy. INTERVAL HISTORY  Ms. Ta Castrejon is a 72-year-old female who presented to [address] [place] [address] emergency room with persistent nausea and vomiting accompanied by abdominal pain. The patient was recently discharged from a prior hospitalization. Of note she has bony lesions concerning for metastatic disease. An attempted biopsy of the lumbar L5 lesion was nondiagnostic.        · 2019-CT scan of the cervical spine without contrast- dignified an osteolytic expansile lesion involving the left side of the vertebral body C3 extending into the pedicle. Suspect a pathological fracture. · 2019-T scan of the lumbar spine identified a lytic lesion at [facility] representing metastatic disease.  Evidence of kyphoplasty completed at T11, L1 and L2. · 2019-CT scan of the thoracic spine without contrast documented no acute compression deformities. · 09/10/2019- CT Abdomen and pellvis- 3.4 x 2 x 1 cm soft tissue mass expands the anterolateral left sixth rib. Enlarging 2 cm lytic lesion in the anterior L5 vertebral body. Increased infiltrating low density throughout the anterior left liver involving segments 2, 3, and 4 with multiple adjacent low-density  lesions. There is also a 7 mm low-density lesion in the inferior right liver on image 47 that was not seen on the prior study. Findings of periportal widening indicating chronic liver disease. Enlarging lytic lesion in the left anterolateral sixth rib and within the L5 vertebral body, compatible with osseous metastatic disease. Progressive peritoneal and omental stranding with multiple small nodules, worrisome for omental/peritoneal carcinomatosis. Subjective   REVIEW OF SYSTEMS:   Review of Systems   Constitutional: Positive for fatigue. Negative for chills, diaphoresis and fever. and oriented to person, place, and time. Coordination normal.   Skin: Skin is warm and dry. No rash noted. Psychiatric: She has a normal mood and affect. Her behavior is normal. Thought content normal.         Recent Labs     09/16/19  0423 09/13/19  0440 09/11/19  0630   WBC 5.1 9.4 10.9*   HGB 9.1* 9.6* 9.2*   HCT 27.7* 33.7* 29.8*   MCV 97.5 112.3* 103.1*    173 173       Lab Results   Component Value Date     09/16/2019    K 3.4 (L) 09/16/2019     09/16/2019    CO2 22 09/16/2019    BUN 33 (H) 09/16/2019    CREATININE 5.0 (H) 09/16/2019    GLUCOSE 156 (H) 09/16/2019    CALCIUM 8.8 09/16/2019    PROT 6.0 (L) 09/16/2019    LABALBU 3.2 (L) 09/16/2019    BILITOT 0.4 09/16/2019    ALKPHOS 87 09/16/2019    AST 23 09/16/2019    ALT 6 09/16/2019    LABGLOM 8 (A) 09/16/2019    GLOB 2.5 03/29/2017       Lab Results   Component Value Date    INR 1.09 08/23/2019    INR 0.87 (L) 11/02/2016    INR 0.92 09/09/2016    PROTIME 13.5 08/23/2019    PROTIME 11.9 (L) 11/02/2016    PROTIME 12.4 09/09/2016     RADIOLOGY STUDIES REVIEWED BY DR. Jhony Johnson 9/10/19     Xr Lumbar Spine (2-3 Views)     Result Date: 8/26/2019  1. Intraoperative fluoroscopic guidance as described. 2. Please refer to real-time fluoroscopy and operative report for full details. Signed by Dr Dia Hawley on 8/26/2019 3:34 PM     Ct Head Wo Contrast     Result Date: 8/23/2019  Changes of aging with no acute intracranial abnormality Signed by Dr Meka Zepeda on 8/23/2019 7:14 AM     Ct Chest W Contrast     Result Date: 8/24/2019  1. No acute cardiopulmonary process. 2. Kyphoplasty said T11, L1 and L2. Signed by Dr Meka Zepeda on 8/24/2019 3:39 PM     Ct Cervical Spine Wo Contrast     Result Date: 8/23/2019  An osteolytic expansile lesion involving the left side of the vertebral body C3 extending into the pedicle. Suspect a pathological fracture. No displacement.  No obvious extension into the spinal canal. Further evaluation with MR imaging of the cervical spine may be obtained. Cervical spondylosis. Bilateral neural foraminal stenosis at C5-6. No spinal stenosis at any level. A tiny noncalcified nodule in the left upper lobe. This is suboptimally evaluated in this study. Further follow-up with CT scan of the chest may be obtained. The above study was initially reviewed and reported by [facility]. I do not find any discrepancies. Signed by Dr Twila Adames on 8/23/2019 8:18 AM     Ct Thoracic Spine Wo Contrast     Result Date: 8/24/2019  Evidence of kyphoplasty at T11. No acute compression deformities identified. Signed by Dr Bernard Ramirez on 8/24/2019 7:58 AM     Ct Lumbar Spine Wo Contrast     Result Date: 8/24/2019  Impression: 1. Lytic lesion at L5 is likely representing metastatic disease. 2. Evidence of kyphoplasty at T11, L1 and L2. 3. Retropulsion of superior endplates of L1 and L2) lumbar canal. Signed by Dr Bernard Ramirez on 8/24/2019 7:56 AM     Mri Cervical Spine Wo Contrast     Result Date: 8/25/2019  1. 2 cm marrow replacement left side of C3 consistent with a metastatic lesion. This extends to the left and appears to partially encase the left vertebral artery. Signed by Dr Bernard Ramirez on 8/25/2019 5:10 PM     Mri Lumbar Spine Wo Contrast     Result Date: 8/25/2019  1. Definite marrow replacement at L5 probably metastatic disease. There may also be marrow replacement inferior L4. 2. Postsurgical and degenerative spondylosis as described above Signed by Dr Bernard Ramirez on 8/25/2019 4:18 PM     Ct Abdomen Pelvis W Iv Contrast Additional Contrast? Oral     Result Date: 8/24/2019  1. 19 mm lytic lesion in the anterior centrum of L5. Most likely this is metastatic disease. 2. Nonuniform attenuation of the left lobe of the liver and infiltrating neoplasm cannot be excluded. 3. Hepatic steatosis.  Signed by Dr Bernard Ramirez on 8/24/2019 3:45 PM     Xr Chest Portable     Result Date: 8/23/2019  Stable chest, no acute abnormality is identified, contributions to the history of present illness, physical examination, and assessment/medical decision-making that reflect my findings and impressions. I discussed essential elements of the care plan with the NP or PA and the patient as well. I answered all the questions to the patient's satisfaction. I concur with above stated. Subjective-feeling better today. Mild neck pain. Nausea is much better controlled. Objective- alert oriented x3. Vital signs reviewed. Lungs are clear. Abdomen nontender. Assessment/plan:  Disseminated metastatic disease- upper EGD tomorrow by GI. Plavix on hold. Hopefully CT-guided biopsy on Tuesday if upper EGD unrevealing. Continue current management. Pain medication as needed. Continue Scopolamine patch for nausea. Tj Willingham MD      Physician's attestation/substantial contribution:  I, Dr Tj Willingham, independently performed an evaluation on Sharlene [facility]. I have reviewed relevant medical information/data to include but not limited to medication list, relevant appropriate labs and imaging when applicable. I reviewed other physician's notes, ancillary services and nurses assessment. I have reviewed the above documentation completed by the Nurse Practitioner or Physician Assistant. Please see my additional contributions to the history of present illness, physical examination, and assessment/medical decision-making that reflect my findings and impressions. I discussed essential elements of the care plan with the NP or PA and the patient as well. I answered all the questions to the patient's satisfaction. I concur with above stated. Subjective-patient is feeling better this morning. No nausea. Awaiting EGD today. Objective- alert oriented x3. Lungs are clear. Abdomen nontender. Assessment/plan:  Disseminated metastatic disease- unknown primary site. Awaiting EGD today. CT-guided biopsy tomorrow.   Nausea-better controlled with scopolamine

## 2019-09-16 NOTE — ANESTHESIA PRE PROCEDURE
Department of Anesthesiology  Preprocedure Note       Name:  Brittany Benavidez   Age:  69 y.o.  :  1945                                          MRN:  [id number]         Date:  2019      Surgeon: Brad Arroyo):  MD Maryellen Young MD    Procedure: EGD DIAGNOSTIC ONLY (Left )    Medications prior to admission:   Prior to Admission medications    Medication Sig Start Date End Date Taking? Authorizing Provider   oxyCODONE-acetaminophen (PERCOCET) 7.5-325 MG per tablet Take 1 tablet by mouth every 4 hours as needed for Pain. Historical Provider, MD   hydrALAZINE (APRESOLINE) 25 MG tablet Take 1 tablet by mouth 2 times daily 19   Xavier Gross MD   bisacodyl (DULCOLAX) 10 MG suppository Place 1 suppository rectally daily as needed for Constipation 19  Xavier Gross MD   sennosides-docusate sodium (SENOKOT-S) 8.6-50 MG tablet Take 1 tablet by mouth 2 times daily 19   Xavier Gross MD   fentaNYL (DURAGESIC) 25 MCG/HR Place 1 patch onto the skin every 3 days for 30 days. Intended supply: 30 days 19  Alberta Denver, MD   HYDROcodone-acetaminophen (NORCO) 5-325 MG per tablet Take 1 tablet by mouth every 6 hours as needed for Pain.     Historical Provider, MD   famotidine (PEPCID) 20 MG tablet famotidine 20 mg tablet    Historical Provider, MD   tiZANidine (ZANAFLEX) 2 MG tablet tizanidine 2 mg tablet    Historical Provider, MD   aspirin 81 MG tablet Take 81 mg by mouth daily    Historical Provider, MD   atorvastatin (LIPITOR) 40 MG tablet Take 1 tablet by mouth nightly 19   Historical Provider, MD   carvedilol (COREG) 6.25 MG tablet Take 1 tablet by mouth 2 times daily 19   Historical Provider, MD   clopidogrel (PLAVIX) 75 MG tablet Take 1 tablet by mouth daily 19   Historical Provider, MD   sodium bicarbonate 325 MG tablet Take 1 tablet by mouth 2 times daily 8/15/18   Xavier Gross MD   NIFEdipine (ADALAT CC) 30 MG extended CMP:   Lab Results   Component Value Date     09/16/2019     05/23/2011    K 3.4 09/16/2019    K 4.0 04/23/2019    K 3.2 05/23/2011     09/16/2019     05/23/2011    CO2 22 09/16/2019    BUN 33 09/16/2019    CREATININE 5.0 09/16/2019    CREATININE 0.9 05/23/2011    LABGLOM 8 09/16/2019    GLUCOSE 156 09/16/2019    PROT 6.0 09/16/2019    PROT 7.7 05/23/2011    CALCIUM 8.8 09/16/2019    BILITOT 0.4 09/16/2019    ALKPHOS 87 09/16/2019    ALKPHOS 64 05/23/2011    AST 23 09/16/2019    ALT 6 09/16/2019       POC Tests:   Recent Labs     09/16/19  1205   POCGLU 152*       Coags:   Lab Results   Component Value Date    PROTIME 13.5 08/23/2019    INR 1.09 08/23/2019    APTT 24.8 08/23/2019       HCG (If Applicable): No results found for: PREGTESTUR, PREGSERUM, HCG, HCGQUANT     ABGs:   Lab Results   Component Value Date    PHART 7.380 08/10/2018    PO2ART 65.0 08/10/2018    NKY5PNF 30.0 08/10/2018    FQS6QHI 17.7 08/10/2018    BEART -6.5 08/10/2018    J4CYEYRD 93.2 08/10/2018        Type & Screen (If Applicable):  No results found for: LABABO, [address]    Anesthesia Evaluation  Patient summary reviewed and Nursing notes reviewed no history of anesthetic complications:   Airway: Mallampati: II  TM distance: >3 FB   Neck ROM: full  Mouth opening: > = 3 FB Dental: normal exam         Pulmonary: breath sounds clear to auscultation  (+) shortness of breath:                            ROS comment: Pulmonary HTN   Cardiovascular:    (+) hypertension:, CAD (multivessel disease):, CHF:, COWAN:,                   Neuro/Psych:   (+) neuromuscular disease:,             GI/Hepatic/Renal: Neg GI/Hepatic/Renal ROS  (+) renal disease: CRI,           Endo/Other:    (+) Diabetes, . Abdominal:           Vascular: negative vascular ROS. Anesthesia Plan      general     ASA 4       Induction: intravenous.       Anesthetic plan and risks discussed with patient.         Attending anesthesiologist reviewed and agrees with Lamin Page MD   9/16/2019

## 2019-09-16 NOTE — PROGRESS NOTES
presumed to be 5 lumbar-type vertebrae, with the most inferior being labeled as L5. Normal lumbar lordosis is maintained. There is no evidence of listhesis or subluxation. Marrow signal: No evidence of kyphoplasty at T11 L1 and L2 is noted. The inferior endplate of L4 there is subtle signal alteration suspicious for marrow replacement. The anterior left centrum of L5 demonstrates marrow replacement consistent with metastatic disease. . Cord/Canal: The conus medullaris terminates at the level of L1. The spinal cord is normal in signal and morphology. Soft tissues: The surrounding soft tissues are unremarkable. Levels: L1-L2: Kyphoplasty is are present at L1 and L2. There is mild retropulsion of the posterior superior endplate of L2 into the lumbar canal. There is moderate central canal stenosis. Neural foramen are patent. L2-L3: Facet joints are visualized. There is mild hypertrophy the left neural foramen and right neural foramen are patent. . L3-L4: Mild hypertrophy of the ligamentum flavum is noted however the lateral recesses are patent there is minimal central canal encroachment. Nilda Loose L4-L5: Facet joint hypertrophy is present. There is moderate encroachment on the left neural foramen right neural foramen also demonstrates marked moderate encroachment by neural foraminal hypertrophy. Nilda Loose L5-S1: Small cyst posterior disc osteophyte complex is present. The left neural foramen is compromised by disc and facet joint. The right neural foramen demonstrates moderate stenosis due to a right lateral disc and facet joint hypertrophy. .     1. Definite marrow replacement at L5 probably metastatic disease.  There may also be marrow replacement inferior L4. 2. Postsurgical and degenerative spondylosis as described above Signed by Dr Hayley Enriquez on 8/25/2019 4:18 PM    Ct Abdomen Pelvis W Iv Contrast Additional Contrast? Oral    Result Date: 8/24/2019  EXAMINATION: CT ABDOMEN PELVIS W IV CONTRAST 8/24/2019 3:39 PM CT ABDOMEN AND PELVIS Lungs are hypoaerated, no focal infiltrate or consolidation is identified. There are coarse interstitial markings as before. Heart size is normal. The right-sided dialysis catheter appears in satisfactory, unchanged. No pneumothorax or pleural effusion is identified. No acute osseous abnormality is identified. The upper abdomen appears unremarkable. Stable chest, no acute abnormality is identified, the lungs are hypoaerated. Chronic changes present as described. Signed by Dr Thee Baird. Keila on 8/23/2019 8:06 AM    Mri Brain Wo Contrast    Result Date: 9/10/2019  MRI BRAIN WO CONTRAST 9/10/2019 1:00 PM HISTORY: Persistent nausea and vomiting, concern for metastatic disease Comparison: None. Technique: Multiplanar imaging of the brain was performed in a routine fashion without gadolinium contrast. Findings: Diffuse confluent white matter T2 FLAIR hyperintensities reflecting advanced chronic small vessel ischemic change. Old deep white matter lacunar infarcts in both hemispheres with additional basal ganglia lacunar infarcts. No acute infarct. Moderate global cortical atrophy. No intra-axial or extra-axial hemorrhage. No pathologic Blooming on T2 star weighted images. No visualized mass lesion on this noncontrast exam. Ventricles are nondilated. Chronic small vessel change in the central mayra. Posterior fossa structures are otherwise unremarkable. The pituitary gland and sella are unremarkable. The major intracranial flow voids are preserved. The orbits are unremarkable. Mild chronic mucosal thickening involving the right posterior ethmoids. The paranasal sinuses are otherwise clear. The mastoids are clear. Marrow signal in the calvarium and skull base appears normal.    Impression: 1. No visualized mass lesion/metastatic disease on this noncontrast exam. 2. Evidence for advanced chronic small vessel ischemia with multiple old deep white matter/basal ganglia lacunar infarcts.  Signed by Dr Ladan Sutton on

## 2019-09-16 NOTE — OP NOTE
Endoscopic Procedure Note    Patient: Celestino Garcia: [birth year]  Med Rec#: [id number] Acc#: [id number]     Primary Care Provider Antonella Pedersen MD  Referring Provider: Dr. Ladonna Ruiz ([facility])    Endoscopist: Malaika Soto MD    Date of Procedure:  9/16/2019    Procedure:   1. EGD with cold biopsies     Indications:   1. Intractable nausea and vomiting  2. Abnormal distal esophageal wall thickening on CT    Anesthesia:  Sedation was administered by anesthesia who monitored the patient during the procedure. Estimated Blood Loss: minimal    Procedure:   After reviewing the patient's chart and obtaining informed consent, the patient was placed in the left lateral decubitus position. A forward-viewing Olympus endoscope was lubricated and inserted through the mouth into the oropharynx. Under direct visualization, the upper esophagus was intubated. The scope was advanced to the level of the third portion of duodenum. Scope was slowly withdrawn with careful inspection of the mucosal surfaces. The scope was retroflexed for inspection of the gastric fundus and incisura. Findings and maneuvers are listed in impression below. The patient tolerated the procedure well. The scope was removed. There were no immediate complications. Findings:   Esophagus: abnormal: Severe erosive esophagitis-Grade 3 involving nearly the entire length of the Esophagus from the upper third at 24 cm from the incisors up to the EG junction at 36 cm. This is the cause of the CT abnormality noted earlier. NO active bleeding. NO strictures or stenosis. Random biopsies were taken. There is a small 3 cm sliding hiatal hernia present. Stomach:  abnormal: mild mucosal changes with linear patchy erythema and small erosions suggestive of gastritis noted in the body and antrum -  Gastric biopsies were taken from the antrum and body to rule out Helicobacter pylori infection.  NO ulcers or masses or gastric outlet

## 2019-09-16 NOTE — PROGRESS NOTES
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 [facility])     Palliative care patient 2018    Type II or unspecified type diabetes mellitus without mention of complication, not stated as uncontrolled     UTI (urinary tract infection)     Gangrenous UTI with gas in urinary tract       Past Surgical History:      Procedure Laterality Date    APPENDECTOMY       SECTION      x 2    CHOLECYSTECTOMY      COLONOSCOPY  09    Dr Fang Carreno (LOWER) N/A 2016    ERCP/EUS-Dr BEKA Sutton-w/placement of a 10 [place] x7 cm temporary plastic biliary stent-Ampullary stenosis, mild dilation of the cbd w/questionable calcification vs stone, small periampullary diverticulum, removal of biliary sludge    ENDOSCOPY, COLON, DIAGNOSTIC      ERCP  2016    ERCP/EUS-Dr BEKA Sutton-w/placement of a 10 [place] x7 cm temporary plastic biliary stent-Ampullary stenosis, mild dilation of the cbd w/questionable calcification vs stone, small periampullary diverticulum, removal of biliary sludge    ERCP N/A 2017    Dr BEKA Sutton-Successful removal of indwelling biliary stent, no evidence of residual choledocholithiasis or common biliary stricture     EYE SURGERY      cataract    FEMUR FRACTURE SURGERY Left 2016    SHORT TFN INTERTROCHAN FRACTURE performed by Gautam Plascencia MD at [address] [place] [address]      femur fracture surgery    HIP FRACTURE SURGERY Left     pinning    KYPHOSIS SURGERY N/A 2019    Biopsy of L5 bone lesion performed by Kayleen Morataya DO at [facility] COLONOSCOPY W/BIOPSY SINGLE/MULTIPLE N/A 3/24/2017    Dr BEKA Sutton-Diverticular disease-Tubular AP (-) dysplasia x 5--3 yr recall    UPPER GASTROINTESTINAL ENDOSCOPY N/A 10/14/2016    Dr Simons-Hemorrhagic gastritis    VASCULAR SURGERY  2019 bisacodyl (DULCOLAX) suppository 10 mg, 10 mg, Rectal, Daily PRN, Merline Small MD    famotidine (PEPCID) tablet 20 mg, 20 mg, Oral, Daily, Merline Small MD, 20 mg at 09/15/19 1015    hydrALAZINE (APRESOLINE) tablet 25 mg, 25 mg, Oral, BID, Merline Small MD, 25 mg at 09/15/19 1020    NIFEdipine (PROCARDIA XL) extended release tablet 30 mg, 30 mg, Oral, BID, Merline Small MD, 30 mg at 09/15/19 2252    oxyCODONE-acetaminophen (PERCOCET) 7.5-325 MG per tablet 1 tablet, 1 tablet, Oral, Q4H PRN, Merline Small MD    sennosides-docusate sodium (SENOKOT-S) 8.6-50 MG tablet 1 tablet, 1 tablet, Oral, BID, Merline Small MD, 1 tablet at 09/15/19 2252    sodium bicarbonate tablet 325 mg, 325 mg, Oral, BID, Merline Small MD, 325 mg at 09/15/19 2253    tiZANidine (ZANAFLEX) tablet 2 mg, 2 mg, Oral, Q8H PRN, Merline Small MD    0.9 % sodium chloride infusion, , Intravenous, Continuous, Merline Small MD, Last Rate: 50 mL/hr at 09/15/19 1716    insulin lispro (HUMALOG) injection vial 0-12 Units, 0-12 Units, Subcutaneous, TID WC, Merline Small MD, 4 Units at 09/15/19 1722    insulin lispro (HUMALOG) injection vial 0-6 Units, 0-6 Units, Subcutaneous, Nightly, Merline Small MD, 1 Units at 09/14/19 2203    glucose (GLUTOSE) 40 % oral gel 15 g, 15 g, Oral, PRN, Merline Small MD    dextrose 50 % IV solution, 12.5 g, Intravenous, PRN, Merline Small MD, 12.5 g at 09/12/19 1746    glucagon (rDNA) injection 1 mg, 1 mg, Intramuscular, PRN, Merline Small MD    dextrose 5 % solution, 100 mL/hr, Intravenous, PRN, Merline Small MD    cefepime (MAXIPIME) 1 g in sodium chloride (PF) 10 mL IV syringe, 1 g, Intravenous, Q24H, Merline Small MD, 1 g at 09/15/19 1014    Allergies:  Codeine    Social History:   TOBACCO:   reports that she has never smoked. She has never used smokeless tobacco.  ETOH:   reports that she does not drink alcohol.     Family History:       Problem Relation Age of Onset    Arthritis Mother     Cancer Mother     High Cholesterol Mother     Arthritis Father     Diabetes Father     High Cholesterol Father     Liver Cancer Father     Arthritis Sister     Diabetes Sister     High Blood Pressure Sister     High Cholesterol Sister     Arthritis Brother     Diabetes Brother     High Blood Pressure Brother     High Cholesterol Brother     Vision Loss Brother     Colon Cancer Neg Hx     Colon Polyps Neg Hx     Esophageal Cancer Neg Hx     Liver Disease Neg Hx     Stomach Cancer Neg Hx     Rectal Cancer Neg Hx          PHYSICAL EXAM:  /76   Pulse 87   Temp 97.7 °F (36.5 °C) (Temporal)   Resp 16   Ht 5' 4\" (1.626 m)   Wt 168 lb 8 oz (76.4 kg)   SpO2 91%   BMI 28.92 kg/m²     Constitutional - well developed, well nourished. Eyes - conjunctiva normal.   Ear, nose, throat - No scars, masses, or lesions over external nose or ears, no atrophy of tongue  Neck-symmetric, no masses noted, no jugular vein distension  Respiration- chest wall appears symmetric, good expansion,   normal effort without use of accessory muscles  Musculoskeletal - no significant wasting of muscles noted, no bony deformities  Extremities-no clubbing, cyanosis or edema  Skin - warm, dry, and intact. No rash, erythema, or pallor. Psychiatric - mood, affect, and behavior appear normal.      Neurological exam  Awake, alert, fluent oriented appropriate affect  Attention and concentration appear appropriate  Recent and remote memory appears unremarkable  Speech normal without dysarthria  No clear issues with language of fund of knowledge     Cranial Nerve Exam     CN III, IV,VI-EOMI, No nystagmus, conjugate eye movements, no ptosis    CN VII-no facial assymetry       Motor Exam  antigravity throughout upper and lower extremities bilaterally      Tremors- trace asterixis     Gait  Not tested      Nursing/pcp notes, imaging,labs and vitals reviewed.      PT,OT and/or speech notes reviewed    Lab Results   Component Value Date    WBC 5.1 09/16/2019    HGB 9.1 (L) 09/16/2019    HCT 27.7 (L) 09/16/2019    MCV 97.5 09/16/2019     09/16/2019     Lab Results   Component Value Date     09/16/2019    K 3.4 (L) 09/16/2019     09/16/2019    CO2 22 09/16/2019    BUN 33 (H) 09/16/2019    CREATININE 5.0 (H) 09/16/2019    GLUCOSE 156 (H) 09/16/2019    CALCIUM 8.8 09/16/2019    PROT 6.0 (L) 09/16/2019    LABALBU 3.2 (L) 09/16/2019    BILITOT 0.4 09/16/2019    ALKPHOS 87 09/16/2019    AST 23 09/16/2019    ALT 6 09/16/2019    LABGLOM 8 (A) 09/16/2019    GLOB 2.5 03/29/2017     Lab Results   Component Value Date    INR 1.09 08/23/2019    INR 0.87 (L) 11/02/2016    INR 0.92 09/09/2016    PROTIME 13.5 08/23/2019    PROTIME 11.9 (L) 11/02/2016    PROTIME 12.4 09/09/2016             RECORD REVIEW: Previous medical records, medications were reviewed at today's visit    IMPRESSION:   Asterixis-classically seen with liver disease     Despite normal liver function studies suspect this is the most liketly etiology for her symptoms as  CT abdomen noted to have suspected large metastasis in a majority of the left hepatic lobe suggestive of an infiltrative hepatocellular carcinoma      MRI brain with no evidence of metastatic disease-chronic lacunar infarcts noted        Ammonia level 29  Check daily level     continue lactulose     PT/OT    Continue care       CALL WITH ANY QUESTIONS  [phone] CELL  Dr Chetna Chanel

## 2019-09-17 NOTE — PLAN OF CARE
Nutrition Problem: Inadequate oral intake  Intervention: Food and/or Nutrient Delivery: Continue NPO  Nutritional Goals: PO intake 50% or greater. Weight remain stable.

## 2019-09-17 NOTE — PROGRESS NOTES
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 [facility])     Palliative care patient 2018    Type II or unspecified type diabetes mellitus without mention of complication, not stated as uncontrolled     UTI (urinary tract infection)     Gangrenous UTI with gas in urinary tract       Past Surgical History:      Procedure Laterality Date    APPENDECTOMY       SECTION      x 2    CHOLECYSTECTOMY      COLONOSCOPY  09    Dr Vijaya Elias (LOWER) N/A 2016    ERCP/EUS-Dr BEKA Sutton-w/placement of a 10 [place] x7 cm temporary plastic biliary stent-Ampullary stenosis, mild dilation of the cbd w/questionable calcification vs stone, small periampullary diverticulum, removal of biliary sludge    ENDOSCOPY, COLON, DIAGNOSTIC      ERCP  2016    ERCP/EUS-Dr BEKA Sutton-w/placement of a 10 [place] x7 cm temporary plastic biliary stent-Ampullary stenosis, mild dilation of the cbd w/questionable calcification vs stone, small periampullary diverticulum, removal of biliary sludge    ERCP N/A 2017    Dr BEKA Sutton-Successful removal of indwelling biliary stent, no evidence of residual choledocholithiasis or common biliary stricture     EYE SURGERY      cataract    FEMUR FRACTURE SURGERY Left 2016    SHORT TFN INTERTROCHAN FRACTURE performed by Jerrod Urias MD at [address] [place] [address]      femur fracture surgery    HIP FRACTURE SURGERY Left     pinning    KYPHOSIS SURGERY N/A 2019    Biopsy of L5 bone lesion performed by Daisy Palmer DO at [facility] COLONOSCOPY W/BIOPSY SINGLE/MULTIPLE N/A 3/24/2017    Dr BEKA Sutton-Diverticular disease-Tubular AP (-) dysplasia x 5--3 yr recall    UPPER GASTROINTESTINAL ENDOSCOPY N/A 10/14/2016    Dr Simons-Hemorrhagic gastritis    UPPER dextrose 5 % solution, 100 mL/hr, Intravenous, PRN, Damian Villafana MD    Allergies:  Codeine    Social History:   TOBACCO:   reports that she has never smoked. She has never used smokeless tobacco.  ETOH:   reports that she does not drink alcohol. Family History:       Problem Relation Age of Onset    Arthritis Mother     Cancer Mother     High Cholesterol Mother     Arthritis Father     Diabetes Father     High Cholesterol Father     Liver Cancer Father     Arthritis Sister     Diabetes Sister     High Blood Pressure Sister     High Cholesterol Sister     Arthritis Brother     Diabetes Brother     High Blood Pressure Brother     High Cholesterol Brother     Vision Loss Brother     Colon Cancer Neg Hx     Colon Polyps Neg Hx     Esophageal Cancer Neg Hx     Liver Disease Neg Hx     Stomach Cancer Neg Hx     Rectal Cancer Neg Hx          PHYSICAL EXAM:  BP (!) 140/80   Pulse 98   Temp 97.5 °F (36.4 °C) (Temporal)   Resp 16   Ht 5' 4\" (1.626 m)   Wt 168 lb (76.2 kg)   SpO2 97%   BMI 28.84 kg/m²     Constitutional - well developed, well nourished. Eyes - conjunctiva normal.   Ear, nose, throat - No scars, masses, or lesions over external nose or ears, no atrophy of tongue  Neck-symmetric, no masses noted, no jugular vein distension  Respiration- chest wall appears symmetric, good expansion,   normal effort without use of accessory muscles  Musculoskeletal - no significant wasting of muscles noted, no bony deformities  Extremities-no clubbing, cyanosis or edema  Skin - warm, dry, and intact. No rash, erythema, or pallor.   Psychiatric - mood, affect, and behavior appear normal.      Neurological exam  Awake, alert, fluent oriented appropriate affect  Attention and concentration appear appropriate  Recent and remote memory appears unremarkable  Speech normal without dysarthria  No clear issues with language of fund of knowledge     Cranial Nerve Exam     CN III, IV,VI-EOMI, No

## 2019-09-17 NOTE — PROGRESS NOTES
months  · Ideal Body Wt: 120 lb (54.4 kg), % Ideal Body 140%  · BMI Classification: BMI 25.0 - 29.9 Overweight    Nutrition Interventions:   Continue NPO  Continued Inpatient Monitoring    Nutrition Evaluation:   · Evaluation: Goals set   · Goals: PO intake 50% or greater. Weight remain stable.     · Monitoring: Nutrition Progression, Diet Tolerance, Skin Integrity, Weight, Pertinent Labs      Electronically signed by Vertie Shone, MS, RD, LD on 9/17/19 at 10:15 AM    Contact Number: [phone]

## 2019-09-17 NOTE — PROGRESS NOTES
Nephrology ([facility]) Progress Note    Patient:  Myra Dorman  YOB: 1945  Date of Service: 9/17/2019  MRN: [id number]   Acct: [de-identified]   Primary Care Physician: Thania Crisostomo MD  Advance Directive: [facility]  Admit Date: 9/10/2019       Hospital Day: 6  Referring Provider: Thania Crisostomo MD    Patient independently seen and examined, Chart, Consults, Notes, Operative notes, Labs, Cardiology, and Radiology studies reviewed as able. Subjective:  Myra Dorman is a 69 y.o. female  whom we were consulted for consulted for end-stage renal disease. She has diabetic nephropathy and gets dialysis at [facility] on Monday Wednesday Friday. She presented with severe nausea and vomiting. CT scan of the abdomen showed patient had \"nodules\" in the liver and carcinomatosis consistent with possible malignancy. Patient was also found to have lytic lesions at L5 vertebrae. During last hospitalization she had a biopsy and biopsy was nonconclusive. Now she is presenting with severe nausea and vomiting for the last 2 days. Oncology has suspected metastatic disease to liver causing  this nausea and vomiting. Recent CT scan of the abdomen did show patient has possible hepatocellular carcinoma affecting left liver lobe. She also has 3.4 x 2 cm soft tissue mass on anterolateral left sixth rib. Today, no new events. Tolerating HD. No cp/soa. Slept better today.        Allergies:  Codeine    Medicines:  Current Facility-Administered Medications   Medication Dose Route Frequency Provider Last Rate Last Dose    [START ON 9/18/2019] influenza quadrivalent split vaccine (FLUZONE;FLUARIX;FLULAVAL;AFLURIA) injection 0.5 mL  0.5 mL Intramuscular Once Thania Crisostomo MD        sodium chloride flush 0.9 % injection 10 mL  10 mL Intravenous 2 times per day Osei Arellano MD   10 mL at 09/17/19 0853    sodium chloride (PF) 0.9 % injection 10 mL  10 mL Intravenous PRN Lingaiah unremarkable. Impression: 1. Lytic lesion at L5 is likely representing metastatic disease. 2. Evidence of kyphoplasty at T11, L1 and L2. 3. Retropulsion of superior endplates of L1 and L2) lumbar canal. Signed by Dr Arnulfo Dobson on 8/24/2019 7:56 AM    Mri Cervical Spine Wo Contrast    Result Date: 8/25/2019  EXAMINATION: MRI CERVICAL SPINE WO CONTRAST 8/25/2019 4:59 PM HISTORY: MRI CERVICAL SPINE WO CONTRAST 8/25/2019 3:00 PM HISTORY: COMPARISON: None TECHNIQUE: Multiplanar, multisequence MRI of the cervical spine was obtained without contrast. FINDINGS: Imaged portions of the cerebellum and brainstem are unremarkable. Alignment: Normal cervical lordosis is maintained. There is no evidence of listhesis or subluxation. Marrow signal: There is abnormal marrow signal left side of the central of C3. This is consistent with metastatic disease. This extends outside of the vertebral body and appears to encase the left vertebral artery. However flow is present in the left vertebral artery. . Cord/Canal: The spinal cord is normal in signal and morphology. Soft tissues: The surrounding soft tissues are unremarkable. Levels: C2-C3: No disc bulge is present. No significant neuroforaminal or central canal stenosis is seen. C3-C4: No disc bulge is present. No significant neuroforaminal or central canal stenosis is seen. C4-C5: No disc bulge is present. No significant neuroforaminal or central canal stenosis is seen. C5-C6: Broad-based disc hypertrophic osteophytes present. There is some uncinate spurring compromising the right and left neural foramen. [place] C6-C7: Broad-based disc is present. There is some uncinate spurring compromising the neural foramen bilaterally. . C7-T1: No disc bulge is present. No significant neuroforaminal or central canal stenosis is seen. 1. 2 cm marrow replacement left side of C3 consistent with a metastatic lesion. This extends to the left and appears to partially encase the left vertebral artery.

## 2019-09-17 NOTE — PLAN OF CARE
symptoms  Outcome: Ongoing  Goal: Absence of new skin breakdown  Description  Absence of new skin breakdown  Outcome: Ongoing     Problem: Tissue Perfusion - Renal, Altered:  Goal: Ability to achieve a balanced intake and output will improve  Description  Ability to achieve a balanced intake and output will improve  Outcome: Ongoing  Goal: Electrolytes within specified parameters  Description  Electrolytes within specified parameters  Outcome: Ongoing  Goal: Urine creatinine clearance will be within specified parameters  Description  Urine creatinine clearance will be within specified parameters  Outcome: Ongoing  Goal: Serum creatinine will be within specified parameters  Description  Serum creatinine will be within specified parameters  Outcome: Ongoing     Problem: Discharge Planning:  Goal: Discharged to appropriate level of care  Description  Discharged to appropriate level of care  Outcome: Ongoing     Problem: Serum Glucose Level - Abnormal:  Goal: Ability to maintain appropriate glucose levels will improve  Description  Ability to maintain appropriate glucose levels will improve  Outcome: Ongoing     Problem: Sensory Perception - Impaired:  Goal: Ability to maintain a stable neurologic state will improve  Description  Ability to maintain a stable neurologic state will improve  Outcome: Ongoing     Problem: Metabolic:  Goal: Ability to maintain appropriate glucose levels will improve  Description  Ability to maintain appropriate glucose levels will improve  Outcome: Ongoing     Problem: Tissue Perfusion:  Goal: Adequacy of tissue perfusion will improve  Description  Adequacy of tissue perfusion will improve  Outcome: Ongoing     Problem: Infection:  Goal: Will remain free from infection  Description  Will remain free from infection  Outcome: Ongoing     Problem: Safety:  Goal: Free from accidental physical injury  Description  Free from accidental physical injury  Outcome: Ongoing  Goal: Free from intentional

## 2019-09-17 NOTE — PROGRESS NOTES
No rash noted. Psychiatric: She has a normal mood and affect. Her behavior is normal. Thought content normal.         Recent Labs     09/16/19  0423 09/13/19  0440 09/11/19  0630   WBC 5.1 9.4 10.9*   HGB 9.1* 9.6* 9.2*   HCT 27.7* 33.7* 29.8*   MCV 97.5 112.3* 103.1*    173 173       Lab Results   Component Value Date     09/17/2019    K 3.5 09/17/2019    CL 96 (L) 09/17/2019    CO2 27 09/17/2019    BUN 19 09/17/2019    CREATININE 3.3 (H) 09/17/2019    GLUCOSE 125 (H) 09/17/2019    CALCIUM 9.0 09/17/2019    PROT 6.4 (L) 09/17/2019    LABALBU 3.2 (L) 09/17/2019    BILITOT 0.4 09/17/2019    ALKPHOS 83 09/17/2019    AST 25 09/17/2019    ALT 6 09/17/2019    LABGLOM 14 (A) 09/17/2019    GLOB 2.5 03/29/2017       Lab Results   Component Value Date    INR 1.08 09/17/2019    INR 1.09 08/23/2019    INR 0.87 (L) 11/02/2016    PROTIME 13.4 09/17/2019    PROTIME 13.5 08/23/2019    PROTIME 11.9 (L) 11/02/2016     RADIOLOGY STUDIES REVIEWED BY DR. Reba De La Cruz 9/10/19     Xr Lumbar Spine (2-3 Views)     Result Date: 8/26/2019  1. Intraoperative fluoroscopic guidance as described. 2. Please refer to real-time fluoroscopy and operative report for full details. Signed by Dr Juan Shepard on 8/26/2019 3:34 PM     Ct Head Wo Contrast     Result Date: 8/23/2019  Changes of aging with no acute intracranial abnormality Signed by Dr Hansel Garcia on 8/23/2019 7:14 AM     Ct Chest W Contrast     Result Date: 8/24/2019  1. No acute cardiopulmonary process. 2. Kyphoplasty said T11, L1 and L2. Signed by Dr Hansel Garcia on 8/24/2019 3:39 PM     Ct Cervical Spine Wo Contrast     Result Date: 8/23/2019  An osteolytic expansile lesion involving the left side of the vertebral body C3 extending into the pedicle. Suspect a pathological fracture. No displacement. No obvious extension into the spinal canal. Further evaluation with MR imaging of the cervical spine may be obtained. Cervical spondylosis.  Bilateral neural foraminal stenosis

## 2019-09-18 NOTE — PROGRESS NOTES
dialysis mon, wed, friday outpt dialysis at [facility]    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 [facility])     Palliative care patient 2018    Type II or unspecified type diabetes mellitus without mention of complication, not stated as uncontrolled     UTI (urinary tract infection)     Gangrenous UTI with gas in urinary tract       Past Surgical History:      Procedure Laterality Date    APPENDECTOMY       SECTION      x 2    CHOLECYSTECTOMY      COLONOSCOPY  09    Dr Val Menezes (LOWER) N/A 2016    ERCP/EUS-Dr BEKA Sutton-w/placement of a 10 [place] x7 cm temporary plastic biliary stent-Ampullary stenosis, mild dilation of the cbd w/questionable calcification vs stone, small periampullary diverticulum, removal of biliary sludge    ENDOSCOPY, COLON, DIAGNOSTIC      ERCP  2016    ERCP/EUS-Dr BEKA Sutton-w/placement of a 10 [place] x7 cm temporary plastic biliary stent-Ampullary stenosis, mild dilation of the cbd w/questionable calcification vs stone, small periampullary diverticulum, removal of biliary sludge    ERCP N/A 2017    Dr BEKA Sutton-Successful removal of indwelling biliary stent, no evidence of residual choledocholithiasis or common biliary stricture     EYE SURGERY      cataract    FEMUR FRACTURE SURGERY Left 2016    SHORT TFN INTERTROCHAN FRACTURE performed by Santiago Isaac MD at [address] [place] [address]      femur fracture surgery    HIP FRACTURE SURGERY Left     pinning    KYPHOSIS SURGERY N/A 2019    Biopsy of L5 bone lesion performed by Yovana Tobin DO at [facility] COLONOSCOPY W/BIOPSY SINGLE/MULTIPLE N/A 3/24/2017    Dr BEKA Sutton-Diverticular disease-Tubular AP (-) dysplasia x 5--3 yr recall    UPPER GASTROINTESTINAL ENDOSCOPY N/A 10/14/2016    Dr Simons-Hemorrhagic gastritis    UPPER

## 2019-09-18 NOTE — PROGRESS NOTES
PROGRESS NOTE  Patient name: Christa Maloney  Patient : 1945  Room: 307      SUBJECTIVE: Tolerated CT-guided left rib biopsy. Nausea and pain controlled. INTERVAL HISTORY  Ms. Samantha Abraham is a 19-year-old female who presented to [address] [place] [address] emergency room with persistent nausea and vomiting accompanied by abdominal pain. The patient was recently discharged from a prior hospitalization. Of note she has bony lesions concerning for metastatic disease. An attempted biopsy of the lumbar L5 lesion was nondiagnostic.        · 2019-CT scan of the cervical spine without contrast- dignified an osteolytic expansile lesion involving the left side of the vertebral body C3 extending into the pedicle. Suspect a pathological fracture. · 2019-T scan of the lumbar spine identified a lytic lesion at [facility] representing metastatic disease.  Evidence of kyphoplasty completed at T11, L1 and L2. · 2019-CT scan of the thoracic spine without contrast documented no acute compression deformities. · 09/10/2019- CT Abdomen and pellvis- 3.4 x 2 x 1 cm soft tissue mass expands the anterolateral left sixth rib. Enlarging 2 cm lytic lesion in the anterior L5 vertebral body. Increased infiltrating low density throughout the anterior left liver involving segments 2, 3, and 4 with multiple adjacent low-density  lesions. There is also a 7 mm low-density lesion in the inferior right liver on image 47 that was not seen on the prior study. Findings of periportal widening indicating chronic liver disease. Enlarging lytic lesion in the left anterolateral sixth rib and within the L5 vertebral body, compatible with osseous metastatic disease. Progressive peritoneal and omental stranding with multiple small nodules, worrisome for omental/peritoneal carcinomatosis. Subjective   REVIEW OF SYSTEMS:   Review of Systems   Constitutional: Positive for fatigue. Negative for chills, diaphoresis and fever.    HENT: 8/23/2019 8:06 AM  Ct Abdomen Pelvis Wo Contrast Additional Contrast? None     Result Date: 9/10/2019  Exam: CT ABDOMEN PELVIS WO CONTRAST Indication: Diffuse abdominal pain, nausea/vomiting, history of cancer Comparison: 8/4/2019 DLP: 1245 mGy cm. In order to have a CT radiation dose as low as reasonably achievable, Automated Exposure Control was utilized for adjustment of the mA and/or KV according to patient size. Findings: The lung bases are clear. 3.4 x 2 x 1 cm soft tissue mass expands the anterolateral left sixth rib. Enlarging 2 cm lytic lesion in the anterior L5 vertebral body. Increased infiltrating low density throughout the anterior left liver involving segments 2, 3, and 4 with multiple adjacent low-density lesions. There is also a 7 mm low-density lesion in the inferior right liver on image 47 that was not seen on the prior study. Findings of periportal widening indicating chronic liver disease. Gallbladder is surgically absent. No peripancreatic inflammation. Spleen and adrenal glands appear normal. Renal vascular calcifications noted. No hydronephrosis. No focal urinary bladder abnormality. No abnormal bowel distention or focal wall thickening. Normal appendix. A few colonic diverticula are noted. Small hiatal hernia. Small duodenal diverticulum. Small volume perihepatic ascites. There is progressive stranding and nodularity in the anterior peritoneum/omentum. No pelvic mass organized pelvic collection. Uterus and adnexa appear unremarkable. Abdominal aorta is normal in caliber and contains atherosclerotic calcification. Borderline enlarged retroperitoneal lymph nodes are stable. For example, mildly enlarged right retroperitoneal lymph node on image 49 is unchanged. Left femoral IM nail partially imaged.  T11, L1, and L2 compression fracture status post augmentation appear stable in degree of height loss compared to the prior CT.     Progressive infiltrative hypodensity throughout the left liver with

## 2019-09-18 NOTE — PROGRESS NOTES
 heparin (porcine) injection 3,600 Units  3,600 Units Intracatheter Once Lily Hopper MD        influenza quadrivalent split vaccine (FLUZONE;FLUARIX;FLULAVAL;AFLURIA) injection 0.5 mL  0.5 mL Intramuscular Once Shameka Montalvo MD   Stopped at 09/18/19 0839    sodium chloride flush 0.9 % injection 10 mL  10 mL Intravenous 2 times per day Essie Foy MD   Stopped at 09/18/19 0840    sodium chloride (PF) 0.9 % injection 10 mL  10 mL Intravenous PRN Essie Foy MD        scopolamine (TRANSDERM-SCOP) transdermal patch 1 patch  1 patch Transdermal Q72H Essie Foy MD   1 patch at 09/17/19 1737    lactulose (CHRONULAC) 10 GM/15ML solution 20 g  20 g Oral 4 times per day Essie Foy MD   20 g at 09/17/19 2217    HYDROcodone-acetaminophen (Janas Allerton) 7.5-325 MG per tablet 1 tablet  1 tablet Oral Q6H PRN Essie Foy MD        technetium Tc 99m oxidronate (TECHNESCAN HDP) injection 26.8 millicurie  10.1 millicurie Intravenous ONCE PRN Essie Foy MD        ondansetron (ZOFRAN) injection 8 mg  8 mg Intravenous Q6H PRN Essie Foy MD   8 mg at 09/18/19 0730    hydrALAZINE (APRESOLINE) injection 20 mg  20 mg Intravenous Q6H PRN Essie Foy MD   20 mg at 09/11/19 2200    metoprolol (LOPRESSOR) injection 2.5 mg  2.5 mg Intravenous Q6H Essie Foy MD   2.5 mg at 09/18/19 0604    enoxaparin (LOVENOX) injection 30 mg  30 mg Subcutaneous Daily Essie Foy MD   Stopped at 09/16/19 0722    sodium chloride flush 0.9 % injection 10 mL  10 mL Intravenous PRN Essie Foy MD        acetaminophen (TYLENOL) tablet 650 mg  650 mg Oral Q4H PRN Essie Foy MD        morphine injection 2 mg  2 mg Intravenous Q2H PRN Essie Foy MD   2 mg at 09/18/19 0730    atorvastatin (LIPITOR) tablet 40 mg  40 mg Oral Nightly Essie Foy MD   40 mg at 09/17/19 4036    polyps     Coronary artery disease (CAD) excluded 2019    Diabetic peripheral neuropathy associated with type 2 diabetes mellitus ([facility] [address] 2019    Hemodialysis patient ([facility] [address]     dialysis mon, wed, friday outpt dialysis [facility]    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 [facility])     Palliative care patient 2018    Type II or unspecified type diabetes mellitus without mention of complication, not stated as uncontrolled     UTI (urinary tract infection)     Gangrenous UTI with gas in urinary tract       Past Surgical History:  Past Surgical History:   Procedure Laterality Date    APPENDECTOMY       SECTION      x 2    CHOLECYSTECTOMY      COLONOSCOPY  09    Dr Yosef Carey (LOWER) N/A 2016    ERCP/EUS-Dr BEKA Sutton-w/placement of a 10 [place] x7 cm temporary plastic biliary stent-Ampullary stenosis, mild dilation of the cbd w/questionable calcification vs stone, small periampullary diverticulum, removal of biliary sludge    ENDOSCOPY, COLON, DIAGNOSTIC      ERCP  2016    ERCP/EUS-Dr BEKA Sutton-w/placement of a 10 [place] x7 cm temporary plastic biliary stent-Ampullary stenosis, mild dilation of the cbd w/questionable calcification vs stone, small periampullary diverticulum, removal of biliary sludge    ERCP N/A 2017    Dr BEKA Sutton-Successful removal of indwelling biliary stent, no evidence of residual choledocholithiasis or common biliary stricture     EYE SURGERY      cataract    FEMUR FRACTURE SURGERY Left 2016    SHORT TFN INTERTROCHAN FRACTURE performed by Kiet Domingo MD at [facility]      femur fracture surgery    HIP FRACTURE SURGERY Left     pinning    KYPHOSIS SURGERY N/A 2019    Biopsy of L5 bone lesion performed by Alex Ramachandran DO at [address] COLONOSCOPY W/BIOPSY bilaterally without respiratory distress  CVS: regular rate and rhythm  Abdominal: soft, nontender, normal bowel sounds  Extremities: no cyanosis or edema  Skin: warm and dry without rash    Labs:  BMP:   Recent Labs     09/16/19  0423 09/17/19  0505 09/18/19  0527    140 141   K 3.4* 3.5 3.5    96* 96*   CO2 22 27 21*   PHOS  --  3.5  --    BUN 33* 19 31*   CREATININE 5.0* 3.3* 4.9*   CALCIUM 8.8 9.0 9.1     CBC:   Recent Labs     09/16/19 0423   WBC 5.1   HGB 9.1*   HCT 27.7*   MCV 97.5        LIVER PROFILE:   Recent Labs     09/16/19  0423 09/17/19  0505   AST 23 25   ALT 6 6   BILITOT 0.4 0.4   ALKPHOS 87 83     PT/INR:   Recent Labs     09/17/19  0535   PROTIME 13.4   INR 1.08     APTT:   Recent Labs     09/17/19  0535   APTT 28.6     BNP:  No results for input(s): BNP in the last 72 hours. Ionized Calcium:No results for input(s): IONCA in the last 72 hours. Magnesium:  Recent Labs     09/17/19  0505   MG 2.0     Phosphorus:  Recent Labs     09/17/19  0505   PHOS 3.5     HgbA1C: No results for input(s): LABA1C in the last 72 hours. Hepatic:   Recent Labs     09/16/19  0423 09/17/19  0505   ALKPHOS 87 83   ALT 6 6   AST 23 25   PROT 6.0* 6.4*   BILITOT 0.4 0.4   LABALBU 3.2* 3.2*     Lactic Acid: No results for input(s): LACTA in the last 72 hours. Troponin: No results for input(s): CKTOTAL, CKMB, TROPONINT in the last 72 hours. ABGs:   Lab Results   Component Value Date    PHART 7.380 08/10/2018    PO2ART 65.0 08/10/2018    AXY6JBW 30.0 08/10/2018     CRP:  No results for input(s): CRP in the last 72 hours. Sed Rate:  No results for input(s): SEDRATE in the last 72 hours. Culture Results:   Blood Culture Recent: No results for input(s): BC in the last 720 hours. Urine Culture Recent : No results for input(s): LABURIN in the last 720 hours.     Radiology reports as per the Radiologist  Radiology: Ct Abdomen Pelvis W Wo Contrast Additional Contrast? Oral    Result Date: ventricles and sulci. The basilar cisterns are normal in size and configuration. There is no evidence of intracranial hemorrhage or mass-effect. There is low attenuation in the periventricular white matter, consistent with chronic ischemic change. Old lacunar infarcts are noted in the region of the mayra and corona radiata. There are no abnormal extra-axial fluid collections. There is no evidence of tonsillar herniation. The included orbits and their contents are unremarkable. The visualized paranasal sinuses, mastoid air cells and middle ear cavities are clear. The visualized osseous structures and overlying soft tissues of the skull and face are intact. Changes of aging with no acute intracranial abnormality Signed by Dr Karen Wu on 8/23/2019 7:14 AM    Ct Chest W Contrast    Result Date: 8/24/2019  EXAMINATION: CT CHEST W CONTRAST 8/24/2019 3:35 PM HISTORY: CT CHEST W CONTRAST 8/24/2019 8:30 AM HISTORY: Metastatic bone disease COMPARISON: None DLP: 1647 mGy cm TECHNIQUE: Serial helical tomographic images of the chest were acquired following the infusion of Isovue contrast intravenously. Bone and soft tissue algorithms were provided. FINDINGS: Neck base: The imaged portion of the neck and thyroid gland is unremarkable. Lungs: Dependent atelectasis is noted in the lungs. No pulmonary nodules are identified. . No pleural effusion is present. The trachea and bronchial tree are patent. Heart: The heart is normal in size. Coronary calcifications are present. Zetta Bolster vessels: The great vessels are normal in caliber and are patent. The pulmonary arteries are patent within limits of this study and are normal in caliber. Lymph nodes: No significant hilar, mediastinal or axillary lymphadenopathy is appreciated. Skeletal and soft tissues: Evidence of kyphoplasty at T11 L1 and L2 is noted. Jerrica Peaks Upper abdomen: The abdomen will be dictated under a separate report. 1. No acute cardiopulmonary process.  2. Kyphoplasty said neural foraminal stenosis at C5-6. No spinal stenosis at any level. A tiny noncalcified nodule in the left upper lobe. This is suboptimally evaluated in this study. Further follow-up with CT scan of the chest may be obtained. The above study was initially reviewed and reported by [facility]. I do not find any discrepancies. Signed by Dr Derian Zendejas on 8/23/2019 8:18 AM    Ct Thoracic Spine Wo Contrast    Result Date: 8/24/2019  EXAMINATION: CT THORACIC SPINE WO CONTRAST 8/24/2019 7:56 AM HISTORY: CT thoracic spine without contrast 8/24/2019 12:45 AM HISTORY: Pain COMPARISON: None DLP: 1035 mGy cm TECHNIQUE: Serial helical tomographic images of the thoracic spine were obtained without the use of intravenous contrast. Multiplanar reformatted images were provided for review. FINDINGS: Evidence of kyphoplasty at T11 is noted. . Vertebral body heights and alignment are well maintained. Degenerative changes noted in the disc spaces. . There is no bony narrowing of the spinal canal or neural foramina. There is no evidence of facet lock or perch. The posterior elements are intact. The visualized soft tissues are unremarkable. Evidence of kyphoplasty at T11. No acute compression deformities identified. Signed by Dr Arnulfo Dobson on 8/24/2019 7:58 AM    Ct Lumbar Spine Wo Contrast    Result Date: 8/24/2019  EXAMINATION: CT LUMBAR SPINE WO CONTRAST 8/24/2019 7:52 AM HISTORY: CT lumbar spine without contrast 8/24/2019 12:45 AM History: Pain Comparison: February 2017 DLP: 867 mGy cm Technique: Serial helical tomographic images of the lumbar spine were obtained without the use of intravenous contrast. Additionally, sagittal and coronal reformatted images were provided for review. Findings: Old compression fractures of L1 and L2 are noted evidence of kyphoplasty is present. There is mild retropulsion of the posterior superior endplate of L2 into the lumbar canal.. Vertebral body heights are well maintained.  Vacuum phenomenon is posterior disc osteophyte complex is present. The left neural foramen is compromised by disc and facet joint. The right neural foramen demonstrates moderate stenosis due to a right lateral disc and facet joint hypertrophy. .     1. Definite marrow replacement at L5 probably metastatic disease. There may also be marrow replacement inferior L4. 2. Postsurgical and degenerative spondylosis as described above Signed by Dr Dakotah Ralph on 8/25/2019 4:18 PM    Ct Abdomen Pelvis W Iv Contrast Additional Contrast? Oral    Result Date: 8/24/2019  EXAMINATION: CT ABDOMEN PELVIS W IV CONTRAST 8/24/2019 3:39 PM CT ABDOMEN AND PELVIS WITH CONTRAST 8/24/2019 8:30 AM HISTORY: Metastatic lytic lesion COMPARISON: March 25, 2017. DLP: 1647 mGy cm Automated exposure control was utilized to minimize patient radiation dose. TECHNIQUE: Following the oral ingestion and intravenous administration of contrast, helical CT tomographic images of the abdomen and pelvis were acquired. Coronal reformatted images were also provided for review. FINDINGS: The lung bases and base of the heart are unremarkable. LIVER: There is nonuniform attenuation of the left lobe of the liver. This is suspicious for metastatic disease. Decreased CT attenuation the liver is noted consistent with hepatic steatosis. BILIARY SYSTEM: The gallbladder is surgically absent. PANCREAS: No focal pancreatic lesion. SPLEEN: Unremarkable. KIDNEYS AND ADRENALS: The adrenal glands are visualized. Renal contours demonstrate symmetric enhancement. Nonobstructing calculi are present bilaterally. . The ureters are decompressed and normal in appearance. RETROPERITONEUM: No mass, lymphadenopathy or hemorrhage. GI TRACT: No evidence of obstruction or bowel wall thickening. Diverticulosis is noted. . OTHER: There is no mesenteric mass, lymphadenopathy or fluid collection. The abdominopelvic vasculature is patent. A lytic lesion is present in the anterior centrum of L5.  This was not present

## 2019-09-19 NOTE — PROGRESS NOTES
level. A tiny noncalcified nodule in the left upper lobe. This is suboptimally evaluated in this study. Further follow-up with CT scan of the chest may be obtained. The above study was initially reviewed and reported by [facility]. I do not find any discrepancies. Signed by Dr Celestino Arizmendi on 8/23/2019 8:18 AM     Ct Thoracic Spine Wo Contrast     Result Date: 8/24/2019  Evidence of kyphoplasty at T11. No acute compression deformities identified. Signed by Dr Reid Ames on 8/24/2019 7:58 AM     Ct Lumbar Spine Wo Contrast     Result Date: 8/24/2019  Impression: 1. Lytic lesion at L5 is likely representing metastatic disease. 2. Evidence of kyphoplasty at T11, L1 and L2. 3. Retropulsion of superior endplates of L1 and L2) lumbar canal. Signed by Dr Reid Ames on 8/24/2019 7:56 AM     Mri Cervical Spine Wo Contrast     Result Date: 8/25/2019  1. 2 cm marrow replacement left side of C3 consistent with a metastatic lesion. This extends to the left and appears to partially encase the left vertebral artery. Signed by Dr Reid Ames on 8/25/2019 5:10 PM     Mri Lumbar Spine Wo Contrast     Result Date: 8/25/2019  1. Definite marrow replacement at L5 probably metastatic disease. There may also be marrow replacement inferior L4. 2. Postsurgical and degenerative spondylosis as described above Signed by Dr Reid Ames on 8/25/2019 4:18 PM     Ct Abdomen Pelvis W Iv Contrast Additional Contrast? Oral     Result Date: 8/24/2019  1. 19 mm lytic lesion in the anterior centrum of L5. Most likely this is metastatic disease. 2. Nonuniform attenuation of the left lobe of the liver and infiltrating neoplasm cannot be excluded. 3. Hepatic steatosis. Signed by Dr Reid Ames on 8/24/2019 3:45 PM     Xr Chest Portable     Result Date: 8/23/2019  Stable chest, no acute abnormality is identified, the lungs are hypoaerated. Chronic changes present as described. Signed by Dr Haylie Albarado.  Keila on 8/23/2019 8:06 AM  Ct Abdomen lactulose. · Continue Zofran and Phenergan  · Reglan before meals and at bedtime   · Scopolamine patch    Had prior colonoscopy last year per daughter. #Esophageal thickening  · Gastroenterology assisting   · EGD 9/16/2019 that revealed severe erosive esophagitis, grade 3 involving nearly the entire length of the esophagus. Gastritis was noted in the body and antrum of the stomach. No masses or obstructions were noted. Pathology pending. #Acute kidney injury/CKD, HD dependent  Creatinine 3.4 on 9/19/2019 last HD 9/18/2019  Nephrology assisting    #Bone lytic lesions- prior L5 biopsy was nondiagnostic. Neurosurgery following as outpatient. #Liver infiltration- Differential diagnosis of metastatic disease or primary hepatic carcinoma with an enhancing lesion in the anterior aspect of the left hepatic lobe that involves segments 2, 3, and 4. This is highly suspicious for metastatic disease and measures 13.2 cm in width, 7.2 cm in length, and 7.4 cm in AP dimension. · CT-guided chest wall of the left rib mass biopsy on 9/17/2019 consistent with well differentiated metastatic adenocarcinoma. Morphology favors upper GI tract including pancreaticobiliary tree although other sites of origin certainly cannot be excluded. · Liver enzymes stable. #Pain secondary to disease process  · Norco 7.5 tablet every 6 hours as needed for breakthrough      #Diabetes- uncontrolled hyperglycemia.      PLAN:  Attending to resume Plavix and aspirin when appropriate  Considering palliative chemotherapy   [facility]    09/19/19  6:52 AM    Physician's attestation/substantial contribution:  I, Dr Tj Willingham, independently performed an evaluation on [facility]. I have reviewed relevant medical information/data to include but not limited to medication list, relevant appropriate labs and imaging when applicable.  I reviewed other physician's notes, ancillary

## 2019-09-19 NOTE — PROGRESS NOTES
pending. 4. GERD: See orders as recommended by GI.  5. Encephalopathy: Much better. Likely due to the liver and she did have an elevated ammonia level. 6. Hypokalemia: Replete. 7. DNR status per daughter and the patient. 8. Diverticulitis: Completed a full course of abx and she is asymptomatic. Abx have been stopped. 9. ESRD: HD to continue. Advance Directive: DNR-CC  DVT prophylaxis with enoxaparin 40 mg sub-Q daily. Discharge planning: ? Active Problems:    Anemia    Essential hypertension, benign    Diabetes mellitus, type 2 (HCC)    Nausea and vomiting    Closed compression fracture of first lumbar vertebra (HCC)    Abdominal pain    Stage 5 chronic kidney disease on chronic dialysis (HCC)    Pulmonary hypertension (HCC)    Nocturnal hypoxia    Decreased mobility and endurance    Chronic back pain    Osteolytic lesion due to metastasis with unknown primary site [facility])    Asterixis    Involuntary movements  Resolved Problems:    * No resolved hospital problems.  Chance Douglas MD

## 2019-09-19 NOTE — PLAN OF CARE
adequate hydration will improve  Description  Maintenance of adequate hydration will improve  Outcome: Ongoing     Problem: Health Behavior:  Goal: Ability to manage health-related needs will improve  Description  Ability to manage health-related needs will improve  Outcome: Ongoing  Goal: Identification of resources available to assist in meeting health care needs will improve  Description  Identification of resources available to assist in meeting health care needs will improve  Outcome: Ongoing  Goal: Ability to identify and utilize available resources and services will improve  Description  Ability to identify and utilize available resources and services will improve  Outcome: Ongoing  Goal: Ability to state signs and symptoms to report to health care provider will improve  Description  Ability to state signs and symptoms to report to health care provider will improve  Outcome: Ongoing     Problem: Nutritional:  Goal: Ability to identify appropriate dietary choices will improve  Description  Ability to identify appropriate dietary choices will improve  Outcome: Ongoing  Goal: Maintenance of adequate nutrition will improve  Description  Maintenance of adequate nutrition will improve  Outcome: Ongoing  Goal: Progress toward achieving an optimal weight will improve  Description  Progress toward achieving an optimal weight will improve  Outcome: Ongoing     Problem: Physical Regulation:  Goal: Ability to maintain clinical measurements within normal limits will improve  Description  Ability to maintain clinical measurements within normal limits will improve  Outcome: Ongoing  Goal: Complications related to the disease process, condition or treatment will be avoided or minimized  Description  Complications related to the disease process, condition or treatment will be avoided or minimized  Outcome: Ongoing  Goal: Will show no signs and symptoms of electrolyte imbalance  Description  Will show no signs and symptoms of Problem: Nausea/Vomiting:  Goal: Ability to achieve adequate nutritional intake will improve  Description  Ability to achieve adequate nutritional intake will improve  Outcome: Ongoing  Goal: Absence of nausea/vomiting  Description  Absence of nausea/vomiting  Outcome: Ongoing  Goal: Able to drink  Description  Able to drink  Outcome: Ongoing  Goal: Able to eat  Description  Able to eat  Outcome: Ongoing     Problem: Bowel/Gastric:  Goal: Diagnostic test results will improve  Description  Diagnostic test results will improve  Outcome: Ongoing  Goal: Bowel function will improve  Description  Bowel function will improve  Outcome: Ongoing  Goal: Occurrences of nausea will decrease  Description  Occurrences of nausea will decrease  Outcome: Ongoing  Goal: Occurrences of vomiting will decrease  Description  Occurrences of vomiting will decrease  Outcome: Ongoing     Problem: Nutrition  Goal: Optimal nutrition therapy  Description  Nutrition Problem: Inadequate oral intake  Intervention: Food and/or Nutrient Delivery: Continue NPO  Nutritional Goals: PO intake 50% or greater. Weight remain stable.      Outcome: Ongoing

## 2019-09-19 NOTE — PROGRESS NOTES
mL  10 mL Intravenous 2 times per day Leonard Weinstein MD   10 mL at 09/19/19 0921    sodium chloride (PF) 0.9 % injection 10 mL  10 mL Intravenous PRN Leonard Weinstein MD        scopolamine (TRANSDERM-SCOP) transdermal patch 1 patch  1 patch Transdermal Q72H Leonard Weinstein MD   1 patch at 09/17/19 1737    lactulose (CHRONULAC) 10 GM/15ML solution 20 g  20 g Oral 4 times per day Leonard Weinstein MD   20 g at 09/18/19 1823    HYDROcodone-acetaminophen ([facility]) 7.5-325 MG per tablet 1 tablet  1 tablet Oral Q6H PRN Leonard Weinstein MD   1 tablet at 09/18/19 1627    technetium Tc 99m oxidronate (TECHNESCAN HDP) injection 10.8 millicurie  13.3 millicurie Intravenous ONCE PRN Leonard Weinstein MD        ondansetron (ZOFRAN) injection 8 mg  8 mg Intravenous Q6H PRN Leonard Weinstein MD   8 mg at 09/19/19 0821    hydrALAZINE (APRESOLINE) injection 20 mg  20 mg Intravenous Q6H PRN Leonard Weinstein MD   20 mg at 09/11/19 2200    metoprolol (LOPRESSOR) injection 2.5 mg  2.5 mg Intravenous Q6H Leonard Weinstein MD   2.5 mg at 09/19/19 0545    enoxaparin (LOVENOX) injection 30 mg  30 mg Subcutaneous Daily Leonard Weinstein MD   30 mg at 09/19/19 0821    sodium chloride flush 0.9 % injection 10 mL  10 mL Intravenous PRN Leonard Weinstein MD        acetaminophen (TYLENOL) tablet 650 mg  650 mg Oral Q4H PRN Leonard Weinstein MD        morphine injection 2 mg  2 mg Intravenous Q2H PRN Leonard Weinstein MD   2 mg at 09/19/19 0821    atorvastatin (LIPITOR) tablet 40 mg  40 mg Oral Nightly Leonard Weinstein MD   40 mg at 09/19/19 0128    bisacodyl (DULCOLAX) suppository 10 mg  10 mg Rectal Daily PRN Leonard Weinstein MD        hydrALAZINE (APRESOLINE) tablet 25 mg  25 mg Oral BID Leonard Weinstein MD   25 mg at 09/19/19 0130    NIFEdipine (PROCARDIA XL) extended release tablet 30 mg  30 [facility] BID [facility] [id number]   K 3.5 3.5 3.3*   CL 96* 96* 98   CO2 27 21* 26   PHOS 3.5  --   --    BUN 19 31* 17   CREATININE 3.3* 4.9* 3.4*   CALCIUM 9.0 9.1 9.1     CBC:   Recent Labs     09/19/19  0517   WBC 4.3*   HGB 8.1*   HCT 25.9*   .4*   *     LIVER PROFILE:   Recent Labs     09/17/19  0505   AST 25   ALT 6   BILITOT 0.4   ALKPHOS 83     PT/INR:   Recent Labs     09/17/19  0535   PROTIME 13.4   INR 1.08     APTT:   Recent Labs     09/17/19  0535   APTT 28.6     BNP:  No results for input(s): BNP in the last 72 hours. Ionized Calcium:No results for input(s): IONCA in the last 72 hours. Magnesium:  Recent Labs     09/17/19  0505   MG 2.0     Phosphorus:  Recent Labs     09/17/19  0505   PHOS 3.5     HgbA1C: No results for input(s): LABA1C in the last 72 hours. Hepatic:   Recent Labs     09/17/19  0505   ALKPHOS 83   ALT 6   AST 25   PROT 6.4*   BILITOT 0.4   LABALBU 3.2*     Lactic Acid: No results for input(s): LACTA in the last 72 hours. Troponin: No results for input(s): CKTOTAL, CKMB, TROPONINT in the last 72 hours. ABGs:   Lab Results   Component Value Date    PHART 7.380 08/10/2018    PO2ART 65.0 08/10/2018    AVE8SRX 30.0 08/10/2018     CRP:  No results for input(s): CRP in the last 72 hours. Sed Rate:  No results for input(s): SEDRATE in the last 72 hours. Culture Results:   Blood Culture Recent: No results for input(s): BC in the last 720 hours. Urine Culture Recent : No results for input(s): LABURIN in the last 720 hours. Radiology reports as per the Radiologist  Radiology: Ct Abdomen Pelvis W Wo Contrast Additional Contrast? Oral    Result Date: 9/11/2019  CT ABDOMEN PELVIS W WO CONTRAST 9/11/2019 12:45 PM HISTORY: Possible liver metastases COMPARISON: CT 9/10/2019 DLP: 4578 mGy cm. In order to have a CT radiation dose as low as reasonably achievable, Automated Exposure Control was utilized for adjustment of the mA and/or KV according to patient size.  TECHNIQUE: Precontrast images of the 8/23/2019 8:18 AM    Ct Thoracic Spine Wo Contrast    Result Date: 8/24/2019  EXAMINATION: CT THORACIC SPINE WO CONTRAST 8/24/2019 7:56 AM HISTORY: CT thoracic spine without contrast 8/24/2019 12:45 AM HISTORY: Pain COMPARISON: None DLP: 1035 mGy cm TECHNIQUE: Serial helical tomographic images of the thoracic spine were obtained without the use of intravenous contrast. Multiplanar reformatted images were provided for review. FINDINGS: Evidence of kyphoplasty at T11 is noted. . Vertebral body heights and alignment are well maintained. Degenerative changes noted in the disc spaces. . There is no bony narrowing of the spinal canal or neural foramina. There is no evidence of facet lock or perch. The posterior elements are intact. The visualized soft tissues are unremarkable. Evidence of kyphoplasty at T11. No acute compression deformities identified. Signed by Dr Glenis Paz on 8/24/2019 7:58 AM    Ct Lumbar Spine Wo Contrast    Result Date: 8/24/2019  EXAMINATION: CT LUMBAR SPINE WO CONTRAST 8/24/2019 7:52 AM HISTORY: CT lumbar spine without contrast 8/24/2019 12:45 AM History: Pain Comparison: February 2017 DLP: 867 mGy cm Technique: Serial helical tomographic images of the lumbar spine were obtained without the use of intravenous contrast. Additionally, sagittal and coronal reformatted images were provided for review. Findings: Old compression fractures of L1 and L2 are noted evidence of kyphoplasty is present. There is mild retropulsion of the posterior superior endplate of L2 into the lumbar canal.. Vertebral body heights are well maintained. Vacuum phenomenon is noted at the L4-5 T12-L1 and L1-L2 levels. Also L5-S1. . A lytic lesion in the anterior centrum of L5 is noted suspicious for metastatic disease. . The posterior elements are intact. The visualized soft tissues are unremarkable. Impression: 1. Lytic lesion at L5 is likely representing metastatic disease. 2. Evidence of kyphoplasty at T11, L1 and L2. 3. Retropulsion of superior endplates of L1 and L2) lumbar canal. Signed by Dr Glenis Paz on 8/24/2019 7:56 AM    Mri Cervical Spine Wo Contrast    Result Date: 8/25/2019  EXAMINATION: MRI CERVICAL SPINE WO CONTRAST 8/25/2019 4:59 PM HISTORY: MRI CERVICAL SPINE WO CONTRAST 8/25/2019 3:00 PM HISTORY: COMPARISON: None TECHNIQUE: Multiplanar, multisequence MRI of the cervical spine was obtained without contrast. FINDINGS: Imaged portions of the cerebellum and brainstem are unremarkable. Alignment: Normal cervical lordosis is maintained. There is no evidence of listhesis or subluxation. Marrow signal: There is abnormal marrow signal left side of the central of C3. This is consistent with metastatic disease. This extends outside of the vertebral body and appears to encase the left vertebral artery. However flow is present in the left vertebral artery. . Cord/Canal: The spinal cord is normal in signal and morphology. Soft tissues: The surrounding soft tissues are unremarkable. Levels: C2-C3: No disc bulge is present. No significant neuroforaminal or central canal stenosis is seen. C3-C4: No disc bulge is present. No significant neuroforaminal or central canal stenosis is seen. C4-C5: No disc bulge is present. No significant neuroforaminal or central canal stenosis is seen. C5-C6: Broad-based disc hypertrophic osteophytes present. There is some uncinate spurring compromising the right and left neural foramen. Néstor Garcia C6-C7: Broad-based disc is present. There is some uncinate spurring compromising the neural foramen bilaterally. . C7-T1: No disc bulge is present. No significant neuroforaminal or central canal stenosis is seen. 1. 2 cm marrow replacement left side of C3 consistent with a metastatic lesion. This extends to the left and appears to partially encase the left vertebral artery.  Signed by Dr Glenis Paz on 8/25/2019 5:10 PM    Mri Lumbar Spine Wo Contrast    Result Date: 8/25/2019  EXAMINATION: MRI LUMBAR SPINE WO appears normal.    Impression: 1. No visualized mass lesion/metastatic disease on this noncontrast exam. 2. Evidence for advanced chronic small vessel ischemia with multiple old deep white matter/basal ganglia lacunar infarcts.  Signed by Dr Maurilio Alcaraz on 9/10/2019 3:59 PM       Assessment   End-stage renal disease  Diabetes type 2 with diabetic nephropathy  Disseminated metastatic disease  Anemia of chronic kidney disease  Secondary hyperparathyroidism  Hypokalemia    Plan:  HD MWF  Monitor labs  Oncology following  replace K    Lily Hopper MD  09/19/19  10:50 AM

## 2019-09-20 NOTE — PLAN OF CARE
electrolyte imbalance  Outcome: Ongoing  Goal: Diagnostic test results will improve  Description  Diagnostic test results will improve  Outcome: Ongoing     Problem: Sensory:  Goal: Pain level will decrease  Description  Pain level will decrease  Outcome: Ongoing  Goal: General experience of comfort will improve  Description  General experience of comfort will improve  Outcome: Ongoing  Goal: Ability to identify factors that increase the pain will improve  Description  Ability to identify factors that increase the pain will improve  Outcome: Ongoing  Goal: Ability to notify healthcare provider of pain before it becomes unmanageable or unbearable will improve  Description  Ability to notify healthcare provider of pain before it becomes unmanageable or unbearable will improve  Outcome: Ongoing     Problem: Skin Integrity:  Goal: Status of oral mucous membranes will improve  Description  Status of oral mucous membranes will improve  Outcome: Ongoing  Goal: Skin integrity will be maintained  Description  Skin integrity will be maintained  Outcome: Ongoing  Goal: Risk for impaired skin integrity will decrease  Description  Risk for impaired skin integrity will decrease  Outcome: Ongoing  Goal: Will show no infection signs and symptoms  Description  Will show no infection signs and symptoms  Outcome: Ongoing  Goal: Absence of new skin breakdown  Description  Absence of new skin breakdown  Outcome: Ongoing     Problem: Tissue Perfusion - Renal, Altered:  Goal: Ability to achieve a balanced intake and output will improve  Description  Ability to achieve a balanced intake and output will improve  Outcome: Ongoing  Goal: Electrolytes within specified parameters  Description  Electrolytes within specified parameters  Outcome: Ongoing  Goal: Urine creatinine clearance will be within specified parameters  Description  Urine creatinine clearance will be within specified parameters  Outcome: Ongoing  Goal: Serum creatinine will be

## 2019-09-20 NOTE — PROGRESS NOTES
Mack Huertas MD                          [phone]      Patient ID:    Name:  Juan Payne    MRN:  [id number]    1956   61 y.o.  male            Patient Care Team:  No Known Provider as PCP - General    LOS: 3    CC/Reason for visit:     Subjective: Pt seen and examined this AM. No acute overnight events noted. Doing better.       ROS:   Denies any fevers/ chills/ CP/ palpitations/ Nausea/ vomiting/ Diarrhoea  No Worsening cough or SOA.     Objective     Vital Signs past 24hrs    BP range: BP: ()/(61-95) 134/83  Pulse range: Heart Rate:  [74-86] 78  Resp rate range: Resp:  [16] 16  Temp range: Temp (24hrs), Av.9 °F (37.2 °C), Min:98.8 °F (37.1 °C), Max:98.9 °F (37.2 °C)      Ventilator/Non-Invasive Ventilation Settings     None          Device (Oxygen Therapy): room air       63.5 kg (140 lb); Body mass index is 21.29 kg/m².      Intake/Output Summary (Last 24 hours) at 18 1605  Last data filed at 18 0700   Gross per 24 hour   Intake           4411.8 ml   Output              975 ml   Net           3436.8 ml       Exam:    GEN:  No respiratory distress, appears stated age  EYES:   EOM-i, anicteric sclera bilat  ENT:    External ears/nose normal, OP clear  NECK:  Supple, midline trachea, No JVD or cervical LApathy  LUNGS: Bilateral breath sounds heard, No adventitious sounds  CV:  Regular rate and rhythm, + PSM  ABD:  Non tender, no distended, no palpable liver or masses  EXT:  No cyanosis or clubbing, no peripheral/sacral edema    Scheduled meds:    enoxaparin 40 mg Subcutaneous Nightly   folic acid 1 mg Oral Daily   insulin aspart 0-4 Units Subcutaneous 4x Daily AC & at Bedtime   insulin aspart 4 Units Subcutaneous TID With Meals   [START ON 3/12/2018] insulin detemir 12 Units Subcutaneous QAM   insulin detemir 6 Units Subcutaneous Nightly   multivitamin 1 tablet Oral Daily   thiamine (VITAMIN B1) IVPB 100 mg Intravenous Daily       IV meds:                            Data Review:        Results from last 7 days  Lab Units 03/11/18  1247 03/11/18  0447 03/10/18  1829  03/10/18  0541 03/10/18  0230  03/09/18  0028  03/08/18  1646   SODIUM mmol/L 138 138 135*  < > 137 133*  < > 136  < > 136   POTASSIUM mmol/L 3.5 3.5 3.9  < > 3.3* 4.3  < > 2.7*  < > 3.3*   CHLORIDE mmol/L 98 100 100  < > 93* 85*  < > 90*  < > 82*   CO2 mmol/L 26.7 28.5 24.6  < > 20.2* 8.1*  < > 19.3*  < > 12.6*   BUN mg/dL 8 9 11  < > 16 17  < > 22*  < > 19   CREATININE mg/dL 0.67* 0.67* 0.74*  < > 1.18 1.26  < > 1.42*  < > 1.39*   CALCIUM mg/dL 9.0 8.3* 7.8*  < > 8.9 8.8  < > 9.1  < > 9.7   BILIRUBIN mg/dL  --   --   --   --  0.9  --   --   --   --  1.7*   ALK PHOS U/L  --   --   --   --  62  --   --   --   --  84   ALT (SGPT) U/L  --   --   --   --  14  --   --   --   --  18   AST (SGOT) U/L  --   --   --   --  14  --   --   --   --  15   GLUCOSE mg/dL 122* 125* 189*  < > 309* 662*  < > 161*  < > 328*   WBC 10*3/mm3  --  6.13  --   --   --  6.88  --  9.47  --  10.85*   HEMOGLOBIN g/dL  --  10.6*  --   --   --  11.2*  --  11.7*  --  13.5*   PLATELETS 10*3/mm3  --  251  --   --   --  259  --  266  --  304   PROCALCITONIN ng/mL  --   --   --   --  0.63*  --   --   --   --   --    < > = values in this interval not displayed.    Lab Results   Component Value Date    CALCIUM 9.0 03/11/2018    PHOS 2.5 03/10/2018         Results from last 7 days  Lab Units 03/10/18  0707   BLOODCX  No growth at 24 hours  No growth at 24 hours         Results from last 7 days  Lab Units 03/10/18  0541 03/08/18 2013   TROPONIN T ng/mL 0.014 0.016       Results Review:    I have reviewed the available laboratory results and reviewed the chest imaging personally    Imaging Results (all)     Procedure Component Value Units Date/Time    XR Chest 1 View [[id number]] Collected:  03/08/18 1938     Updated:  03/08/18 1942    Narrative:       EMERGENCY PORTABLE CHEST SINGLE VIEW 19:45     HISTORY: 61-year-old male with shortness  for chills, diaphoresis and fever. HENT: Negative for mouth sores, nosebleeds, sore throat, trouble swallowing and voice change. Eyes: Negative for photophobia, discharge and itching. Respiratory: Negative for cough, shortness of breath and wheezing. Cardiovascular: Negative for chest pain, palpitations and leg swelling. Gastrointestinal: Positive for nausea (overall controlled). Negative for blood in stool, constipation, diarrhea and vomiting. Endocrine: Negative for cold intolerance, heat intolerance, polydipsia and polyuria. Genitourinary: Negative for difficulty urinating, dysuria, hematuria and urgency. Musculoskeletal: Positive for arthralgias and back pain. Negative for joint swelling and myalgias. Skin: Negative for color change and rash. Neurological: Positive for weakness. Negative for dizziness, tremors and seizures. Hematological: Negative for adenopathy. Does not bruise/bleed easily. Psychiatric/Behavioral: Negative for behavioral problems and suicidal ideas. The patient is not nervous/anxious. All other systems reviewed and are negative. Objective   /70   Pulse 80   Temp 98.4 °F (36.9 °C) (Temporal)   Resp 18   Ht 5' 4\" (1.626 m)   Wt 168 lb 3.2 oz (76.3 kg)   SpO2 92%   BMI 28.87 kg/m²     PHYSICAL EXAM:  Physical Exam   Constitutional: She is oriented to person, place, and time. Weak appearing   HENT:   Head: Normocephalic and atraumatic. Mouth/Throat: Oropharynx is clear and moist.   Eyes: Conjunctivae and EOM are normal. No scleral icterus. Neck: Normal range of motion. Neck supple. No tracheal deviation present. Cardiovascular: Normal rate, regular rhythm and normal heart sounds. No murmur heard. Pulmonary/Chest: Effort normal and breath sounds normal. No respiratory distress. Abdominal: Soft. Bowel sounds are normal.   Musculoskeletal: She exhibits no edema or tenderness.    Full ROM in all 4 extremities   Neurological: She is alert and of breath, tobacco abuse and  diabetes     COMPARISON: None available     FINDINGS:  1. Negative acute portable chest, no evidence of an active intrathoracic  process nor other significant abnormality.     This report was finalized on 3/8/2018 7:39 PM by Dr. Zeb Davis MD.             ASSESSMENT:   Diabetic ketoacidosis  Generalized weakness  Current heavy alcohol drinker  Current heavy smoker  Known diabetes  Cardiac murmur, AS  Hypertriglyceridemia  Anemia, likely chronic disease    PLAN:  Patient has been off insulin drip for 24 hours.  Endocrinology trying to get a good regimen for him.  Multiple episodes of hypoglycemia noted due to poor by mouth intake.  I have talked to the patient regarding importance of being consistent with diet.   No more concerns for alcohol withdrawal.  Outpatient follow-up for cardiac murmur  Thiamine and folate for alcohol abuse  Ok to tx to floor.    I have discussed my findings and recommendations with patient, nursing staff and primary care team.     Mack Huertas MD  3/11/2018   lungs are hypoaerated. Chronic changes present as described. Signed by Dr Thee Baird. Keila on 8/23/2019 8:06 AM  Ct Abdomen Pelvis Wo Contrast Additional Contrast? None     Result Date: 9/10/2019  Exam: CT ABDOMEN PELVIS WO CONTRAST Indication: Diffuse abdominal pain, nausea/vomiting, history of cancer Comparison: 8/4/2019 DLP: 1245 mGy cm. In order to have a CT radiation dose as low as reasonably achievable, Automated Exposure Control was utilized for adjustment of the mA and/or KV according to patient size. Findings: The lung bases are clear. 3.4 x 2 x 1 cm soft tissue mass expands the anterolateral left sixth rib. Enlarging 2 cm lytic lesion in the anterior L5 vertebral body. Increased infiltrating low density throughout the anterior left liver involving segments 2, 3, and 4 with multiple adjacent low-density lesions. There is also a 7 mm low-density lesion in the inferior right liver on image 47 that was not seen on the prior study. Findings of periportal widening indicating chronic liver disease. Gallbladder is surgically absent. No peripancreatic inflammation. Spleen and adrenal glands appear normal. Renal vascular calcifications noted. No hydronephrosis. No focal urinary bladder abnormality. No abnormal bowel distention or focal wall thickening. Normal appendix. A few colonic diverticula are noted. Small hiatal hernia. Small duodenal diverticulum. Small volume perihepatic ascites. There is progressive stranding and nodularity in the anterior peritoneum/omentum. No pelvic mass organized pelvic collection. Uterus and adnexa appear unremarkable. Abdominal aorta is normal in caliber and contains atherosclerotic calcification. Borderline enlarged retroperitoneal lymph nodes are stable. For example, mildly enlarged right retroperitoneal lymph node on image 49 is unchanged. Left femoral IM nail partially imaged.  T11, L1, and L2 compression fracture status post augmentation appear stable in degree of height loss

## 2019-09-20 NOTE — PROGRESS NOTES
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 [facility])     Palliative care patient 2018    Type II or unspecified type diabetes mellitus without mention of complication, not stated as uncontrolled     UTI (urinary tract infection)     Gangrenous UTI with gas in urinary tract       Past Surgical History:  Past Surgical History:   Procedure Laterality Date    APPENDECTOMY       SECTION      x 2    CHOLECYSTECTOMY      COLONOSCOPY  09    Dr Renetta Gee (LOWER) N/A 2016    ERCP/EUS-Dr BEKA Sutton-w/placement of a 10 Lebanese x7 cm temporary plastic biliary stent-Ampullary stenosis, mild dilation of the cbd w/questionable calcification vs stone, small periampullary diverticulum, removal of biliary sludge    ENDOSCOPY, COLON, DIAGNOSTIC      ERCP  2016    ERCP/EUS-Dr BEKA Sutton-w/placement of a 10 Lebanese x7 cm temporary plastic biliary stent-Ampullary stenosis, mild dilation of the cbd w/questionable calcification vs stone, small periampullary diverticulum, removal of biliary sludge    ERCP N/A 2017    Dr BEKA Sutton-Successful removal of indwelling biliary stent, no evidence of residual choledocholithiasis or common biliary stricture     EYE SURGERY      cataract    FEMUR FRACTURE SURGERY Left 2016    SHORT TFN INTERTROCHAN FRACTURE performed by Mendel Mast MD at [address] [place] [address]      femur fracture surgery    HIP FRACTURE SURGERY Left     pinning    KYPHOSIS SURGERY N/A 2019    Biopsy of L5 bone lesion performed by Neda Babb DO at [facility] COLONOSCOPY W/BIOPSY SINGLE/MULTIPLE N/A 3/24/2017    Dr BEKA Sutton-Diverticular disease-Tubular AP (-) dysplasia x 5--3 yr recall    UPPER GASTROINTESTINAL FINDINGS: The midline structures are nondisplaced. There is mild cerebral and cerebellar volume loss, with an associated increase in the prominence of the ventricles and sulci. The basilar cisterns are normal in size and configuration. There is no evidence of intracranial hemorrhage or mass-effect. There is low attenuation in the periventricular white matter, consistent with chronic ischemic change. Old lacunar infarcts are noted in the region of the mayra and corona radiata. There are no abnormal extra-axial fluid collections. There is no evidence of tonsillar herniation. The included orbits and their contents are unremarkable. The visualized paranasal sinuses, mastoid air cells and middle ear cavities are clear. The visualized osseous structures and overlying soft tissues of the skull and face are intact. Changes of aging with no acute intracranial abnormality Signed by Dr Gautam Ramos on 8/23/2019 7:14 AM    Ct Chest W Contrast    Result Date: 8/24/2019  EXAMINATION: CT CHEST W CONTRAST 8/24/2019 3:35 PM HISTORY: CT CHEST W CONTRAST 8/24/2019 8:30 AM HISTORY: Metastatic bone disease COMPARISON: None DLP: 1647 mGy cm TECHNIQUE: Serial helical tomographic images of the chest were acquired following the infusion of Isovue contrast intravenously. Bone and soft tissue algorithms were provided. FINDINGS: Neck base: The imaged portion of the neck and thyroid gland is unremarkable. Lungs: Dependent atelectasis is noted in the lungs. No pulmonary nodules are identified. . No pleural effusion is present. The trachea and bronchial tree are patent. Heart: The heart is normal in size. Coronary calcifications are present. Barnie Griffins vessels: The great vessels are normal in caliber and are patent. The pulmonary arteries are patent within limits of this study and are normal in caliber. Lymph nodes: No significant hilar, mediastinal or axillary lymphadenopathy is appreciated.  Skeletal and soft tissues: Evidence of kyphoplasty at mass, lymphadenopathy or fluid collection. The abdominopelvic vasculature is patent. A lytic lesion is present in the anterior centrum of L5. This was not present in March 25, 2017. Kyphoplasty is at T11 L1 and L2 are noted. Orthopedic pin is present in the left hip. PELVIS: The uterus is visualized. . The urinary bladder is normal in appearance. 1. 19 mm lytic lesion in the anterior centrum of L5. Most likely this is metastatic disease. 2. Nonuniform attenuation of the left lobe of the liver and infiltrating neoplasm cannot be excluded. 3. Hepatic steatosis. Signed by Dr Crystal Torres on 8/24/2019 3:45 PM    Xr Chest Portable    Result Date: 8/23/2019  EXAMINATION: XR CHEST PORTABLE 8/23/2019 8:05 AM HISTORY: Chest pain. Report: Comparison is made with the chest x-ray 7/5/2019. Lungs are hypoaerated, no focal infiltrate or consolidation is identified. There are coarse interstitial markings as before. Heart size is normal. The right-sided dialysis catheter appears in satisfactory, unchanged. No pneumothorax or pleural effusion is identified. No acute osseous abnormality is identified. The upper abdomen appears unremarkable. Stable chest, no acute abnormality is identified, the lungs are hypoaerated. Chronic changes present as described. Signed by Dr Tera Pedraza on 8/23/2019 8:06 AM    Mri Brain Wo Contrast    Result Date: 9/10/2019  MRI BRAIN WO CONTRAST 9/10/2019 1:00 PM HISTORY: Persistent nausea and vomiting, concern for metastatic disease Comparison: None. Technique: Multiplanar imaging of the brain was performed in a routine fashion without gadolinium contrast. Findings: Diffuse confluent white matter T2 FLAIR hyperintensities reflecting advanced chronic small vessel ischemic change. Old deep white matter lacunar infarcts in both hemispheres with additional basal ganglia lacunar infarcts. No acute infarct. Moderate global cortical atrophy. No intra-axial or extra-axial hemorrhage.  No pathologic Blooming on T2 star weighted images. No visualized mass lesion on this noncontrast exam. Ventricles are nondilated. Chronic small vessel change in the central mayra. Posterior fossa structures are otherwise unremarkable. The pituitary gland and sella are unremarkable. The major intracranial flow voids are preserved. The orbits are unremarkable. Mild chronic mucosal thickening involving the right posterior ethmoids. The paranasal sinuses are otherwise clear. The mastoids are clear. Marrow signal in the calvarium and skull base appears normal.    Impression: 1. No visualized mass lesion/metastatic disease on this noncontrast exam. 2. Evidence for advanced chronic small vessel ischemia with multiple old deep white matter/basal ganglia lacunar infarcts.  Signed by Dr Ko Ortiz on 9/10/2019 3:59 PM       Assessment   End-stage renal disease  Diabetes type 2 with diabetic nephropathy  Disseminated metastatic disease  Anemia of chronic kidney disease  Secondary hyperparathyroidism  Hypokalemia    Plan:  HD MWF  Monitor labs  Oncology following  rashmi Beltran MD  09/20/19  12:19 PM

## 2019-09-20 NOTE — PROGRESS NOTES
Physical Therapy   Name: Valeri Mann  MRN:  [id number]  Date of service:  9/20/2019  Pt. not available for PT this AM due to HD. Also noted that pt. and family are making a decision about Hospice/palliative care. Will f/u at a later time if appropriate.   Electronically signed by Kathy Seay PT on 9/20/2019 at 1:01 PM

## 2019-09-20 NOTE — PLAN OF CARE
Complications related to the disease process, condition or treatment will be avoided or minimized  Description  Complications related to the disease process, condition or treatment will be avoided or minimized  9/20/2019 0127 by Gus Ball RN  Outcome: Ongoing  9/19/2019 1624 by Antonia Torres RN  Outcome: Ongoing  Goal: Will show no signs and symptoms of electrolyte imbalance  Description  Will show no signs and symptoms of electrolyte imbalance  9/20/2019 0127 by Gus Ball RN  Outcome: Ongoing  9/19/2019 1624 by Antonia Torres RN  Outcome: Ongoing  Goal: Diagnostic test results will improve  Description  Diagnostic test results will improve  9/20/2019 0127 by Gus Ball RN  Outcome: Ongoing  9/19/2019 1624 by Antonia Torres RN  Outcome: Ongoing     Problem: Sensory:  Goal: Pain level will decrease  Description  Pain level will decrease  9/20/2019 0127 by Gus Ball RN  Outcome: Ongoing  9/19/2019 1624 by Antonia Torres RN  Outcome: Ongoing  Goal: General experience of comfort will improve  Description  General experience of comfort will improve  9/20/2019 0127 by Gus Ball RN  Outcome: Ongoing  9/19/2019 1624 by Antonia Torres RN  Outcome: Ongoing  Goal: Ability to identify factors that increase the pain will improve  Description  Ability to identify factors that increase the pain will improve  9/20/2019 0127 by Gus Ball RN  Outcome: Ongoing  9/19/2019 1624 by Antonia Torres RN  Outcome: Ongoing  Goal: Ability to notify healthcare provider of pain before it becomes unmanageable or unbearable will improve  Description  Ability to notify healthcare provider of pain before it becomes unmanageable or unbearable will improve  9/20/2019 0127 by Gus Ball RN  Outcome: Ongoing  9/19/2019 1624 by Antonia Torres RN  Outcome: Ongoing     Problem: Skin Integrity:  Goal: Status of oral mucous membranes will improve  Description  Status of oral mucous membranes will

## 2019-09-21 NOTE — PROGRESS NOTES
Progress Note  9/21/2019 6:24 AM  Subjective:   Admit Date: 9/10/2019  PCP: hRea Valle MD    Interval History: She has been better. DIET CARB CONTROL; Renal    Intake/Output Summary (Last 24 hours) at 9/21/2019 0624  Last data filed at 9/20/2019 1539  Gross per 24 hour   Intake 440 ml   Output 1200 ml   Net -760 ml     Medications:      dextrose        sucralfate  1 g Oral 3 times per day    pantoprazole  20 mg Oral BID AC    sodium chloride flush  10 mL Intravenous 2 times per day    scopolamine  1 patch Transdermal Q72H    lactulose  20 g Oral 4 times per day    metoprolol  2.5 mg Intravenous Q6H    enoxaparin  30 mg Subcutaneous Daily    atorvastatin  40 mg Oral Nightly    hydrALAZINE  25 mg Oral BID    NIFEdipine  30 mg Oral BID    sennosides-docusate sodium  1 tablet Oral BID    sodium bicarbonate  325 mg Oral BID    insulin lispro  0-12 Units Subcutaneous TID WC    insulin lispro  0-6 Units Subcutaneous Nightly     Recent Labs     09/19/19  0517 09/20/19  0521   WBC 4.3* 5.5   HGB 8.1* 8.3*   * 120*     Recent Labs     09/19/19  0517 09/20/19  0521 09/21/19  0036    137 141   K 3.3* 3.5 3.5   CL 98 96* 101   CO2 26 24 26   BUN 17 21 10   CREATININE 3.4* 5.1* 2.8*   GLUCOSE 155* 131* 120*     No results for input(s): AST, ALT, ALB, BILITOT, ALKPHOS in the last 72 hours. Objective:   Vitals: /70   Pulse 90   Temp 98.3 °F (36.8 °C) (Temporal)   Resp 20   Ht 5' 4\" (1.626 m)   Wt 168 lb (76.2 kg)   SpO2 93%   BMI 28.84 kg/m²   General appearance: alert and cooperative with exam  Lungs: clear to auscultation bilaterally  Heart: regular rate and rhythm, S1, S2 normal, no murmur, click, rub or gallop  Abdomen: Diffusely tender. Extremities: extremities normal, atraumatic, no cyanosis or edema  Neurologic: No obvious focal neurologic deficits. Assessment and Plan:   1. N/V: Is better. EGD showed severe erosive esophagitis and biopsies were taken.   2. Liver mass:

## 2019-09-21 NOTE — PROGRESS NOTES
Nutrition Assessment    Type and Reason for Visit: Reassess    Nutrition Recommendations: continue current POC    Nutrition Assessment: Pt remains nutritionally compromised d/t decreased po itnake, wound. Aware family discussing Hospice. continue to follow    Malnutrition Assessment:  · Malnutrition Status: At risk for malnutrition  · Context: Acute illness or injury  · Findings of the 6 clinical characteristics of malnutrition (Minimum of 2 out of 6 clinical characteristics is required to make the diagnosis of moderate or severe Protein Calorie Malnutrition based on AND/ASPEN Guidelines):  1. Energy Intake- , Unable to assess    2. Weight Loss-5% loss or greater, in 3 months  3. Fat Loss-No significant subcutaneous fat loss,    4. Muscle Loss-No significant muscle mass loss,    5. Fluid Accumulation-Mild fluid accumulation, Extremities  6.  Strength-Not measured    Nutrition Risk Level:  Moderate    Nutrient Needs:  · Estimated Daily Total Kcal: 0031-4598 kcals (20-25kcals/kg)  · Estimated Daily Protein (g): 76-91g (1-1.2g)  · Estimated Daily Total Fluid (ml/day): 1524-1905ml    Nutrition Diagnosis:   · Problem: Inadequate oral intake  · Etiology: related to Acute injury/trauma, Increased demand for energy/nutrients     Signs and symptoms:  as evidenced by Intake 0-25%, Weight loss, Presence of wounds    Objective Information:  · Nutrition-Focused Physical Findings:    · Wound Type: Surgical Wound  · Current Nutrition Therapies:  · Oral Diet Orders: Carb Control 4 Carbs/Meal, Renal   · Oral Diet intake: 1-25%, 26-50%  · Oral Nutrition Supplement (ONS) Orders: None  · ONS intake: Refused     · Anthropometric Measures:  · Ht: 5' 4\" (162.6 cm)   · Current Body Wt: 168 lb (76.2 kg)  · Admission Body Wt: 164 lb (74.4 kg)(stated)  · Usual Body Wt: 178 lb (80.7 kg)(6/2019)  · % Weight Change:  ,  5.7% decrease in 3 months  · Ideal Body Wt: 120 lb (54.4 kg), % Ideal Body 140%  · BMI Classification: BMI 25.0 - 29.9 Overweight    Nutrition Interventions:   Continue current diet  Continued Inpatient Monitoring    Nutrition Evaluation:   · Evaluation: Progressing toward goals   · Goals: PO intake 50% or greater. Weight remain stable.     · Monitoring: Meal Intake, Diet Tolerance, Skin Integrity, Wound Healing, Weight      Electronically signed by Kinga Jeffries, MS, RD, LD on 9/21/19 at 12:39 PM    Contact Number: [phone]

## 2019-09-21 NOTE — PLAN OF CARE
for activity intolerance will decrease  9/21/2019 0220 by Gale Cartagena RN  Outcome: Ongoing  9/20/2019 1540 by Roro López RN  Outcome: Ongoing     Problem: Coping:  Goal: Ability to cope will improve  Description  Ability to cope will improve  9/21/2019 0220 by Gale Cartagena RN  Outcome: Ongoing  9/20/2019 1540 by Roro López RN  Outcome: Ongoing  Goal: Ability to adjust to condition or change in health will improve  Description  Ability to adjust to condition or change in health will improve  9/21/2019 0220 by Gale Cartagena RN  Outcome: Ongoing  9/20/2019 1540 by Roro López RN  Outcome: Ongoing     Problem: Fluid Volume:  Goal: Will show no signs or symptoms of fluid imbalance  Description  Will show no signs or symptoms of fluid imbalance  9/21/2019 0220 by Gale Cartagena RN  Outcome: Ongoing  9/20/2019 1540 by Roro López RN  Outcome: Ongoing  Goal: Ability to achieve a balanced intake and output will improve  Description  Ability to achieve a balanced intake and output will improve  9/21/2019 0220 by Gale Cartagena RN  Outcome: Ongoing  9/20/2019 1540 by Roro López RN  Outcome: Ongoing  Goal: Ability to maintain a balanced intake and output will improve  Description  Ability to maintain a balanced intake and output will improve  9/21/2019 0220 by Gale Cartagena RN  Outcome: Ongoing  9/20/2019 1540 by Roro López RN  Outcome: Ongoing  Goal: Maintenance of adequate hydration will improve  Description  Maintenance of adequate hydration will improve  9/21/2019 0220 by Gale Cartagena RN  Outcome: Ongoing  9/20/2019 1540 by Roro López RN  Outcome: Ongoing     Problem: Health Behavior:  Goal: Ability to manage health-related needs will improve  Description  Ability to manage health-related needs will improve  9/21/2019 0220 by Gale Cartagena RN  Outcome: Ongoing  9/20/2019 1540 by Roro López RN  Outcome: Ongoing  Goal: Identification of resources available to assist in meeting health care needs will improve  Description  Identification of resources available to assist in meeting health care needs will improve  9/21/2019 0220 by Dewayne Dennis RN  Outcome: Ongoing  9/20/2019 1540 by Brinda Garduno RN  Outcome: Ongoing  Goal: Ability to identify and utilize available resources and services will improve  Description  Ability to identify and utilize available resources and services will improve  9/21/2019 0220 by Dewayne Dennis RN  Outcome: Ongoing  9/20/2019 1540 by Brinda Garduno RN  Outcome: Ongoing  Goal: Ability to state signs and symptoms to report to health care provider will improve  Description  Ability to state signs and symptoms to report to health care provider will improve  9/21/2019 0220 by Dewayne Dennis RN  Outcome: Ongoing  9/20/2019 1540 by Brinda Garduno RN  Outcome: Ongoing     Problem: Nutritional:  Goal: Ability to identify appropriate dietary choices will improve  Description  Ability to identify appropriate dietary choices will improve  9/21/2019 0220 by Dewayne Dennis RN  Outcome: Ongoing  9/20/2019 1540 by Brinda Garduno RN  Outcome: Ongoing  Goal: Maintenance of adequate nutrition will improve  Description  Maintenance of adequate nutrition will improve  9/21/2019 0220 by Dewayne Dennis RN  Outcome: Ongoing  9/20/2019 1540 by Brinda Garduno RN  Outcome: Ongoing  Goal: Progress toward achieving an optimal weight will improve  Description  Progress toward achieving an optimal weight will improve  9/21/2019 0220 by Dewayne Dennis RN  Outcome: Ongoing  9/20/2019 1540 by Brinda Garduno RN  Outcome: Ongoing     Problem: Physical Regulation:  Goal: Ability to maintain clinical measurements within normal limits will improve  Description  Ability to maintain clinical measurements within normal limits will improve  9/21/2019 0220 by Dewayne Dennis RN  Outcome: Ongoing  9/20/2019 1540 by Brinda Garduno RN  Outcome: Ongoing  Goal: Mattie Candelaria RN  Outcome: Ongoing     Problem: Discharge Planning:  Goal: Discharged to appropriate level of care  Description  Discharged to appropriate level of care  9/21/2019 0220 by Esther Duran RN  Outcome: Ongoing  9/20/2019 1540 by Mattie Candelaria RN  Outcome: Ongoing     Problem: Serum Glucose Level - Abnormal:  Goal: Ability to maintain appropriate glucose levels will improve  Description  Ability to maintain appropriate glucose levels will improve  9/21/2019 0220 by Esther Duran RN  Outcome: Ongoing  9/20/2019 1540 by Mattie Candelaria RN  Outcome: Ongoing     Problem: Sensory Perception - Impaired:  Goal: Ability to maintain a stable neurologic state will improve  Description  Ability to maintain a stable neurologic state will improve  9/21/2019 0220 by Esther Duran RN  Outcome: Ongoing  9/20/2019 1540 by Mattie Candelaria RN  Outcome: Ongoing     Problem: Metabolic:  Goal: Ability to maintain appropriate glucose levels will improve  Description  Ability to maintain appropriate glucose levels will improve  9/21/2019 0220 by Esther Duran RN  Outcome: Ongoing  9/20/2019 1540 by Mattie Candelaria RN  Outcome: Ongoing     Problem: Tissue Perfusion:  Goal: Adequacy of tissue perfusion will improve  Description  Adequacy of tissue perfusion will improve  9/21/2019 0220 by Esther Duran RN  Outcome: Ongoing  9/20/2019 1540 by Mattie Candelaria RN  Outcome: Ongoing     Problem:  Bowel/Gastric:  Goal: Diagnostic test results will improve  Description  Diagnostic test results will improve  9/21/2019 0220 by Esther Duran RN  Outcome: Ongoing  9/20/2019 1540 by Mattie Candelaria RN  Outcome: Ongoing

## 2019-09-21 NOTE — PROGRESS NOTES
vomiting vs. GERD-related? gastritis, duodenitis    UPPER GASTROINTESTINAL ENDOSCOPY Left 9/16/2019    EGD DIAGNOSTIC ONLY performed by Catherine Turcios MD at [facility] [place] [facility]    VASCULAR SURGERY  03/06/2019    SJSUltrasound guided cannulation of right internal jugular vein. Placement of right internal jugular vein tunneled dialysis catheter bard glidepath 19 cm tip to cuff.        Family History  Family History   Problem Relation Age of Onset    Arthritis Mother     Cancer Mother     High Cholesterol Mother     Arthritis Father     Diabetes Father     High Cholesterol Father     Liver Cancer Father     Arthritis Sister     Diabetes Sister     High Blood Pressure Sister     High Cholesterol Sister     Arthritis Brother     Diabetes Brother     High Blood Pressure Brother     High Cholesterol Brother     Vision Loss Brother     Colon Cancer Neg Hx     Colon Polyps Neg Hx     Esophageal Cancer Neg Hx     Liver Disease Neg Hx     Stomach Cancer Neg Hx     Rectal Cancer Neg Hx        Social History  Social History     Socioeconomic History    Marital status:      Spouse name: Mina Lemons Number of children: 2    Years of education: 15    Highest education level: Not on file   Occupational History    Not on file   Social Needs    Financial resource strain: Not on file    Food insecurity:     Worry: Not on file     Inability: Not on file   [employer] needs:     Medical: Not on file     Non-medical: Not on file   Tobacco Use    Smoking status: Never Smoker    Smokeless tobacco: Never Used   Substance and Sexual Activity    Alcohol use: No    Drug use: No    Sexual activity: Not Currently     Partners: Male   Lifestyle    Physical activity:     Days per week: Not on file     Minutes per session: Not on file    Stress: Not on file   Relationships    Social connections:     Talks on phone: Not on file     Gets together: Not on file     Attends [employer] service: Not on file abdominal pain, nausea/vomiting, history of cancer Comparison: 8/4/2019 DLP: 1245 mGy cm. In order to have a CT radiation dose as low as reasonably achievable, Automated Exposure Control was utilized for adjustment of the mA and/or KV according to patient size. Findings: The lung bases are clear. 3.4 x 2 x 1 cm soft tissue mass expands the anterolateral left sixth rib. Enlarging 2 cm lytic lesion in the anterior L5 vertebral body. Increased infiltrating low density throughout the anterior left liver involving segments 2, 3, and 4 with multiple adjacent low-density lesions. There is also a 7 mm low-density lesion in the inferior right liver on image 47 that was not seen on the prior study. Findings of periportal widening indicating chronic liver disease. Gallbladder is surgically absent. No peripancreatic inflammation. Spleen and adrenal glands appear normal. Renal vascular calcifications noted. No hydronephrosis. No focal urinary bladder abnormality. No abnormal bowel distention or focal wall thickening. Normal appendix. A few colonic diverticula are noted. Small hiatal hernia. Small duodenal diverticulum. Small volume perihepatic ascites. There is progressive stranding and nodularity in the anterior peritoneum/omentum. No pelvic mass organized pelvic collection. Uterus and adnexa appear unremarkable. Abdominal aorta is normal in caliber and contains atherosclerotic calcification. Borderline enlarged retroperitoneal lymph nodes are stable. For example, mildly enlarged right retroperitoneal lymph node on image 49 is unchanged. Left femoral IM nail partially imaged. T11, L1, and L2 compression fracture status post augmentation appear stable in degree of height loss compared to the prior CT. Progressive infiltrative hypodensity throughout the left liver with multiple adjacent low-density lesions. Findings are concerning for progressive primary liver neoplasm or metastasis.  Hepatic periportal widening suggests bladder is normal in appearance. 1. 19 mm lytic lesion in the anterior centrum of L5. Most likely this is metastatic disease. 2. Nonuniform attenuation of the left lobe of the liver and infiltrating neoplasm cannot be excluded. 3. Hepatic steatosis. Signed by Dr Ashley Hull on 8/24/2019 3:45 PM    Xr Chest Portable    Result Date: 8/23/2019  EXAMINATION: XR CHEST PORTABLE 8/23/2019 8:05 AM HISTORY: Chest pain. Report: Comparison is made with the chest x-ray 7/5/2019. Lungs are hypoaerated, no focal infiltrate or consolidation is identified. There are coarse interstitial markings as before. Heart size is normal. The right-sided dialysis catheter appears in satisfactory, unchanged. No pneumothorax or pleural effusion is identified. No acute osseous abnormality is identified. The upper abdomen appears unremarkable. Stable chest, no acute abnormality is identified, the lungs are hypoaerated. Chronic changes present as described. Signed by Dr Donald Pedraza on 8/23/2019 8:06 AM    Mri Brain Wo Contrast    Result Date: 9/10/2019  MRI BRAIN WO CONTRAST 9/10/2019 1:00 PM HISTORY: Persistent nausea and vomiting, concern for metastatic disease Comparison: None. Technique: Multiplanar imaging of the brain was performed in a routine fashion without gadolinium contrast. Findings: Diffuse confluent white matter T2 FLAIR hyperintensities reflecting advanced chronic small vessel ischemic change. Old deep white matter lacunar infarcts in both hemispheres with additional basal ganglia lacunar infarcts. No acute infarct. Moderate global cortical atrophy. No intra-axial or extra-axial hemorrhage. No pathologic Blooming on T2 star weighted images. No visualized mass lesion on this noncontrast exam. Ventricles are nondilated. Chronic small vessel change in the central mayra. Posterior fossa structures are otherwise unremarkable. The pituitary gland and sella are unremarkable. The major intracranial flow voids are preserved.

## 2019-09-22 NOTE — PROGRESS NOTES
trouble swallowing and voice change. Eyes: Negative for photophobia, discharge and itching. Respiratory: Negative for cough, shortness of breath and wheezing. Cardiovascular: Negative for chest pain, palpitations and leg swelling. Gastrointestinal: Positive for nausea (overall controlled). Negative for blood in stool, constipation, diarrhea and vomiting. Endocrine: Negative for cold intolerance, heat intolerance, polydipsia and polyuria. Genitourinary: Negative for difficulty urinating, dysuria, hematuria and urgency. Musculoskeletal: Positive for arthralgias and back pain. Negative for joint swelling and myalgias. Skin: Negative for color change and rash. Neurological: Positive for weakness. Negative for dizziness, tremors and seizures. Hematological: Negative for adenopathy. Does not bruise/bleed easily. Psychiatric/Behavioral: Negative for behavioral problems and suicidal ideas. The patient is not nervous/anxious. All other systems reviewed and are negative. Objective   /67   Pulse 96   Temp 97.6 °F (36.4 °C) (Temporal)   Resp 15   Ht 5' 4\" (1.626 m)   Wt 168 lb (76.2 kg)   SpO2 94%   BMI 28.84 kg/m²     PHYSICAL EXAM:  Physical Exam   Constitutional: She is oriented to person, place, and time. Weak appearing   HENT:   Head: Normocephalic and atraumatic. Right Ear: External ear normal.   Left Ear: External ear normal.   Mouth/Throat: Oropharynx is clear and moist.   Eyes: Conjunctivae and EOM are normal. No scleral icterus. Neck: Normal range of motion. Neck supple. No tracheal deviation present. Cardiovascular: Normal rate, regular rhythm and normal heart sounds. No murmur heard. Pulmonary/Chest: Effort normal and breath sounds normal. No respiratory distress. Abdominal: Soft. Bowel sounds are normal.   Musculoskeletal: She exhibits no edema or tenderness.    Full ROM in all 4 extremities   Neurological: She is alert and oriented to person, place, and height loss compared to the prior CT.     Progressive infiltrative hypodensity throughout the left liver with multiple adjacent low-density lesions. Findings are concerning for progressive primary liver neoplasm or metastasis. Hepatic periportal widening suggests underlying chronic liver disease. Enlarging lytic lesion in the left anterolateral sixth rib and within the L5 vertebral body, compatible with osseous metastatic disease. Progressive peritoneal and omental stranding with multiple small nodules, worrisome for omental/peritoneal carcinomatosis. No definite acute bowel pathology. There are some diverticula within the sigmoid colon that have adjacent inflammation but inflammation is felt to be secondary to carcinomatosis rather than acute bowel inflammation although clinical correlation is necessary. Signed by Dr Nerissa Dean on 9/10/2019 8:33 AM       ASSESSMENT:    # Well differentiated metastatic adenocarcinoma, stage V. Morphology favors upper GI tract including pancreaticobiliary tree although other sites of origin certainly cannot be excluded. Prognosis-poor due to metastatic disease stage IV. Treatment is also complicated by history of chronic kidney disease terminal stage on dialysis. Chemotherapy options are limited. Palliative care/hospice consult has been discussed. Awaiting family decision. · CT abdomen and pelvis without contrast 9/10/2019 revealed progressive infiltrative density throughout the left liver with multiple adjacent low-density lesions. This is concerning for a primary liver neoplasm versus metastases. 3.4 x 2 x 1 cm soft tissue mass expanding the anterolateral left sixth rib. An enlarging 2 cm lytic lesion in the anterior L5 vertebral body. · CT of Abdomen and pelvis on 9/11/2019 There is an infiltrative enhancing lesion in the anterior aspect of the left hepatic lobe that involves segments 2, 3, and 4.  This is highly suspicious for metastatic disease and measures 13.2 cm

## 2019-09-22 NOTE — PROGRESS NOTES
tablet 1 g  1 g Oral 3 times per day Isaac Guzman MD   1 g at 09/21/19 2027    pantoprazole (PROTONIX) tablet 20 mg  20 mg Oral BID AC Isaac Guzman MD   20 mg at 09/21/19 0622    sodium chloride flush 0.9 % injection 10 mL  10 mL Intravenous 2 times per day Lazarus Hilda, MD   10 mL at 09/22/19 1025    sodium chloride (PF) 0.9 % injection 10 mL  10 mL Intravenous PRN Lazarus Hilda, MD   10 mL at 09/22/19 0643    scopolamine (TRANSDERM-SCOP) transdermal patch 1 patch  1 patch Transdermal Q72H Lazarus Hilda, MD   1 patch at 09/17/19 1737    lactulose (CHRONULAC) 10 GM/15ML solution 20 g  20 g Oral 4 times per day Lazarus Hilda, MD   20 g at 09/18/19 1823    HYDROcodone-acetaminophen (Jacob Miguel) 7.5-325 MG per tablet 1 tablet  1 tablet Oral Q6H PRN Lazarus Hilda, MD   1 tablet at 09/18/19 1627    technetium Tc 99m oxidronate (TECHNESCAN HDP) injection 88.7 millicurie  34.7 millicurie Intravenous ONCE PRN Lazarus Hilda, MD        ondansetron (ZOFRAN) injection 8 mg  8 mg Intravenous Q6H PRN Lazarus Hilda, MD   8 mg at 09/22/19 1989    hydrALAZINE (APRESOLINE) injection 20 mg  20 mg Intravenous Q6H PRN Lazarus Hilda, MD   20 mg at 09/11/19 2200    metoprolol (LOPRESSOR) injection 2.5 mg  2.5 mg Intravenous Q6H Lazarus Hilda, MD   2.5 mg at 09/21/19 0052    enoxaparin (LOVENOX) injection 30 mg  30 mg Subcutaneous Daily Lazarus Hilda, MD   30 mg at 09/19/19 0821    acetaminophen (TYLENOL) tablet 650 mg  650 mg Oral Q4H PRN Lazarus Hilda, MD        atorvastatin (LIPITOR) tablet 40 mg  40 mg Oral Nightly Lazarus Hilda, MD   40 mg at 09/21/19 2027    bisacodyl (DULCOLAX) suppository 10 mg  10 mg Rectal Daily PRN Lazarus Hilda, MD        hydrALAZINE (APRESOLINE) tablet 25 mg  25 mg Oral BID Lazarus Hilda, MD   25 mg at 09/21/19 2027    NIFEdipine (PROCARDIA XL) extended release tablet 30 mg  30 mg Oral BID Lazarus Hilda, MD   30 mg at 09/20/19 2039    oxyCODONE-acetaminophen (PERCOCET) 7.5-325 MG per tablet 1 tablet  1 tablet Oral Q4H PRN Lazarus Hilda, MD   1 tablet at 09/19/19 0128    sennosides-docusate sodium (SENOKOT-S) 8.6-50 MG tablet 1 tablet  1 tablet Oral BID Lazarus Hilda, MD   1 tablet at 09/21/19 2028    sodium bicarbonate tablet 325 mg  325 mg Oral BID Lazarus Hilda, MD   325 mg at 09/21/19 2027    tiZANidine (ZANAFLEX) tablet 2 mg  2 mg Oral Q8H PRN Lazarus Hilda, MD        insulin lispro (HUMALOG) injection vial 0-12 Units  0-12 Units Subcutaneous TID WC Lazarus Hilda, MD   6 Units at 09/20/19 1828    insulin lispro (HUMALOG) injection vial 0-6 Units  0-6 Units Subcutaneous Nightly Lazarus Hilda, MD   1 Units at 09/19/19 2135    glucose (GLUTOSE) 40 % oral gel 15 g  15 g Oral PRN Lazarus Hilda, MD        dextrose 50 % IV solution  12.5 g Intravenous PRN Lazarus Hilda, MD   12.5 g at 09/12/19 1746    glucagon (rDNA) injection 1 mg  1 mg Intramuscular PRN Lazarus Hilda, MD        dextrose 5 % solution  100 mL/hr Intravenous PRN Lazarus Hilda, MD           Past Medical History:  Past Medical History:   Diagnosis Date    Anemia     Blood circulation, collateral     Broken hip (HCC)     L hip    [facility]F [facility]congestive heart failure) ([facility]     Chronic kidney disease 9/27/2018    Chronic kidney disease (CKD), stage IV (severe) (HCC)     Chronic kidney disease (CKD), stage IV (severe) (HCC)     Closed compression fracture of first lumbar vertebra ([facility] 2/2/2017    Closed compression fracture of first lumbar vertebra ([facility] 2/2/2017    Colon polyps     Coronary artery disease (CAD) excluded 4/23/2019    Diabetic peripheral neuropathy associated with type 2 diabetes mellitus ([facility] [facility] 8/22/2019    Hemodialysis patient ([facility] [address]     dialysis mon, wed, friday outpt dialysis at 09/16/2019    Dr Shoaib Ugalde erosive esophagitis-secondary to severe vomiting vs. GERD-related? gastritis, duodenitis    UPPER GASTROINTESTINAL ENDOSCOPY Left 9/16/2019    EGD DIAGNOSTIC ONLY performed by Donna Pak MD at [address] [place] [address] Endoscopy    VASCULAR SURGERY  03/06/2019    SJSUltrasound guided cannulation of right internal jugular vein. Placement of right internal jugular vein tunneled dialysis catheter bard glidepath 19 cm tip to cuff.        Family History  Family History   Problem Relation Age of Onset    Arthritis Mother     Cancer Mother     High Cholesterol Mother     Arthritis Father     Diabetes Father     High Cholesterol Father     Liver Cancer Father     Arthritis Sister     Diabetes Sister     High Blood Pressure Sister     High Cholesterol Sister     Arthritis Brother     Diabetes Brother     High Blood Pressure Brother     High Cholesterol Brother     Vision Loss Brother     Colon Cancer Neg Hx     Colon Polyps Neg Hx     Esophageal Cancer Neg Hx     Liver Disease Neg Hx     Stomach Cancer Neg Hx     Rectal Cancer Neg Hx        Social History  Social History     Socioeconomic History    Marital status:      Spouse name: Eber Cid Number of children: 2    Years of education: 15    Highest education level: Not on file   Occupational History    Not on file   Social Needs    Financial resource strain: Not on file    Food insecurity:     Worry: Not on file     Inability: Not on file   Procured Health needs:     Medical: Not on file     Non-medical: Not on file   Tobacco Use    Smoking status: Never Smoker    Smokeless tobacco: Never Used   Substance and Sexual Activity    Alcohol use: No    Drug use: No    Sexual activity: Not Currently     Partners: Male   Lifestyle    Physical activity:     Days per week: Not on file     Minutes per session: Not on file    Stress: Not on file   Relationships    Social connections:     Talks on phone: Not on file [phone]   K 3.5 3.5 3.8   CL 96* 101 98   CO2 24 26 26   BUN 21 10 19   CREATININE 5.1* 2.8* 5.0*   CALCIUM 8.9 8.9 9.2     CBC:   Recent Labs     09/20/19  0521   WBC 5.5   HGB 8.3*   HCT 26.8*   MCV 99.6*   *     LIVER PROFILE:   No results for input(s): AST, ALT, LIPASE, BILIDIR, BILITOT, ALKPHOS in the last 72 hours. Invalid input(s): AMYLASE,  ALB  PT/INR:   No results for input(s): PROTIME, INR in the last 72 hours. APTT:   No results for input(s): APTT in the last 72 hours. BNP:  No results for input(s): BNP in the last 72 hours. Ionized Calcium:No results for input(s): IONCA in the last 72 hours. Magnesium:  No results for input(s): MG in the last 72 hours. Phosphorus:  No results for input(s): PHOS in the last 72 hours. HgbA1C: No results for input(s): LABA1C in the last 72 hours. Hepatic:   No results for input(s): ALKPHOS, ALT, AST, PROT, BILITOT, BILIDIR, LABALBU in the last 72 hours. Lactic Acid: No results for input(s): LACTA in the last 72 hours. Troponin: No results for input(s): CKTOTAL, CKMB, TROPONINT in the last 72 hours. ABGs:   Lab Results   Component Value Date    PHART 7.380 08/10/2018    PO2ART 65.0 08/10/2018    KIY4MFI 30.0 08/10/2018     CRP:  No results for input(s): CRP in the last 72 hours. Sed Rate:  No results for input(s): SEDRATE in the last 72 hours. Culture Results:   Blood Culture Recent: No results for input(s): BC in the last 720 hours. Urine Culture Recent : No results for input(s): LABURIN in the last 720 hours. Radiology reports as per the Radiologist  Radiology: Ct Abdomen Pelvis W Wo Contrast Additional Contrast? Oral    Result Date: 9/11/2019  CT ABDOMEN PELVIS W WO CONTRAST 9/11/2019 12:45 PM HISTORY: Possible liver metastases COMPARISON: CT 9/10/2019 DLP: 4578 mGy cm.  In order to have a CT radiation dose as low as reasonably achievable, Automated Exposure Control was utilized for adjustment of the mA and/or KV according to patient Automated Exposure Control was utilized for adjustment of the mA and/or KV according to patient size. Findings: The lung bases are clear. 3.4 x 2 x 1 cm soft tissue mass expands the anterolateral left sixth rib. Enlarging 2 cm lytic lesion in the anterior L5 vertebral body. Increased infiltrating low density throughout the anterior left liver involving segments 2, 3, and 4 with multiple adjacent low-density lesions. There is also a 7 mm low-density lesion in the inferior right liver on image 47 that was not seen on the prior study. Findings of periportal widening indicating chronic liver disease. Gallbladder is surgically absent. No peripancreatic inflammation. Spleen and adrenal glands appear normal. Renal vascular calcifications noted. No hydronephrosis. No focal urinary bladder abnormality. No abnormal bowel distention or focal wall thickening. Normal appendix. A few colonic diverticula are noted. Small hiatal hernia. Small duodenal diverticulum. Small volume perihepatic ascites. There is progressive stranding and nodularity in the anterior peritoneum/omentum. No pelvic mass organized pelvic collection. Uterus and adnexa appear unremarkable. Abdominal aorta is normal in caliber and contains atherosclerotic calcification. Borderline enlarged retroperitoneal lymph nodes are stable. For example, mildly enlarged right retroperitoneal lymph node on image 49 is unchanged. Left femoral IM nail partially imaged. T11, L1, and L2 compression fracture status post augmentation appear stable in degree of height loss compared to the prior CT. Progressive infiltrative hypodensity throughout the left liver with multiple adjacent low-density lesions. Findings are concerning for progressive primary liver neoplasm or metastasis. Hepatic periportal widening suggests underlying chronic liver disease.  Enlarging lytic lesion in the left anterolateral sixth rib and within the L5 vertebral body, compatible with osseous metastatic radiata. There are no abnormal extra-axial fluid collections. There is no evidence of tonsillar herniation. The included orbits and their contents are unremarkable. The visualized paranasal sinuses, mastoid air cells and middle ear cavities are clear. The visualized osseous structures and overlying soft tissues of the skull and face are intact. Changes of aging with no acute intracranial abnormality Signed by Dr Keaton Craft on 8/23/2019 7:14 AM    Ct Chest W Contrast    Result Date: 8/24/2019  EXAMINATION: CT CHEST W CONTRAST 8/24/2019 3:35 PM HISTORY: CT CHEST W CONTRAST 8/24/2019 8:30 AM HISTORY: Metastatic bone disease COMPARISON: None DLP: 1647 mGy cm TECHNIQUE: Serial helical tomographic images of the chest were acquired following the infusion of Isovue contrast intravenously. Bone and soft tissue algorithms were provided. FINDINGS: Neck base: The imaged portion of the neck and thyroid gland is unremarkable. Lungs: Dependent atelectasis is noted in the lungs. No pulmonary nodules are identified. . No pleural effusion is present. The trachea and bronchial tree are patent. Heart: The heart is normal in size. Coronary calcifications are present. Cristina Alba vessels: The great vessels are normal in caliber and are patent. The pulmonary arteries are patent within limits of this study and are normal in caliber. Lymph nodes: No significant hilar, mediastinal or axillary lymphadenopathy is appreciated. Skeletal and soft tissues: Evidence of kyphoplasty at T11 L1 and L2 is noted. Frutoso Golder Upper abdomen: The abdomen will be dictated under a separate report. 1. No acute cardiopulmonary process. 2. Kyphoplasty said T11, L1 and L2. Signed by Dr Keaton Craft on 8/24/2019 3:39 PM    Ct Cervical Spine Wo Contrast    Result Date: 8/23/2019  Examination. CT CERVICAL SPINE WO CONTRAST History: Trauma and neck pain. DLP: 549 mGycm. The CT scan of the cervical spine is performed without intravenous or intrathecal contrast enhancement. The images are acquired in axial plane with subsequent reconstruction in coronal and sagittal planes. There is no previous study for comparison. There is an area of osteolysis involving the left lung The vertebral body C3 partly extending into the pedicle. There is moderate expansion of the vertebral body. There is complete occlusion of the anterior lateral wall suggesting a pathological fracture. No displacement. No obvious extension of the mass into the spinal canal. There is mild anterolisthesis of C2 over C3. The remaining vertebral body alignments are normal. Chronic degenerative changes are seen in the form of facet arthropathy and anterior and posterior osteophytes most pronounced at C5-C6. There is bilateral neural foraminal stenosis at C5-C6. No significant spinal canal stenosis at any level. The prevertebral soft tissues are normal. There is calcification of the nuchal ligament opposite and posterior to the spinous processes of C5 and C6. The included lung apices show a tiny noncalcified nodule in the left upper lobe anteriorly, image #57 in axial plane. There are interstitial changes and groundglass opacities in the upper lungs bilaterally more so on the left upper lobe posteriorly. An accessory azygous fissure is seen. Right internal jugular venous catheter should is seen in place. An osteolytic expansile lesion involving the left side of the vertebral body C3 extending into the pedicle. Suspect a pathological fracture. No displacement. No obvious extension into the spinal canal. Further evaluation with MR imaging of the cervical spine may be obtained. Cervical spondylosis. Bilateral neural foraminal stenosis at C5-6. No spinal stenosis at any level. A tiny noncalcified nodule in the left upper lobe. This is suboptimally evaluated in this study. Further follow-up with CT scan of the chest may be obtained. The above study was initially reviewed and reported by [facility].  I do not find any 2. Evidence of kyphoplasty at T11, L1 and L2. 3. Retropulsion of superior endplates of L1 and L2) lumbar canal. Signed by Dr Lory Nobles on 8/24/2019 7:56 AM    Mri Cervical Spine Wo Contrast    Result Date: 8/25/2019  EXAMINATION: MRI CERVICAL SPINE WO CONTRAST 8/25/2019 4:59 PM HISTORY: MRI CERVICAL SPINE WO CONTRAST 8/25/2019 3:00 PM HISTORY: COMPARISON: None TECHNIQUE: Multiplanar, multisequence MRI of the cervical spine was obtained without contrast. FINDINGS: Imaged portions of the cerebellum and brainstem are unremarkable. Alignment: Normal cervical lordosis is maintained. There is no evidence of listhesis or subluxation. Marrow signal: There is abnormal marrow signal left side of the central of C3. This is consistent with metastatic disease. This extends outside of the vertebral body and appears to encase the left vertebral artery. However flow is present in the left vertebral artery. . Cord/Canal: The spinal cord is normal in signal and morphology. Soft tissues: The surrounding soft tissues are unremarkable. Levels: C2-C3: No disc bulge is present. No significant neuroforaminal or central canal stenosis is seen. C3-C4: No disc bulge is present. No significant neuroforaminal or central canal stenosis is seen. C4-C5: No disc bulge is present. No significant neuroforaminal or central canal stenosis is seen. C5-C6: Broad-based disc hypertrophic osteophytes present. There is some uncinate spurring compromising the right and left neural foramen. Keshawn Opoka C6-C7: Broad-based disc is present. There is some uncinate spurring compromising the neural foramen bilaterally. . C7-T1: No disc bulge is present. No significant neuroforaminal or central canal stenosis is seen. 1. 2 cm marrow replacement left side of C3 consistent with a metastatic lesion. This extends to the left and appears to partially encase the left vertebral artery.  Signed by Dr Lory Nobles on 8/25/2019 5:10 PM    Mri Lumbar Spine Wo Contrast    Result Date: 8/25/2019  EXAMINATION: MRI LUMBAR SPINE WO CONTRAST 8/25/2019 4:10 PM HISTORY: Lytic lesion at L5 MRI LUMBAR SPINE WO CONTRAST 8/25/2019 3:00 PM HISTORY: COMPARISON: None TECHNIQUE: Multiplanar, multisequence MRI of the lumbar spine was performed without the use of contrast. FINDINGS: Alignment: There are presumed to be 5 lumbar-type vertebrae, with the most inferior being labeled as L5. Normal lumbar lordosis is maintained. There is no evidence of listhesis or subluxation. Marrow signal: No evidence of kyphoplasty at T11 L1 and L2 is noted. The inferior endplate of L4 there is subtle signal alteration suspicious for marrow replacement. The anterior left centrum of L5 demonstrates marrow replacement consistent with metastatic disease. . Cord/Canal: The conus medullaris terminates at the level of L1. The spinal cord is normal in signal and morphology. Soft tissues: The surrounding soft tissues are unremarkable. Levels: L1-L2: Kyphoplasty is are present at L1 and L2. There is mild retropulsion of the posterior superior endplate of L2 into the lumbar canal. There is moderate central canal stenosis. Neural foramen are patent. L2-L3: Facet joints are visualized. There is mild hypertrophy the left neural foramen and right neural foramen are patent. . L3-L4: Mild hypertrophy of the ligamentum flavum is noted however the lateral recesses are patent there is minimal central canal encroachment. Rachel Belch L4-L5: Facet joint hypertrophy is present. There is moderate encroachment on the left neural foramen right neural foramen also demonstrates marked moderate encroachment by neural foraminal hypertrophy. Rachel Belch L5-S1: Small cyst posterior disc osteophyte complex is present. The left neural foramen is compromised by disc and facet joint. The right neural foramen demonstrates moderate stenosis due to a right lateral disc and facet joint hypertrophy. .     1. Definite marrow replacement at L5 probably metastatic disease.  There may also be

## 2019-09-22 NOTE — PROGRESS NOTES
Patient's family is requesting that patient have PICC or midline placed for IV pain medication at home. According to hospice nurse, IV pain medication is not administered in the home setting. Inpatient hospice does administer IV pain medication and drips. Will continue to monitor.

## 2019-09-22 NOTE — PLAN OF CARE
adequate hydration will improve  Description  Maintenance of adequate hydration will improve  Outcome: Ongoing     Problem: Health Behavior:  Goal: Ability to manage health-related needs will improve  Description  Ability to manage health-related needs will improve  Outcome: Ongoing  Goal: Identification of resources available to assist in meeting health care needs will improve  Description  Identification of resources available to assist in meeting health care needs will improve  Outcome: Ongoing  Goal: Ability to identify and utilize available resources and services will improve  Description  Ability to identify and utilize available resources and services will improve  Outcome: Ongoing  Goal: Ability to state signs and symptoms to report to health care provider will improve  Description  Ability to state signs and symptoms to report to health care provider will improve  Outcome: Ongoing     Problem: Nutritional:  Goal: Ability to identify appropriate dietary choices will improve  Description  Ability to identify appropriate dietary choices will improve  Outcome: Ongoing  Goal: Maintenance of adequate nutrition will improve  Description  Maintenance of adequate nutrition will improve  Outcome: Ongoing  Goal: Progress toward achieving an optimal weight will improve  Description  Progress toward achieving an optimal weight will improve  Outcome: Ongoing     Problem: Physical Regulation:  Goal: Ability to maintain clinical measurements within normal limits will improve  Description  Ability to maintain clinical measurements within normal limits will improve  Outcome: Ongoing  Goal: Complications related to the disease process, condition or treatment will be avoided or minimized  Description  Complications related to the disease process, condition or treatment will be avoided or minimized  Outcome: Ongoing  Goal: Will show no signs and symptoms of electrolyte imbalance  Description  Will show no signs and symptoms of

## 2019-09-22 NOTE — PROGRESS NOTES
Progress Note  9/22/2019 7:56 AM  Subjective:   Admit Date: 9/10/2019  PCP: Deanna Rodriguez MD    Interval History: She has been better. Long family conference with  , daughter and grandson. DIET CARB CONTROL; Renal    Intake/Output Summary (Last 24 hours) at 9/22/2019 0756  Last data filed at 9/21/2019 2030  Gross per 24 hour   Intake 60 ml   Output 150 ml   Net -90 ml     Medications:      dextrose        sucralfate  1 g Oral 3 times per day    pantoprazole  20 mg Oral BID AC    sodium chloride flush  10 mL Intravenous 2 times per day    scopolamine  1 patch Transdermal Q72H    lactulose  20 g Oral 4 times per day    metoprolol  2.5 mg Intravenous Q6H    enoxaparin  30 mg Subcutaneous Daily    atorvastatin  40 mg Oral Nightly    hydrALAZINE  25 mg Oral BID    NIFEdipine  30 mg Oral BID    sennosides-docusate sodium  1 tablet Oral BID    sodium bicarbonate  325 mg Oral BID    insulin lispro  0-12 Units Subcutaneous TID WC    insulin lispro  0-6 Units Subcutaneous Nightly     Recent Labs     09/20/19  0521   WBC 5.5   HGB 8.3*   *     Recent Labs     09/20/19  0521 09/21/19  0036 09/22/19  0451    141 138   K 3.5 3.5 3.8   CL 96* 101 98   CO2 24 26 26   BUN 21 10 19   CREATININE 5.1* 2.8* 5.0*   GLUCOSE 131* 120* 151*     No results for input(s): AST, ALT, ALB, BILITOT, ALKPHOS in the last 72 hours. Objective:   Vitals: /67   Pulse 96   Temp 97.6 °F (36.4 °C) (Temporal)   Resp 15   Ht 5' 4\" (1.626 m)   Wt 168 lb (76.2 kg)   SpO2 94%   BMI 28.84 kg/m²   General appearance: alert and cooperative with exam  Lungs: clear to auscultation bilaterally  Heart: regular rate and rhythm, S1, S2 normal, no murmur, click, rub or gallop  Abdomen: Diffusely tender. Extremities: extremities normal, atraumatic, no cyanosis or edema  Neurologic: No obvious focal neurologic deficits. Assessment and Plan:   1. N/V: Is better.  EGD showed severe erosive esophagitis and biopsies

## 2019-09-23 NOTE — PROGRESS NOTES
Nephrology ([facility]) Progress Note    Patient:  Brittany Benavidez  YOB: 1945  Date of Service: 9/23/2019  MRN: [id number]   Acct: [de-identified]   Primary Care Physician: Eileen Austin MD  Advance Directive: [facility]  Admit Date: 9/10/2019       Hospital Day: 12  Referring Provider: Eileen Austin MD    Patient Seen, Chart, Consults, Notes, Labs, Radiology studies reviewed. Subjective:  Brittany Benavidez is a 69 y.o. female  whom we were consulted for consulted for end-stage renal disease.  She has diabetic nephropathy and gets dialysis at [facility] on Monday Wednesday Friday. She presented with severe nausea and vomiting.  CT scan of the abdomen showed patient had \"nodules\" in the liver and carcinomatosis consistent with possible malignancy.  Patient was also found to have lytic lesions at L5 vertebrae.  During last hospitalization she had a biopsy and biopsy was nonconclusive.  Now she is presenting with severe nausea and vomiting for the last 2 days.  Oncology has suspected metastatic disease to liver causing  this nausea and vomiting.  Recent CT scan of the abdomen did show patient has possible hepatocellular carcinoma affecting left liver lobe.  She also has 3.4 x 2 cm soft tissue mass on anterolateral left sixth rib.     Today, no new overnight events. Tolerating HD. No cp/soa. Pain controlled. No family present.   Noted that they are now considering a hospice disposition, but did not want to stop dialysis at this point.       Dialysis   Pt was seen on RRT  Modality: Hemodialysis  Access: Catheter  Location: right upper  QB: 450  QD: 700  UF: 2 Liters    Allergies:  Codeine    Medicines:  Current Facility-Administered Medications   Medication Dose Route Frequency Provider Last Rate Last Dose    promethazine (PHENERGAN) injection 6.25 mg  6.25 mg Intramuscular Q6H PRN Eileen Austin MD        dronabinol (MARINOL) capsule 2.5 mg  2.5 mg Oral BID Eileen Austin MD patient ([address]     dialysis mon, wed, friday outpt [facility]    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 [facility])     Palliative care patient 2018    Type II or unspecified type diabetes mellitus without mention of complication, not stated as uncontrolled     UTI (urinary tract infection) 2015    Gangrenous UTI with gas in urinary tract       Past Surgical History:  Past Surgical History:   Procedure Laterality Date    APPENDECTOMY       SECTION      x 2    CHOLECYSTECTOMY      COLONOSCOPY  09    Dr Can Chapman (LOWER) N/A 2016    ERCP/EUS-Dr BEKA Sutton-w/placement of a 10 Dominican x7 cm temporary plastic biliary stent-Ampullary stenosis, mild dilation of the cbd w/questionable calcification vs stone, small periampullary diverticulum, removal of biliary sludge    ENDOSCOPY, COLON, DIAGNOSTIC      ERCP  2016    ERCP/EUS-Dr BEKA Sutton-w/placement of a 10 Dominican x7 cm temporary plastic biliary stent-Ampullary stenosis, mild dilation of the cbd w/questionable calcification vs stone, small periampullary diverticulum, removal of biliary sludge    ERCP N/A 2017    Dr BEKA Sutton-Successful removal of indwelling biliary stent, no evidence of residual choledocholithiasis or common biliary stricture     EYE SURGERY      cataract    FEMUR FRACTURE SURGERY Left 2016    SHORT TFN INTERTROCHAN FRACTURE performed by Vernell Brody MD at [address] [place] [address]      femur fracture surgery    HIP FRACTURE SURGERY Left     pinning    KYPHOSIS SURGERY N/A 2019    Biopsy of L5 bone lesion performed by Patrick Enriquez DO at [address] COLONOSCOPY W/BIOPSY SINGLE/MULTIPLE N/A 3/24/2017    Dr BEKA Sutton-Diverticular disease-Tubular AP (-) dysplasia x 5--3 yr recall    UPPER GASTROINTESTINAL ENDOSCOPY N/A 10/14/2016     Angles-Hemorrhagic gastritis    UPPER GASTROINTESTINAL ENDOSCOPY  09/16/2019    Dr Tai Medeiros erosive esophagitis-secondary to severe vomiting vs. GERD-related? gastritis, duodenitis    UPPER GASTROINTESTINAL ENDOSCOPY Left 9/16/2019    EGD DIAGNOSTIC ONLY performed by Rama Grande MD at [address] [place] [address] Endoscopy    VASCULAR SURGERY  03/06/2019    SJSUltrasound guided cannulation of right internal jugular vein. Placement of right internal jugular vein tunneled dialysis catheter bard glidepath 19 cm tip to cuff.        Family History  Family History   Problem Relation Age of Onset    Arthritis Mother     Cancer Mother     High Cholesterol Mother     Arthritis Father     Diabetes Father     High Cholesterol Father     Liver Cancer Father     Arthritis Sister     Diabetes Sister     High Blood Pressure Sister     High Cholesterol Sister     Arthritis Brother     Diabetes Brother     High Blood Pressure Brother     High Cholesterol Brother     Vision Loss Brother     Colon Cancer Neg Hx     Colon Polyps Neg Hx     Esophageal Cancer Neg Hx     Liver Disease Neg Hx     Stomach Cancer Neg Hx     Rectal Cancer Neg Hx        Social History  Social History     Socioeconomic History    Marital status:      Spouse name: Miya Stone Number of children: 2    Years of education: 15    Highest education level: Not on file   Occupational History    Not on file   Social Needs    Financial resource strain: Not on file    Food insecurity:     Worry: Not on file     Inability: Not on file   [employer] needs:     Medical: Not on file     Non-medical: Not on file   Tobacco Use    Smoking status: Never Smoker    Smokeless tobacco: Never Used   Substance and Sexual Activity    Alcohol use: No    Drug use: No    Sexual activity: Not Currently     Partners: Male   Lifestyle    Physical activity:     Days per week: Not on file     Minutes per session: Not on file    Stress: Not on file Relationships    Social connections:     Talks on phone: Not on file     Gets together: Not on file     Attends Evangelical service: Not on file     Active member of club or organization: Not on file     Attends meetings of clubs or organizations: Not on file     Relationship status: Not on file    Intimate partner violence:     Fear of current or ex partner: Not on file     Emotionally abused: Not on file     Physically abused: Not on file     Forced sexual activity: Not on file   Other Topics Concern    Not on file   Social History Narrative    Born in [place]     46 years only marriage    She has one son and one daughter    Worked as a  presently retired    Education high school    Tenriism venice none    Enjoys working with a [employer] and family trees also [place]    Denies history of tobacco usage alcohol consumption or substance usage    Physically sedentary         Review of Systems:  Reviewed with the patient at the bedside, 6 points reviewed and negative except as noted above. Objective:  BP: 119/69   HR 81  General: awake/alert   Chest:  clear to auscultation bilaterally without respiratory distress  CVS: regular rate and rhythm  Abdominal: soft, nontender, normal bowel sounds  Extremities: no cyanosis or edema  Skin: warm and dry without rash    Labs:  BMP:   Recent Labs     09/21/19  0036 09/22/19  0451 09/23/19  0313    138 143   K 3.5 3.8 3.6    98 98   CO2 26 26 30*   BUN 10 19 26*   CREATININE 2.8* 5.0* 6.6*   CALCIUM 8.9 9.2 9.4     CBC: No results for input(s): WBC, HGB, HCT, MCV, PLT in the last 72 hours. LIVER PROFILE: No results for input(s): AST, ALT, LIPASE, BILIDIR, BILITOT, ALKPHOS in the last 72 hours. Invalid input(s): AMYLASE,  ALB  PT/INR: No results for input(s): PROTIME, INR in the last 72 hours. APTT: No results for input(s): APTT in the last 72 hours. BNP:  No results for input(s): BNP in the last 72 hours.   Ionized Calcium:No results for

## 2019-09-23 NOTE — PROGRESS NOTES
Full ROM in all 4 extremities   Neurological: She is alert and oriented to person, place, and time. Coordination normal.   Sleepy, easily arouses and interacts     Skin: Skin is warm and dry. No rash noted. There is pallor. Psychiatric: She has a normal mood and affect. Her behavior is normal. Thought content normal.     Recent Labs     09/20/19  0521 09/19/19  0517 09/16/19  0423   WBC 5.5 4.3* 5.1   HGB 8.3* 8.1* 9.1*   HCT 26.8* 25.9* 27.7*   MCV 99.6* 100.4* 97.5   * 115* 131       Lab Results   Component Value Date     09/23/2019    K 3.6 09/23/2019    CL 98 09/23/2019    CO2 30 (H) 09/23/2019    BUN 26 (H) 09/23/2019    CREATININE 6.6 (H) 09/23/2019    GLUCOSE 134 (H) 09/23/2019    CALCIUM 9.4 09/23/2019    PROT 6.4 (L) 09/17/2019    LABALBU 3.2 (L) 09/17/2019    BILITOT 0.4 09/17/2019    ALKPHOS 83 09/17/2019    AST 25 09/17/2019    ALT 6 09/17/2019    LABGLOM 6 (A) 09/23/2019    GLOB 2.5 03/29/2017       Lab Results   Component Value Date    INR 1.08 09/17/2019    INR 1.09 08/23/2019    INR 0.87 (L) 11/02/2016    PROTIME 13.4 09/17/2019    PROTIME 13.5 08/23/2019    PROTIME 11.9 (L) 11/02/2016     RADIOLOGY STUDIES REVIEWED BY DR. Sussy Valencia 9/10/19     Xr Lumbar Spine (2-3 Views)     Result Date: 8/26/2019  1. Intraoperative fluoroscopic guidance as described. 2. Please refer to real-time fluoroscopy and operative report for full details. Signed by Dr Eli Mcnally on 8/26/2019 3:34 PM     Ct Head Wo Contrast     Result Date: 8/23/2019  Changes of aging with no acute intracranial abnormality Signed by Dr Jaison Maldonado on 8/23/2019 7:14 AM     Ct Chest W Contrast     Result Date: 8/24/2019  1. No acute cardiopulmonary process. 2. Kyphoplasty said T11, L1 and L2. Signed by Dr Jaison Maldonado on 8/24/2019 3:39 PM     Ct Cervical Spine Wo Contrast     Result Date: 8/23/2019  An osteolytic expansile lesion involving the left side of the vertebral body C3 extending into the pedicle.  Suspect a partially imaged. T11, L1, and L2 compression fracture status post augmentation appear stable in degree of height loss compared to the prior CT.     Progressive infiltrative hypodensity throughout the left liver with multiple adjacent low-density lesions. Findings are concerning for progressive primary liver neoplasm or metastasis. Hepatic periportal widening suggests underlying chronic liver disease. Enlarging lytic lesion in the left anterolateral sixth rib and within the L5 vertebral body, compatible with osseous metastatic disease. Progressive peritoneal and omental stranding with multiple small nodules, worrisome for omental/peritoneal carcinomatosis. No definite acute bowel pathology. There are some diverticula within the sigmoid colon that have adjacent inflammation but inflammation is felt to be secondary to carcinomatosis rather than acute bowel inflammation although clinical correlation is necessary. Signed by Dr Reynold Ricketts on 9/10/2019 8:33 AM       ASSESSMENT:    # Well differentiated metastatic adenocarcinoma, stage V. Morphology favors upper GI tract including pancreaticobiliary tree although other sites of origin certainly cannot be excluded. Prognosis-poor due to metastatic disease stage IV. Treatment is also complicated by history of chronic kidney disease terminal stage on dialysis. Chemotherapy options are limited. Palliative care/hospice consult has been discussed. Awaiting family decision. · CT abdomen and pelvis without contrast 9/10/2019 revealed progressive infiltrative density throughout the left liver with multiple adjacent low-density lesions. This is concerning for a primary liver neoplasm versus metastases. 3.4 x 2 x 1 cm soft tissue mass expanding the anterolateral left sixth rib. An enlarging 2 cm lytic lesion in the anterior L5 vertebral body.   · CT of Abdomen and pelvis on 9/11/2019 There is an infiltrative enhancing lesion in the anterior aspect of the left hepatic lobe that involves segments 2, 3, and 4. This is highly suspicious for metastatic disease and measures 13.2 cm in width, 7.2 cm in length, and 7.4 cm in AP dimension. Bone metastases are demonstrated in the L5 vertebral body and in the 6th left rib. Multiple levels of kyphoplasty are noted. · MRI brain without contrast 9/10/2019 was negative for any obvious intracranial mass. · CEA - 4.1  · CA 19-9 - 108 on 9/12/2019 (153 on 11/2/2016 and 91 on 1/11/2017)  · AFP 1.7 on 9/12/2019  · Ammonia level 57 on 9/14/2019, 32 on 9/17/2019 and 17 on 9/18/2019. Cipriano Ricardo has been refusing lactulose. · CT-guided chest wall of the left rib mass biopsy on 9/17/2019 consistent with well differentiated metastatic adenocarcinoma. Morphology favors upper GI tract including pancreaticobiliary tree although other sites of origin certainly cannot be excluded. Metastatic adenocarcinoma of unknown primary site- likely upper GI tract. Unfortunately IHC studies could not be performed. · Liver enzymes normal.     Poor prognosis due to metastatic disease. She is a poor candidate for palliative chemotherapy due to concurrent end-stage renal disease. #Nausea/vomiting-unknown etiology, suspect secondary to liver lesion. Improved. · Marinol  · Scopolamine patch  · Zofran PRN nausea    #Esophageal thickening   · EGD 9/16/2019 revealed severe erosive esophagitis, grade 3 involving nearly the entire length of the esophagus. Gastritis was noted in the body and antrum of the stomach. No masses or obstructions were noted. Pathology pending. · On PPI    Anemia, multifactorial r/t metastatic adenocarcinoma, chronic disease, ESRD, etc.  Hgb 8.3, MCV 99.6    #CKD, HD dependent  Creatinine 5.0, GFR 8 on 9/22/19  HD MWF per nephrology hemodialysis today      #Bone lytic lesions- prior L5 biopsy was nondiagnostic. Neurosurgery following as outpatient.      #Pain secondary to disease process  Percoct tablet every 6 hours as needed for

## 2019-09-24 NOTE — DISCHARGE INSTR - COC
Continuity of Care Form    Patient Name: Zan Epps   :  1945  MRN:  [id number]    Admit date:  9/10/2019  Discharge date:  ***    Code Status Order: [facility]   Advance Directives:   [facility] Documentation     Date/Time Healthcare Directive Type of Healthcare Directive Copy in [address] Agent's Name Healthcare Agent's Phone Number    19 1411  Yes, patient has an advance directive for healthcare treatment  Living will  Yes, copy in chart --  [employer]  [phone], [phone], [phone]    19 1201  Yes, patient has an advance directive for healthcare treatment  Living will  Yes, copy in chart --  [facility]  60 [phone], [phone], [phone]    19 1528  Yes, patient has an advance directive for healthcare treatment  Living will  Yes, copy in chart --  [facility]  [phone], [phone], [address] [place] [address]          Admitting Physician:  Deanna Rodriguez MD  PCP: Deanna Rodriguez MD    Discharging Nurse: [facility] Unit/Room#: 0307/307-01  Discharging Unit Phone Number: ***    Emergency Contact:   Extended Emergency Contact Information  Primary Emergency Contact: Marshall Arzola   [address] Phone: [phone]  Mobile Phone: [phone]  Relation: Child  Secondary Emergency Contact: Cortes Arzola  Address: [address] [place] [address] Phone: [phone]  Mobile Phone: [phone]  Relation: Spouse    Past Surgical History:  Past Surgical History:   Procedure Laterality Date    APPENDECTOMY       SECTION      x 2    CHOLECYSTECTOMY      COLONOSCOPY  09    Dr Fang Carreno ([facility]) N/A 2016    ERCP/EUS-Dr BEKA Sutton-w/placement of a 10 Serbian x7 cm temporary plastic biliary stent-Ampullary stenosis, mild dilation of the cbd [facility]) 10/15/2016, 11/16/2017    Pneumococcal Polysaccharide ([id number]) 10/01/2011       Active Problems:  Patient Active Problem List   Diagnosis Code    History of adenomatous polyp of colon Z86.010    Anemia D64.9    Essential hypertension, benign I10    Diabetes mellitus, type 2 (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    Biliary stricture K83.1    Elevated CA 19-9 level R97.8    History of biliary stent insertion Z98.890    Closed compression fracture of second lumbar vertebra (HCC) S32.020A    Closed compression fracture of first lumbar vertebra (HCC) S32.010A    Abdominal pain R10.9    Abnormal CT of the abdomen R93.5    Chronic systolic heart failure (HCC) I50.22    Stage 5 chronic kidney disease on chronic dialysis (HCC) N18.6, Z99.2    Abnormal electrocardiogram R94.31    Dyspnea on effort R06.09    Abnormal stress ECG R94.39    Coronary artery disease involving native coronary artery of native heart without angina pectoris I25.10    Pulmonary hypertension (HCC) I27.20    Nocturnal hypoxia G47.34    Decreased mobility and endurance Z74.09    Left main coronary artery disease I25.10    Mixed hyperlipidemia E78.2    Palliative care patient Z51.5    Chronic back pain M54.9, G89.29    Congestive heart failure (CHF) (HCC) I50.9    Diabetic peripheral neuropathy associated with type 2 diabetes mellitus (HCC) E11.42    ESRD (end stage renal disease) ([facility] N18.6    Hemodialysis patient ([facility] Z99.2    Hypokalemia E87.6    Insomnia G47.00    Multi-vessel coronary artery stenosis I25.10    Vitamin D deficiency E55.9    Intractable pain R52    Distant metastasis staging category M1b involving bone (HCC) C79.51    Liver lesion K76.9    Lytic bone lesions on xray M89.9    Osteolytic lesion due to metastasis with unknown primary site (HCC) C79.51, C80.1    Asterixis R27.8    Involuntary movements R25.9       Isolation/Infection:   Isolation

## 2019-09-24 NOTE — PROGRESS NOTES
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 [facility])     Palliative care patient 2018    Type II or unspecified type diabetes mellitus without mention of complication, not stated as uncontrolled     UTI (urinary tract infection)     Gangrenous UTI with gas in urinary tract       Past Surgical History:  Past Surgical History:   Procedure Laterality Date    APPENDECTOMY       SECTION      x 2    CHOLECYSTECTOMY      COLONOSCOPY  09    Dr Tran Morel (LOWER) N/A 2016    ERCP/EUS-Dr BEKA Sutton-w/placement of a 10 Welsh x7 cm temporary plastic biliary stent-Ampullary stenosis, mild dilation of the cbd w/questionable calcification vs stone, small periampullary diverticulum, removal of biliary sludge    ENDOSCOPY, COLON, DIAGNOSTIC      ERCP  2016    ERCP/EUS-Dr BEKA Sutton-w/placement of a 10 Welsh x7 cm temporary plastic biliary stent-Ampullary stenosis, mild dilation of the cbd w/questionable calcification vs stone, small periampullary diverticulum, removal of biliary sludge    ERCP N/A 2017    Dr BEKA Sutton-Successful removal of indwelling biliary stent, no evidence of residual choledocholithiasis or common biliary stricture     EYE SURGERY      cataract    FEMUR FRACTURE SURGERY Left 2016    SHORT TFN INTERTROCHAN FRACTURE performed by Huma Ferguson MD at [address] [place] [address]      femur fracture surgery    HIP FRACTURE SURGERY Left     pinning    KYPHOSIS SURGERY N/A 2019    Biopsy of L5 bone lesion performed by Papo Eaton DO at [facility] [address] COLONOSCOPY W/BIOPSY SINGLE/MULTIPLE N/A 3/24/2017    Dr BEKA Sutton-Diverticular disease-Tubular AP (-) dysplasia x 5--3 yr recall    UPPER GASTROINTESTINAL ENDOSCOPY N/A 10/14/2016    Dr Simons-Hemorrhagic gastritis    UPPER GASTROINTESTINAL ENDOSCOPY  2019     Not on file     Attends [employer] service: Not on file     Active member of club or organization: Not on file     Attends meetings of clubs or organizations: Not on file     Relationship status: Not on file    Intimate partner violence:     Fear of current or ex partner: Not on file     Emotionally abused: Not on file     Physically abused: Not on file     Forced sexual activity: Not on file   Other Topics Concern    Not on file   Social History Narrative    Born in [place]     46 years only marriage    She has one son and one daughter    Worked as a  presently retired    Education high school    Sabianism venice none    Enjoys working with a genealogy and family trees also ceramics    Denies history of tobacco usage alcohol consumption or substance usage    Physically sedentary         Review of Systems:  History obtained from chart review and the patient  General ROS: No fever or chills  Respiratory ROS: No cough, shortness of breath, wheezing  Cardiovascular ROS: No chest pain or palpitations  Gastrointestinal ROS: No abdominal pain or melena  Genito-Urinary ROS: No dysuria or hematuria  Musculoskeletal ROS: No joint pain or swelling         Objective:  Patient Vitals for the past 24 hrs:   BP Temp Temp src Pulse Resp SpO2   09/24/19 0630 122/61 97.2 °F (36.2 °C) -- 98 16 90 %   09/24/19 0134 (!) 104/54 96.5 °F (35.8 °C) Temporal 97 18 95 %   09/23/19 2310 -- -- -- 96 -- --   09/23/19 1819 128/70 96.4 °F (35.8 °C) Temporal 96 16 96 %   09/23/19 1730 121/70 -- -- 104 16 --   09/23/19 1139 120/70 98.2 °F (36.8 °C) Temporal 103 16 95 %       Intake/Output Summary (Last 24 hours) at 9/24/2019 0844  Last data filed at 9/24/2019 0836  Gross per 24 hour   Intake 360 ml   Output 1500 ml   Net -1140 ml     General: awake/alert   Chest:  clear to auscultation bilaterally without respiratory distress  CVS: regular rate and rhythm  Abdominal: soft, nontender, normal bowel sounds  Extremities: no cyanosis or edema  Skin: warm and dry without rash    Labs:  BMP:   Recent Labs     09/22/19  0451 09/23/19  0313 09/24/19  0325    143 141   K 3.8 3.6 4.9   CL 98 98 98   CO2 26 30* 31*   BUN 19 26* 14   CREATININE 5.0* 6.6* 3.4*   CALCIUM 9.2 9.4 8.8     CBC:   Recent Labs     09/24/19  0325   WBC 5.4   HGB 7.5*   HCT 24.9*   .5*        LIVER PROFILE:   No results for input(s): AST, ALT, LIPASE, BILIDIR, BILITOT, ALKPHOS in the last 72 hours. Invalid input(s): AMYLASE,  ALB  PT/INR:   No results for input(s): PROTIME, INR in the last 72 hours. APTT:   No results for input(s): APTT in the last 72 hours. BNP:  No results for input(s): BNP in the last 72 hours. Ionized Calcium:No results for input(s): IONCA in the last 72 hours. Magnesium:  No results for input(s): MG in the last 72 hours. Phosphorus:  No results for input(s): PHOS in the last 72 hours. HgbA1C: No results for input(s): LABA1C in the last 72 hours. Hepatic:   No results for input(s): ALKPHOS, ALT, AST, PROT, BILITOT, BILIDIR, LABALBU in the last 72 hours. Lactic Acid: No results for input(s): LACTA in the last 72 hours. Troponin: No results for input(s): CKTOTAL, CKMB, TROPONINT in the last 72 hours. ABGs:   Lab Results   Component Value Date    PHART 7.380 08/10/2018    PO2ART 65.0 08/10/2018    NFN8ECK 30.0 08/10/2018     CRP:  No results for input(s): CRP in the last 72 hours. Sed Rate:  No results for input(s): SEDRATE in the last 72 hours. Culture Results:   Blood Culture Recent: No results for input(s): BC in the last 720 hours. Urine Culture Recent : No results for input(s): LABURIN in the last 720 hours. Radiology reports as per the Radiologist  Radiology: Ct Abdomen Pelvis W Wo Contrast Additional Contrast? Oral    Result Date: 9/11/2019  CT ABDOMEN PELVIS W WO CONTRAST 9/11/2019 12:45 PM HISTORY: Possible liver metastases COMPARISON: CT 9/10/2019 DLP: 4578 mGy cm.  In order to have a CT radiation dose as low as cancer Comparison: 8/4/2019 DLP: 1245 mGy cm. In order to have a CT radiation dose as low as reasonably achievable, Automated Exposure Control was utilized for adjustment of the mA and/or KV according to patient size. Findings: The lung bases are clear. 3.4 x 2 x 1 cm soft tissue mass expands the anterolateral left sixth rib. Enlarging 2 cm lytic lesion in the anterior L5 vertebral body. Increased infiltrating low density throughout the anterior left liver involving segments 2, 3, and 4 with multiple adjacent low-density lesions. There is also a 7 mm low-density lesion in the inferior right liver on image 47 that was not seen on the prior study. Findings of periportal widening indicating chronic liver disease. Gallbladder is surgically absent. No peripancreatic inflammation. Spleen and adrenal glands appear normal. Renal vascular calcifications noted. No hydronephrosis. No focal urinary bladder abnormality. No abnormal bowel distention or focal wall thickening. Normal appendix. A few colonic diverticula are noted. Small hiatal hernia. Small duodenal diverticulum. Small volume perihepatic ascites. There is progressive stranding and nodularity in the anterior peritoneum/omentum. No pelvic mass organized pelvic collection. Uterus and adnexa appear unremarkable. Abdominal aorta is normal in caliber and contains atherosclerotic calcification. Borderline enlarged retroperitoneal lymph nodes are stable. For example, mildly enlarged right retroperitoneal lymph node on image 49 is unchanged. Left femoral IM nail partially imaged. T11, L1, and L2 compression fracture status post augmentation appear stable in degree of height loss compared to the prior CT. Progressive infiltrative hypodensity throughout the left liver with multiple adjacent low-density lesions. Findings are concerning for progressive primary liver neoplasm or metastasis. Hepatic periportal widening suggests underlying chronic liver disease.  Enlarging lytic thickening involving the right posterior ethmoids. The paranasal sinuses are otherwise clear. The mastoids are clear. Marrow signal in the calvarium and skull base appears normal.    Impression: 1. No visualized mass lesion/metastatic disease on this noncontrast exam. 2. Evidence for advanced chronic small vessel ischemia with multiple old deep white matter/basal ganglia lacunar infarcts.  Signed by Dr Cecil Kemp on 9/10/2019 3:59 PM       Assessment   End-stage renal disease  Diabetes type 2 with diabetic nephropathy  Disseminated metastatic disease  Anemia of chronic kidney disease  Secondary hyperparathyroidism  Hypokalemia    Plan:  HD MWF  Monitor labs  Oncology following, now considering hospice for disposition at a later date      Tatyana Segundo MD  09/24/19  8:44 AM

## 2019-09-24 NOTE — PROGRESS NOTES
were taken. She is getting carafate and twice daily PPI. Will try marinol as well. Still significant N&V at times. I will cancel discharge plans. 2. Liver mass: Bx shows metastatic adenocarcinoma. Wants to go home with home health and hold off on hospice as she is not ready to stop HD. 3. Bone mets: Metastatic adenocarcinoma. 4. GERD: See orders as recommended by GI.  5. Encephalopathy: Much better. Likely due to the liver and she did have an elevated ammonia level. 6. Hypokalemia: Better. 7. DNR status per daughter and the patient. 8. Diverticulitis: Completed a full course of abx and she is asymptomatic. Abx have been stopped. 9. ESRD: HD to continue. Advance Directive: DNR-CC  DVT prophylaxis with enoxaparin 40 mg sub-Q daily. Discharge planning: ? 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  Jarod Paula MD

## 2019-09-30 PROBLEM — R41.82 ALTERED MENTAL STATUS: Status: ACTIVE | Noted: 2019-01-01

## 2019-10-01 PROBLEM — C26.9: Status: ACTIVE | Noted: 2019-01-01

## 2023-08-15 NOTE — DISCHARGE SUMMARY
MITCHNITHIN GUTIÉRREZ [employer] [facility] [address] [facility]                               DISCHARGE SUMMARY    PATIENT NAME: Andrews Guallpa                        :        1945  MED REC NO:   [id number]                              ROOM:       [facility]  ACCOUNT NO:   [de-identified]                           ADMIT DATE: 2019  PROVIDER:     Alex Rico MD                   [facility] DATE: 2019    HOSPITAL COURSE:  This is a 72-year-old admitted with angina and fluid  overload. She was seen in consultation by Dr. Lena Stewart and Dr. Rosa Dennison. She has known inoperable coronary artery disease and she had dropped her  hemoglobin due to the chronic kidney disease, and I believe that was the  source of her angina and fluid overload. She has had a unit of blood. Her hemoglobin has increased from 7.5 to 8.8. She has also been  hemodialyzed and 3 liters of fluid had been removed and I believe this  has contributed to helping her. This morning, she is without any chest  pain. She is sitting up. She is moving around in the room without  problems. Her vitals are stable and she is felt ready for discharge  home by Dr. Rosa Dennison, Dr. Lena Stewart, and myself. During this admission, she  had one unit of packed red cells transfusion. She also had a  hemodialysis on Friday. She had a good fluid removal by the  hemodialysis, as I said she has been stable. Her oxygen levels were  good. She and her  felt she is ready for discharge to home. Her  medicines on discharge will be aspirin 81 mg daily as on admission;  hydrocodone 7.5 mg twice daily as needed as on admission; sodium  bicarbonate 325 mg twice daily as on admission, NovoLog FlexPen she uses  on as needed and sliding scale basis as on admission; Basaglar insulin  30 units once daily as on admission; Januvia 50 mg daily as on  admission; atorvastatin 40 mg nightly as on admission.   We stopped her  Cardura as her blood pressure Allergic Rx

## 2024-02-27 NOTE — PATIENT INSTRUCTIONS
Return in about 3 months (around 9/6/2019) for APRN. Consider [facility] Fl[facility] Ortiz- will discuss with Nephrology and let you know    - Weigh daily and report weight gain of 3lbs or more in 24hrs or 5lbs in one week. - Call for increasing shortness of breath or increasing swelling in feet and legs. (This could mean you are retaining too much fluid)  - 2000mg low sodium diet  - Fluid restriction of 1500ml per day (about 6 cups of fluid per day)    Keep diabetes under control to help prevent further heart disease. Keep Hemoglobin A1C less than 7%.     Patient Education
Price (Do Not Change): 0.00
Detail Level: Simple
Instructions: This plan will send the code FBSE to the PM system.  DO NOT or CHANGE the price.

## (undated) DEVICE — BAG BND W36XL36IN TRNSPAR POLY GEN PURP W E BND CLSR TIDI

## (undated) DEVICE — SHEET,DRAPE,53X77,STERILE: Brand: MEDLINE

## (undated) DEVICE — SNARE ENDOSCP L240CM LOOP W13MM SHTH DIA2.4MM SM OVL FLX

## (undated) DEVICE — MINOR CDS: Brand: MEDLINE INDUSTRIES, INC.

## (undated) DEVICE — STRIP,CLOSURE,WOUND,MEDI-STRIP,1/2X4: Brand: MEDLINE

## (undated) DEVICE — ENDO KIT,LOURDES HOSPITAL: Brand: MEDLINE INDUSTRIES, INC.

## (undated) DEVICE — MEDIA CONTRAST INJ VISAPAQUE 50ML 320MG

## (undated) DEVICE — GLOVE SURG SZ 75 L12IN FNGR THK94MIL TRNSLUC YEL LTX

## (undated) DEVICE — GOWN,PREVENTION PLUS,L,ST,24/CS: Brand: MEDLINE

## (undated) DEVICE — GLOVE SURG SZ 8 L12IN FNGR THK79MIL GRN LTX FREE

## (undated) DEVICE — 3M™ IOBAN™ 2 ANTIMICROBIAL INCISE DRAPE 6650EZ: Brand: IOBAN™ 2

## (undated) DEVICE — SHEET,T,THYROID,STERILE: Brand: MEDLINE

## (undated) DEVICE — BONE TAMP KIT KPX203PB FF X2 20/3 1 STP: Brand: KYPHOPAK™ TRAY

## (undated) DEVICE — SUTURE VCRL SZ 3-0 L27IN ABSRB UD L26MM SH 1/2 CIR J416H

## (undated) DEVICE — FORCEPS BX L240CM DIA2.4MM L NDL RAD JAW 4 133340

## (undated) DEVICE — FORCEPS BX L240CM JAW DIA2.4MM ORNG L CAP W/ NDL DISP RAD

## (undated) DEVICE — CLIP INT L235CM WRK CHN DIA2.8MM OPN 11MM BRAID CATH ROT BX/10

## (undated) DEVICE — BONE BIOPSY DEVICE F05A BBD SIZE 3: Brand: MEDTRONIC REUSABLE INSTRUMENTS